# Patient Record
Sex: MALE | Race: WHITE | NOT HISPANIC OR LATINO | Employment: OTHER | URBAN - METROPOLITAN AREA
[De-identification: names, ages, dates, MRNs, and addresses within clinical notes are randomized per-mention and may not be internally consistent; named-entity substitution may affect disease eponyms.]

---

## 2018-05-01 ENCOUNTER — RECORDS - HEALTHEAST (OUTPATIENT)
Dept: LAB | Facility: CLINIC | Age: 78
End: 2018-05-01

## 2018-05-01 LAB
ALBUMIN SERPL-MCNC: 3.6 G/DL (ref 3.5–5)
ALP SERPL-CCNC: 86 U/L (ref 45–120)
ALT SERPL W P-5'-P-CCNC: 22 U/L (ref 0–45)
ANION GAP SERPL CALCULATED.3IONS-SCNC: 11 MMOL/L (ref 5–18)
AST SERPL W P-5'-P-CCNC: 25 U/L (ref 0–40)
BILIRUB SERPL-MCNC: 0.3 MG/DL (ref 0–1)
BUN SERPL-MCNC: 25 MG/DL (ref 8–28)
CALCIUM SERPL-MCNC: 9.3 MG/DL (ref 8.5–10.5)
CHLORIDE BLD-SCNC: 106 MMOL/L (ref 98–107)
CHOLEST SERPL-MCNC: 179 MG/DL
CO2 SERPL-SCNC: 21 MMOL/L (ref 22–31)
CREAT SERPL-MCNC: 1.13 MG/DL (ref 0.7–1.3)
FASTING STATUS PATIENT QL REPORTED: NO
GFR SERPL CREATININE-BSD FRML MDRD: >60 ML/MIN/1.73M2
GLUCOSE BLD-MCNC: 122 MG/DL (ref 70–125)
HDLC SERPL-MCNC: 46 MG/DL
LDLC SERPL CALC-MCNC: 109 MG/DL
POTASSIUM BLD-SCNC: 4.2 MMOL/L (ref 3.5–5)
PROT SERPL-MCNC: 7 G/DL (ref 6–8)
PSA SERPL-MCNC: 2.6 NG/ML (ref 0–6.5)
SODIUM SERPL-SCNC: 138 MMOL/L (ref 136–145)
TRIGL SERPL-MCNC: 122 MG/DL

## 2019-05-02 ENCOUNTER — RECORDS - HEALTHEAST (OUTPATIENT)
Dept: LAB | Facility: CLINIC | Age: 79
End: 2019-05-02

## 2019-05-02 LAB
ANION GAP SERPL CALCULATED.3IONS-SCNC: 10 MMOL/L (ref 5–18)
BUN SERPL-MCNC: 22 MG/DL (ref 8–28)
CALCIUM SERPL-MCNC: 9.5 MG/DL (ref 8.5–10.5)
CHLORIDE BLD-SCNC: 106 MMOL/L (ref 98–107)
CHOLEST SERPL-MCNC: 173 MG/DL
CO2 SERPL-SCNC: 22 MMOL/L (ref 22–31)
CREAT SERPL-MCNC: 1.06 MG/DL (ref 0.7–1.3)
FASTING STATUS PATIENT QL REPORTED: YES
GFR SERPL CREATININE-BSD FRML MDRD: >60 ML/MIN/1.73M2
GLUCOSE BLD-MCNC: 92 MG/DL (ref 70–125)
HDLC SERPL-MCNC: 48 MG/DL
LDLC SERPL CALC-MCNC: 114 MG/DL
POTASSIUM BLD-SCNC: 4.7 MMOL/L (ref 3.5–5)
SODIUM SERPL-SCNC: 138 MMOL/L (ref 136–145)
TRIGL SERPL-MCNC: 56 MG/DL

## 2020-06-17 ENCOUNTER — RECORDS - HEALTHEAST (OUTPATIENT)
Dept: LAB | Facility: CLINIC | Age: 80
End: 2020-06-17

## 2020-06-17 LAB
ALBUMIN SERPL-MCNC: 3.8 G/DL (ref 3.5–5)
ALP SERPL-CCNC: 87 U/L (ref 45–120)
ALT SERPL W P-5'-P-CCNC: 13 U/L (ref 0–45)
ANION GAP SERPL CALCULATED.3IONS-SCNC: 8 MMOL/L (ref 5–18)
AST SERPL W P-5'-P-CCNC: 17 U/L (ref 0–40)
BILIRUB SERPL-MCNC: 0.4 MG/DL (ref 0–1)
BUN SERPL-MCNC: 21 MG/DL (ref 8–28)
CALCIUM SERPL-MCNC: 9.3 MG/DL (ref 8.5–10.5)
CHLORIDE BLD-SCNC: 106 MMOL/L (ref 98–107)
CHOLEST SERPL-MCNC: 176 MG/DL
CO2 SERPL-SCNC: 23 MMOL/L (ref 22–31)
CREAT SERPL-MCNC: 1.2 MG/DL (ref 0.7–1.3)
FASTING STATUS PATIENT QL REPORTED: NORMAL
GFR SERPL CREATININE-BSD FRML MDRD: 59 ML/MIN/1.73M2
GLUCOSE BLD-MCNC: 113 MG/DL (ref 70–125)
HDLC SERPL-MCNC: 43 MG/DL
LDLC SERPL CALC-MCNC: 108 MG/DL
POTASSIUM BLD-SCNC: 4.1 MMOL/L (ref 3.5–5)
PROT SERPL-MCNC: 7 G/DL (ref 6–8)
SODIUM SERPL-SCNC: 137 MMOL/L (ref 136–145)
TRIGL SERPL-MCNC: 124 MG/DL

## 2021-01-13 ENCOUNTER — RECORDS - HEALTHEAST (OUTPATIENT)
Dept: LAB | Facility: CLINIC | Age: 81
End: 2021-01-13

## 2021-01-13 ENCOUNTER — TRANSFERRED RECORDS (OUTPATIENT)
Dept: HEALTH INFORMATION MANAGEMENT | Facility: CLINIC | Age: 81
End: 2021-01-13

## 2021-01-13 LAB
ANION GAP SERPL CALCULATED.3IONS-SCNC: 7 MMOL/L (ref 5–18)
BUN SERPL-MCNC: 24 MG/DL (ref 8–28)
CALCIUM SERPL-MCNC: 8.7 MG/DL (ref 8.5–10.5)
CHLORIDE BLD-SCNC: 107 MMOL/L (ref 98–107)
CO2 SERPL-SCNC: 23 MMOL/L (ref 22–31)
CREAT SERPL-MCNC: 1.09 MG/DL (ref 0.7–1.3)
GFR SERPL CREATININE-BSD FRML MDRD: >60 ML/MIN/1.73M2
GLUCOSE BLD-MCNC: 90 MG/DL (ref 70–125)
POTASSIUM BLD-SCNC: 4.5 MMOL/L (ref 3.5–5)
SODIUM SERPL-SCNC: 137 MMOL/L (ref 136–145)

## 2021-04-07 ENCOUNTER — PRE VISIT (OUTPATIENT)
Dept: NEUROLOGY | Facility: CLINIC | Age: 81
End: 2021-04-07

## 2021-04-07 DIAGNOSIS — G60.0 CMT (CHARCOT-MARIE-TOOTH DISEASE): Primary | ICD-10-CM

## 2021-04-07 NOTE — TELEPHONE ENCOUNTER
CMT NEW PATIENT    Family History of CMT?  o  Father diagnosed with CMT - had very high arches, hammertoes, foot problems, trouble walking in the later years  o Paternal cousin (from father's sister) recently dagnosed with CMT 2-3 years ago, having balance problems  o Pt's daughter was tested around age 7 - had EMG and diagnosed with CMT, has shoes inserts, no major walking/balance problems, has never been very athletic as this was difficult for her  o Pt's grandson (daughter's son) - possible signs of CMT - not tested yet, feet looked like his mother's when she was young, still athletic       When did their symptoms start? Began in childhood, had very flat feet, one foot started to invert, local orthopedist placed feet in casts on both feet for 6 months, had foot surgery when he was young, was never athletic, could not run, poor balance, was finally diagnosed about 40 years ago. Pt had seen a Dr. Christopher Iniguez (an orthopedist), R foot beginning to become numb      Who initially gave them the diagnosis? Dr. Christopher Luther's at [facility]. Clinic and hospital has now closed. EMG and muscle biopsy was done but likely no records at this time.       Has genetic testing been completed? If so, do they know what type they have? None      Hammertoes? Yes, gets calluses on feet more often now, progressively gotten worse over the years      High Arches? Not very high arches, had some foot surgery in the past as a child       Ankle weakness/foot drop? Yes, has both. Occasional trips and falls mostly when walking onto curbs or uneven ground. Had multiple falls 4-5 years ago but has been better past couple years 2-3, had fall past year and hit his head and got couple stitches above eyebrow       Hand weakness? 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 Uses walking stick if hiking, harder to stand on  uneven ground, used more for balance, sometimes hard to maintain balance if standing still        Wearing orthotics (AFO s or foot orthotics)? Yes, has inserts for his arches, last saw orthotist 3 years ago      To determine if they qualify for any research studies, would they be interested in meeting with the Research Coordinator? 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   o Losartan 50mg, once daily  o diltiazem 120mg, once daily  o Aspirin at bedtime   o No allergies       What to expect during your visit:    The CMT clinic takes place every 2nd Tuesday of the month. It is a multi-disciplinary clinic where the pt sees multiple specialists during their visit. The team consists of the neurologist, physical therapist, occupational therapist, orthotist, genetic counselor, , research coordinator and a representative from the [employer] .     If they are scheduled in the afternoon, we will need them to arrive at 12:15PM. Their departure time will vary but could be as late as 5PM.    Did they receive the NPP? If not, we can either fax it or email it to them. To be sent to pt, will call pt closer to appt date and make sure they received or else e-mail them copy    It is imperative that they have this completed and can bring this with to the appt. Dr Love requires that all new pt s fill this questionnaire  out.     We will be in touch with you closer to the appt date to update your chart and complete a pre-visit .       RECAP OF SERVICES NEEDED: PT, genetic counselor, orthotist, OT referral if ordered, SW, research coordinator, VINNY Reilly RNCC  Neurology

## 2021-04-07 NOTE — TELEPHONE ENCOUNTER
RECORDS RECEIVED FROM: Self   Date of Appt: 7/12/21   NOTES (FOR ALL VISITS) STATUS DETAILS   OFFICE NOTE from referring provider N/A    OFFICE NOTE from other specialist In process Josephra:   DISCHARGE SUMMARY from hospital N/A    DISCHARGE REPORT from the ER N/A    OPERATIVE REPORT N/A    MEDICATION LIST In process    IMAGING  (FOR ALL VISITS)     EMG N/A    EEG N/A    LUMBAR PUNCTURE N/A    JIM SCAN N/A    ULTRASOUND (CAROTID BILAT) *VASCULAR* N/A    MRI (HEAD, NECK, SPINE) N/A    CT (HEAD, NECK, SPINE) N/A       Action 4/7/21 MV 3.00pm   Action Taken Records request faxed to [email or web address]     Action 4/8/21 MV 12.59pm   Action Taken Records received from [email or web address], however pt cancelled appt. Sent records to scanning.

## 2021-05-01 ENCOUNTER — HEALTH MAINTENANCE LETTER (OUTPATIENT)
Age: 81
End: 2021-05-01

## 2021-05-05 NOTE — TELEPHONE ENCOUNTER
Pt contacted and pt's wife informed of arrival time to be 12:15pm. Updated that neurology clinic is now located on the first floor and to enter via East entrance and check-in at desk C. Pt to bring completed NPP with him to appt.       Bertha Reilly, RNCC  Neurology

## 2021-05-11 ENCOUNTER — OFFICE VISIT (OUTPATIENT)
Dept: NEUROLOGY | Facility: CLINIC | Age: 81
End: 2021-05-11
Payer: COMMERCIAL

## 2021-05-11 ENCOUNTER — DOCUMENTATION ONLY (OUTPATIENT)
Dept: ORTHOPEDICS | Facility: CLINIC | Age: 81
End: 2021-05-11

## 2021-05-11 ENCOUNTER — HOSPITAL ENCOUNTER (OUTPATIENT)
Dept: PHYSICAL THERAPY | Facility: CLINIC | Age: 81
Setting detail: THERAPIES SERIES
End: 2021-05-11
Attending: PSYCHIATRY & NEUROLOGY
Payer: COMMERCIAL

## 2021-05-11 VITALS
HEART RATE: 73 BPM | DIASTOLIC BLOOD PRESSURE: 77 MMHG | SYSTOLIC BLOOD PRESSURE: 151 MMHG | BODY MASS INDEX: 29.55 KG/M2 | WEIGHT: 195 LBS | OXYGEN SATURATION: 98 % | HEIGHT: 68 IN

## 2021-05-11 DIAGNOSIS — G60.0 CMT (CHARCOT-MARIE-TOOTH DISEASE): Primary | ICD-10-CM

## 2021-05-11 DIAGNOSIS — G60.0 CMT (CHARCOT-MARIE-TOOTH DISEASE): ICD-10-CM

## 2021-05-11 DIAGNOSIS — M25.373 ANKLE INSTABILITY: Primary | ICD-10-CM

## 2021-05-11 LAB — MISCELLANEOUS TEST: NORMAL

## 2021-05-11 PROCEDURE — 97110 THERAPEUTIC EXERCISES: CPT | Mod: GP | Performed by: PHYSICAL THERAPIST

## 2021-05-11 PROCEDURE — 99207 PR SATISFY VISIT NUMBER: CPT | Performed by: GENETIC COUNSELOR, MS

## 2021-05-11 PROCEDURE — 97161 PT EVAL LOW COMPLEX 20 MIN: CPT | Mod: GP | Performed by: PHYSICAL THERAPIST

## 2021-05-11 PROCEDURE — 96040 PR GENETIC COUNSELING, EACH 30 MIN: CPT | Performed by: GENETIC COUNSELOR, MS

## 2021-05-11 PROCEDURE — 99202 OFFICE O/P NEW SF 15 MIN: CPT | Mod: GC | Performed by: PSYCHIATRY & NEUROLOGY

## 2021-05-11 RX ORDER — ASPIRIN 325 MG
TABLET ORAL DAILY
COMMUNITY
End: 2022-05-10 | Stop reason: CLARIF

## 2021-05-11 RX ORDER — LORATADINE 10 MG/1
TABLET ORAL PRN
COMMUNITY
End: 2024-01-18

## 2021-05-11 ASSESSMENT — MIFFLIN-ST. JEOR: SCORE: 1579.01

## 2021-05-11 ASSESSMENT — PAIN SCALES - GENERAL: PAINLEVEL: SEVERE PAIN (7)

## 2021-05-11 NOTE — Clinical Note
5/11/2021         RE: Enrike Sotomayor  [address]  [place] [facility] [address]        Dear Colleague,    Thank you for referring your patient, Enrike Sotomayor, to the [facility] [place]. Please see a copy of my visit note below.    No notes on file    Again, thank you for allowing me to participate in the care of your patient.        Sincerely,        Maryanne Gardner GC

## 2021-05-11 NOTE — LETTER
5/11/2021         RE: Enrike Sotomayor  [address] [facility] [address]        Dear Colleague,    Thank you for referring your patient, Enrike Sotomayor, to the [facility] [place]. Please see a copy of my visit note below.    Neuromuscular CMT Consult Note    Chief Complaint: Imbalance    History of Present Illness:    Mr. Sotomayor is a 80 year old male with a h/o HTN who presents for evaluation of peripheral neuropathy. He first noted difficulty with imbalance and clumsiness at a child. He felt as though he was slow with running and could not keep up with his peers.  He stated he has always had flat feet and had foot surgery bilaterally at a young age but wasn't quite sure what it entailed.  He stated he's always had trouble with catching his feet on the ground with walking or with climbing stairs.  He does not use AFOs but has inserts for his shoes. He has only fallen once in the last year but attributes this to being more careful while walking.  He doesn't use a cane or walker. He does use a walking stick while hiking. He does note weakness in his hands that has been present for many years. No numbness described in the hands. He stated he does not have difficulty with fine motor tasks. He previously worked as a  for [employer] and did not have any difficulty with hand function at work. He does describe numnbess in the left toes for about the last 6-8 years ago and now the right toes in the last few months. He also notices intermittent numbness on the dorsum of the right foot when he crosses his legs.        Prior pertinent laboratory work-up:  No prior genetic testing    Prior pertinent imaging work-up:  None    Prior electrophysiologic work-up:  No prior EMG on file     Past Medical History:   HTN    Past Surgical History:  No past surgical history on file.    Family history:    History of CMT in father (high arches and imbalance), paternal cousin, daughter (high arches, not  "necessarily imbalance), and possibly his daughter's son. No prior genetic testing.      Social History:    Pt denies tobacco or illicit drug use. Occasional glass of wine or beer during the week. There is no known exposure to toxins or heavy metals.     Medical Allergies:    Allergies   Allergen Reactions     Pneumovax  [Pneumococcal Polysaccharides]      Other reaction(s): fever,swollen, red arm     Current Medications:     Current Outpatient Medications:      aspirin (ASA) 325 MG EC tablet, daily, Disp: , Rfl:      diltiazem ER (DILT-XR) 120 MG 24 hr capsule, Take 120 mg by mouth daily, Disp: , Rfl:      losartan (COZAAR) 50 MG tablet, Take 50 mg by mouth daily, Disp: , Rfl:      aspirin (ASPIRIN ADULT) 325 MG tablet, daily, Disp: , Rfl:      loratadine (CLARITIN) 10 MG tablet, as needed, Disp: , Rfl:      Review of Systems: A complete review of systems was obtained and was negative except for what was noted above.    Physical examination:    BP (!) 151/77   Pulse 73   Ht 1.727 m (5' 8\")   Wt 88.5 kg (195 lb)   SpO2 98%   BMI 29.65 kg/m    General Appearance: NAD    Skin: There are no rashes or other skin lesions.    Musculoskeletal:  Pes cavus and hammertoes noted.    Neurologic examination:    Mental status:  Patient is alert, attentive, and oriented x 3.  Language is coherent and fluent without dysarthria or aphasia.  Memory, comprehension and ability to follow commands were intact.       Cranial nerves: Pupils were round and reacted to light.  Extraocular movements were full. There was no face, jaw, palate or tongue weakness or atrophy. Hearing was grossly intact.  Shoulder shrug was normal.      Motor exam: Manual muscle testing revealed the following MRC grade muscle power:   Right Left   Neck flexion 5    Neck extension 5    Shoulder abduction 5 5   Elbow extension 5 5   Elbow flexion  5 5   Wrist extension 5 5   Finger extension 5 5   Finger flexion 5 5   FDI 4 4-   APB 5 4+   Hip flexion 5 5   Knee " extension 5 5   Knee flexion 5 5   Dorsiflexion 4 4-   Plantarflexion 5 5     Complex motor skills revealed normal coordination.  Finger-nose- finger and heel to shin were intact.       Sensory exam revealed vibration to be absent at the toes, 5-6s at the ankle and 6s at the knee, 12s at the fingers. PP reduced to ankle. Light touch reduced to mid calf.     Steppage gait.      Deep tendon reflexes:   Right Left   Triceps 0 0   Biceps 0 0   Brachioradialis 0 0   Knee jerk 0 0   Ankle jerk 0 0   Mcghee's negative b/l.     Assessment:    Mr. Sotomayor is a 80 year old male with a h/o HTN who presents for evaluation of peripheral neuropathy. 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 We will plan to have our genetic counselor meet with him today to further discuss genetic testing.  He will also visit with PT given his imbalance and distal lower limb weakness along with our orthotist to assess need for AFOs. He did note transient numbness on the dorsum of the right foot when crossing his legs and recommended he avoid this positioning as this could lead to a peroneal neuropathy. We also reviewed medications to avoid.        Plan:      1. PT  2. Genetic counselor  3. Orthotist  4. Avoid leg crossing or pressure of footwear on foot dorsum  5. List of medications to avoid provided  6. Follow up in 1 year    Patient seen and examined with attending neurologist, Dr. Love, who agrees with above.     Joe Hope MD  Neuromuscular Fellow    I personally examined the patient and concur with the resident's note.  25 minutes spent on the date of the encounter on chart review, history and examination, documentation and further activities as noted above.    Enrike Love M.D.      Again, thank you for allowing me to participate in the care of your patient.        Sincerely,        Enrike Love MD

## 2021-05-11 NOTE — PATIENT INSTRUCTIONS
Plan  1. Physical therapy and orthotics evaluations today  2. Genetic counselor visit today  3. Avoid leg crossing  4. List of medications to avoid provided  5. Follow up in 1 year  6. [employer] link to register sent via My Chart  7. 1 year follow-up scheduled for May 10th, 2021

## 2021-05-11 NOTE — PROGRESS NOTES
OUTPATIENT PHYSICAL THERAPY CLINIC NOTE  Enrike Sotomayor  YOB: 1940  [id number]    Type of visit:         Evaluation & Treatment     Date of service: 2021    Referring provider: Dr. Love     Medical diagnosis:   CMT    Date of diagnosis:     Pertinent history of current problem (include personal factors and/or comorbidities that impact the plan of care): new patient visit to CMT clinic; here to establish care; questions regarding exercise. PMHx: right patellar tendon rupture with repair (); left TKA ()    Living environment:  House    Living environment barriers:  2 stairs to enter (no railing present)  18 stairs within home (1 railing present)     Current assistance/living environment:  Lives with spouse      Current mobility equipment:  Orthotics: foot orthotics  Walking stick     Current ADL equipment:  None    Technology used: cell phone, computer, tablet    Evaluation   Interview completed.   Pain assessment:  Pain present  Location: bilateral feet/Ratin/10 at best, 6/10 at worst (left toes)                                                   0/10 at best, 6/10 at worst (right foot numbness)   Range of motion: WFL bilateral L/Es, except mild hamstring tightness & dorsiflexion to 0 degrees bilaterally with knee extended     Manual muscle testing: hip flex 5/5 bilaterally; knee ext & flex 5/5 bilaterally; ankle dorsiflexion 3-/5 bilaterally; ankle plantar flexion 4-/5 on right, 3-/5 on left   Gait: independent without device; exhibits kyphotic postural alignment, increased base of support, foot slap bilaterally, decreased push-off bilaterally   25 ft timed walk: 10 steps in 5.15 seconds without device    Fall Risk Screen:   Has the patient fallen 2 or more times in the last year? No      Has the patient fallen and had an injury in the past year? No       Timed Up and Go Score: 6.95 seconds without device    Is the patient a fall risk? Yes, department fall risk interventions  implemented     Impairments:  Muscle atrophy  Balance  Pain  Range of motion     Treatment diagnosis:  Impaired mobility    Clinical Presentation: Stable/Uncomplicated  Clinical Presentation Rationale: based upon subjective information provided, objective exam findings, medical history & clinical judgement   Clinical Decision Making (Complexity): Low complexity     Recommendations/Plan of care:  1 session evaluation & treatment.     Goals:   Target date: 5/11/2021  Patient will verbalize understanding of evaluation results and implications for functional performance.  Patient will verbalize/demonstrate understanding of home program.    Educational assessment/barriers to learning:   No barriers noted     Treatment provided this date:   Therapeutic procedures, 15 minutes    Response to treatment/recommendations:   Reviewed results of PT exam with patient and provided education regarding importance of maintaining flexibility in toes & ankles to prevent complications related to joint stiffness. Patient has been completing standing gastroc & soleus stretches; reviewed technique to assure correct positioning. Instructed in seated toe flexor stretches; demonstrated technique for patient & issued written instructions with illustrations - patient able to return demonstration of correct technique by end of visit (goal attained). Reviewed current HEP & discussed importance of feeling energized by exercises, not fatigued; encouraged patient to continually monitor for signs of intolerance and to modify as necessary. Patient verbalizes understanding of education provided (goal attained).     Goal attainment:  All goals met     Risks and benefits of evaluation/treatment have been explained.  Patient is in agreement with Plan of Care.     Timed Code Treatment Minutes: 15 min  Total Treatment Time (sum of timed and untimed services): 45 min    Signature: Fabiola Bolivar, PT   Date: 5/11/2021    Certification:  Onset date:  4/7/2021  Start of care date: 5/11/2021  Certification date from 5/11/2021 to 6/10/2021    I CERTIFY THE NEED FOR THESE SERVICES FURNISHED UNDER        THIS PLAN OF TREATMENT AND WHILE UNDER MY CARE     (Physician co-signature of this document indicates review and certification of the therapy plan).

## 2021-05-11 NOTE — PROGRESS NOTES
S.  Pt. was seen today, at Dr. Love's CMT clinic, for an evaluation for lower extremity bracing.      O.Goal.  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    A.  Pt. is an active 80 year old male.  He enjoys golfing during his free time.  He is currently wearing size 10.5 New balance shoes with accommodative custom foot orthotics, which he stated work well for him.  He has extreme pes planus and external rotation.  I have tried Turbomed afo's on the patient and he was very surprised by how well they worked for him.  We both felt that bilateral turbomed afo's would help him with stance, gait, stairs, and help with ADL's.  They will keep him much safer from falls.  The plan is to check his coverage and to let him know.  I have the AFO's in my office at this time if he decides that he would like to go forward with getting them.    P.  Pt. has been instructed to contact our facility with any future questions and/or concerns.    Jorge Alberto MCCOY/MARI

## 2021-05-11 NOTE — NURSING NOTE
"Enrike Sotomayor's goals for this visit include: consult  He requests these members of his care team be copied on today's visit information:     PCP: Louis Arora    Referring Provider:  No referring provider defined for this encounter.    BP (!) 151/77   Pulse 73   Ht 1.727 m (5' 8\")   Wt 88.5 kg (195 lb)   SpO2 98%   BMI 29.65 kg/m      Do you need any medication refills at today's visit? n  "

## 2021-05-11 NOTE — PROGRESS NOTES
Neuromuscular CMT Consult Note    Chief Complaint: Imbalance    History of Present Illness:    Mr. Sotomayor is a 80 year old male with a h/o HTN who presents for evaluation of peripheral neuropathy. He first noted difficulty with imbalance and clumsiness at a child. He felt as though he was slow with running and could not keep up with his peers.  He stated he has always had flat feet and had foot surgery bilaterally at a young age but wasn't quite sure what it entailed.  He stated he's always had trouble with catching his feet on the ground with walking or with climbing stairs.  He does not use AFOs but has inserts for his shoes. He has only fallen once in the last year but attributes this to being more careful while walking.  He doesn't use a cane or walker. He does use a walking stick while hiking. He does note weakness in his hands that has been present for many years. No numbness described in the hands. He stated he does not have difficulty with fine motor tasks. He previously worked as a  for [employer] and did not have any difficulty with hand function at work. He does describe numnbess in the left toes for about the last 6-8 years ago and now the right toes in the last few months. He also notices intermittent numbness on the dorsum of the right foot when he crosses his legs.        Prior pertinent laboratory work-up:  No prior genetic testing    Prior pertinent imaging work-up:  None    Prior electrophysiologic work-up:  No prior EMG on file     Past Medical History:   HTN    Past Surgical History:  No past surgical history on file.    Family history:    History of CMT in father (high arches and imbalance), paternal cousin, daughter (high arches, not necessarily imbalance), and possibly his daughter's son. No prior genetic testing.      Social History:    Pt denies tobacco or illicit drug use. Occasional glass of wine or beer during the week. There is no known exposure to toxins or heavy metals.     Medical  "Allergies:    Allergies   Allergen Reactions     Pneumovax  [Pneumococcal Polysaccharides]      Other reaction(s): fever,swollen, red arm     Current Medications:     Current Outpatient Medications:      aspirin (ASA) 325 MG EC tablet, daily, Disp: , Rfl:      diltiazem ER (DILT-XR) 120 MG 24 hr capsule, Take 120 mg by mouth daily, Disp: , Rfl:      losartan (COZAAR) 50 MG tablet, Take 50 mg by mouth daily, Disp: , Rfl:      aspirin (ASPIRIN ADULT) 325 MG tablet, daily, Disp: , Rfl:      loratadine (CLARITIN) 10 MG tablet, as needed, Disp: , Rfl:      Review of Systems: A complete review of systems was obtained and was negative except for what was noted above.    Physical examination:    BP (!) 151/77   Pulse 73   Ht 1.727 m (5' 8\")   Wt 88.5 kg (195 lb)   SpO2 98%   BMI 29.65 kg/m    General Appearance: NAD    Skin: There are no rashes or other skin lesions.    Musculoskeletal:  Pes cavus and hammertoes noted.    Neurologic examination:    Mental status:  Patient is alert, attentive, and oriented x 3.  Language is coherent and fluent without dysarthria or aphasia.  Memory, comprehension and ability to follow commands were intact.       Cranial nerves: Pupils were round and reacted to light.  Extraocular movements were full. There was no face, jaw, palate or tongue weakness or atrophy. Hearing was grossly intact.  Shoulder shrug was normal.      Motor exam: Manual muscle testing revealed the following MRC grade muscle power:   Right Left   Neck flexion 5    Neck extension 5    Shoulder abduction 5 5   Elbow extension 5 5   Elbow flexion  5 5   Wrist extension 5 5   Finger extension 5 5   Finger flexion 5 5   FDI 4 4-   APB 5 4+   Hip flexion 5 5   Knee extension 5 5   Knee flexion 5 5   Dorsiflexion 4 4-   Plantarflexion 5 5     Complex motor skills revealed normal coordination.  Finger-nose- finger and heel to shin were intact.       Sensory exam revealed vibration to be absent at the toes, 5-6s at the ankle " and 6s at the knee, 12s at the fingers. PP reduced to ankle. Light touch reduced to mid calf.     Steppage gait.      Deep tendon reflexes:   Right Left   Triceps 0 0   Biceps 0 0   Brachioradialis 0 0   Knee jerk 0 0   Ankle jerk 0 0   Mcghee's negative b/l.     Assessment:    Mr. Sotomayor is a 80 year old male with a h/o HTN who presents for evaluation of peripheral neuropathy. 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 We will plan to have our genetic counselor meet with him today to further discuss genetic testing.  He will also visit with PT given his imbalance and distal lower limb weakness along with our orthotist to assess need for AFOs. He did note transient numbness on the dorsum of the right foot when crossing his legs and recommended he avoid this positioning as this could lead to a peroneal neuropathy. We also reviewed medications to avoid.        Plan:      1. PT  2. Genetic counselor  3. Orthotist  4. Avoid leg crossing or pressure of footwear on foot dorsum  5. List of medications to avoid provided  6. Follow up in 1 year    Patient seen and examined with attending neurologist, Dr. Love, who agrees with above.     Joe Hope MD  Neuromuscular Fellow    I personally examined the patient and concur with the resident's note.  25 minutes spent on the date of the encounter on chart review, history and examination, documentation and further activities as noted above.    Enrike Love M.D.

## 2021-05-26 LAB — LAB SCANNED RESULT: ABNORMAL

## 2021-06-02 ENCOUNTER — TELEPHONE (OUTPATIENT)
Dept: CONSULT | Facility: CLINIC | Age: 81
End: 2021-06-02

## 2021-06-02 NOTE — TELEPHONE ENCOUNTER
LVM for patient to call me back directly to schedule results return visit with Maryanne Gardner in adult Neuro.

## 2021-06-04 NOTE — TELEPHONE ENCOUNTER
Spoke with patient and assisted in scheduling appointment.     Future Appointments   Date Time Provider Department Center   6/15/2021  1:00 PM Maryanne Gardner GC [facility]

## 2021-06-15 ENCOUNTER — VIRTUAL VISIT (OUTPATIENT)
Dept: NEUROLOGY | Facility: CLINIC | Age: 81
End: 2021-06-15
Payer: COMMERCIAL

## 2021-06-15 DIAGNOSIS — G60.0 CMT (CHARCOT-MARIE-TOOTH DISEASE): Primary | ICD-10-CM

## 2021-06-15 PROCEDURE — 99207 PR NO BILLABLE SERVICE THIS VISIT: CPT | Mod: GT | Performed by: GENETIC COUNSELOR, MS

## 2021-06-15 PROCEDURE — 99207 PR NO CHARGE LOS: CPT | Performed by: GENETIC COUNSELOR, MS

## 2021-06-15 RX ORDER — SILDENAFIL 100 MG/1
TABLET, FILM COATED ORAL
COMMUNITY
Start: 2021-03-08 | End: 2024-06-28

## 2021-06-15 NOTE — PROGRESS NOTES
Louis is a 80 year old who is being evaluated via a billable video visit.      How would you like to obtain your AVS? MyChart  If the video visit is dropped, the invitation should be resent by: Send to e-mail at: [email or web address]  Will anyone else be joining your video visit? No        Video-Visit Details    Type of service:  Video Visit        Originating Location (pt. Location): Home    Distant Location (provider location):  [facility]UROLOG[facility]IN[facility] [place]     Platform used for Video Visit: [employer]

## 2021-06-17 NOTE — PROGRESS NOTES
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 He was evaluated in the Charcot Carla Tooth multidisciplinary clinic today by multiple specialists. I met with the family at the request of Dr. Love to obtain a family history, discuss possible genetic contributions to his symptoms, and to obtain informed consent for genetic testing.     Personal History: Louis has a history of hammertoes, foot drop and hand weakness. His clinical picture is most consistent with CMT. See Dr. Love's note for additional details.     Family History: A three generation pedigree was obtained at Louis's previous appointment and scanned into the EMR. After his appointment, Louis contacted me with corrected information related to this history. These changes have been made. This family history is by patient report only and has not been verified with medical records except where noted. The following information is significant:     Louis has two children. His daughter (age 48) has some difficulty walking as a child and was fitted with plastic braces to fit inside her hi-top shoes.  She stopped wearing those braces when she started . She has two sons. One son (age 19) is healthy. One son (age 16) who has feet that turned inward when he was younger but did not require treatment and has little problem now.      His son (age 51) is quite athletic and is still playing soccer (with guys much younger than he!).  However, he did have a seizure about 4 years ago and is under a doctor's care for that, although he has not had recurrence of seizures. He has two sons. One son (age 19) is healthy. One son (age 16) who has feet that turned inward when he was younger but did not require treatment.    Louis has one sister (age 73) who has bad knees and difficulty walking and standing. Limited information is available regarding her health.    Louis's father  at age 82 due to heart  failure. He had a history of high arches, problems with his feet, walking and balance. He used a cane, walker and lift chair.He had three sisters and four brothers who have passed away. One of this brother's  due to cancer and the others  due to heart problems. One of Louis's paternal female cousins (age 84) has CMT that was diagnosed at age 83 after a fall. Her voice is slurred.    Louis's paternal grandfather  early and Louis's paternal grandmother  later in life. Limited information is available regarding Louis's paternal grandparents. Paternal ancestry is Polish.    Louis's maternal history was not obtained at this appointment. Maternal ancestry is [place] and [place].     Consanguinity was denied.     See scanned pedigree under the media tab.    Discussion: Clinical features, genetics and inheritance of Charcot Carla Tooth disease and options for genetic testing were reviewed.     Charcot-Carla-Tooth Neuropathy (CMT) is common genetic form of peripheral neuropathy affecting 1 in 2500 individuals. In general, CMT is a condition that affects the peripheral nerves. Our peripheral nerves carry messages from our brain and our spine to the rest of our body and back. When these nerves are not working properly, this can lead to symptoms in the motor and sensory systems, especially in the parts of the body that are farthest away from the brain and spine.     Each nerve has two parts, the covering around the nerve called the myelin and the central part of the nerve called the axon. The myelin controls the speed at which the message is sent. The axon controls the strength of that message. The part of the nerve that is not functioning properly tells us what type of CMT an individual has.    There are many different types of CMT. These types are labeled with numbers (1,2,4 etc) and can be determined based on the results of a nerve conduction study. CMT Type 1 is the demyelinating form of the condition.   Demyelinating  means that this type of CMT causes the covering around the nerve (myelin) to form incorrectly. When the myelin is not formed correctly the messages cannot travel the whole length of the nerve and they do not travel at the speed that they would in an individual who does not have CMT. CMT Type 2 is the axonal form of the condition.  Axonal  means that the axon of the nerve is damaged and the messages being sent down the nerves are not clear or accurate. This leads to the muscles not being able to understand the garbled message that is being sent. There are also some less common types of CMT (3, 4) that have a mixture of these axonal and demyelinating features. There are also many subtypes of CMT within each type. These subtypes are labeled with letters (A, B, C etc) and can only be determined through genetic testing.     Louis was counseled that CMT is a peripheral neuropathy that causes damage to two types of the nerves, the motor nerves (involved in muscle movement) and the sensory nerves (involved in sensation or feeling). Damage to motor nerves causes muscle weakness and difficulty with muscle movement, especially in the hands and feet. This can lead to difficulty walking, writing, putting on jewelry and many other types of movement. Damage to the sensory nerves can cause numbness, tingling and impaired balance, which can lead to fatigue.    CMT is highly variable, even within members of the same family. Individuals may not experience any symptoms, these symptoms may be mild, or they might begin as mild and gradually become more severe.    Basic genetic information was reviewed. Our genes are sequences of letters that provide instructions that help our body grow, develop and function. Sometimes a change occurs in one of our genes that causes the body to be unable to read these instructions. This results in a genetic condition.     We know that there are many different types of genetic changes that  cause CMT. The genetic changes that cause CMT could be a change in one letter in a gene or it could be a deletion or duplication of part or all of the gene. For this reason, testing for a panel of genes related to CMT is recommended. This testing looks at many genes that are associated with CMT to determine if any changes are present.    There are three possible results for this testing:    o Positive: A positive result indicates that a genetic variant has been identified that explains the cause of Enrike s symptoms. A positive result may provide information on prognosis and other symptoms related to the genetic change. It may also help guide medical management for him and may provide information to other family members regarding their risk.   o Negative: A negative result indicates that a disease causing genetic variant was not identified  o Variant of uncertain significance (VUS): A VUS is an uncertain result that indicates a genetic change was identified, but it is currently unknown if that change is associated with a genetic disorder.       Risks benefits and limitations of this testing were reviewed. A positive result in any of the genes that are tested would provide a diagnosis of CMT, explain the cause of Enrike's clinical symptoms, provide information on prognosis and appropriate medical management, as well as provide information regarding risk to other family members. For this reason, this testing is considered medically necessary and standard of care for patients like Enrike.     Next Generation Sequencing is a well established technology utilized by all molecular genetic labs throughout the country for identifying disease-causing mutations in various genes.  Next Generation Sequencing is currently the standard of care for genetic testing through the use of panels of genes.  The recommended testing for Enrike  is DIAGNOSTIC testing, and it is NOT investigational.    Risks benefits and limitations of this  testing was reviewed. Louis decided to submit for insurance authorization and complete this testing through the [facility] comprehensive neuropathy panel pending insurance approval.    Plan:   1. Louis expressed an excellent understanding of this information and decided to pursue testing today for the comprehensive neuropathy panel at [facility] pending insurance approval.  2. Return pending results of genetic testing.  3. Contact information was provided should any questions arise in the future.         Maryanne Gardner MS [facility]  Genetic Counselor  Division of Genetics and Metabolism  (p) [phone]  (f) [phone]    Total time spent in consultation with the family was approximately 45 minutes    Cc: No Letter

## 2021-07-12 ENCOUNTER — PRE VISIT (OUTPATIENT)
Dept: NEUROLOGY | Facility: CLINIC | Age: 81
End: 2021-07-12

## 2021-07-14 NOTE — PROGRESS NOTES
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 I met with Louis at the request of Dr. Love to review the results of his genetic testing and his new diagnosis of CMT1A.      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 See Dr. Love's note for additional details.      Family History: A three generation pedigree was obtained at Louis's previous appointment and scanned into the EMR. After his appointment, Louis contacted me with corrected information related to this history. These changes have been made. This family history is by patient report only and has not been verified with medical records except where noted. The following information is significant:     Louis has two children. His daughter (age 48) has some difficulty walking as a child and was fitted with plastic braces to fit inside her hi-top shoes.  She stopped wearing those braces when she started . She has two sons. One son (age 19) is healthy. One son (age 16) who has feet that turned inward when he was younger but did not require treatment and has little problem now.      His son (age 51) is quite athletic and is still playing soccer (with guys much younger than he!).  However, he did have a seizure about 4 years ago and is under a doctor's care for that, although he has not had recurrence of seizures. He has two sons. One son (age 19) is healthy. One son (age 16) who has feet that turned inward when he was younger but did not require treatment.    Louis has one sister (age 73) who has bad knees and difficulty walking and standing. Limited information is available regarding her health.    Louis's father  at age 82 due to heart failure. He had a history of high arches, problems with his feet, walking and balance. He used a cane, walker and lift chair.He had three sisters and four brothers who have passed away.  One of this brother's  due to cancer and the others  due to heart problems. One of Louis's paternal female cousins (age 84) has CMT that was diagnosed at age 83 after a fall. Her voice is slurred.    Louis's paternal grandfather  early and Louis's paternal grandmother  later in life. Limited information is available regarding Louis's paternal grandparents. Paternal ancestry is Polish.    Louis's maternal history was not obtained at this appointment. Maternal ancestry is [place]ian and Polish.     Consanguinity was denied.     See scanned pedigree under the media tab.    Discussion:     Thank you for allowing me to be a part of your healthcare at the [facility].  At your visit on 21 genetic testing related to CMT was pursued. As we discussed on the phone, your genetic test results included a disease causing (pathogenic) variant in the PMP22 gene and a disease causing (pathogenic) variant in the FIG4 gene. This letter is a brief summary of our discussion and these genetic test results. I have also included a copy of the lab report for your records.    Our genes are sequences of letters that provide instructions that help our body grow, develop and function. We all have changes or variations in our genes that make us unique. Some variations cause the body to be unable to read the instructions. These variations are called pathogenic and can result in a genetic condition. Your genetic testing looked at over 100 genes related to CMT to determine if any variants or changes were present.    As we discussed by phone, this test found two disease causing (pathogenic) variants, one in a gene called PMP22 and one in a gene called FIG4. These variants are technically called a full gene duplication of PMP22 and FIG4 [id number]. Disease causing duplications in the PMP22 gene are associated with a condition called CMT1A. Disease causing variants in the FIG4 gene are associated with CMT4J and  Christiana Varan Syndrome.    Clinical Presentation of CMT1A  Charcot Carla Tooth Disease Type 1A or CMT1A is this most common type of CMT, encompassing about 55% of genetically defined CMT and 36% of all CMT diagnoses. CMT1A is a peripheral neuropathy that causes damage to the myelin in two types of the nerves, the motor nerves (involved in muscle movement) and the sensory nerves (involved in sensation or feeling). Damage to motor nerves causes muscle weakness and difficulty with muscle movement, especially in the hands and feet. This can lead to difficulty walking, writing, putting on jewelry and many other types of movement. Weakness in the small muscles of the foot can also lead to changes in the structure of the foot such as hammer toes or high/low arches. Damage to the sensory nerves can cause numbness, tingling and impaired balance, which can lead to fatigue.    CMT1A is highly variable within members of the same family. Individuals may not experience any symptoms, these symptoms may be mild, or they might begin as mild and gradually become more severe. For some individuals, signs of neuropathy may be present in childhood, with about 10% of children experiencing delayed walking. In contrast, about 30% of people with this diagnosis do not experience symptoms until the 3rd decade of life or later. CMT1A does not impact lifespan or intelligence. The symptoms of CMT are generally very slowly progressive and less than 5% of individuals with CMT will need a wheelchair during their lifetime.     Inheritance of CMT1A  Chromosomes are made of DNA and are the packaging that contain our genes. Genes are the instructions that tell our bodies how to build proteins. These proteins function all over our body to keep us healthy. Typically, everyone has two copies of every gene. They inherit one from their mother one from their father. CMT1A is caused by a duplication in the PMP22 gene. Instead of two copies of the PMP22 gene (one  on each chromosome), individuals with CMT1A have three copies of the PMP22 gene (two on one chromosome and one on the other chromosome). This causes the body to produce too much protein which leads to the symptoms we see in CMT1A.    CMT1A is inherited in an autosomal dominant manner. This means that only one extra copy of the PMP22 gene is necessary for an individual to show symptoms of CMT1A. Children get one copy of their chromosome from their mother and one from their father. This means there is a 50% chance that someone with CMT1A will pass on the chromosome containing one copy of PMP22, resulting in a child without CMT1A and there is a 50% chance that they will pass on the chromosome containing two copies of PMP22, resulting in a child with CMT1A. Based on your genetic test results, there is a 50% chance that each of your children have CMT1A and a 50% chance that they do not have this condition.    Genetic changes can be present in multiple family members or they can be brand new and only seen in one family member. It is possible that you inherited this genetic change from a parent. It is also possible that this change is brand new in you. If you inherited this condition from a parent, your sister, cousins and other extended family members are at risk.     FIG4-related conditions  Disease causing changes in the FIG4 gene are associated with CMT4J and Yunis Varan Syndrome. Just like CMT1A, CMT4J or Charcot Carla Tooth Disease type 4J is a peripheral neuropathy that causes damage to the myelin in two types of the nerves, the motor nerves (involved in muscle movement) and the sensory nerves (involved in sensation or feeling). Damage to motor nerves causes muscle weakness and difficulty with muscle movement, especially in the hands and feet. This can lead to difficulty walking, writing, putting on jewelry and many other types of movement. Weakness in the small muscles of the foot can also lead to changes in the  structure of the foot such as hammer toes or high/low arches. Damage to the sensory nerves can cause numbness, tingling and impaired balance, which can lead to fatigue. The symptoms of CMT4J can begin in childhood or adulthood. Individuals with this type of CMT may also experience muscle weakness in the muscle closest to their body (proximal muscles) such as their thighs and shoulders. This is uncommon in other types of CMT.    Yunnis Guilherme Syndrome is a condition that impacts many different parts of the body including the bones, nerves, hair and teeth. Individuals with this condition experience signs and symptoms that begin at birth and may include underdeveloped or absent collarbones (clavicles), underdevelopment of the fingers and toes, hip dislocations and low muscle tone (hypotonia). Affected people may also experience feeding difficulties, breathing problems, brain malformations, heart defects, other skeletal abnormalities, developmental delay, and/or intellectual disability. Individuals with this condition may not survive past infancy or early childhood.    CMT4J and Yunnis Guilherme syndrome are inherited in an autosomal recessive pattern.This means that to be affected an individual must have a variant in both copies of their FIG4 gene in order to develop symptoms. Individuals with just one mutation in the FIG4 gene, like yourself, are said to be carriers.  Carriers do not have symptoms of these conditions but can have an affected child if their partner is also a carrier.  When both parents are carrier, with each pregnancy there is a 25% chance for the child to be affected, a 50% chance to be a carrier, and a 25% chance to be unaffected and not a carrier.     FIG4 association with ALS  In addition to the two conditions described above, there is some evidence that having one disease causing change in the FIG4 gene increases that risk that a person will develop ALS (Amyotrophic Lateral Sclerosis) during their  lifetime. The research linking changes in this gene with ALS are unclear at this time. For this reason, information relating to this risk is limited.    Amyotrophic lateral sclerosis (ALS) is a condition that affects the motor neurons.  Motor neurons are responsible for sending the necessary signals to the muscles, to allow for movement and speech. In ALS, these motor neurons break down and eventually lead to loss of speech and muscle control. This is a progressive disorder, meaning that the symptoms of this condition worsen over time. However, the rate of progression and muscles groups that are impacted the most is different for different individuals. For example, the first symptom that some people may experience is weakness in their throat muscles while others may experience weakness in an arm or a leg at the onset of symptoms.    Next Steps  This testing confirms your diagnosis of CMT1A. It is important that you continue to follow with a neurologist in order to obtain up to date medical management recommendations.     Genetic testing is necessary to determine if you children, sister and other family members are affected. Genetic testing is offered free of charge to any of your relatives through [facility] for 150 days after your testing was reported ([id number]). If your family members have questions or would like to pursue genetic testing they are welcome to contact me via email at [email or web address] or via phone at [phone]. If you family members choose to pursue genetic testing through a different provider, they will need to bring a copy of this letter and your genetic test results to their appointment.    Thank you again for allowing us to be a part of your care. Please do not hesitate to contact me with additional questions or concerns.    Plan:  1. reviewed the results of Louis's genetic testing  2. Contact information was provided should any questions arise in the future.     Maryanne Gardner  MS [facility]  Genetic Counselor  Division of Genetics and Metabolism  (p) [phone]  (f) [phone]     Total time spent in consultation with the family was approximately 26 minutes    Cc: No Letter

## 2021-09-08 ENCOUNTER — LAB REQUISITION (OUTPATIENT)
Dept: LAB | Facility: CLINIC | Age: 81
End: 2021-09-08
Payer: COMMERCIAL

## 2021-09-08 DIAGNOSIS — I10 ESSENTIAL (PRIMARY) HYPERTENSION: ICD-10-CM

## 2021-09-08 LAB
ANION GAP SERPL CALCULATED.3IONS-SCNC: 10 MMOL/L (ref 5–18)
BUN SERPL-MCNC: 23 MG/DL (ref 8–28)
CALCIUM SERPL-MCNC: 9.1 MG/DL (ref 8.5–10.5)
CHLORIDE BLD-SCNC: 106 MMOL/L (ref 98–107)
CO2 SERPL-SCNC: 23 MMOL/L (ref 22–31)
CREAT SERPL-MCNC: 1.06 MG/DL (ref 0.7–1.3)
GFR SERPL CREATININE-BSD FRML MDRD: 67 ML/MIN/1.73M2
GLUCOSE BLD-MCNC: 88 MG/DL (ref 70–125)
POTASSIUM BLD-SCNC: 4.6 MMOL/L (ref 3.5–5)
SODIUM SERPL-SCNC: 139 MMOL/L (ref 136–145)

## 2021-09-08 PROCEDURE — 80048 BASIC METABOLIC PNL TOTAL CA: CPT | Mod: ORL | Performed by: FAMILY MEDICINE

## 2021-10-11 ENCOUNTER — HEALTH MAINTENANCE LETTER (OUTPATIENT)
Age: 81
End: 2021-10-11

## 2022-02-23 ENCOUNTER — LAB REQUISITION (OUTPATIENT)
Dept: LAB | Facility: CLINIC | Age: 82
End: 2022-02-23
Payer: COMMERCIAL

## 2022-02-23 DIAGNOSIS — E78.5 HYPERLIPIDEMIA, UNSPECIFIED: ICD-10-CM

## 2022-02-23 DIAGNOSIS — I10 ESSENTIAL (PRIMARY) HYPERTENSION: ICD-10-CM

## 2022-02-23 PROCEDURE — 80048 BASIC METABOLIC PNL TOTAL CA: CPT | Mod: ORL | Performed by: FAMILY MEDICINE

## 2022-02-23 PROCEDURE — 80061 LIPID PANEL: CPT | Mod: ORL | Performed by: FAMILY MEDICINE

## 2022-02-24 LAB
ANION GAP SERPL CALCULATED.3IONS-SCNC: 11 MMOL/L (ref 5–18)
BUN SERPL-MCNC: 22 MG/DL (ref 8–28)
CALCIUM SERPL-MCNC: 9.6 MG/DL (ref 8.5–10.5)
CHLORIDE BLD-SCNC: 105 MMOL/L (ref 98–107)
CHOLEST SERPL-MCNC: 164 MG/DL
CO2 SERPL-SCNC: 23 MMOL/L (ref 22–31)
CREAT SERPL-MCNC: 1.16 MG/DL (ref 0.7–1.3)
FASTING STATUS PATIENT QL REPORTED: NORMAL
GFR SERPL CREATININE-BSD FRML MDRD: 64 ML/MIN/1.73M2
GLUCOSE BLD-MCNC: 103 MG/DL (ref 70–125)
HDLC SERPL-MCNC: 46 MG/DL
LDLC SERPL CALC-MCNC: 99 MG/DL
POTASSIUM BLD-SCNC: 4.2 MMOL/L (ref 3.5–5)
SODIUM SERPL-SCNC: 139 MMOL/L (ref 136–145)
TRIGL SERPL-MCNC: 96 MG/DL

## 2022-04-15 ENCOUNTER — TELEPHONE (OUTPATIENT)
Dept: NEUROLOGY | Facility: CLINIC | Age: 82
End: 2022-04-15
Payer: COMMERCIAL

## 2022-04-15 NOTE — TELEPHONE ENCOUNTER
Health Call Center    Phone Message    May a detailed message be left on voicemail: yes     Reason for Call: Other: patient called to schedule Physical Therapy appt on 5/10/22 the same day as his appt with Dr. Love but the PT referral expires on 5/4/22. Please extend referral to 5/10 if possible, and call pt back when he's able to schedule.    Action Taken: Message routed to:  [facility] neurology    Travel Screening: Not Applicable

## 2022-04-15 NOTE — TELEPHONE ENCOUNTER
RN called the pt and explained that we will reach out to him prior to his appt in the CMT clinic and get him set up with PT. He does not need to call and set up an appt.  He verbalized understanding and had no further questions.    Rhea Benitez RN   Neurology Care Coordinator

## 2022-05-03 ENCOUNTER — PRE VISIT (OUTPATIENT)
Dept: NEUROLOGY | Facility: CLINIC | Age: 82
End: 2022-05-03
Payer: COMMERCIAL

## 2022-05-03 DIAGNOSIS — G60.0 CMT (CHARCOT-MARIE-TOOTH DISEASE): Primary | ICD-10-CM

## 2022-05-03 NOTE — TELEPHONE ENCOUNTER
"CMT RETURN PATIENT    Are their any goals or new issues you would like to address at this appt? More trouble walking and balance    How has your mobility been since the last office visit? More trouble walking and worsening balance  Have you had an increase in falls or any mobility concerns? 1 fall with uneven sidewalk in the winter time, no major injury   Do you have any assistive devices (cane, walker, wheelchair) or are you interested in finding out more about how to obtain these? None, but uses a walking stick when hiking    Any concerns about hand weakness or pain? Reports that when he reaches for an object he will knock over something else in the process, patient feels his hand coordination is being affected, slightly worsening in his  strength                  Do you have trouble doing things around the house such as opening jars, zipping/buttoning, writing or other activities of daily living?     Do you know what type of CMT you have? CMT1A         Do you have any other questions for the genetic counselor? Patient was wondering if any of his family have participated in the genetic testing as well but he did not request an appt with genetic counselor to discuss this. Will send message to  to inquire about this.     Do you wear braces such as foot orthotics or AFOs? If so, how are these working for you? If not, is this something you would like to discuss?  None, declined    We will have a  available in clinic in case you have any questions. They can help connect you with community resources and discuss disability. declined    There will also be a representative from the [facility] here in clinic. n/a    To determine if you qualify for any CMT research studies, would you be interested in meeting with the research coordinator? n/a               Verify medications and allergies.     The patient will be scheduled to see: PT    \"We will contact you once the schedule has been completed to let you know " "what time you need to arrive. Disregard the automated appointment reminder call, I will call you directly to let you know what time to be here.\"      Bertha Reilly, RNCC  Neurology/Neurosurgery/PM&R    "

## 2022-05-06 NOTE — TELEPHONE ENCOUNTER
Pt informed that his arrival time will remain the same at 2:30pm.   Pt notified writer that his wife tested positive for COVID about 4-5 days ago. She has mild cold symptoms. Patient is asymptomatic and has tested negative for COVID twice. He will continue to test himself over the weekend and writer will follow-up with patient to ensure he is still asymptomatic and his test remains negative on Monday. He will also need to test himself Tuesday morning to ensure that he remains negative prior to appt.     Bertha Reilly, RNCC  Neurology/Neurosurgery/PM&R

## 2022-05-09 NOTE — TELEPHONE ENCOUNTER
Writer reached out to patient to follow-up as his wife tested positive for COVID last week. Pt stated that he is still asymptomatic and tested himself again yesterday. COVID result was negative. He will plan on testing himself again tomorrow morning and if positive, he will notify the clinic right away and reschedule his appointment. If negative, then he will come in for his visit as scheduled. Pt did reportd that his wife tested herself again yesterday and her COVID result came back negative.       Bertha Reilly, RNCC  Neurology/Neurosurgery/PM&R

## 2022-05-10 ENCOUNTER — OFFICE VISIT (OUTPATIENT)
Dept: NEUROLOGY | Facility: CLINIC | Age: 82
End: 2022-05-10
Payer: COMMERCIAL

## 2022-05-10 VITALS
SYSTOLIC BLOOD PRESSURE: 157 MMHG | HEART RATE: 69 BPM | WEIGHT: 195 LBS | BODY MASS INDEX: 29.55 KG/M2 | DIASTOLIC BLOOD PRESSURE: 77 MMHG | HEIGHT: 68 IN

## 2022-05-10 DIAGNOSIS — G60.0 CMT (CHARCOT-MARIE-TOOTH DISEASE): Primary | ICD-10-CM

## 2022-05-10 PROCEDURE — 99213 OFFICE O/P EST LOW 20 MIN: CPT | Performed by: PSYCHIATRY & NEUROLOGY

## 2022-05-10 RX ORDER — FLUTICASONE PROPIONATE 50 MCG
SPRAY, SUSPENSION (ML) NASAL
COMMUNITY

## 2022-05-10 RX ORDER — ASPIRIN 81 MG/1
81 TABLET ORAL EVERY EVENING
Status: ON HOLD | COMMUNITY
End: 2024-08-13

## 2022-05-10 ASSESSMENT — PAIN SCALES - GENERAL: PAINLEVEL: NO PAIN (0)

## 2022-05-10 NOTE — PATIENT INSTRUCTIONS
PT follow up, ideally with Shelby Bolivar. We will be in touch with you to find a day/time/place.    Return 1 year, this has been scheduled for you.    I reviewed your examination with you and discussed medication trials.

## 2022-05-10 NOTE — LETTER
"    5/10/2022         RE: Enrike Sotomayor  [address] [address]        Dear Colleague,    Thank you for referring your patient, Enrike Sotomayor, to the [facility] [place]. Please see a copy of my visit note below.    Enrike Sotomayor's goals for this visit include:   Chief Complaint   Patient presents with     RECHECK     I year follow up        He requests these members of his care team be copied on today's visit information: yes    PCP: Louis Arora    Referring Provider:  No referring provider defined for this encounter.    BP (!) 157/77 (BP Location: Right arm, Patient Position: Sitting, Cuff Size: Adult Regular)   Pulse 69   Ht 1.727 m (5' 8\")   Wt 88.5 kg (195 lb)   BMI 29.65 kg/m      Do you need any medication refills at today's visit? 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 He reports gradually progressive clumsiness in all 4 limbs, without new or worsening sensory symptoms. No headache, cognitive dysfunction, diplopia, or sustained dysphagia. Some ZULUAGA without chest pain.      Mental state: Alert, appropriate, speech, language, and thought content normal.     Cranial nerves II-XII normal.    Sensory examination:     Right Left   Light touch Normal Normal   Vibration (timed) MM6 4   Vibration (Rydell-Seiffer)     Temp     Pin DC DC   Pos     Legend:   MM = medial malleolus, TT = tibial tuberosity, K = patella, MCP = MCP joint  MF = mid-foot, DC = distal calf, MC = mid calf, PC = proximal calf      Motor examination:     Right Left   Shoulder abduction  5 5   Elbow extension 5 5   Elbow flexion 5 5   Wrist extension  5 5   Finger extension 5 5   FDI 4 3   APB 4+ 5   Hip flexion 5 5   Knee flexion 5 5   Knee extension 5 5   Dorsiflexion 4 4-   Plantar flexion 5 5   A=atrophy    Tone normal     Reflexes:   Right Left   Biceps 1 1   BRD 0 0   Triceps 1 1   Patellar 2 2   Achilles 2 2   Plantar Flexor Flexor   Clonus Absent " Absent      Coordination:  Finger-nose normal.  Heel-shin normal.  RRMs normal.    Gait:  Stooped (5-10 years), leans left. Turns well. Base normal but cannot tandem. Sways with Romberg. Retropulsive. Query moderate scoliosis.    Impression:  No clearly new neurologic or neuromuscular findings.    Recommendations:  PT follow up.      Enrike Love M.D.      23 minutes spent on the date of the encounter on chart review, history and examination, documentation and further activities as noted above.            Again, thank you for allowing me to participate in the care of your patient.        Sincerely,        Enrike Love MD

## 2022-05-10 NOTE — PROGRESS NOTES
"Enrike Sotomayor's goals for this visit include:   Chief Complaint   Patient presents with     FROYLANECK     I year follow up        He requests these members of his care team be copied on today's visit information: yes    PCP: Louis Arora    Referring Provider:  No referring provider defined for this encounter.    BP (!) 157/77 (BP Location: Right arm, Patient Position: Sitting, Cuff Size: Adult Regular)   Pulse 69   Ht 1.727 m (5' 8\")   Wt 88.5 kg (195 lb)   BMI 29.65 kg/m      Do you need any medication refills at today's visit? No  Alberto Park CMA      "

## 2022-05-10 NOTE — PROGRESS NOTES
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 He reports gradually progressive clumsiness in all 4 limbs, without new or worsening sensory symptoms. No headache, cognitive dysfunction, diplopia, or sustained dysphagia. Some ZULUAGA without chest pain.      Mental state: Alert, appropriate, speech, language, and thought content normal.     Cranial nerves II-XII normal.    Sensory examination:     Right Left   Light touch Normal Normal   Vibration (timed) MM6 4   Vibration (Rydell-Seiffer)     Temp     Pin DC DC   Pos     Legend:   MM = medial malleolus, TT = tibial tuberosity, K = patella, MCP = MCP joint  MF = mid-foot, DC = distal calf, MC = mid calf, PC = proximal calf      Motor examination:     Right Left   Shoulder abduction  5 5   Elbow extension 5 5   Elbow flexion 5 5   Wrist extension  5 5   Finger extension 5 5   FDI 4 3   APB 4+ 5   Hip flexion 5 5   Knee flexion 5 5   Knee extension 5 5   Dorsiflexion 4 4-   Plantar flexion 5 5   A=atrophy    Tone normal     Reflexes:   Right Left   Biceps 1 1   BRD 0 0   Triceps 1 1   Patellar 2 2   Achilles 2 2   Plantar Flexor Flexor   Clonus Absent Absent      Coordination:  Finger-nose normal.  Heel-shin normal.  RRMs normal.    Gait:  Stooped (5-10 years), leans left. Turns well. Base normal but cannot tandem. Sways with Romberg. Retropulsive. Query moderate scoliosis.    Impression:  No clearly new neurologic or neuromuscular findings.    Recommendations:  PT follow up.      Enrike Love M.D.      23 minutes spent on the date of the encounter on chart review, history and examination, documentation and further activities as noted above.

## 2022-05-22 ENCOUNTER — HEALTH MAINTENANCE LETTER (OUTPATIENT)
Age: 82
End: 2022-05-22

## 2022-08-09 ENCOUNTER — HOSPITAL ENCOUNTER (OUTPATIENT)
Dept: PHYSICAL THERAPY | Facility: CLINIC | Age: 82
Setting detail: THERAPIES SERIES
Discharge: HOME | End: 2022-08-09
Attending: PSYCHIATRY & NEUROLOGY
Payer: COMMERCIAL

## 2022-08-09 PROCEDURE — 97161 PT EVAL LOW COMPLEX 20 MIN: CPT | Mod: GP | Performed by: PHYSICAL THERAPIST

## 2022-08-09 NOTE — PROGRESS NOTES
[facility]    OUTPATIENT PHYSICAL THERAPY CLINIC NOTE  Enrike Sotomayor  YOB: 1940  [id number]    Type of visit:         Evaluation only    Date of service: 8/9/2022    Referring provider: Dr. Love    Medical diagnosis:   CMT    Date of diagnosis: 1972    Pertinent history of current problem (include personal factors and/or comorbidities that impact the plan of care): patient referred for PT visit in CMT clinic for assessment of strength & gait to determine progression of disease/symptoms. Patient reports fracture of 5th metatarsal 10/21 due to mis-stepping when doing the lawn (he did not fall).  PMHx: right patellar tendon rupture with repair (2007); left TKA (2012)    Living environment:  House    Living environment barriers:  2 stairs to enter (no railing present)  16 stairs within home (1 railing present)     Current assistance/living environment:  Lives with spouse      Current mobility equipment:  Orthotics: foot orthotics  Walking stick  Has access to 4WW; W/C; shower chair; commode (not using)     Current ADL equipment:  None    Technology used: cell phone, computer, tablet    Evaluation   Interview completed.   Pain assessment:  Pain denied     Range of motion: WFL bilateral L/Es, except mild hamstring tightness on left & ankle dorsiflexon to 0 degrees on left with knee ext   Manual muscle testing: hip flex, knee ext & flex 5/5 bilaterally; ankle dorsiflexion & plantar flexion 3-/5 bilaterally   Gait: independent amb without device; note fixed kyphoscoliosis, decreased armswing on right compared to left, decreased heelstrike bilaterally & decreased push-off bilaterally   25 ft timed walk: 13 steps in 6.06 seconds without device    Fall Risk Screen:   Has the patient fallen 2 or more times in the last year? No      Has the patient fallen and had an injury in the past year? No       Timed  Up and Go Score: 7.68 seconds without device    Is the patient a fall risk? Yes, department fall risk interventions implemented     Impairments:  Muscle atrophy  Balance  Range of motion     Treatment diagnosis:  Impaired mobility    Clinical Presentation: Evolving/Changing  Clinical Presentation Rationale: based upon subjective information provided, objective exam findings, medical history & clinical judgement   Clinical Decision Making (Complexity): Low complexity     Recommendations/Plan of care:  1 session evaluation only.     Goals:   Target date: 8/9/22  Patient will verbalize understanding of evaluation results and implications for functional performance.    Educational assessment/barriers to learning:   No barriers noted     Treatment provided this date:   N/A, evaluation only      Response to treatment/recommendations: N/A, PT evaluation only - no treatment provided; compared 25 ft timed walk score & TUG score from last PT visit in CMT clinic in 5/2021 (decline in both scores)    Goal attainment:  All goals met     Risks and benefits of evaluation/treatment have been explained.  Patient is in agreement with Plan of Care.     Total Treatment Time (sum of timed and untimed services): 35 min    Signature: Fabiola Bolivar, PT   Date: 8/9/2022    Certification:  Onset date: 5/9/2022  Start of care date: 8/9/2022  Certification date from 8/9/2022 to 8/9/2022    I CERTIFY THE NEED FOR THESE SERVICES FURNISHED UNDER        THIS PLAN OF TREATMENT AND WHILE UNDER MY CARE     (Physician co-signature of this document indicates review and certification of the therapy plan).

## 2022-08-24 ENCOUNTER — LAB REQUISITION (OUTPATIENT)
Dept: LAB | Facility: CLINIC | Age: 82
End: 2022-08-24
Payer: COMMERCIAL

## 2022-08-24 DIAGNOSIS — I10 ESSENTIAL (PRIMARY) HYPERTENSION: ICD-10-CM

## 2022-08-24 LAB
ALBUMIN SERPL BCG-MCNC: 3.9 G/DL (ref 3.5–5.2)
ALP SERPL-CCNC: 79 U/L (ref 40–129)
ALT SERPL W P-5'-P-CCNC: 12 U/L (ref 10–50)
ANION GAP SERPL CALCULATED.3IONS-SCNC: 8 MMOL/L (ref 7–15)
AST SERPL W P-5'-P-CCNC: 22 U/L (ref 10–50)
BILIRUB SERPL-MCNC: 0.3 MG/DL
BUN SERPL-MCNC: 24.7 MG/DL (ref 8–23)
CALCIUM SERPL-MCNC: 8.9 MG/DL (ref 8.8–10.2)
CHLORIDE SERPL-SCNC: 106 MMOL/L (ref 98–107)
CREAT SERPL-MCNC: 1.18 MG/DL (ref 0.67–1.17)
DEPRECATED HCO3 PLAS-SCNC: 25 MMOL/L (ref 22–29)
GFR SERPL CREATININE-BSD FRML MDRD: 63 ML/MIN/1.73M2
GLUCOSE SERPL-MCNC: 98 MG/DL (ref 70–99)
POTASSIUM SERPL-SCNC: 4.3 MMOL/L (ref 3.4–5.3)
PROT SERPL-MCNC: 6.4 G/DL (ref 6.4–8.3)
SODIUM SERPL-SCNC: 139 MMOL/L (ref 136–145)

## 2022-08-24 PROCEDURE — 80053 COMPREHEN METABOLIC PANEL: CPT | Mod: ORL | Performed by: FAMILY MEDICINE

## 2022-09-25 ENCOUNTER — HEALTH MAINTENANCE LETTER (OUTPATIENT)
Age: 82
End: 2022-09-25

## 2022-12-15 ENCOUNTER — LAB REQUISITION (OUTPATIENT)
Dept: LAB | Facility: CLINIC | Age: 82
End: 2022-12-15
Payer: COMMERCIAL

## 2022-12-15 DIAGNOSIS — R42 DIZZINESS AND GIDDINESS: ICD-10-CM

## 2022-12-15 LAB
ALBUMIN SERPL BCG-MCNC: 4 G/DL (ref 3.5–5.2)
ALP SERPL-CCNC: 74 U/L (ref 40–129)
ALT SERPL W P-5'-P-CCNC: 15 U/L (ref 10–50)
ANION GAP SERPL CALCULATED.3IONS-SCNC: 12 MMOL/L (ref 7–15)
AST SERPL W P-5'-P-CCNC: 19 U/L (ref 10–50)
BILIRUB SERPL-MCNC: 0.3 MG/DL
BUN SERPL-MCNC: 22.9 MG/DL (ref 8–23)
CALCIUM SERPL-MCNC: 9.2 MG/DL (ref 8.8–10.2)
CHLORIDE SERPL-SCNC: 105 MMOL/L (ref 98–107)
CREAT SERPL-MCNC: 1.2 MG/DL (ref 0.67–1.17)
DEPRECATED HCO3 PLAS-SCNC: 24 MMOL/L (ref 22–29)
GFR SERPL CREATININE-BSD FRML MDRD: 61 ML/MIN/1.73M2
GLUCOSE SERPL-MCNC: 96 MG/DL (ref 70–99)
POTASSIUM SERPL-SCNC: 4.8 MMOL/L (ref 3.4–5.3)
PROT SERPL-MCNC: 6.6 G/DL (ref 6.4–8.3)
SODIUM SERPL-SCNC: 141 MMOL/L (ref 136–145)

## 2022-12-15 PROCEDURE — 80053 COMPREHEN METABOLIC PANEL: CPT | Mod: ORL | Performed by: STUDENT IN AN ORGANIZED HEALTH CARE EDUCATION/TRAINING PROGRAM

## 2023-05-03 ENCOUNTER — PRE VISIT (OUTPATIENT)
Dept: NEUROLOGY | Facility: CLINIC | Age: 83
End: 2023-05-03
Payer: COMMERCIAL

## 2023-05-03 DIAGNOSIS — G60.0 CMT (CHARCOT-MARIE-TOOTH DISEASE): Primary | ICD-10-CM

## 2023-05-03 NOTE — CONFIDENTIAL NOTE
"CMT RETURN PATIENT    Do you have any questions/concerns or new issues you would like to address at your appt? Balance is getting worse. Feels \"funny\" when he walks.     How has your mobility been since the last office visit? Declining   Have you had an increase in falls or any mobility concerns? No falls but mobility concerns especially balance     Do you know what type of CMT you have? CMT1A        Do you have any other questions for the genetic counselor? No    Sensory Loss  - Do you have loss of feeling anywhere in your feet or legs? No  - If so, does the loss of feeling extend above your toes? N/A  - Does it extend above the ankle? N/A  - Can you identify the point where the sensation becomes normal or nearly normal? N/A  - Are these symptoms constant (present all the time), present most of the daytime, less than one-half of the daytime, or just occasionally (Daytime is defined as the time between getting up and going to bed)? N/A     Motor Symptoms- Legs  - Do you have weakness in your legs or feet? Yes  - Do you ever trip over your toes/feet or turn or sprain your ankles? Yes   - Do your feet slap down on the ground when you walk? yes  - Do you wear shoe inserts/insoles (below the ankle)? Yes   - Do you wear braces, splints or an equivalent type of orthotic that extends above your ankle? No. He has a pair but does not wear them. They are uncomfortable.  If so, how are these working for you?   - Have the above ankle orthotics described above ever been prescribed or suggested by a healthcare professional? He has some but doesn't wear them  - Have you ever had surgery on your feet or ankles? No  - If so, do you know if the surgery involved fusion of bones, a transfer of tendons, heel cord lengthening or lowering of the arch? N/A  - Do you use a cane, walking stick, or walker to help you walk most of the time outside the home? Walking stick when out hiking If not, do you feel adaptive equipments would be " "beneficial? Not at this time   - Do you use a wheelchair most of the time because of weakness? No    Motor Symptoms- Arms  - Do you have difficulty with buttoning clothes (standard shirt buttons)? No but his writing is really getting bad. May have some light hand/wrist weakness   - If yes, are the difficulties mild or severe (severe includes unable)? Very mild   - Can you cut most food including meat and pizza with normal utensils? Yes   - Do you have difficulty with activities that require extending or flexing your arms, or activities using your upper arms? No    We will have a  available in clinic. Would you be interested in discussing any of the following topics: No    - Initiating the disability process  -Transportation issues  -Financial concerns   - Other community resources    Are you registered with the MDA ([employer])? Yes     To determine if you qualify for any CMT research studies, would you be interested in meeting with the research coordinator? Yes   \"We will contact you once the schedule has been completed to let you know what time you need to arrive. Disregard the automated appointment reminder call, I will call you directly to let you know what time to be here.\"    Recap of services needed: PT and research     "

## 2023-05-07 ENCOUNTER — OFFICE VISIT (OUTPATIENT)
Dept: URGENT CARE | Facility: URGENT CARE | Age: 83
End: 2023-05-07
Payer: COMMERCIAL

## 2023-05-07 VITALS
DIASTOLIC BLOOD PRESSURE: 74 MMHG | RESPIRATION RATE: 14 BRPM | SYSTOLIC BLOOD PRESSURE: 146 MMHG | TEMPERATURE: 98.1 F | HEART RATE: 65 BPM | OXYGEN SATURATION: 96 %

## 2023-05-07 DIAGNOSIS — S61.216A LACERATION OF RIGHT LITTLE FINGER WITHOUT FOREIGN BODY WITHOUT DAMAGE TO NAIL, INITIAL ENCOUNTER: Primary | ICD-10-CM

## 2023-05-07 PROCEDURE — 12002 RPR S/N/AX/GEN/TRNK2.6-7.5CM: CPT | Performed by: FAMILY MEDICINE

## 2023-05-07 RX ORDER — CEPHALEXIN 500 MG/1
500 CAPSULE ORAL 2 TIMES DAILY
Qty: 20 CAPSULE | Refills: 0 | Status: SHIPPED | OUTPATIENT
Start: 2023-05-07 | End: 2023-05-17

## 2023-05-07 NOTE — PROGRESS NOTES
Assessment:  Laceration of right little finger without foreign body without damage to nail, initial encounter       We discussed options for wound management including advantages and disadvantages in terms of speed of healing,  Risk of infection,     He decided he wanted stitches place since the site of the wound would have stress pulling it open    PLAN:  Patient gave verbal informed consent to have laceration repaired       After care instructions:     Cephalexin 500 mg. bid x 10 days-  Will only start if signs of erythema or drainage or tenderness in the joint capsule  Tetanus- up to date  Symptomatic pain relief with acetaminophen and/ or ibuprofen  Patient was advised to monitor for signs of redness, swelling, streaking and start the antibiotic  if the wound appears to be worsening  Keep wound clean and dry  - no immersion  Sutures out in 2 weeks  Signs of infection discussed today         PROCEDURE NOTE:    Patient/ parent  signed informed consent to have laceration repaired    Anesthesia:Wound was locally injected with 3 cc's of Lidocaine 2% with epinephrine  Good anesthesia was obtained   Prepped and draped in the usual sterile fashion  Wound and surrounding skin was cleaned with antiseptic soap and sterile water   Wound was explored for any foreign bodies and evaluated for tendon, nerve, vessel or joint involvement.    Surrounding skin was painted with povidone prior to the closure  Closure was simple  Laceration was closed with 3 X 4.0 Nylon (proline) interrupted sutures with good hemostasis  Wound and local skin was again cleaned with  Sterile water and dried    Bandage was applied  Patient tolerated the procedure well  ---------------------------------------------------------------------------------------------------------------------------    SUBJECTIVE:     Chief Complaint   Patient presents with     Musculoskeletal Problem     Left hand injury, was hit in had with a baseball bleeding was recommended  to come in and get stiches     Enrike Sotomayor is a 82 year old male who presents to the clinic with a laceration on the left  Palmar base of the 5th finger sustained 1 hour(s) ago.  This is a accidental injury.    Mechanism of injury: finger was hyperextended while playing baseball-  No pain in the bones or joint.   His skin of the crease at the base of the palmar 5th finger ruptured.    Associated symptoms: Denies loss of consciousness, vomiting or confusion.  Denies numbness, weakness, or loss of function  Last tetanus booster within 10 years: yes    PMH:  Hypertension    ALLERGIES:  Pneumovax  [pneumococcal polysaccharide vaccine]    MEDs  aspirin 81 MG EC tablet, Take 81 mg by mouth daily  diltiazem ER (DILT-XR) 120 MG 24 hr capsule, Take 120 mg by mouth daily  fluticasone (FLONASE) 50 MCG/ACT nasal spray, 1 spray in each nostril  losartan (COZAAR) 50 MG tablet, Take 50 mg by mouth daily  sildenafil (VIAGRA) 100 MG tablet, 1 tablet as needed  loratadine (CLARITIN) 10 MG tablet, as needed (Patient not taking: Reported on 5/7/2023)    No current facility-administered medications on file prior to visit.      Social History     Tobacco Use     Smoking status: Former     Types: Cigarettes     Smokeless tobacco: Never   Vaping Use     Vaping status: Not on file   Substance Use Topics     Alcohol use: Not on file          ROS:  CONSTITUTIONAL:NEGATIVE for fever, chills,   EYES: NEGATIVE for vision changes or irritation  ENT/MOUTH: NEGATIVE for ear, mouth and throat problems  RESP:NEGATIVE for significant cough or SOB    EXAM:      VITALS: BP (!) 146/74   Pulse 65   Temp 98.1  F (36.7  C)   Resp 14   SpO2 96%   Site of wound:  right   Base of the 5th finger in the crease palmar side MCP joint  Size of laceration: 3 centimeters  Depth of laceration 4 millimeters  Characteristics of the laceration: bleeding- mild, clean and straight, transverse  Tendon function intact: yes  Sensation to light touch intact:  yes  Pulses intact: yes      EYES:   EOMI,   conjunctiva clear  HENT:   External ears with no swelling or lesions   Nose and lips without  Swelling, ulcers, erythema or lesions  NECK:   normal pain free ROM  RESP:   no labored respirations, no tachypnea  EXTREMITIES:   Full ROM without expression of pain or limitation x 4 extremities  NEURO:   Normal strength and tone, ambulation without difficulty,   normal speech and mentation

## 2023-05-09 ENCOUNTER — HOSPITAL ENCOUNTER (OUTPATIENT)
Dept: PHYSICAL THERAPY | Facility: CLINIC | Age: 83
Setting detail: THERAPIES SERIES
Discharge: HOME | End: 2023-05-09
Attending: PSYCHIATRY & NEUROLOGY
Payer: COMMERCIAL

## 2023-05-09 ENCOUNTER — OFFICE VISIT (OUTPATIENT)
Dept: NEUROLOGY | Facility: CLINIC | Age: 83
End: 2023-05-09
Payer: COMMERCIAL

## 2023-05-09 ENCOUNTER — ANCILLARY PROCEDURE (OUTPATIENT)
Dept: GENERAL RADIOLOGY | Facility: CLINIC | Age: 83
End: 2023-05-09
Attending: PSYCHIATRY & NEUROLOGY
Payer: COMMERCIAL

## 2023-05-09 VITALS
HEART RATE: 60 BPM | HEIGHT: 68 IN | BODY MASS INDEX: 29.55 KG/M2 | SYSTOLIC BLOOD PRESSURE: 157 MMHG | DIASTOLIC BLOOD PRESSURE: 73 MMHG | WEIGHT: 195 LBS | OXYGEN SATURATION: 97 %

## 2023-05-09 DIAGNOSIS — M62.9 DISORDER OF MUSCLE, UNSPECIFIED: ICD-10-CM

## 2023-05-09 DIAGNOSIS — G60.0 CMT (CHARCOT-MARIE-TOOTH DISEASE): Primary | ICD-10-CM

## 2023-05-09 LAB
BASOPHILS # BLD AUTO: 0.1 10E3/UL (ref 0–0.2)
BASOPHILS NFR BLD AUTO: 1 %
CK SERPL-CCNC: 180 U/L (ref 30–300)
EOSINOPHIL # BLD AUTO: 0.3 10E3/UL (ref 0–0.7)
EOSINOPHIL NFR BLD AUTO: 5 %
ERYTHROCYTE [DISTWIDTH] IN BLOOD BY AUTOMATED COUNT: 12 % (ref 10–15)
ERYTHROCYTE [SEDIMENTATION RATE] IN BLOOD BY WESTERGREN METHOD: 19 MM/HR (ref 0–20)
HCT VFR BLD AUTO: 40.7 % (ref 40–53)
HGB BLD-MCNC: 13.5 G/DL (ref 13.3–17.7)
IMM GRANULOCYTES # BLD: 0 10E3/UL
IMM GRANULOCYTES NFR BLD: 1 %
LYMPHOCYTES # BLD AUTO: 1.7 10E3/UL (ref 0.8–5.3)
LYMPHOCYTES NFR BLD AUTO: 24 %
MCH RBC QN AUTO: 30.3 PG (ref 26.5–33)
MCHC RBC AUTO-ENTMCNC: 33.2 G/DL (ref 31.5–36.5)
MCV RBC AUTO: 92 FL (ref 78–100)
MONOCYTES # BLD AUTO: 0.6 10E3/UL (ref 0–1.3)
MONOCYTES NFR BLD AUTO: 9 %
NEUTROPHILS # BLD AUTO: 4.2 10E3/UL (ref 1.6–8.3)
NEUTROPHILS NFR BLD AUTO: 60 %
NRBC # BLD AUTO: 0 10E3/UL
NRBC BLD AUTO-RTO: 0 /100
PLATELET # BLD AUTO: 231 10E3/UL (ref 150–450)
RBC # BLD AUTO: 4.45 10E6/UL (ref 4.4–5.9)
TSH SERPL DL<=0.005 MIU/L-ACNC: 2.59 MU/L (ref 0.4–4)
WBC # BLD AUTO: 6.9 10E3/UL (ref 4–11)

## 2023-05-09 PROCEDURE — 36415 COLL VENOUS BLD VENIPUNCTURE: CPT | Performed by: PSYCHIATRY & NEUROLOGY

## 2023-05-09 PROCEDURE — 84443 ASSAY THYROID STIM HORMONE: CPT | Performed by: PSYCHIATRY & NEUROLOGY

## 2023-05-09 PROCEDURE — 85025 COMPLETE CBC W/AUTO DIFF WBC: CPT | Performed by: PSYCHIATRY & NEUROLOGY

## 2023-05-09 PROCEDURE — 82550 ASSAY OF CK (CPK): CPT | Performed by: PSYCHIATRY & NEUROLOGY

## 2023-05-09 PROCEDURE — 85652 RBC SED RATE AUTOMATED: CPT | Performed by: PSYCHIATRY & NEUROLOGY

## 2023-05-09 PROCEDURE — 99214 OFFICE O/P EST MOD 30 MIN: CPT | Performed by: PSYCHIATRY & NEUROLOGY

## 2023-05-09 PROCEDURE — 97110 THERAPEUTIC EXERCISES: CPT | Mod: GP | Performed by: PHYSICAL THERAPIST

## 2023-05-09 PROCEDURE — 72080 X-RAY EXAM THORACOLMB 2/> VW: CPT | Performed by: RADIOLOGY

## 2023-05-09 PROCEDURE — 97161 PT EVAL LOW COMPLEX 20 MIN: CPT | Mod: GP | Performed by: PHYSICAL THERAPIST

## 2023-05-09 NOTE — NURSING NOTE
"Enrike Sotomayor's goals for this visit include:   Chief Complaint   Patient presents with     RECHECK     1 year follow up CMT       He requests these members of his care team be copied on today's visit information: yes    PCP: Louis Arora    Referring Provider:  No referring provider defined for this encounter.    BP (!) 157/73   Pulse 60   Ht 1.727 m (5' 8\")   Wt 88.5 kg (195 lb)   SpO2 97%   BMI 29.65 kg/m      Do you need any medication refills at today's visit? No      "

## 2023-05-09 NOTE — PROGRESS NOTES
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 No substantial interval changes, but slowly a bit weaker in the legs, particularly later in the day. Uses a stick on uneven surfaces. Legs get tired when walking long distances. No gait limitation. Does not have pigmenturia. Also stooped posture, developing over years.    Examination    Trace appendicular cogwheeling, normal axial tone.  EOMI  No tremor  Gait is stooped; no Trendelenberg or steppage, turns well.  Able to stand from a seated position without using arms.       0 1 2 3 4   Sensory symptoms None Below or at ankle bones Symptoms up to the distal half of the calf Symptoms up to the proximal half of the calf, including knee Symptoms above knee (above the top of the patella)   Motor symptoms - legs None  Trips, catches toes, slaps feet, shoe inserts Ankle support or stabilization needed most of the time for ambulation Walking aids (cane, walker) needed most of the time Wheelchair most of the time   Motor symptoms - arms None Mild difficulty with buttons Severe difficulty or unable to do buttons Unable to cut most foods Proximal weakness (affect movements involving the elbow and above)   Pin sensibility Normal Decreased below or at ankle bones  MF/MF Decreased up to the distal half of the calf Decreased up to the proximal half of the calf, including knee Decreased above knee (above the  top of the patella)   Vibration  Normal Reduced at great toe  MM5/MM5 Reduced at ankle Reduced at knee (tibial tuberosity) Absent at knee and ankle   Strength - legs Normal 4+, 4, or 4- on foot dorsi- or plantarflexion  DF 4/4 </= 3 on foot dorsi- or plantarflexion </= 3 on foot dorsi- and plantarflexion Proximal weakness   Strength - arms Normal 4+, 4, or 4- on intrinsic hand muscles  FDI 4/3  APB 4/4+ </= 3 on intrinsic hand muscles < 5 on wrist extensors Weak above elbow   Ulnar CMAP (Median)        Radial SNAP          Symptoms discussed with PT and medical spine  consultant      Impression    1. CMT stable.  2. Gait abnormality: CK, TSH, x-rays for DISH, spine PT    RTC 1 year, sooner if worsening.    Enrike Love M.D.

## 2023-05-09 NOTE — PATIENT INSTRUCTIONS
CMT examination is unchanged.  Referral to a spine PT.  Labs to screen for common conditions that can cause heaviness in the legs or difficulty with walking.  Return 1 year, sooner if needed.  Xrays at your convenience

## 2023-05-09 NOTE — LETTER
5/9/2023         RE: Enrike Sotomayor  [address]  [place] [address]        Dear Colleague,    Thank you for referring your patient, Enrike Sotomayor, to the [facility] [place]. Please see a copy of my visit note below.    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 No substantial interval changes, but slowly a bit weaker in the legs, particularly later in the day. Uses a stick on uneven surfaces. Legs get tired when walking long distances. No gait limitation. Does not have pigmenturia. Also stooped posture, developing over years.    Examination    Trace appendicular cogwheeling, normal axial tone.  EOMI  No tremor  Gait is stooped; no Trendelenberg or steppage, turns well.  Able to stand from a seated position without using arms.       0 1 2 3 4   Sensory symptoms None Below or at ankle bones Symptoms up to the distal half of the calf Symptoms up to the proximal half of the calf, including knee Symptoms above knee (above the top of the patella)   Motor symptoms - legs None  Trips, catches toes, slaps feet, shoe inserts Ankle support or stabilization needed most of the time for ambulation Walking aids (cane, walker) needed most of the time Wheelchair most of the time   Motor symptoms - arms None Mild difficulty with buttons Severe difficulty or unable to do buttons Unable to cut most foods Proximal weakness (affect movements involving the elbow and above)   Pin sensibility Normal Decreased below or at ankle bones  MF/MF Decreased up to the distal half of the calf Decreased up to the proximal half of the calf, including knee Decreased above knee (above the  top of the patella)   Vibration  Normal Reduced at great toe  MM5/MM5 Reduced at ankle Reduced at knee (tibial tuberosity) Absent at knee and ankle   Strength - legs Normal 4+, 4, or 4- on foot dorsi- or plantarflexion  DF 4/4 </= 3 on foot dorsi- or plantarflexion </= 3 on foot dorsi- and plantarflexion Proximal weakness    Strength - arms Normal 4+, 4, or 4- on intrinsic hand muscles  FDI 4/3  APB 4/4+ </= 3 on intrinsic hand muscles < 5 on wrist extensors Weak above elbow   Ulnar CMAP (Median)        Radial SNAP          Symptoms discussed with PT and medical spine consultant      Impression    1. CMT stable.  2. Gait abnormality: CK, TSH, x-rays for DISH, spine PT    RTC 1 year, sooner if worsening.    Enrike Love M.D.    Again, thank you for allowing me to participate in the care of your patient.        Sincerely,        Enrike Love MD

## 2023-05-09 NOTE — PROGRESS NOTES
[facility]    OUTPATIENT PHYSICAL THERAPY CLINIC NOTE  Enrike Sotomayor  YOB: 1940  [id number]    Type of visit:         Evaluation & Treatment     Date of service: 5/9/2023    Referring provider: Dr. Love    Medical diagnosis:   CMT    Date of diagnosis: 1972    Pertinent history of current problem (include personal factors and/or comorbidities that impact the plan of care): patient reporting increased difficulty with static standing balance, a decrease in walking distance & greater difficulty on inclines/declines. PMHx: right patellar tendon rupture with repair (2007); left TKA (2012)    Living environment:  House    Living environment barriers:  2 stairs to enter (no railing present)  16 stairs within home (1 railing present)     Current assistance/living environment:  Lives with spouse      Current mobility equipment:  Orthotics: bilateral foot orthoses  Walking stick     Current ADL equipment:  None    Technology used: cell phone, computer    Evaluation   Interview completed.   Pain assessment:  Pain denied     Range of motion: hips & knees WFL bilaterally; ankle dorsiflexion 0 degrees bilaterally with knee ext     Manual muscle testing: hip flex, knee ext & knee flex 5/5 bilaterally; ankle dorsiflexion & plantar flexion 3-/5 bilaterally   Gait: independent without device, note kyphoscoliosis, decreased armswing on right compared to left, decreased heelstrike bilaterally & decreased push-off bilaterally   25 ft timed walk: 14 steps in 6.86 seconds without device    Fall Risk Screen:   Has the patient fallen 2 or more times in the last year? No      Has the patient fallen and had an injury in the past year? No       Timed Up and Go Score: 7.71 seconds without device    Is the patient a fall risk? Yes, department fall risk interventions implemented     Impairments:  Muscle  atrophy  Balance  Range of motion     Treatment diagnosis:  Impaired mobility    Clinical Presentation: Stable/Uncomplicated  Clinical Presentation Rationale: based upon subjective information provided, objective exam findings, medical history & clinical judgement   Clinical Decision Making (Complexity): Low complexity     Recommendations/Plan of care:  1 session evaluation & treatment.     Goals:   Target date: 5/9/2023  Patient will verbalize understanding of evaluation results and implications for functional performance.  Patient will verbalize/demonstrate understanding of home program.    Educational assessment/barriers to learning:   No barriers noted     Treatment provided this date:   Therapeutic procedures, 15 minutes    Response to treatment/recommendations:   Reviewed results of PT exam with patient & completed comparison of scores with last clinic visit (8/9/22); strength assessment stable, 25 ft timed walk & TUG scores unchanged. Reviewed difficulties with static standing related to ankle dorsiflexion & plantar flexion weakness with decreased ankle flexibility. Encouraged patient to complete ankle stretches daily. Instructed in standing ankle sway exercise to be completed in corner with chair in front for safety - discussed importance of monitoring his fatigue level, as well as any other changes. Instructed in standing postural stretch/positioning against wall - very difficult for patient to maintain & patient suggested supine posture stretch on floor. Patient denies any concerns with floor to stand transition, thus in agreement with this exercise. Also instructed in step-ups with single U/E support to work on power for stairs & inclines. By end of session, patient able to return demonstration of these exercises to PT using written instructions with illustrations.     Goal attainment:  All goals met     Risks and benefits of evaluation/treatment have been explained.  Patient is in agreement with Plan of  Care.     Timed Code Treatment Minutes: 15 min  Total Treatment Time (sum of timed and untimed services): 45 min    Signature: Fabiola Bolivar, PT   Date: 5/9/2023    Certification:  Onset date: 5/3/2023  Start of care date: 5/9/2023  Certification date from 5/9/2023 to 5/31/2023    I CERTIFY THE NEED FOR THESE SERVICES FURNISHED UNDER        THIS PLAN OF TREATMENT AND WHILE UNDER MY CARE     (Physician co-signature of this document indicates review and certification of the therapy plan).

## 2023-05-12 ENCOUNTER — TELEPHONE (OUTPATIENT)
Dept: NEUROLOGY | Facility: CLINIC | Age: 83
End: 2023-05-12
Payer: COMMERCIAL

## 2023-05-12 DIAGNOSIS — M48.10 DISH (DISSEMINATED IDIOPATHIC SKELETAL HYPEROSTOSIS): ICD-10-CM

## 2023-05-12 DIAGNOSIS — M40.00 KYPHOSIS (ACQUIRED) (POSTURAL): Primary | ICD-10-CM

## 2023-05-12 NOTE — TELEPHONE ENCOUNTER
Pt informed of provider's message below and will wait for phone call to schedule med spine referral.       Enrike Love MD  [facility] [place]  Please notify Mr Светлана that his labs are normal. His x-ray demonstrates bone spurs and a spinal deformity that are likely contributing to his gait difficulty. In addition to PT, I am referring him for a medical spine consultation. Thanks.         Bertha Reilly, RNCC  Neurology/Neurosurgery

## 2023-05-13 ENCOUNTER — HEALTH MAINTENANCE LETTER (OUTPATIENT)
Age: 83
End: 2023-05-13

## 2023-05-24 ENCOUNTER — LAB REQUISITION (OUTPATIENT)
Dept: LAB | Facility: CLINIC | Age: 83
End: 2023-05-24
Payer: COMMERCIAL

## 2023-05-24 DIAGNOSIS — E78.5 HYPERLIPIDEMIA, UNSPECIFIED: ICD-10-CM

## 2023-05-24 DIAGNOSIS — I10 ESSENTIAL (PRIMARY) HYPERTENSION: ICD-10-CM

## 2023-05-24 PROCEDURE — 80061 LIPID PANEL: CPT | Mod: ORL | Performed by: FAMILY MEDICINE

## 2023-05-24 PROCEDURE — 80048 BASIC METABOLIC PNL TOTAL CA: CPT | Mod: ORL | Performed by: FAMILY MEDICINE

## 2023-05-25 LAB
ANION GAP SERPL CALCULATED.3IONS-SCNC: 12 MMOL/L (ref 7–15)
BUN SERPL-MCNC: 27.1 MG/DL (ref 8–23)
CALCIUM SERPL-MCNC: 9.4 MG/DL (ref 8.8–10.2)
CHLORIDE SERPL-SCNC: 105 MMOL/L (ref 98–107)
CHOLEST SERPL-MCNC: 154 MG/DL
CREAT SERPL-MCNC: 1.29 MG/DL (ref 0.67–1.17)
DEPRECATED HCO3 PLAS-SCNC: 22 MMOL/L (ref 22–29)
GFR SERPL CREATININE-BSD FRML MDRD: 56 ML/MIN/1.73M2
GLUCOSE SERPL-MCNC: 98 MG/DL (ref 70–99)
HDLC SERPL-MCNC: 49 MG/DL
LDLC SERPL CALC-MCNC: 83 MG/DL
NONHDLC SERPL-MCNC: 105 MG/DL
POTASSIUM SERPL-SCNC: 4.4 MMOL/L (ref 3.4–5.3)
SODIUM SERPL-SCNC: 139 MMOL/L (ref 136–145)
TRIGL SERPL-MCNC: 108 MG/DL

## 2023-05-31 ENCOUNTER — THERAPY VISIT (OUTPATIENT)
Dept: PHYSICAL THERAPY | Facility: REHABILITATION | Age: 83
End: 2023-05-31
Attending: PSYCHIATRY & NEUROLOGY
Payer: COMMERCIAL

## 2023-05-31 DIAGNOSIS — G60.0 CMT (CHARCOT-MARIE-TOOTH DISEASE): ICD-10-CM

## 2023-05-31 PROCEDURE — 97161 PT EVAL LOW COMPLEX 20 MIN: CPT | Mod: GP

## 2023-05-31 PROCEDURE — 97110 THERAPEUTIC EXERCISES: CPT | Mod: GP

## 2023-05-31 PROCEDURE — 97140 MANUAL THERAPY 1/> REGIONS: CPT | Mod: GP

## 2023-07-19 ENCOUNTER — TRANSFERRED RECORDS (OUTPATIENT)
Dept: HEALTH INFORMATION MANAGEMENT | Facility: CLINIC | Age: 83
End: 2023-07-19

## 2023-08-02 ENCOUNTER — THERAPY VISIT (OUTPATIENT)
Dept: PHYSICAL THERAPY | Facility: REHABILITATION | Age: 83
End: 2023-08-02
Payer: COMMERCIAL

## 2023-08-02 DIAGNOSIS — G60.0 CMT (CHARCOT-MARIE-TOOTH DISEASE): Primary | ICD-10-CM

## 2023-08-02 PROCEDURE — 97110 THERAPEUTIC EXERCISES: CPT | Mod: GP

## 2023-08-02 PROCEDURE — 97112 NEUROMUSCULAR REEDUCATION: CPT | Mod: GP

## 2023-08-04 ENCOUNTER — THERAPY VISIT (OUTPATIENT)
Dept: PHYSICAL THERAPY | Facility: REHABILITATION | Age: 83
End: 2023-08-04
Payer: COMMERCIAL

## 2023-08-04 DIAGNOSIS — G60.0 CMT (CHARCOT-MARIE-TOOTH DISEASE): Primary | ICD-10-CM

## 2023-08-04 PROCEDURE — 97112 NEUROMUSCULAR REEDUCATION: CPT | Mod: GP

## 2023-08-04 PROCEDURE — 97110 THERAPEUTIC EXERCISES: CPT | Mod: GP

## 2023-08-09 ENCOUNTER — THERAPY VISIT (OUTPATIENT)
Dept: PHYSICAL THERAPY | Facility: REHABILITATION | Age: 83
End: 2023-08-09
Payer: COMMERCIAL

## 2023-08-09 DIAGNOSIS — G60.0 CMT (CHARCOT-MARIE-TOOTH DISEASE): Primary | ICD-10-CM

## 2023-08-09 PROCEDURE — 97110 THERAPEUTIC EXERCISES: CPT | Mod: GP

## 2023-08-09 PROCEDURE — 97112 NEUROMUSCULAR REEDUCATION: CPT | Mod: GP

## 2023-08-16 ENCOUNTER — THERAPY VISIT (OUTPATIENT)
Dept: PHYSICAL THERAPY | Facility: REHABILITATION | Age: 83
End: 2023-08-16
Payer: COMMERCIAL

## 2023-08-16 DIAGNOSIS — G60.0 CMT (CHARCOT-MARIE-TOOTH DISEASE): Primary | ICD-10-CM

## 2023-08-16 PROCEDURE — 97110 THERAPEUTIC EXERCISES: CPT | Mod: GP

## 2023-08-16 PROCEDURE — 97112 NEUROMUSCULAR REEDUCATION: CPT | Mod: GP

## 2023-08-23 ENCOUNTER — THERAPY VISIT (OUTPATIENT)
Dept: PHYSICAL THERAPY | Facility: REHABILITATION | Age: 83
End: 2023-08-23
Payer: COMMERCIAL

## 2023-08-23 DIAGNOSIS — G60.0 CMT (CHARCOT-MARIE-TOOTH DISEASE): Primary | ICD-10-CM

## 2023-08-23 PROCEDURE — 97112 NEUROMUSCULAR REEDUCATION: CPT | Mod: GP

## 2023-08-23 PROCEDURE — 97110 THERAPEUTIC EXERCISES: CPT | Mod: GP

## 2023-08-23 NOTE — PROGRESS NOTES
DISCHARGE  Reason for Discharge: Patient has met all goals.      Discharge Plan: Patient to continue home program.    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   Target Date 06/28/23   Date Met 08/23/23   PT Goal 2   Goal Identifier 2   Goal Description Patient will tolerate grabbing boxes off the top pantry shelf with no increase in symptoms in order to demonstrate improved function.   Target Date 07/26/23   Date Met 08/23/23   PT Goal 3   Goal Identifier 3   Goal Description Paitent will improve FGA from 18/30 to 25/30 or better in order to demonstrate improved impairments.   Target Date 08/23/23   Date Met 08/23/23   Subjective Report   Subjective Report Patient reports he is feeling pretty good today. He feels he is more stable than normal.   Objective Measures   Objective Measures Objective Measure 1   Objective Measure 1   Objective Measure Functional Gait Assessment   Details 27/30   Treatment Interventions (PT)   Interventions Therapeutic Procedure/Exercise;Self Care/Home Management;Manual Therapy;Neuromuscular Re-education   Therapeutic Procedure/Exercise   Therapeutic Procedures: strength, endurance, ROM, flexibillity minutes (49219) 25   Ther Proc 1 -  "Details Education on activity guidelines and strength and resistance training post PT; Banded low row 2x12 G (verbal and tactile cues for scapular retraction); Banded shoulder extension 2x12 G (verbal and tactile cues for scapular depression);  Sit to stand 2x10 @ 10#; Deficit calf raise 2x12; Exercise ball serratus roll on wall 2x10   Skilled Intervention Progressive UE and LE strengthening   Patient Response/Progress Patient demonstrated understanding; patient worked to appropriate level of fatigue   Neuromuscular Re-education   Neuromuscular re-ed of mvmt, balance, coord, kinesthetic sense, posture, proprioception minutes (28246) 15   Neuro Re-ed 1 - Details Foam step up and over 2x8 each; Backward walk w/ quick transition forward 20\" 2x3;   Skilled Intervention Dynamic and reactive balance training; Pallof routine to improve static standing with perturbation   Patient Response/Progress Patient performance improved with cueing to decrease speed   Education   Learner/Method Patient   Plan   Home program PTRx [id number]   Plan for next session Progressive UE/LE strengthening and dynamic balance training   Comments   Comments Patient reports he has made noticeable balance improvements. He is performing more tasks at home without being concerned for balance. Patient worked to appropriate level of fatigue. Patient was educated on activity guidelines and resistance trianing to improve function post PT. At this time, patient is appropriate for discharge due to meeting all goals.   Total Session Time   Timed Code Treatment Minutes 40   Total Treatment Time (sum of timed and untimed services) 40           Phi Alcaraz, PT on 8/23/2023 at 11:14 AM    "

## 2023-11-03 ENCOUNTER — LAB REQUISITION (OUTPATIENT)
Dept: LAB | Facility: CLINIC | Age: 83
End: 2023-11-03
Payer: COMMERCIAL

## 2023-11-03 DIAGNOSIS — N18.31 CHRONIC KIDNEY DISEASE, STAGE 3A (H): ICD-10-CM

## 2023-11-03 LAB
ANION GAP SERPL CALCULATED.3IONS-SCNC: 15 MMOL/L (ref 7–15)
BUN SERPL-MCNC: 24.5 MG/DL (ref 8–23)
CALCIUM SERPL-MCNC: 9.2 MG/DL (ref 8.8–10.2)
CHLORIDE SERPL-SCNC: 103 MMOL/L (ref 98–107)
CREAT SERPL-MCNC: 1.15 MG/DL (ref 0.67–1.17)
DEPRECATED HCO3 PLAS-SCNC: 20 MMOL/L (ref 22–29)
EGFRCR SERPLBLD CKD-EPI 2021: 64 ML/MIN/1.73M2
GLUCOSE SERPL-MCNC: 130 MG/DL (ref 70–99)
POTASSIUM SERPL-SCNC: 4.4 MMOL/L (ref 3.4–5.3)
SODIUM SERPL-SCNC: 138 MMOL/L (ref 135–145)

## 2023-11-03 PROCEDURE — 80048 BASIC METABOLIC PNL TOTAL CA: CPT | Mod: ORL | Performed by: PHYSICIAN ASSISTANT

## 2024-01-15 DIAGNOSIS — H35.363 DRUSEN (DEGENERATIVE) OF MACULA, BILATERAL: ICD-10-CM

## 2024-01-15 DIAGNOSIS — H26.9 CATARACT: Primary | ICD-10-CM

## 2024-01-15 DIAGNOSIS — H35.369: ICD-10-CM

## 2024-01-17 ENCOUNTER — OFFICE VISIT (OUTPATIENT)
Dept: OPHTHALMOLOGY | Facility: CLINIC | Age: 84
End: 2024-01-17
Attending: OPHTHALMOLOGY
Payer: COMMERCIAL

## 2024-01-17 DIAGNOSIS — H52.203 MYOPIA OF BOTH EYES WITH ASTIGMATISM AND PRESBYOPIA: Primary | ICD-10-CM

## 2024-01-17 DIAGNOSIS — Z96.1 PSEUDOPHAKIA, BOTH EYES: ICD-10-CM

## 2024-01-17 DIAGNOSIS — H35.369: ICD-10-CM

## 2024-01-17 DIAGNOSIS — H35.363 DRUSEN (DEGENERATIVE) OF MACULA, BILATERAL: ICD-10-CM

## 2024-01-17 DIAGNOSIS — H52.13 MYOPIA OF BOTH EYES WITH ASTIGMATISM AND PRESBYOPIA: Primary | ICD-10-CM

## 2024-01-17 DIAGNOSIS — H52.4 MYOPIA OF BOTH EYES WITH ASTIGMATISM AND PRESBYOPIA: Primary | ICD-10-CM

## 2024-01-17 PROCEDURE — G0463 HOSPITAL OUTPT CLINIC VISIT: HCPCS | Performed by: OPHTHALMOLOGY

## 2024-01-17 PROCEDURE — 92134 CPTRZ OPH DX IMG PST SGM RTA: CPT | Performed by: OPHTHALMOLOGY

## 2024-01-17 PROCEDURE — 92004 COMPRE OPH EXAM NEW PT 1/>: CPT | Performed by: OPHTHALMOLOGY

## 2024-01-17 ASSESSMENT — CONF VISUAL FIELD
OS_INFERIOR_NASAL_RESTRICTION: 0
OD_SUPERIOR_NASAL_RESTRICTION: 0
OS_NORMAL: 1
OS_SUPERIOR_NASAL_RESTRICTION: 0
OD_INFERIOR_TEMPORAL_RESTRICTION: 0
OD_SUPERIOR_TEMPORAL_RESTRICTION: 0
OD_NORMAL: 1
OS_SUPERIOR_TEMPORAL_RESTRICTION: 0
METHOD: COUNTING FINGERS
OS_INFERIOR_TEMPORAL_RESTRICTION: 0
OD_INFERIOR_NASAL_RESTRICTION: 0

## 2024-01-17 ASSESSMENT — REFRACTION_MANIFEST
OD_AXIS: 010
OD_CYLINDER: +1.75
METHOD_AUTOREFRACTION: 1
OD_ADD: +2.50
OS_SPHERE: -0.75
OD_SPHERE: -2.00
OD_CYLINDER: +0.75
OD_AXIS: 007
OS_ADD: +2.50
OS_AXIS: 125
OS_CYLINDER: +0.50
OD_SPHERE: -1.50

## 2024-01-17 ASSESSMENT — SLIT LAMP EXAM - LIDS
COMMENTS: NORMAL
COMMENTS: NORMAL

## 2024-01-17 ASSESSMENT — CUP TO DISC RATIO
OD_RATIO: 0.8
OS_RATIO: 0.8

## 2024-01-17 ASSESSMENT — TONOMETRY
OD_IOP_MMHG: 14
OS_IOP_MMHG: 14
IOP_METHOD: APPLANATION

## 2024-01-17 ASSESSMENT — VISUAL ACUITY
OS_SC: 20/20
OD_SC+: -1
OD_SC: 20/50
METHOD: SNELLEN - LINEAR

## 2024-01-17 NOTE — PROGRESS NOTES
HPI       Pseudophakia Follow Up    In right eye.  Associated symptoms include blurred vision.  Context:  near vision.             Comments    Here for lens exchange evaluation right eye. Had cataracts over 1 year ago and vision in the right can only see up to 18ft. Some monocular diplopia right eye. Left eye doing excellent. He wishes to see distance with both eyes. No flashes or floaters. No pain.    Anmol Maxwell COT 9:31 AM January 17, 2024             Last edited by Anmol Maxwell on 1/17/2024  9:36 AM.         Review of systems for the eyes was negative other than the pertinent positives/negatives listed in the HPI.      Assessment & Plan    HPI:  Enrike Sotomayor is a 83 year old male with history of HTN, ED, allergies presents with dissatisfaction following cataract surgery. He is left eye dominant and can see well at a distance in the left eye, but the right eye only sees moderately well at distance and up to 18 inches. He cannot read without spectacles. He is wondering his options for improvement.       POHx: Cataract extraction/intraocular lens, mild dry armd  PMHx:  HTN, ED, allergies  Current Medications: aspirin 81 MG EC tablet, Take 81 mg by mouth daily  diltiazem ER (DILT-XR) 120 MG 24 hr capsule, Take 120 mg by mouth daily  fluticasone (FLONASE) 50 MCG/ACT nasal spray, 1 spray in each nostril  losartan (COZAAR) 50 MG tablet, Take 50 mg by mouth daily  sildenafil (VIAGRA) 100 MG tablet, 1 tablet as needed    No current facility-administered medications on file prior to visit.    FHx:  PSHx: Cataract extraction/intraocular lens Vivity both eyes 2/2023 [facility]      Current Eye Medications:      Assessment & Plan:  (H52.13,  H52.203,  H52.4) Myopia of both eyes with astigmatism and presbyopia  (primary encounter diagnosis)  (Z96.1) Pseudophakia, both eyes  Likely chose Vivity lens due to drusen left eye > right eye  Dissatisfaction with near vision  Will first dispense spectacles and gauge satisfaction  May  consider refractive surgery vs lens exchange  Discussed risks and benefits of each  Patient states that he has not attempted spectacle correction yet    Patient has myopia and a copy of today's glasses prescription was given.  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        Ganesh Del Rio MD     Attending Physician Attestation:  Complete documentation of historical and exam elements from today's encounter can be found in the full encounter summary report (not reduplicated in this progress note).  I personally obtained the chief complaint(s) and history of present illness.  I confirmed and edited as necessary the review of systems, past medical/surgical history, family history, social history, and examination findings as documented by others; and I examined the patient myself.  I personally reviewed the relevant tests, images, and reports as documented above.  I formulated and edited as necessary the assessment and plan and discussed the findings and management plan with the patient and family. - Ganesh Del Rio MD

## 2024-01-17 NOTE — NURSING NOTE
Chief Complaints and History of Present Illnesses   Patient presents with    Pseudophakia Follow Up     Chief Complaint(s) and History of Present Illness(es)       Pseudophakia Follow Up              Laterality: right eye    Associated symptoms: blurred vision    Context: near vision              Comments    Here for lens exchange evaluation right eye. Had cataracts over 1 year ago and vision in the right can only see up to 18ft. Some monocular diplopia right eye. Left eye doing excellent. He wishes to see distance with both eyes. No flashes or floaters. No pain.    Anmol Maxwell COT 9:31 AM January 17, 2024

## 2024-02-08 ENCOUNTER — MYC MEDICAL ADVICE (OUTPATIENT)
Dept: OPHTHALMOLOGY | Facility: CLINIC | Age: 84
End: 2024-02-08
Payer: COMMERCIAL

## 2024-02-08 ENCOUNTER — TELEPHONE (OUTPATIENT)
Dept: OPHTHALMOLOGY | Facility: CLINIC | Age: 84
End: 2024-02-08
Payer: COMMERCIAL

## 2024-02-08 NOTE — TELEPHONE ENCOUNTER
"Enrike Sotomayor \"Louis\" [phone]     Called and no answer and voicemail not set up    Called mobile number times two and spoke to pt    Pt with new swelling of eyelid this morning and drooping appearance    Pt had some scratchiness yesterday.    Pathable message sent to pt and pt will try to send image back in response.    Will contact pt again after image received.    Kalyan Schuster RN 9:01 AM 02/08/24    "

## 2024-02-08 NOTE — TELEPHONE ENCOUNTER
[facility] Call Center    Phone Message    May a detailed message be left on voicemail: yes     Reason for Call: Symptoms or Concerns     If patient has red-flag symptoms, warm transfer to triage line    Current symptom or concern: Pt woke up this morning and his Lt eye is swollen, itchy and a little pink.    Symptoms have been present for: Couple hour(s)    Has patient previously been seen for this? No    By : Dr. Del Rio    Date: 1/17/2024    Are there any new or worsening symptoms? Yes: New eyelid swelling    Action Taken: Message routed to:  [facility]    Travel Screening: Not Applicable

## 2024-02-08 NOTE — TELEPHONE ENCOUNTER
Image received and spoke to pt     Swelling localized on lid only    No redness on white part of eye  No vision changes    Recommended warm compresses 4/day for 10 minutes    Reviewed to contact clinic or may go to urgent care for any new swelling around eye/tissue of eye.    Reviewed if not improving by next week to reach out for scheduling evaluation and sooner for any worsening eye symptoms/vision changes    Pt verbally demonstrated understanding and seemed comfortable with plan/information.    Kalyan Schuster RN 10:57 AM 02/08/24

## 2024-02-12 ENCOUNTER — TELEPHONE (OUTPATIENT)
Dept: OPHTHALMOLOGY | Facility: CLINIC | Age: 84
End: 2024-02-12
Payer: COMMERCIAL

## 2024-02-12 NOTE — TELEPHONE ENCOUNTER
Patient asking to be seen for MARISABEL cyst.  Worsening and swelling coming down.  Gave patient options to see his PCP physician today, or [facility] today or appointment tomorrow.  Patient asking for appointment tomorrow.  Appointment scheduled.

## 2024-02-12 NOTE — TELEPHONE ENCOUNTER
[facility] Call Center    Phone Message    May a detailed message be left on voicemail: yes     Reason for Call: Symptoms or Concerns     If patient has red-flag symptoms, warm transfer to triage line    Current symptom or concern: Pt is still having the Lt eye symptoms as last week. He has used the warm compresses as Kalyan had recommended but has not seen improvement.    Symptoms have been present for:  4 day(s)    Has patient previously been seen for this? No    By : Dr. Del Rio    Date: 1/17/24    Are there any new or worsening symptoms? No    Action Taken: Message routed to:  [facility]    Travel Screening: Not Applicable

## 2024-02-12 NOTE — TELEPHONE ENCOUNTER
FUTURE VISIT INFORMATION      FUTURE VISIT INFORMATION:  Date: 2/13/24  Time: 12:40pm  Location: [facility]  REFERRAL INFORMATION:  Referring provider:  Ganesh Del Rio MD   Referring providers clinic:  [facility]  Reason for visit/diagnosis  cyst upper lid, worsening     RECORDS REQUESTED FROM:       Clinic name Comments Records Status Imaging Status   [facility] Ov/referral 1/17/24 epic

## 2024-02-13 ENCOUNTER — OFFICE VISIT (OUTPATIENT)
Dept: OPHTHALMOLOGY | Facility: CLINIC | Age: 84
End: 2024-02-13
Payer: COMMERCIAL

## 2024-02-13 ENCOUNTER — PRE VISIT (OUTPATIENT)
Dept: OPHTHALMOLOGY | Facility: CLINIC | Age: 84
End: 2024-02-13

## 2024-02-13 DIAGNOSIS — H00.014 HORDEOLUM EXTERNUM OF LEFT UPPER EYELID: Primary | ICD-10-CM

## 2024-02-13 DIAGNOSIS — Z96.1 PSEUDOPHAKIA, BOTH EYES: ICD-10-CM

## 2024-02-13 PROCEDURE — 99213 OFFICE O/P EST LOW 20 MIN: CPT | Performed by: OPHTHALMOLOGY

## 2024-02-13 RX ORDER — NEOMYCIN SULFATE, POLYMYXIN B SULFATE, AND DEXAMETHASONE 3.5; 10000; 1 MG/G; [USP'U]/G; MG/G
0.5 OINTMENT OPHTHALMIC 4 TIMES DAILY
Qty: 3.5 G | Refills: 1 | Status: SHIPPED | OUTPATIENT
Start: 2024-02-13 | End: 2024-02-23

## 2024-02-13 ASSESSMENT — VISUAL ACUITY
OS_CC+: -2
OD_CC+: -2
OD_CC: 20/25
METHOD: SNELLEN - LINEAR
OS_CC: 20/20
CORRECTION_TYPE: GLASSES

## 2024-02-13 ASSESSMENT — TONOMETRY
OD_IOP_MMHG: 10
IOP_METHOD: ICARE
OS_IOP_MMHG: 11

## 2024-02-13 ASSESSMENT — CONF VISUAL FIELD
OS_INFERIOR_NASAL_RESTRICTION: 0
OD_INFERIOR_TEMPORAL_RESTRICTION: 0
OD_SUPERIOR_NASAL_RESTRICTION: 0
OS_SUPERIOR_TEMPORAL_RESTRICTION: 0
OS_SUPERIOR_NASAL_RESTRICTION: 0
OD_NORMAL: 1
OS_INFERIOR_TEMPORAL_RESTRICTION: 0
OD_SUPERIOR_TEMPORAL_RESTRICTION: 0
OS_NORMAL: 1
OD_INFERIOR_NASAL_RESTRICTION: 0

## 2024-02-13 ASSESSMENT — SLIT LAMP EXAM - LIDS: COMMENTS: NORMAL

## 2024-02-13 ASSESSMENT — CUP TO DISC RATIO
OS_RATIO: 0.8
OD_RATIO: 0.8

## 2024-02-13 NOTE — NURSING NOTE
"Chief Complaints and History of Present Illnesses   Patient presents with    Eyelid Cyst Evaluation     Chief Complaint(s) and History of Present Illness(es)       Eyelid Cyst Evaluation              Laterality: left upper lid    Onset: 6 days ago    Associated symptoms: lid swelling, lid pain (tender to the touch, less than before per pt), lid redness and discharge (some mattering upon waking per pt).  Negative for mattering    Treatments tried: warm compresses    Response to treatment: mild improvement    Pain scale: 0/10              Comments    Pt notes he has been doing WC 4-5x daily x 6 day, AT 3-4x daily, NI, pt notes he woke up on Thursday with a red lid, on Friday it was \"the worst and it felt scratchy\", it is no longer scratchy.  He has a h/o I and D on the MARISABEL.     Rosetta Stake COT February 13, 2024 12:48 PM                       "

## 2024-02-13 NOTE — PROGRESS NOTES
HPI  Enrike Sotomayor is an 83 year old  here for a new painful lesion of the left upper eyelid that started 6 days ago.  Some mattering but no significant drainage. Less eye irritation now.  Has been doing warm compresses. Vision stable, new reading glasses working great.     PMH:  Past Medical History:   Diagnosis Date     Nonsenile cataract Mar 2023      POH: cataract extraction intraocular lens both eyes-vivity , left upper eyelid chalazion excision, posterior vitreous detachment both eyes, mild dry armd  Oc Meds: none  FH: Denies any glaucoma, age related macular degeneration, or other known eye diseases         Assessment & Plan        (H00.014) Hordeolum externum of left upper eyelid  (primary encounter diagnosis)  Comment: new , still somewhat acutely inflammed  Plan: neomycin-polymyxin-dexAMETHasone (MAXITROL)         3.5-33353-9.1 ophthalmic ointment four times a day os        Continue warm compresses         Gentle lid washing , massage area         Discussed with patient signs of cellulitis and to call if this occurs        Possible excision if not resolved    (Z96.1) Pseudophakia of both eyes - Both Eyes  Comment: now happy with reading glasses   Plan: observe     -----------------------------------------------------------------------------------       Patient disposition:   Return in about 31 days (around 3/15/2024) for poss MARISABEL chalazion excision w Dr. Coronado or Quang. . Patient to call sooner as needed for any worsening symptoms.    Complete documentation of historical and exam elements from today's encounter can be found in the full encounter summary report (not reduplicated in this progress note). I personally obtained the chief complaint(s) and history of present illness.  I have confirmed and edited as necessary the CC, HPI, PMH/PSH, social history, FMH, ROS, and exam/neuro findings as obtained by the technician or others. I have examined this patient myself and I personally viewed the  image(s) and studies listed above and the documentation reflects my findings and interpretation.  I formulated and edited as necessary the assessment and plan and discussed the findings and management plan with the patient and family.     Millicent Carver MD

## 2024-02-21 ENCOUNTER — OFFICE VISIT (OUTPATIENT)
Dept: OPHTHALMOLOGY | Facility: CLINIC | Age: 84
End: 2024-02-21
Attending: OPHTHALMOLOGY
Payer: COMMERCIAL

## 2024-02-21 DIAGNOSIS — H00.14 CHALAZION OF LEFT UPPER EYELID: Primary | ICD-10-CM

## 2024-02-21 DIAGNOSIS — H52.4 MYOPIA OF BOTH EYES WITH ASTIGMATISM AND PRESBYOPIA: ICD-10-CM

## 2024-02-21 DIAGNOSIS — H52.203 MYOPIA OF BOTH EYES WITH ASTIGMATISM AND PRESBYOPIA: ICD-10-CM

## 2024-02-21 DIAGNOSIS — H52.13 MYOPIA OF BOTH EYES WITH ASTIGMATISM AND PRESBYOPIA: ICD-10-CM

## 2024-02-21 PROCEDURE — 67800 REMOVE EYELID LESION: CPT | Performed by: OPHTHALMOLOGY

## 2024-02-21 PROCEDURE — G0463 HOSPITAL OUTPT CLINIC VISIT: HCPCS | Mod: 25 | Performed by: OPHTHALMOLOGY

## 2024-02-21 RX ORDER — DOXYCYCLINE 100 MG/1
100 CAPSULE ORAL 2 TIMES DAILY
Qty: 60 CAPSULE | Refills: 1 | Status: SHIPPED | OUTPATIENT
Start: 2024-02-21 | End: 2024-06-19

## 2024-02-21 ASSESSMENT — REFRACTION_WEARINGRX
OS_AXIS: 125
OD_ADD: +2.50
OD_SPHERE: -1.50
OS_CYLINDER: +0.50
OS_ADD: +2.50
OD_AXIS: 007
OS_SPHERE: -0.75
OD_CYLINDER: +0.75

## 2024-02-21 ASSESSMENT — TONOMETRY
OD_IOP_MMHG: 10
IOP_METHOD: TONOPEN
OS_IOP_MMHG: 10

## 2024-02-21 ASSESSMENT — VISUAL ACUITY
OD_CC: 20/20
OS_CC: 20/25
METHOD: SNELLEN - LINEAR
CORRECTION_TYPE: GLASSES
OD_CC+: -1

## 2024-02-21 ASSESSMENT — SLIT LAMP EXAM - LIDS
COMMENTS: CHALAZION: UPPER LID
COMMENTS: NORMAL

## 2024-02-21 NOTE — PROGRESS NOTES
HPI       Pseudophakia Follow Up    In both eyes.  Duration of 4 weeks.             Comments    Pt. States that he is doing great! Extremely happy with new glasses. No pain BE. No flashes or new floaters BE. Has been treating a stye on RUL for the last week. Has seen slight improvement with WC's and ointment.     Madai Maradiaga COT 9:20 AM February 21, 2024             Last edited by Madai Maradiaga on 2/21/2024  9:21 AM.         Review of systems for the eyes was negative other than the pertinent positives/negatives listed in the HPI.      Assessment & Plan    HPI:  Enrike Sotomayor is a 83 year old male with history of HTN, ED, allergies presents for followup dissatisfaction following cataract surgery. He was ecstatic with receiving the glasses. Left eye a little worse since chalazion. Saw Dr. Carver and started warm compress and maxitrol      POHx: Cataract extraction/intraocular lens, mild dry armd  PMHx:  HTN, ED, allergies  Current Medications: aspirin 81 MG EC tablet, Take 81 mg by mouth daily  diltiazem ER (DILT-XR) 120 MG 24 hr capsule, Take 120 mg by mouth daily  fluticasone (FLONASE) 50 MCG/ACT nasal spray, 1 spray in each nostril  losartan (COZAAR) 50 MG tablet, Take 50 mg by mouth daily  neomycin-polymyxin-dexAMETHasone (MAXITROL) 3.5-97488-2.1 ophthalmic ointment, Place 0.1429 Applications (0.5 g) Into the left eye 4 times daily for 10 days  sildenafil (VIAGRA) 100 MG tablet, 1 tablet as needed    No current facility-administered medications on file prior to visit.    FHx: denies family history of ocular conditions   PSHx: Cataract extraction/intraocular lens Vivity both eyes 2/2023 [facility]      Current Eye Medications:  Maxitrol four times a day left eye     Assessment & Plan:  (H52.13,  H52.203,  H52.4) Myopia of both eyes with astigmatism and presbyopia  (primary encounter diagnosis)  (Z96.1) Pseudophakia, both eyes  Likely chose Vivity lens due to drusen left eye > right eye  Doing much better  with MRx and happy    (H00.14) Chalazion of left upper eyelid  (primary encounter diagnosis)  Chalazion diagnosis was discussed in detail with patient. Discussed treatment options with patient. Surgical risks and benefits of I&D were discussed, explained and understood by patient. Patient elects to proceed  Hot compresses, mechanical lid cleansing with OcuSoft lid scrubs or baby shampoo, oral omega 3's and topical ointment were prescribed. Normal lid anatomy was discussed including the oil producing glands and its role in chalazion and blepharitis. Strategies were reviewed for helping a chalazion resolve when one is present. This includes hot compresses twice a day using an uncooked bag of rice in a clean tube sock followed by ophthalmic ointment. Prevention of new lesions may be tried using daily mechanical lid scrubs.   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        Ganesh Del Rio MD     Attending Physician Attestation:  Complete documentation of historical and exam elements from today's encounter can be found in the full encounter summary report (not reduplicated in this progress note).  I personally obtained the chief complaint(s) and history of present illness.  I confirmed and edited as necessary the review of systems, past medical/surgical history, family history, social history, and examination findings as documented by others; and I examined the patient myself.  I personally reviewed the relevant tests, images, and reports as documented above.  I formulated and edited as necessary the assessment and plan and discussed the findings and management plan with the patient and family. - Ganesh Del Rio MD

## 2024-02-21 NOTE — NURSING NOTE
Chief Complaints and History of Present Illnesses   Patient presents with    Pseudophakia Follow Up     Chief Complaint(s) and History of Present Illness(es)       Pseudophakia Follow Up              Laterality: both eyes    Duration: 4 weeks              Comments    Pt. States that he is doing great! Extremely happy with new glasses. No pain BE. No flashes or new floaters BE. Has been treating a stye on RUL for the last week. Has seen slight improvement with WC's and ointment.     Madai Maradiaga COT 9:20 AM February 21, 2024

## 2024-03-07 ENCOUNTER — LAB REQUISITION (OUTPATIENT)
Dept: LAB | Facility: CLINIC | Age: 84
End: 2024-03-07
Payer: COMMERCIAL

## 2024-03-07 ENCOUNTER — MEDICAL CORRESPONDENCE (OUTPATIENT)
Dept: HEALTH INFORMATION MANAGEMENT | Facility: CLINIC | Age: 84
End: 2024-03-07

## 2024-03-07 ENCOUNTER — TRANSFERRED RECORDS (OUTPATIENT)
Dept: HEALTH INFORMATION MANAGEMENT | Facility: CLINIC | Age: 84
End: 2024-03-07

## 2024-03-07 DIAGNOSIS — I12.9 HYPERTENSIVE CHRONIC KIDNEY DISEASE WITH STAGE 1 THROUGH STAGE 4 CHRONIC KIDNEY DISEASE, OR UNSPECIFIED CHRONIC KIDNEY DISEASE: ICD-10-CM

## 2024-03-07 LAB — HBA1C MFR BLD: 5.2 % (ref 4.2–6.1)

## 2024-03-07 PROCEDURE — 80048 BASIC METABOLIC PNL TOTAL CA: CPT | Mod: ORL | Performed by: STUDENT IN AN ORGANIZED HEALTH CARE EDUCATION/TRAINING PROGRAM

## 2024-03-08 LAB
ANION GAP SERPL CALCULATED.3IONS-SCNC: 8 MMOL/L (ref 7–15)
BUN SERPL-MCNC: 28.4 MG/DL (ref 8–23)
CALCIUM SERPL-MCNC: 9.2 MG/DL (ref 8.8–10.2)
CHLORIDE SERPL-SCNC: 104 MMOL/L (ref 98–107)
CREAT SERPL-MCNC: 1.21 MG/DL (ref 0.67–1.17)
DEPRECATED HCO3 PLAS-SCNC: 22 MMOL/L (ref 22–29)
EGFRCR SERPLBLD CKD-EPI 2021: 60 ML/MIN/1.73M2
GLUCOSE SERPL-MCNC: 98 MG/DL (ref 70–99)
POTASSIUM SERPL-SCNC: 4.4 MMOL/L (ref 3.4–5.3)
SODIUM SERPL-SCNC: 134 MMOL/L (ref 135–145)

## 2024-03-20 ENCOUNTER — TRANSFERRED RECORDS (OUTPATIENT)
Dept: HEALTH INFORMATION MANAGEMENT | Facility: CLINIC | Age: 84
End: 2024-03-20
Payer: COMMERCIAL

## 2024-03-27 ENCOUNTER — HOSPITAL ENCOUNTER (OUTPATIENT)
Dept: CARDIOLOGY | Facility: CLINIC | Age: 84
Discharge: HOME | End: 2024-03-27
Attending: STUDENT IN AN ORGANIZED HEALTH CARE EDUCATION/TRAINING PROGRAM | Admitting: STUDENT IN AN ORGANIZED HEALTH CARE EDUCATION/TRAINING PROGRAM
Payer: COMMERCIAL

## 2024-03-27 ENCOUNTER — TRANSFERRED RECORDS (OUTPATIENT)
Dept: HEALTH INFORMATION MANAGEMENT | Facility: CLINIC | Age: 84
End: 2024-03-27

## 2024-03-27 DIAGNOSIS — R06.09 DYSPNEA ON EXERTION: ICD-10-CM

## 2024-03-27 PROCEDURE — 93321 DOPPLER ECHO F-UP/LMTD STD: CPT | Mod: 26 | Performed by: GENERAL ACUTE CARE HOSPITAL

## 2024-03-27 PROCEDURE — 93325 DOPPLER ECHO COLOR FLOW MAPG: CPT | Mod: 26 | Performed by: GENERAL ACUTE CARE HOSPITAL

## 2024-03-27 PROCEDURE — 255N000002 HC RX 255 OP 636: Performed by: STUDENT IN AN ORGANIZED HEALTH CARE EDUCATION/TRAINING PROGRAM

## 2024-03-27 PROCEDURE — 93016 CV STRESS TEST SUPVJ ONLY: CPT | Performed by: INTERNAL MEDICINE

## 2024-03-27 PROCEDURE — 93352 ADMIN ECG CONTRAST AGENT: CPT | Performed by: GENERAL ACUTE CARE HOSPITAL

## 2024-03-27 PROCEDURE — 93018 CV STRESS TEST I&R ONLY: CPT | Performed by: GENERAL ACUTE CARE HOSPITAL

## 2024-03-27 PROCEDURE — 93350 STRESS TTE ONLY: CPT | Mod: 26 | Performed by: GENERAL ACUTE CARE HOSPITAL

## 2024-03-27 PROCEDURE — 999N000208 ECHO STRESS ECHOCARDIOGRAM

## 2024-03-27 RX ADMIN — PERFLUTREN 3 ML: 6.52 INJECTION, SUSPENSION INTRAVENOUS at 08:48

## 2024-03-29 ENCOUNTER — TRANSFERRED RECORDS (OUTPATIENT)
Dept: HEALTH INFORMATION MANAGEMENT | Facility: CLINIC | Age: 84
End: 2024-03-29
Payer: COMMERCIAL

## 2024-04-10 ENCOUNTER — OFFICE VISIT (OUTPATIENT)
Dept: OPHTHALMOLOGY | Facility: CLINIC | Age: 84
End: 2024-04-10
Attending: OPHTHALMOLOGY
Payer: COMMERCIAL

## 2024-04-10 DIAGNOSIS — H00.14 CHALAZION OF LEFT UPPER EYELID: Primary | ICD-10-CM

## 2024-04-10 PROCEDURE — 99212 OFFICE O/P EST SF 10 MIN: CPT | Performed by: OPHTHALMOLOGY

## 2024-04-10 PROCEDURE — G0463 HOSPITAL OUTPT CLINIC VISIT: HCPCS | Performed by: OPHTHALMOLOGY

## 2024-04-10 ASSESSMENT — REFRACTION_WEARINGRX
OD_AXIS: 007
OD_SPHERE: -1.50
OS_SPHERE: -0.75
OS_CYLINDER: +0.50
OD_CYLINDER: +0.75
OS_AXIS: 125
OS_ADD: +2.50
OD_ADD: +2.50

## 2024-04-10 ASSESSMENT — TONOMETRY
IOP_METHOD: ICARE
OD_IOP_MMHG: 09
OS_IOP_MMHG: 10

## 2024-04-10 ASSESSMENT — VISUAL ACUITY
OS_CC+: -2
OD_CC: 20/25
OS_CC: 20/20
CORRECTION_TYPE: GLASSES
METHOD: SNELLEN - LINEAR
OD_CC+: +2

## 2024-04-10 ASSESSMENT — SLIT LAMP EXAM - LIDS: COMMENTS: 2+ DERMATOCHALASIS, 2+ MEIBOMIAN GLAND DYSFUNCTION

## 2024-04-10 NOTE — NURSING NOTE
Chief Complaints and History of Present Illnesses   Patient presents with    Chalazion Follow Up     Chief Complaint(s) and History of Present Illness(es)       Chalazion Follow Up              Laterality: left upper lid              Comments    Pt. States that he is doing well. Seems as though chalazion has almost resolved. No pain or discomfort. Swelling has gone down. No change in VA BE.   Madai YOUNG 8:48 AM April 10, 2024

## 2024-04-10 NOTE — PROGRESS NOTES
HPI       Chalazion Follow Up    In left upper lid.             Comments    Pt. States that he is doing well. Seems as though chalazion has almost resolved. No pain or discomfort. Swelling has gone down. No change in VA BE.   Madai Maradiaga COT 8:48 AM April 10, 2024             Last edited by Madai Maradiaga on 4/10/2024  8:48 AM.         Review of systems for the eyes was negative other than the pertinent positives/negatives listed in the HPI.      Assessment & Plan    HPI:  Enrike Sotomayor is a 83 year old male with history of HTN, ED, allergies presents for followup dissatisfaction following cataract surgery. He was ecstatic with receiving the glasses. Left eye a little worse since chalazion. Saw Dr. Carver and started warm compress and maxitrol      POHx: Cataract extraction/intraocular lens, mild dry armd  PMHx:  HTN, ED, allergies  Current Medications:   Current Outpatient Medications   Medication Sig Dispense Refill    aspirin 81 MG EC tablet Take 81 mg by mouth daily      diltiazem ER (DILT-XR) 120 MG 24 hr capsule Take 120 mg by mouth daily      doxycycline hyclate (VIBRAMYCIN) 100 MG capsule Take 1 capsule (100 mg) by mouth 2 times daily 60 capsule 1    fluticasone (FLONASE) 50 MCG/ACT nasal spray 1 spray in each nostril      losartan (COZAAR) 50 MG tablet Take 50 mg by mouth daily      sildenafil (VIAGRA) 100 MG tablet 1 tablet as needed       No current facility-administered medications for this visit.     FHx: denies family history of ocular conditions   PSHx: Cataract extraction/intraocular lens Vivity both eyes 2/2023 [facility]      Current Eye Medications:  Maxitrol four times a day left eye     Assessment & Plan:  (H52.13,  H52.203,  H52.4) Myopia of both eyes with astigmatism and presbyopia  (primary encounter diagnosis)  (Z96.1) Pseudophakia, both eyes  Likely chose Vivity lens due to drusen left eye > right eye  Doing much better with MRx and happy    (H00.14) Chalazion of left upper eyelid   (primary encounter diagnosis)  Much improved but not completely resolved  Advised complete doxycycline course  Restart warm compress      (H35.369) Drusen (degenerative) of retina  (H35.363) Drusen (degenerative) of macula, bilateral  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        Ganesh Del Rio MD     Attending Physician Attestation:  Complete documentation of historical and exam elements from today's encounter can be found in the full encounter summary report (not reduplicated in this progress note).  I personally obtained the chief complaint(s) and history of present illness.  I confirmed and edited as necessary the review of systems, past medical/surgical history, family history, social history, and examination findings as documented by others; and I examined the patient myself.  I personally reviewed the relevant tests, images, and reports as documented above.  I formulated and edited as necessary the assessment and plan and discussed the findings and management plan with the patient and family. - Ganesh Del Rio MD

## 2024-04-15 ENCOUNTER — MEDICAL CORRESPONDENCE (OUTPATIENT)
Dept: HEALTH INFORMATION MANAGEMENT | Facility: CLINIC | Age: 84
End: 2024-04-15
Payer: COMMERCIAL

## 2024-05-08 ENCOUNTER — OFFICE VISIT (OUTPATIENT)
Dept: CARDIOLOGY | Facility: CLINIC | Age: 84
End: 2024-05-08
Payer: COMMERCIAL

## 2024-05-08 ENCOUNTER — HOSPITAL ENCOUNTER (OUTPATIENT)
Dept: RADIOLOGY | Facility: CLINIC | Age: 84
Discharge: HOME | End: 2024-05-08
Attending: INTERNAL MEDICINE | Admitting: INTERNAL MEDICINE
Payer: COMMERCIAL

## 2024-05-08 VITALS
SYSTOLIC BLOOD PRESSURE: 138 MMHG | DIASTOLIC BLOOD PRESSURE: 70 MMHG | OXYGEN SATURATION: 98 % | HEART RATE: 65 BPM | WEIGHT: 201.9 LBS | BODY MASS INDEX: 30.7 KG/M2 | RESPIRATION RATE: 16 BRPM

## 2024-05-08 DIAGNOSIS — R06.09 DYSPNEA ON EXERTION: ICD-10-CM

## 2024-05-08 DIAGNOSIS — R07.2 PRECORDIAL PAIN: ICD-10-CM

## 2024-05-08 DIAGNOSIS — R09.89 BRUIT OF LEFT CAROTID ARTERY: ICD-10-CM

## 2024-05-08 DIAGNOSIS — R09.89 CHEST CRACKLES: ICD-10-CM

## 2024-05-08 DIAGNOSIS — R06.09 DYSPNEA ON EXERTION: Primary | ICD-10-CM

## 2024-05-08 PROCEDURE — 99204 OFFICE O/P NEW MOD 45 MIN: CPT | Performed by: INTERNAL MEDICINE

## 2024-05-08 PROCEDURE — 71046 X-RAY EXAM CHEST 2 VIEWS: CPT

## 2024-05-08 NOTE — LETTER
5/8/2024    Louis Arora MD  [address] [place] [address]    RE: Enrike Sotomayor       Dear Colleague,     I had the pleasure of seeing Enrike Sotomayor in the [facility].      [facility]  [phone]          Assessment/Recommendations   Patient with longstanding exercise intolerance because of dyspnea on exertion but this has been worsening over the past 1 year.  Now he gets short of breath going up any kind of incline.  He does not have resting dyspnea and no evidence of pulmonary vascular congestion but does have some dry type crackles at his bases which do not clear with coughing.  He also has known vascular disease, status post carotid endarterectomy on the right and also has a soft bruit on the left.  His stress echocardiogram was nondiagnostic with very little increase in his heart rate and no change in his left ventricular systolic function after exercise.  Further evaluation would be warranted and I would suggest a CT coronary angiogram and patient is agreeable.    Given his shortness of breath and dry type crackles at the left base, I would also get a chest x-ray.      Given the systolic murmur and limited views of the right heart chambers, may also need an echocardiogram.    He has a left carotid bruit and a history of a right carotid endarterectomy.  Would recommend a carotid ultrasound for follow-up.    LDL cholesterol is excellent despite no statin therapy but we may need to reconsider this if he has plaque buildup on CT angiogram or carotid ultrasound.  He does take an antiplatelet agent.    Blood pressure is well-controlled.    We will get back to him with the results of above studies and any further recommendations.           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  Patient states that he was  short of breath with exertion dating back to his high school days.  He just could not keep up with running long distances and would become winded.  All of his adult life he had the similar issue and climbing up to the second floor of their house would cause him to be short of breath but it would pass quickly.  Over the last year he has noticed worsening dyspnea on exertion.  Any kind of elevation causes him to be short of breath.  He did not get short of breath walking into the clinic.  He gets more short of breath climbing stairs.  He denies orthopnea, paroxysmal nocturnal dyspnea, and gets minimal peripheral edema intermittently.  He denies a history of heart murmur, rheumatic fever, cerebrovascular accident or TIA.  He has no palpitations.    He is not diabetic, smoked for about 10 years in his college years and shortly thereafter.  His father had congestive heart failure in his 70s.  Patient's lipids are excellent and he has been treated for hypertensive but is not diabetic.    Patient grew up in [place].  He has a PhD in chemistry from [employer].  He worked over 30 years at  as a .  He is  has a son and a daughter.  He is now retired.        Study Date: 03/27/2024 08:16 AM  Age: 83 yrs  Gender: Male  Patient Location: [facility]  Reason For Study: Dyspnea on exertion  Ordering Physician: NIRMAAL BYERS  Referring Physician: NIRMALA BYERS  Performed By: ASHOK     BSA: 2.0 m2  Height: 68 in  Weight: 195 lb  HR: 82  BP: 161/79 mmHg  ______________________________________________________________________________  Procedure  Stress Echo Complete. Definity (NDC #55505-136) given intravenously.  Technically difficult study.Extremely difficult acoustic windows despite the  use of contrast for endcardial border definition.  ______________________________________________________________________________  Interpretation Summary  Stress findings:  1. Non-diagnostic exercise stress  echocardiogram due to a combination of poor  image quality and the inability to achieve target heart rate. Visually  estimated post exercise left ventricular ejection fraction appears unchanged  at 55-60%.  2. Non-diagnostic exercise stress electrocardiogram due to the inability to  achieve target heart rate. No ST segment changes observed with the achieved  workload.  3. Functional capacity is impaired. The patient exercised for 3:00 minutes on  the Justino protocol, achieving 4.6 METs and 80% the age-predicted maximum heart  rate. The patient reported dyspnea with exercise but did not experience chest  paiin.     Physical Examination Review of Systems   There were no vitals taken for this visit.  There is no height or weight on file to calculate BMI.  Wt Readings from Last 3 Encounters:   05/09/23 88.5 kg (195 lb)   05/10/22 88.5 kg (195 lb)   05/11/21 88.5 kg (195 lb)     General Appearance:   Alert, cooperative and in no acute distress.   ENT/Mouth: Pink/moist oral mucosa   EYES:  no scleral icterus, normal conjunctivae   Neck: JVP normal. No Hepatojugular reflux. Thyroid not visualized.   Chest/Lungs:   Lungs have dry type crackles at the left base which do not clear with coughing, equal chest wall expansion.   Cardiovascular:   S1, S2 with 2/6 systolic murmur , no clicks or rubs. Brachial, radial and posterior tibial pulses are intact and symetric. No carotid bruits noted   Abdomen:  Nontender.    Extremities: No cyanosis, clubbing or edema   Skin: no xanthelasma, warm.    Neurologic: normal arm movement bilateral, no tremors     Psychiatric: Appropriate affect.                                                  Medical History  Surgical History Family History Social History   Past Medical History:   Diagnosis Date    Nonsenile cataract Mar 2023    Past Surgical History:   Procedure Laterality Date    CATARACT IOL, RT/LT  Mar 2023    Family History   Problem Relation Age of Onset    Cancer Mother     Hypertension  Father     Social History     Socioeconomic History    Marital status:      Spouse name: Not on file    Number of children: Not on file    Years of education: Not on file    Highest education level: Not on file   Occupational History    Not on file   Tobacco Use    Smoking status: Former     Current packs/day: 1.00     Average packs/day: 1 pack/day for 5.0 years (5.0 ttl pk-yrs)     Types: Cigarettes    Smokeless tobacco: Never   Substance and Sexual Activity    Alcohol use: Yes    Drug use: Never    Sexual activity: Not Currently     Partners: Female     Birth control/protection: Male Surgical   Other Topics Concern    Not on file   Social History Narrative    Not on file     Social Determinants of Health     Financial Resource Strain: Not on File (2021)    Received from [facility]     Financial Resource Strain     Financial Resource Strain: 0   Food Insecurity: Not on File (2021)    Received from [facility]     Food Insecurity     Food: 0   Transportation Needs: Not on File (2021)    Received from [facility]     Transportation Needs     Transportation: 0   Physical Activity: Not on File (2021)    Received from [facility]     Physical Activity     Physical Activity: 0   Stress: Not on File (2021)    Received from [facility]     Stress     Stress: 0   Social Connections: Not on File (2021)    Received from [facility]     Social Connections     Social Connections and Isolation: 0   Interpersonal Safety: Not on file   Housing Stability: Not on File (2021)    Received from [facility]     Housing Stability     Housin          Medications  Allergies   Current Outpatient Medications   Medication Sig Dispense Refill    aspirin 81 MG EC tablet Take 81 mg by mouth daily      diltiazem ER (DILT-XR) 120 MG 24 hr capsule Take 120 mg by mouth daily      doxycycline hyclate (VIBRAMYCIN) 100 MG capsule Take 1 capsule (100 mg) by mouth 2 times daily (Patient taking  differently: Take 100 mg by mouth 2 times daily Pt still have 2 more pills left to take.) 60 capsule 1    fluticasone (FLONASE) 50 MCG/ACT nasal spray 1 spray in each nostril      losartan (COZAAR) 50 MG tablet Take 50 mg by mouth daily      sildenafil (VIAGRA) 100 MG tablet 1 tablet as needed      Allergies   Allergen Reactions    Pneumovax  [Pneumococcal Polysaccharide Vaccine]      Other reaction(s): fever,swollen, red arm         Lab Results    Chemistry/lipid CBC Cardiac Enzymes/BNP/TSH/INR   Lab Results   Component Value Date    CHOL 154 05/24/2023    HDL 49 05/24/2023    TRIG 108 05/24/2023    BUN 28.4 (H) 03/07/2024     (L) 03/07/2024    CO2 22 03/07/2024    Lab Results   Component Value Date    WBC 6.9 05/09/2023    HGB 13.5 05/09/2023    HCT 40.7 05/09/2023    MCV 92 05/09/2023     05/09/2023    Lab Results   Component Value Date    TSH 2.59 05/09/2023                Thank you for allowing me to participate in the care of your patient.      Sincerely,     Justin Corona MD     [facility]  cc:   Debbie Lazo NP  [address], [address],   [place] [address]

## 2024-05-08 NOTE — PROGRESS NOTES
[facility]  [phone]          Assessment/Recommendations   Patient with longstanding exercise intolerance because of dyspnea on exertion but this has been worsening over the past 1 year.  Now he gets short of breath going up any kind of incline.  He does not have resting dyspnea and no evidence of pulmonary vascular congestion but does have some dry type crackles at his bases which do not clear with coughing.  He also has known vascular disease, status post carotid endarterectomy on the right and also has a soft bruit on the left.  His stress echocardiogram was nondiagnostic with very little increase in his heart rate and no change in his left ventricular systolic function after exercise.  Further evaluation would be warranted and I would suggest a CT coronary angiogram and patient is agreeable.    Given his shortness of breath and dry type crackles at the left base, I would also get a chest x-ray.      Given the systolic murmur and limited views of the right heart chambers, may also need an echocardiogram.    He has a left carotid bruit and a history of a right carotid endarterectomy.  Would recommend a carotid ultrasound for follow-up.    LDL cholesterol is excellent despite no statin therapy but we may need to reconsider this if he has plaque buildup on CT angiogram or carotid ultrasound.  He does take an antiplatelet agent.    Blood pressure is well-controlled.    We will get back to him with the results of above studies and any further recommendations.           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  Patient states that he was short of breath with exertion dating back to his high school days.  He just could not keep up with running long distances and would become winded.  All of his adult life he had the similar issue and climbing  up to the second floor of their house would cause him to be short of breath but it would pass quickly.  Over the last year he has noticed worsening dyspnea on exertion.  Any kind of elevation causes him to be short of breath.  He did not get short of breath walking into the clinic.  He gets more short of breath climbing stairs.  He denies orthopnea, paroxysmal nocturnal dyspnea, and gets minimal peripheral edema intermittently.  He denies a history of heart murmur, rheumatic fever, cerebrovascular accident or TIA.  He has no palpitations.    He is not diabetic, smoked for about 10 years in his college years and shortly thereafter.  His father had congestive heart failure in his 70s.  Patient's lipids are excellent and he has been treated for hypertensive but is not diabetic.    Patient grew up in [place].  He has a PhD in chemistry from [employer].  He worked over 30 years at  as a .  He is  has a son and a daughter.  He is now retired.        Study Date: 03/27/2024 08:16 AM  Age: 83 yrs  Gender: Male  Patient Location: [facility]  Reason For Study: Dyspnea on exertion  Ordering Physician: NIRMALA BYERS  Referring Physician: NIRMALA BYERS  Performed By: ASHOK     BSA: 2.0 m2  Height: 68 in  Weight: 195 lb  HR: 82  BP: 161/79 mmHg  ______________________________________________________________________________  Procedure  Stress Echo Complete. Definity (NDC #24011-369) given intravenously.  Technically difficult study.Extremely difficult acoustic windows despite the  use of contrast for endcardial border definition.  ______________________________________________________________________________  Interpretation Summary  Stress findings:  1. Non-diagnostic exercise stress echocardiogram due to a combination of poor  image quality and the inability to achieve target heart rate. Visually  estimated post exercise left ventricular ejection fraction appears unchanged  at 55-60%.  2.  Non-diagnostic exercise stress electrocardiogram due to the inability to  achieve target heart rate. No ST segment changes observed with the achieved  workload.  3. Functional capacity is impaired. The patient exercised for 3:00 minutes on  the Justino protocol, achieving 4.6 METs and 80% the age-predicted maximum heart  rate. The patient reported dyspnea with exercise but did not experience chest  paiin.     Physical Examination Review of Systems   There were no vitals taken for this visit.  There is no height or weight on file to calculate BMI.  Wt Readings from Last 3 Encounters:   05/09/23 88.5 kg (195 lb)   05/10/22 88.5 kg (195 lb)   05/11/21 88.5 kg (195 lb)     General Appearance:   Alert, cooperative and in no acute distress.   ENT/Mouth: Pink/moist oral mucosa   EYES:  no scleral icterus, normal conjunctivae   Neck: JVP normal. No Hepatojugular reflux. Thyroid not visualized.   Chest/Lungs:   Lungs have dry type crackles at the left base which do not clear with coughing, equal chest wall expansion.   Cardiovascular:   S1, S2 with 2/6 systolic murmur , no clicks or rubs. Brachial, radial and posterior tibial pulses are intact and symetric. No carotid bruits noted   Abdomen:  Nontender.    Extremities: No cyanosis, clubbing or edema   Skin: no xanthelasma, warm.    Neurologic: normal arm movement bilateral, no tremors     Psychiatric: Appropriate affect.                                                  Medical History  Surgical History Family History Social History   Past Medical History:   Diagnosis Date    Nonsenile cataract Mar 2023    Past Surgical History:   Procedure Laterality Date    CATARACT IOL, RT/LT  Mar 2023    Family History   Problem Relation Age of Onset    Cancer Mother     Hypertension Father     Social History     Socioeconomic History    Marital status:      Spouse name: Not on file    Number of children: Not on file    Years of education: Not on file    Highest education level: Not  on file   Occupational History    Not on file   Tobacco Use    Smoking status: Former     Current packs/day: 1.00     Average packs/day: 1 pack/day for 5.0 years (5.0 ttl pk-yrs)     Types: Cigarettes    Smokeless tobacco: Never   Substance and Sexual Activity    Alcohol use: Yes    Drug use: Never    Sexual activity: Not Currently     Partners: Female     Birth control/protection: Male Surgical   Other Topics Concern    Not on file   Social History Narrative    Not on file     Social Determinants of Health     Financial Resource Strain: Not on File (2021)    Received from [facility]     Financial Resource Strain     Financial Resource Strain: 0   Food Insecurity: Not on File (2021)    Received from [facility]     Food Insecurity     Food: 0   Transportation Needs: Not on File (2021)    Received from [facility]     Transportation Needs     Transportation: 0   Physical Activity: Not on File (2021)    Received from [facility]     Physical Activity     Physical Activity: 0   Stress: Not on File (2021)    Received from [facility]     Stress     Stress: 0   Social Connections: Not on File (2021)    Received from [facility]     Social Connections     Social Connections and Isolation: 0   Interpersonal Safety: Not on file   Housing Stability: Not on File (2021)    Received from [facility]     Housing Stability     Housin          Medications  Allergies   Current Outpatient Medications   Medication Sig Dispense Refill    aspirin 81 MG EC tablet Take 81 mg by mouth daily      diltiazem ER (DILT-XR) 120 MG 24 hr capsule Take 120 mg by mouth daily      doxycycline hyclate (VIBRAMYCIN) 100 MG capsule Take 1 capsule (100 mg) by mouth 2 times daily (Patient taking differently: Take 100 mg by mouth 2 times daily Pt still have 2 more pills left to take.) 60 capsule 1    fluticasone (FLONASE) 50 MCG/ACT nasal spray 1 spray in each nostril      losartan (COZAAR) 50 MG tablet  Take 50 mg by mouth daily      sildenafil (VIAGRA) 100 MG tablet 1 tablet as needed      Allergies   Allergen Reactions    Pneumovax  [Pneumococcal Polysaccharide Vaccine]      Other reaction(s): fever,swollen, red arm         Lab Results    Chemistry/lipid CBC Cardiac Enzymes/BNP/TSH/INR   Lab Results   Component Value Date    CHOL 154 05/24/2023    HDL 49 05/24/2023    TRIG 108 05/24/2023    BUN 28.4 (H) 03/07/2024     (L) 03/07/2024    CO2 22 03/07/2024    Lab Results   Component Value Date    WBC 6.9 05/09/2023    HGB 13.5 05/09/2023    HCT 40.7 05/09/2023    MCV 92 05/09/2023     05/09/2023    Lab Results   Component Value Date    TSH 2.59 05/09/2023

## 2024-06-12 ENCOUNTER — HOSPITAL ENCOUNTER (OUTPATIENT)
Dept: CT IMAGING | Facility: CLINIC | Age: 84
Discharge: HOME | End: 2024-06-12
Attending: INTERNAL MEDICINE
Payer: COMMERCIAL

## 2024-06-12 ENCOUNTER — HOSPITAL ENCOUNTER (OUTPATIENT)
Dept: ULTRASOUND IMAGING | Facility: CLINIC | Age: 84
Discharge: HOME | End: 2024-06-12
Attending: INTERNAL MEDICINE
Payer: COMMERCIAL

## 2024-06-12 VITALS — SYSTOLIC BLOOD PRESSURE: 124 MMHG | DIASTOLIC BLOOD PRESSURE: 56 MMHG | HEART RATE: 61 BPM

## 2024-06-12 DIAGNOSIS — R93.1 ABNORMAL FINDINGS ON DIAGNOSTIC IMAGING OF HEART AND CORONARY CIRCULATION: ICD-10-CM

## 2024-06-12 DIAGNOSIS — R09.89 BRUIT OF LEFT CAROTID ARTERY: ICD-10-CM

## 2024-06-12 DIAGNOSIS — R07.2 PRECORDIAL PAIN: ICD-10-CM

## 2024-06-12 DIAGNOSIS — R06.09 DYSPNEA ON EXERTION: ICD-10-CM

## 2024-06-12 DIAGNOSIS — R09.89 CHEST CRACKLES: ICD-10-CM

## 2024-06-12 LAB
BSA FOR ECHO PROCEDURE: 0 M2
CREAT BLD-MCNC: 1.2 MG/DL (ref 0.7–1.3)
EGFRCR SERPLBLD CKD-EPI 2021: >60 ML/MIN/1.73M2

## 2024-06-12 PROCEDURE — 75574 CT ANGIO HRT W/3D IMAGE: CPT | Mod: 26 | Performed by: INTERNAL MEDICINE

## 2024-06-12 PROCEDURE — 250N000011 HC RX IP 250 OP 636: Mod: JZ | Performed by: INTERNAL MEDICINE

## 2024-06-12 PROCEDURE — 250N000013 HC RX MED GY IP 250 OP 250 PS 637: Performed by: INTERNAL MEDICINE

## 2024-06-12 PROCEDURE — 93880 EXTRACRANIAL BILAT STUDY: CPT

## 2024-06-12 PROCEDURE — 75580 N-INVAS EST C FFR SW ALY CTA: CPT

## 2024-06-12 PROCEDURE — 82565 ASSAY OF CREATININE: CPT

## 2024-06-12 PROCEDURE — 75574 CT ANGIO HRT W/3D IMAGE: CPT

## 2024-06-12 RX ORDER — LIDOCAINE 40 MG/G
CREAM TOPICAL
Status: DISCONTINUED | OUTPATIENT
Start: 2024-06-12 | End: 2024-06-13 | Stop reason: HOSPADM

## 2024-06-12 RX ORDER — METOPROLOL TARTRATE 1 MG/ML
5 INJECTION, SOLUTION INTRAVENOUS
Status: DISCONTINUED | OUTPATIENT
Start: 2024-06-12 | End: 2024-06-13 | Stop reason: HOSPADM

## 2024-06-12 RX ORDER — DILTIAZEM HYDROCHLORIDE 5 MG/ML
10 INJECTION INTRAVENOUS
Status: DISCONTINUED | OUTPATIENT
Start: 2024-06-12 | End: 2024-06-13 | Stop reason: HOSPADM

## 2024-06-12 RX ORDER — DILTIAZEM HYDROCHLORIDE 5 MG/ML
5 INJECTION INTRAVENOUS
Status: DISCONTINUED | OUTPATIENT
Start: 2024-06-12 | End: 2024-06-13 | Stop reason: HOSPADM

## 2024-06-12 RX ORDER — NITROGLYCERIN 0.4 MG/1
0.4 TABLET SUBLINGUAL ONCE
Status: COMPLETED | OUTPATIENT
Start: 2024-06-12 | End: 2024-06-12

## 2024-06-12 RX ORDER — IOPAMIDOL 755 MG/ML
100 INJECTION, SOLUTION INTRAVASCULAR ONCE
Status: COMPLETED | OUTPATIENT
Start: 2024-06-12 | End: 2024-06-12

## 2024-06-12 RX ADMIN — NITROGLYCERIN 0.4 MG: 0.4 TABLET, ORALLY DISINTEGRATING SUBLINGUAL at 08:07

## 2024-06-12 RX ADMIN — IOPAMIDOL 100 ML: 755 INJECTION, SOLUTION INTRAVENOUS at 08:08

## 2024-06-13 LAB — BSA FOR ECHO PROCEDURE: 2 M2

## 2024-06-13 PROCEDURE — 75580 N-INVAS EST C FFR SW ALY CTA: CPT | Mod: 26 | Performed by: INTERNAL MEDICINE

## 2024-06-17 DIAGNOSIS — R09.89 BRUIT OF LEFT CAROTID ARTERY: Primary | ICD-10-CM

## 2024-06-17 DIAGNOSIS — R93.1 ABNORMAL COMPUTED TOMOGRAPHY ANGIOGRAPHY OF HEART: ICD-10-CM

## 2024-06-17 DIAGNOSIS — R93.89 ABNORMAL ULTRASOUND OF CAROTID ARTERY: ICD-10-CM

## 2024-06-17 DIAGNOSIS — R07.2 PRECORDIAL PAIN: ICD-10-CM

## 2024-06-17 DIAGNOSIS — R93.1 ABNORMAL FINDINGS ON DIAGNOSTIC IMAGING OF HEART AND CORONARY CIRCULATION: ICD-10-CM

## 2024-06-17 DIAGNOSIS — R06.09 DYSPNEA ON EXERTION: Primary | ICD-10-CM

## 2024-06-18 ENCOUNTER — DOCUMENTATION ONLY (OUTPATIENT)
Dept: CARDIOLOGY | Facility: CLINIC | Age: 84
End: 2024-06-18
Payer: COMMERCIAL

## 2024-06-18 ENCOUNTER — PREP FOR PROCEDURE (OUTPATIENT)
Dept: CARDIOLOGY | Facility: CLINIC | Age: 84
End: 2024-06-18
Payer: COMMERCIAL

## 2024-06-18 DIAGNOSIS — R93.1 ABNORMAL COMPUTED TOMOGRAPHY ANGIOGRAPHY OF HEART: ICD-10-CM

## 2024-06-18 DIAGNOSIS — R93.1 ABNORMAL FINDINGS ON DIAGNOSTIC IMAGING OF HEART AND CORONARY CIRCULATION: ICD-10-CM

## 2024-06-18 DIAGNOSIS — R07.2 PRECORDIAL PAIN: Primary | ICD-10-CM

## 2024-06-18 DIAGNOSIS — R06.09 DYSPNEA ON EXERTION: ICD-10-CM

## 2024-06-18 LAB
ABO/RH(D): NORMAL
ANTIBODY SCREEN: NEGATIVE
SPECIMEN EXPIRATION DATE: NORMAL

## 2024-06-18 RX ORDER — ASPIRIN 325 MG
325 TABLET ORAL ONCE
Status: CANCELLED | OUTPATIENT
Start: 2024-06-24 | End: 2024-06-18

## 2024-06-18 RX ORDER — ASPIRIN 81 MG/1
243 TABLET, CHEWABLE ORAL ONCE
Status: CANCELLED | OUTPATIENT
Start: 2024-06-24

## 2024-06-18 RX ORDER — FENTANYL CITRATE 50 UG/ML
25 INJECTION, SOLUTION INTRAMUSCULAR; INTRAVENOUS
Status: CANCELLED | OUTPATIENT
Start: 2024-06-24

## 2024-06-18 RX ORDER — LIDOCAINE 40 MG/G
CREAM TOPICAL
Status: CANCELLED | OUTPATIENT
Start: 2024-06-18

## 2024-06-18 RX ORDER — SODIUM CHLORIDE 9 MG/ML
INJECTION, SOLUTION INTRAVENOUS CONTINUOUS
Status: CANCELLED | OUTPATIENT
Start: 2024-06-24

## 2024-06-19 ENCOUNTER — OFFICE VISIT (OUTPATIENT)
Dept: CARDIOLOGY | Facility: CLINIC | Age: 84
End: 2024-06-19
Payer: COMMERCIAL

## 2024-06-19 ENCOUNTER — APPOINTMENT (OUTPATIENT)
Dept: CARDIOLOGY | Facility: CLINIC | Age: 84
End: 2024-06-19
Payer: COMMERCIAL

## 2024-06-19 VITALS
RESPIRATION RATE: 16 BRPM | HEART RATE: 64 BPM | BODY MASS INDEX: 30.41 KG/M2 | WEIGHT: 200 LBS | SYSTOLIC BLOOD PRESSURE: 130 MMHG | DIASTOLIC BLOOD PRESSURE: 64 MMHG

## 2024-06-19 DIAGNOSIS — R93.1 ABNORMAL COMPUTED TOMOGRAPHY ANGIOGRAPHY OF HEART: ICD-10-CM

## 2024-06-19 DIAGNOSIS — R06.09 DYSPNEA ON EXERTION: ICD-10-CM

## 2024-06-19 DIAGNOSIS — R93.1 ABNORMAL FINDINGS ON DIAGNOSTIC IMAGING OF HEART AND CORONARY CIRCULATION: ICD-10-CM

## 2024-06-19 DIAGNOSIS — R07.2 PRECORDIAL PAIN: ICD-10-CM

## 2024-06-19 LAB
ANION GAP SERPL CALCULATED.3IONS-SCNC: 9 MMOL/L (ref 7–15)
BLOOD BANK CHART COMMENT: NORMAL
BUN SERPL-MCNC: 24.6 MG/DL (ref 8–23)
CALCIUM SERPL-MCNC: 9.1 MG/DL (ref 8.8–10.2)
CHLORIDE SERPL-SCNC: 105 MMOL/L (ref 98–107)
CHOLEST SERPL-MCNC: 156 MG/DL
CREAT SERPL-MCNC: 1.08 MG/DL (ref 0.67–1.17)
DEPRECATED HCO3 PLAS-SCNC: 24 MMOL/L (ref 22–29)
EGFRCR SERPLBLD CKD-EPI 2021: 69 ML/MIN/1.73M2
ERYTHROCYTE [DISTWIDTH] IN BLOOD BY AUTOMATED COUNT: 12.3 % (ref 10–15)
FASTING STATUS PATIENT QL REPORTED: YES
FASTING STATUS PATIENT QL REPORTED: YES
GLUCOSE SERPL-MCNC: 95 MG/DL (ref 70–99)
HCT VFR BLD AUTO: 39.3 % (ref 40–53)
HDLC SERPL-MCNC: 49 MG/DL
HGB BLD-MCNC: 13.3 G/DL (ref 13.3–17.7)
LDLC SERPL CALC-MCNC: 92 MG/DL
MCH RBC QN AUTO: 31 PG (ref 26.5–33)
MCHC RBC AUTO-ENTMCNC: 33.8 G/DL (ref 31.5–36.5)
MCV RBC AUTO: 92 FL (ref 78–100)
NONHDLC SERPL-MCNC: 107 MG/DL
PLATELET # BLD AUTO: 231 10E3/UL (ref 150–450)
POTASSIUM SERPL-SCNC: 5.1 MMOL/L (ref 3.4–5.3)
RBC # BLD AUTO: 4.29 10E6/UL (ref 4.4–5.9)
SODIUM SERPL-SCNC: 138 MMOL/L (ref 135–145)
SPECIMEN EXPIRATION DATE: NORMAL
TRIGL SERPL-MCNC: 77 MG/DL
WBC # BLD AUTO: 6.1 10E3/UL (ref 4–11)

## 2024-06-19 PROCEDURE — 86850 RBC ANTIBODY SCREEN: CPT | Performed by: INTERNAL MEDICINE

## 2024-06-19 PROCEDURE — 86900 BLOOD TYPING SEROLOGIC ABO: CPT | Performed by: INTERNAL MEDICINE

## 2024-06-19 PROCEDURE — 99215 OFFICE O/P EST HI 40 MIN: CPT | Performed by: PHYSICIAN ASSISTANT

## 2024-06-19 PROCEDURE — 36415 COLL VENOUS BLD VENIPUNCTURE: CPT | Performed by: INTERNAL MEDICINE

## 2024-06-19 PROCEDURE — 85027 COMPLETE CBC AUTOMATED: CPT | Performed by: INTERNAL MEDICINE

## 2024-06-19 PROCEDURE — 86901 BLOOD TYPING SEROLOGIC RH(D): CPT | Performed by: INTERNAL MEDICINE

## 2024-06-19 PROCEDURE — 80048 BASIC METABOLIC PNL TOTAL CA: CPT | Performed by: INTERNAL MEDICINE

## 2024-06-19 PROCEDURE — 80061 LIPID PANEL: CPT | Performed by: INTERNAL MEDICINE

## 2024-06-19 RX ORDER — LOSARTAN POTASSIUM 100 MG/1
1 TABLET ORAL EVERY MORNING
Status: ON HOLD | COMMUNITY
Start: 2024-05-17 | End: 2024-08-13

## 2024-06-19 NOTE — PATIENT INSTRUCTIONS
Enrike Sotomayor,    It was a pleasure to see you today in the clinic regarding your H&P for coronary angiogram.     My recommendations after this visit include:     - no medication changes today   - please go to [facility] at the instructed time on Monday    If you have questions or concerns, please call using the number below:      After Hours/Scheduling  [phone]      Whitney Martinez PA-C  Structural Heart Program  [facility]

## 2024-06-19 NOTE — H&P (VIEW-ONLY)
Assessment/Recommendations   Assessment/Plan:  1.  Coronary artery disease: He recently has had progressive dyspnea on exertion.  He underwent CTA coronary angiogram which showed showed proximal to mid circumflex stenosis 50 to 69% FFRct  0.61.  Proximal LAD stenosis with FFRct 0.65.  He is scheduled to undergo coronary angiogram with possible percutaneous coronary intervention on June 24.    Discussed about the indication for coronary angiogram/possible PCI and the risks associated with angiogram including <0.1% of MI and CVA or death or any of the following to the degree that it could threaten her life: allergic reaction, arrhythmia, renal failure, hemorrhage, thrombosis and infection.  Risk associated with therapeutic intervention includes 2% or less risk of MI, less than 1% risk of CVA, emergency heart surgery, death, less than 4% risk of vascular injury requiring surgical repair or blood transfusion with 20-30% risk of restenosis with a bare-metal stent and less than 10% risk of stenosis with a drug-eluting stent.     Patient  met with KAVITHA Gonsalves for pre-angio teach.     2.  Hypertension: His blood pressure is well controlled- currently taking losartan and diltiazem.    3.  Carotid artery stenosis: Recent carotid ultrasound showed 50-69% stenosis of left internal carotid artery less than 50% stenosis of right internal carotid artery.  He has been referred to vascular.    4. Chronic Kidney Disease Stage : BMP pending    5.  Obstructive sleep apnea -uses CPAP    Plan:  - Labs pending  - Continue ASA  - Avoid strenuous exercise or lifting heavy objects until further direction  - Please seek medical attention if persistent worsening chest pain/tightness/pressure, shortness of breath, lightheaded or dizziness  -Continue current hypertension regimen  - Pre angio teach by KAVITHA Gonsalves      History of Present Illness/Subjective    Enrike Sotomayor is a 83 year old years old  with a significant PMH of hypertension,  carotid artery stenosis,  obstructive sleep apnea, former smoker is here at [facility] for pre procedure history and physical examination for coronary angiogram..     He has recently had progressive dyspnea on exertion.  He was evaluated by his primary care provider in March and he underwent echo stress test which was nondiagnostic due to poor image quality and inability to achieve target heart rate.  He was recently seen in clinic with Dr. Corona for further evaluation he underwent CTA which showed high likelihood of lesion specific ischemia of the LAD and LCx.  He was recommended to undergo coronary angiogram with possible PCI, which is scheduled for June 24.     He denies chest discomfort, shortness of breath, orthopnea, PND, palpitations, chest pain, abdominal fullness/bloating, lower extremity edema.  He further denies fever, chills, N/V, diarrhea, vomiting, constipation, hematochezia, hematemesis, hemoptysis.       CTA Coronary on 6/12/2024-reviewed  Interpretation Summary    Prox Cx to Mid Cx lesion has moderate luminal stenosis in the range of 50-69%.    Hazy images in proximal RCA and proximal LAD, cannot exclude hemodynamically significant obstruction.  FFR pending.    Interpretation Summary    Date of FFRct Interpretation:  6/13/2024   Interpretation:     1. The 50-69 % stenosis in the circumflex coronary artery proximal segment has a high likelihood of lesion specific ischemia with an FFRct value of 0.61.   2. The  stenosis in the LAD coronary artery proximal segment has a high likelihood of lesion specific ischemia with an FFRct value of 0.65.   3.  The stenosis in the proximal RCA is of indeterminate significance, due to poor image quality.    Stress test done on 3/27/2024- Reviewed  Interpretation Summary  Stress findings:  1. Non-diagnostic exercise stress echocardiogram due to a combination of poor  image quality and the inability to achieve target heart rate.  Visually  estimated post exercise left ventricular ejection fraction appears unchanged  at 55-60%.  2. Non-diagnostic exercise stress electrocardiogram due to the inability to  achieve target heart rate. No ST segment changes observed with the achieved  workload.  3. Functional capacity is impaired. The patient exercised for 3:00 minutes on  the Justino protocol, achieving 4.6 METs and 80% the age-predicted maximum heart  rate. The patient reported dyspnea with exercise but did not experience chest  paiin.     Rest findings:  1. Left ventricular chamber size are wall thickness are normal. Overall  systolic function is normal but there is suggestion of distal septal and  apical hypokineis. The visually estimated left ventricular ejection fraction  is 55-60%.  2. Right ventricle is not well seen. Limited views suggest normal chamber size  and systolic function.  3. No hemodynamically significant left-sided valvular abnormalities. The  right-sided heart valves are not visualized.     No prior study available for comparison. Recommend stress cardiac MRI for  further assessment or pharmacologic nuclear stress testing if MRI cannot be  performed.         Physical Examination Review of Systems   /64 (BP Location: Right arm, Patient Position: Sitting, Cuff Size: Adult Large)   Pulse 64   Resp 16   Wt 90.7 kg (200 lb)   BMI 30.41 kg/m    Body mass index is 30.41 kg/m .  Wt Readings from Last 3 Encounters:   06/19/24 90.7 kg (200 lb)   05/08/24 91.6 kg (201 lb 14.4 oz)   05/09/23 88.5 kg (195 lb)     General Appearance:   no distress, normal body habitus   ENT/Mouth: membranes moist, no oral lesions or bleeding gums.      EYES:  no scleral icterus, normal conjunctivae   Neck: no carotid bruits or thyromegaly   Chest/Lungs:   lungs are clear to auscultation, no rales or wheezing, equal chest wall expansion    Cardiovascular:   Regular. Normal first and second heart sounds with +murmur, no rubs or gallops; the carotid,  radial and posterior tibial pulses are intact, no edema bilaterally    Abdomen:  no organomegaly, masses, bruits, or tenderness; bowel sounds are present   Extremities   no cyanosis or clubbing.     Skin: no xanthelasma, warm.    Neurologic: normal  bilateral, no tremors     Psychiatric: alert and oriented x3, calm               Enc Vitals  BP: 130/64  Pulse: 64  Resp: 16  Weight: 90.7 kg (200 lb)                                        Negative unless noted in HPI     Medical History  Surgical History Family History Social History   Past Medical History:   Diagnosis Date    Nonsenile cataract Mar 2023    Past Surgical History:   Procedure Laterality Date    CATARACT IOL, RT/LT  Mar 2023    Family History   Problem Relation Age of Onset    Cancer Mother     Hypertension Father     Social History     Socioeconomic History    Marital status:      Spouse name: Not on file    Number of children: Not on file    Years of education: Not on file    Highest education level: Not on file   Occupational History    Not on file   Tobacco Use    Smoking status: Former     Current packs/day: 1.00     Average packs/day: 1 pack/day for 5.0 years (5.0 ttl pk-yrs)     Types: Cigarettes    Smokeless tobacco: Never   Substance and Sexual Activity    Alcohol use: Yes    Drug use: Never    Sexual activity: Not Currently     Partners: Female     Birth control/protection: Male Surgical   Other Topics Concern    Not on file   Social History Narrative    Not on file     Social Determinants of Health     Financial Resource Strain: Not on File (2/16/2021)    Received from [facility]     Financial Resource Strain     Financial Resource Strain: 0   Food Insecurity: Not on File (2/16/2021)    Received from [facility]     Food Insecurity     Food: 0   Transportation Needs: Not on File (2/16/2021)    Received from [facility]     Transportation Needs     Transportation: 0   Physical Activity: Not on File (2/16/2021)    Received from  [facility]     Physical Activity     Physical Activity: 0   Stress: Not on File (2021)    Received from [facility]     Stress     Stress: 0   Social Connections: Not on File (2021)    Received from [facility]     Social Connections     Social Connections and Isolation: 0   Interpersonal Safety: Not on file   Housing Stability: Not on File (2021)    Received from [facility]     Housing Stability     Housin          Medications  Allergies   Current Outpatient Medications   Medication Sig Dispense Refill    aspirin 81 MG EC tablet Take 81 mg by mouth daily      diltiazem ER (DILT-XR) 120 MG 24 hr capsule Take 120 mg by mouth daily      fluticasone (FLONASE) 50 MCG/ACT nasal spray 1 spray in each nostril      losartan (COZAAR) 100 MG tablet       sildenafil (VIAGRA) 100 MG tablet 1 tablet as needed      Allergies   Allergen Reactions    Pneumovax  [Pneumococcal Polysaccharide Vaccine]      Other reaction(s): fever,swollen, red arm         Lab Results    Chemistry/lipid CBC Cardiac Enzymes/BNP/TSH/INR   Lab Results   Component Value Date    CHOL 154 2023    HDL 49 2023    TRIG 108 2023    BUN 28.4 (H) 2024     (L) 2024    CO2 22 2024    Lab Results   Component Value Date    WBC 6.9 2023    HGB 13.5 2023    HCT 40.7 2023    MCV 92 2023     2023    Lab Results   Component Value Date    TSH 2.59 2023        40 minutes spent on the date of encounter doing chart review, review of outside records, review of test results, interpretation with above tests, patient visit, and documentation.        This note has been dictated using voice recognition software. Any grammatical, typographical, or context distortions are unintentional and inherent to the software    Whitney Martinez PA-C

## 2024-06-19 NOTE — PROGRESS NOTES
Assessment/Recommendations   Assessment/Plan:  1.  Coronary artery disease: He recently has had progressive dyspnea on exertion.  He underwent CTA coronary angiogram which showed showed proximal to mid circumflex stenosis 50 to 69% FFRct  0.61.  Proximal LAD stenosis with FFRct 0.65.  He is scheduled to undergo coronary angiogram with possible percutaneous coronary intervention on June 24.    Discussed about the indication for coronary angiogram/possible PCI and the risks associated with angiogram including <0.1% of MI and CVA or death or any of the following to the degree that it could threaten her life: allergic reaction, arrhythmia, renal failure, hemorrhage, thrombosis and infection.  Risk associated with therapeutic intervention includes 2% or less risk of MI, less than 1% risk of CVA, emergency heart surgery, death, less than 4% risk of vascular injury requiring surgical repair or blood transfusion with 20-30% risk of restenosis with a bare-metal stent and less than 10% risk of stenosis with a drug-eluting stent.     Patient  met with KAVITHA Gonsalves for pre-angio teach.     2.  Hypertension: His blood pressure is well controlled- currently taking losartan and diltiazem.    3.  Carotid artery stenosis: Recent carotid ultrasound showed 50-69% stenosis of left internal carotid artery less than 50% stenosis of right internal carotid artery.  He has been referred to vascular.    4. Chronic Kidney Disease Stage : BMP pending    5.  Obstructive sleep apnea -uses CPAP    Plan:  - Labs pending  - Continue ASA  - Avoid strenuous exercise or lifting heavy objects until further direction  - Please seek medical attention if persistent worsening chest pain/tightness/pressure, shortness of breath, lightheaded or dizziness  -Continue current hypertension regimen  - Pre angio teach by KAVITHA Gonsalves      History of Present Illness/Subjective    Enrike Sotomayor is a 83 year old years old  with a significant PMH of hypertension,  carotid artery stenosis,  obstructive sleep apnea, former smoker is here at [facility] for pre procedure history and physical examination for coronary angiogram..     He has recently had progressive dyspnea on exertion.  He was evaluated by his primary care provider in March and he underwent echo stress test which was nondiagnostic due to poor image quality and inability to achieve target heart rate.  He was recently seen in clinic with Dr. Corona for further evaluation he underwent CTA which showed high likelihood of lesion specific ischemia of the LAD and LCx.  He was recommended to undergo coronary angiogram with possible PCI, which is scheduled for June 24.     He denies chest discomfort, shortness of breath, orthopnea, PND, palpitations, chest pain, abdominal fullness/bloating, lower extremity edema.  He further denies fever, chills, N/V, diarrhea, vomiting, constipation, hematochezia, hematemesis, hemoptysis.       CTA Coronary on 6/12/2024-reviewed  Interpretation Summary    Prox Cx to Mid Cx lesion has moderate luminal stenosis in the range of 50-69%.    Hazy images in proximal RCA and proximal LAD, cannot exclude hemodynamically significant obstruction.  FFR pending.    Interpretation Summary    Date of FFRct Interpretation:  6/13/2024   Interpretation:     1. The 50-69 % stenosis in the circumflex coronary artery proximal segment has a high likelihood of lesion specific ischemia with an FFRct value of 0.61.   2. The  stenosis in the LAD coronary artery proximal segment has a high likelihood of lesion specific ischemia with an FFRct value of 0.65.   3.  The stenosis in the proximal RCA is of indeterminate significance, due to poor image quality.    Stress test done on 3/27/2024- Reviewed  Interpretation Summary  Stress findings:  1. Non-diagnostic exercise stress echocardiogram due to a combination of poor  image quality and the inability to achieve target heart rate.  Visually  estimated post exercise left ventricular ejection fraction appears unchanged  at 55-60%.  2. Non-diagnostic exercise stress electrocardiogram due to the inability to  achieve target heart rate. No ST segment changes observed with the achieved  workload.  3. Functional capacity is impaired. The patient exercised for 3:00 minutes on  the Justino protocol, achieving 4.6 METs and 80% the age-predicted maximum heart  rate. The patient reported dyspnea with exercise but did not experience chest  paiin.     Rest findings:  1. Left ventricular chamber size are wall thickness are normal. Overall  systolic function is normal but there is suggestion of distal septal and  apical hypokineis. The visually estimated left ventricular ejection fraction  is 55-60%.  2. Right ventricle is not well seen. Limited views suggest normal chamber size  and systolic function.  3. No hemodynamically significant left-sided valvular abnormalities. The  right-sided heart valves are not visualized.     No prior study available for comparison. Recommend stress cardiac MRI for  further assessment or pharmacologic nuclear stress testing if MRI cannot be  performed.         Physical Examination Review of Systems   /64 (BP Location: Right arm, Patient Position: Sitting, Cuff Size: Adult Large)   Pulse 64   Resp 16   Wt 90.7 kg (200 lb)   BMI 30.41 kg/m    Body mass index is 30.41 kg/m .  Wt Readings from Last 3 Encounters:   06/19/24 90.7 kg (200 lb)   05/08/24 91.6 kg (201 lb 14.4 oz)   05/09/23 88.5 kg (195 lb)     General Appearance:   no distress, normal body habitus   ENT/Mouth: membranes moist, no oral lesions or bleeding gums.      EYES:  no scleral icterus, normal conjunctivae   Neck: no carotid bruits or thyromegaly   Chest/Lungs:   lungs are clear to auscultation, no rales or wheezing, equal chest wall expansion    Cardiovascular:   Regular. Normal first and second heart sounds with +murmur, no rubs or gallops; the carotid,  radial and posterior tibial pulses are intact, no edema bilaterally    Abdomen:  no organomegaly, masses, bruits, or tenderness; bowel sounds are present   Extremities   no cyanosis or clubbing.     Skin: no xanthelasma, warm.    Neurologic: normal  bilateral, no tremors     Psychiatric: alert and oriented x3, calm               Enc Vitals  BP: 130/64  Pulse: 64  Resp: 16  Weight: 90.7 kg (200 lb)                                        Negative unless noted in HPI     Medical History  Surgical History Family History Social History   Past Medical History:   Diagnosis Date    Nonsenile cataract Mar 2023    Past Surgical History:   Procedure Laterality Date    CATARACT IOL, RT/LT  Mar 2023    Family History   Problem Relation Age of Onset    Cancer Mother     Hypertension Father     Social History     Socioeconomic History    Marital status:      Spouse name: Not on file    Number of children: Not on file    Years of education: Not on file    Highest education level: Not on file   Occupational History    Not on file   Tobacco Use    Smoking status: Former     Current packs/day: 1.00     Average packs/day: 1 pack/day for 5.0 years (5.0 ttl pk-yrs)     Types: Cigarettes    Smokeless tobacco: Never   Substance and Sexual Activity    Alcohol use: Yes    Drug use: Never    Sexual activity: Not Currently     Partners: Female     Birth control/protection: Male Surgical   Other Topics Concern    Not on file   Social History Narrative    Not on file     Social Determinants of Health     Financial Resource Strain: Not on File (2/16/2021)    Received from [facility]     Financial Resource Strain     Financial Resource Strain: 0   Food Insecurity: Not on File (2/16/2021)    Received from [facility]     Food Insecurity     Food: 0   Transportation Needs: Not on File (2/16/2021)    Received from [facility]     Transportation Needs     Transportation: 0   Physical Activity: Not on File (2/16/2021)    Received from  [facility]     Physical Activity     Physical Activity: 0   Stress: Not on File (2021)    Received from [facility]     Stress     Stress: 0   Social Connections: Not on File (2021)    Received from [facility]     Social Connections     Social Connections and Isolation: 0   Interpersonal Safety: Not on file   Housing Stability: Not on File (2021)    Received from [facility]     Housing Stability     Housin          Medications  Allergies   Current Outpatient Medications   Medication Sig Dispense Refill    aspirin 81 MG EC tablet Take 81 mg by mouth daily      diltiazem ER (DILT-XR) 120 MG 24 hr capsule Take 120 mg by mouth daily      fluticasone (FLONASE) 50 MCG/ACT nasal spray 1 spray in each nostril      losartan (COZAAR) 100 MG tablet       sildenafil (VIAGRA) 100 MG tablet 1 tablet as needed      Allergies   Allergen Reactions    Pneumovax  [Pneumococcal Polysaccharide Vaccine]      Other reaction(s): fever,swollen, red arm         Lab Results    Chemistry/lipid CBC Cardiac Enzymes/BNP/TSH/INR   Lab Results   Component Value Date    CHOL 154 2023    HDL 49 2023    TRIG 108 2023    BUN 28.4 (H) 2024     (L) 2024    CO2 22 2024    Lab Results   Component Value Date    WBC 6.9 2023    HGB 13.5 2023    HCT 40.7 2023    MCV 92 2023     2023    Lab Results   Component Value Date    TSH 2.59 2023        40 minutes spent on the date of encounter doing chart review, review of outside records, review of test results, interpretation with above tests, patient visit, and documentation.        This note has been dictated using voice recognition software. Any grammatical, typographical, or context distortions are unintentional and inherent to the software    Whitney Martinez PA-C

## 2024-06-19 NOTE — LETTER
6/19/2024    Louis Arora MD  [address] [place] [address]    RE: RustyBAILEE Sotomayor       Dear Colleague,     I had the pleasure of seeing Enrike MOROCHO Светлана in the [facility].          Assessment/Recommendations   Assessment/Plan:  1.  Coronary artery disease: He recently has had progressive dyspnea on exertion.  He underwent CTA coronary angiogram which showed showed proximal to mid circumflex stenosis 50 to 69% FFRct  0.61.  Proximal LAD stenosis with FFRct 0.65.  He is scheduled to undergo coronary angiogram with possible percutaneous coronary intervention on June 24.    Discussed about the indication for coronary angiogram/possible PCI and the risks associated with angiogram including <0.1% of MI and CVA or death or any of the following to the degree that it could threaten her life: allergic reaction, arrhythmia, renal failure, hemorrhage, thrombosis and infection.  Risk associated with therapeutic intervention includes 2% or less risk of MI, less than 1% risk of CVA, emergency heart surgery, death, less than 4% risk of vascular injury requiring surgical repair or blood transfusion with 20-30% risk of restenosis with a bare-metal stent and less than 10% risk of stenosis with a drug-eluting stent.     Patient  met with KAVITHA Gonsalves for pre-angio teach.     2.  Hypertension: His blood pressure is well controlled- currently taking losartan and diltiazem.    3.  Carotid artery stenosis: Recent carotid ultrasound showed 50-69% stenosis of left internal carotid artery less than 50% stenosis of right internal carotid artery.  He has been referred to vascular.    4. Chronic Kidney Disease Stage : BMP pending    5.  Obstructive sleep apnea -uses CPAP    Plan:  - Labs pending  - Continue ASA  - Avoid strenuous exercise or lifting heavy objects until further direction  - Please seek medical attention if persistent worsening chest pain/tightness/pressure, shortness of breath, lightheaded or  dizziness  -Continue current hypertension regimen  - Pre angio teach by KAVITHA Gonsalves      History of Present Illness/Subjective    Enrike Sotomayor is a 83 year old years old  with a significant PMH of hypertension, carotid artery stenosis,  obstructive sleep apnea, former smoker is here at [facility] for pre procedure history and physical examination for coronary angiogram..     He has recently had progressive dyspnea on exertion.  He was evaluated by his primary care provider in March and he underwent echo stress test which was nondiagnostic due to poor image quality and inability to achieve target heart rate.  He was recently seen in clinic with Dr. Corona for further evaluation he underwent CTA which showed high likelihood of lesion specific ischemia of the LAD and LCx.  He was recommended to undergo coronary angiogram with possible PCI, which is scheduled for June 24.     He denies chest discomfort, shortness of breath, orthopnea, PND, palpitations, chest pain, abdominal fullness/bloating, lower extremity edema.  He further denies fever, chills, N/V, diarrhea, vomiting, constipation, hematochezia, hematemesis, hemoptysis.       CTA Coronary on 6/12/2024-reviewed  Interpretation Summary    Prox Cx to Mid Cx lesion has moderate luminal stenosis in the range of 50-69%.    Hazy images in proximal RCA and proximal LAD, cannot exclude hemodynamically significant obstruction.  FFR pending.    Interpretation Summary    Date of FFRct Interpretation:  6/13/2024   Interpretation:     1. The 50-69 % stenosis in the circumflex coronary artery proximal segment has a high likelihood of lesion specific ischemia with an FFRct value of 0.61.   2. The  stenosis in the LAD coronary artery proximal segment has a high likelihood of lesion specific ischemia with an FFRct value of 0.65.   3.  The stenosis in the proximal RCA is of indeterminate significance, due to poor image quality.    Stress test done on  3/27/2024- Reviewed  Interpretation Summary  Stress findings:  1. Non-diagnostic exercise stress echocardiogram due to a combination of poor  image quality and the inability to achieve target heart rate. Visually  estimated post exercise left ventricular ejection fraction appears unchanged  at 55-60%.  2. Non-diagnostic exercise stress electrocardiogram due to the inability to  achieve target heart rate. No ST segment changes observed with the achieved  workload.  3. Functional capacity is impaired. The patient exercised for 3:00 minutes on  the Justino protocol, achieving 4.6 METs and 80% the age-predicted maximum heart  rate. The patient reported dyspnea with exercise but did not experience chest  paiin.     Rest findings:  1. Left ventricular chamber size are wall thickness are normal. Overall  systolic function is normal but there is suggestion of distal septal and  apical hypokineis. The visually estimated left ventricular ejection fraction  is 55-60%.  2. Right ventricle is not well seen. Limited views suggest normal chamber size  and systolic function.  3. No hemodynamically significant left-sided valvular abnormalities. The  right-sided heart valves are not visualized.     No prior study available for comparison. Recommend stress cardiac MRI for  further assessment or pharmacologic nuclear stress testing if MRI cannot be  performed.         Physical Examination Review of Systems   /64 (BP Location: Right arm, Patient Position: Sitting, Cuff Size: Adult Large)   Pulse 64   Resp 16   Wt 90.7 kg (200 lb)   BMI 30.41 kg/m    Body mass index is 30.41 kg/m .  Wt Readings from Last 3 Encounters:   06/19/24 90.7 kg (200 lb)   05/08/24 91.6 kg (201 lb 14.4 oz)   05/09/23 88.5 kg (195 lb)     General Appearance:   no distress, normal body habitus   ENT/Mouth: membranes moist, no oral lesions or bleeding gums.      EYES:  no scleral icterus, normal conjunctivae   Neck: no carotid bruits or thyromegaly    Chest/Lungs:   lungs are clear to auscultation, no rales or wheezing, equal chest wall expansion    Cardiovascular:   Regular. Normal first and second heart sounds with +murmur, no rubs or gallops; the carotid, radial and posterior tibial pulses are intact, no edema bilaterally    Abdomen:  no organomegaly, masses, bruits, or tenderness; bowel sounds are present   Extremities   no cyanosis or clubbing.     Skin: no xanthelasma, warm.    Neurologic: normal  bilateral, no tremors     Psychiatric: alert and oriented x3, calm               Enc Vitals  BP: 130/64  Pulse: 64  Resp: 16  Weight: 90.7 kg (200 lb)                                        Negative unless noted in HPI     Medical History  Surgical History Family History Social History   Past Medical History:   Diagnosis Date    Nonsenile cataract Mar 2023    Past Surgical History:   Procedure Laterality Date    CATARACT IOL, RT/LT  Mar 2023    Family History   Problem Relation Age of Onset    Cancer Mother     Hypertension Father     Social History     Socioeconomic History    Marital status:      Spouse name: Not on file    Number of children: Not on file    Years of education: Not on file    Highest education level: Not on file   Occupational History    Not on file   Tobacco Use    Smoking status: Former     Current packs/day: 1.00     Average packs/day: 1 pack/day for 5.0 years (5.0 ttl pk-yrs)     Types: Cigarettes    Smokeless tobacco: Never   Substance and Sexual Activity    Alcohol use: Yes    Drug use: Never    Sexual activity: Not Currently     Partners: Female     Birth control/protection: Male Surgical   Other Topics Concern    Not on file   Social History Narrative    Not on file     Social Determinants of Health     Financial Resource Strain: Not on File (2/16/2021)    Received from [facility]     Financial Resource Strain     Financial Resource Strain: 0   Food Insecurity: Not on File (2/16/2021)    Received from [facility]      Food Insecurity     Food: 0   Transportation Needs: Not on File (2021)    Received from [facility]     Transportation Needs     Transportation: 0   Physical Activity: Not on File (2021)    Received from [facility]     Physical Activity     Physical Activity: 0   Stress: Not on File (2021)    Received from [facility]     Stress     Stress: 0   Social Connections: Not on File (2021)    Received from [facility]     Social Connections     Social Connections and Isolation: 0   Interpersonal Safety: Not on file   Housing Stability: Not on File (2021)    Received from [facility]     Housing Stability     Housin          Medications  Allergies   Current Outpatient Medications   Medication Sig Dispense Refill    aspirin 81 MG EC tablet Take 81 mg by mouth daily      diltiazem ER (DILT-XR) 120 MG 24 hr capsule Take 120 mg by mouth daily      fluticasone (FLONASE) 50 MCG/ACT nasal spray 1 spray in each nostril      losartan (COZAAR) 100 MG tablet       sildenafil (VIAGRA) 100 MG tablet 1 tablet as needed      Allergies   Allergen Reactions    Pneumovax  [Pneumococcal Polysaccharide Vaccine]      Other reaction(s): fever,swollen, red arm         Lab Results    Chemistry/lipid CBC Cardiac Enzymes/BNP/TSH/INR   Lab Results   Component Value Date    CHOL 154 2023    HDL 49 2023    TRIG 108 2023    BUN 28.4 (H) 2024     (L) 2024    CO2 22 2024    Lab Results   Component Value Date    WBC 6.9 2023    HGB 13.5 2023    HCT 40.7 2023    MCV 92 2023     2023    Lab Results   Component Value Date    TSH 2.59 2023        40 minutes spent on the date of encounter doing chart review, review of outside records, review of test results, interpretation with above tests, patient visit, and documentation.        This note has been dictated using voice recognition software. Any grammatical, typographical, or context  distortions are unintentional and inherent to the software    Whitney Martinez PA-C      Thank you for allowing me to participate in the care of your patient.      Sincerely,     Whitney Martinez PA-C     [facility]  cc:   Louis Arora MD  [address] [place] [address]

## 2024-06-20 ENCOUNTER — TELEPHONE (OUTPATIENT)
Dept: VASCULAR SURGERY | Facility: CLINIC | Age: 84
End: 2024-06-20
Payer: COMMERCIAL

## 2024-06-20 NOTE — TELEPHONE ENCOUNTER
Vascular Referral Intake    Referred by: Whitney CROSS for carotid stenosis    Specialty: Vascular Medicine    Specific Provider if Necessary:  MD Zander Tyler or TAY Bang    Visit Type: Vascular Medicine    Time Frame: Next Available    Testing/Imaging Needed Before Consult: NA    Appt Note: (to be pasted into appt comments, also add where additional information is located, ie, outside images pushed to PACS, in Epic, sent to HIMS, etc)  50-69% stenosis of left internal carotid artery

## 2024-06-24 ENCOUNTER — HOSPITAL ENCOUNTER (OUTPATIENT)
Facility: HOSPITAL | Age: 84
Discharge: HOME | End: 2024-06-24
Attending: INTERNAL MEDICINE | Admitting: INTERNAL MEDICINE
Payer: COMMERCIAL

## 2024-06-24 VITALS
OXYGEN SATURATION: 97 % | WEIGHT: 200 LBS | DIASTOLIC BLOOD PRESSURE: 71 MMHG | RESPIRATION RATE: 18 BRPM | HEART RATE: 60 BPM | HEIGHT: 68 IN | TEMPERATURE: 97.8 F | SYSTOLIC BLOOD PRESSURE: 160 MMHG | BODY MASS INDEX: 30.31 KG/M2

## 2024-06-24 DIAGNOSIS — R06.09 DYSPNEA ON EXERTION: ICD-10-CM

## 2024-06-24 DIAGNOSIS — R93.1 ABNORMAL FINDINGS ON DIAGNOSTIC IMAGING OF HEART AND CORONARY CIRCULATION: ICD-10-CM

## 2024-06-24 DIAGNOSIS — R07.2 PRECORDIAL PAIN: ICD-10-CM

## 2024-06-24 DIAGNOSIS — R09.89 BRUIT OF LEFT CAROTID ARTERY: Primary | ICD-10-CM

## 2024-06-24 DIAGNOSIS — R93.1 ABNORMAL COMPUTED TOMOGRAPHY ANGIOGRAPHY OF HEART: ICD-10-CM

## 2024-06-24 LAB
ANION GAP SERPL CALCULATED.3IONS-SCNC: 9 MMOL/L (ref 7–15)
ATRIAL RATE - MUSE: 63 BPM
BUN SERPL-MCNC: 22 MG/DL (ref 8–23)
CALCIUM SERPL-MCNC: 8.9 MG/DL (ref 8.8–10.2)
CHLORIDE SERPL-SCNC: 106 MMOL/L (ref 98–107)
CHOLEST SERPL-MCNC: 160 MG/DL
CREAT SERPL-MCNC: 1.12 MG/DL (ref 0.67–1.17)
DEPRECATED HCO3 PLAS-SCNC: 24 MMOL/L (ref 22–29)
DIASTOLIC BLOOD PRESSURE - MUSE: NORMAL MMHG
EGFRCR SERPLBLD CKD-EPI 2021: 66 ML/MIN/1.73M2
ERYTHROCYTE [DISTWIDTH] IN BLOOD BY AUTOMATED COUNT: 12.1 % (ref 10–15)
FASTING STATUS PATIENT QL REPORTED: YES
FASTING STATUS PATIENT QL REPORTED: YES
GLUCOSE SERPL-MCNC: 101 MG/DL (ref 70–99)
HCT VFR BLD AUTO: 40.9 % (ref 40–53)
HDLC SERPL-MCNC: 49 MG/DL
HGB BLD-MCNC: 13.8 G/DL (ref 13.3–17.7)
INTERPRETATION ECG - MUSE: NORMAL
LDLC SERPL CALC-MCNC: 99 MG/DL
MCH RBC QN AUTO: 30.7 PG (ref 26.5–33)
MCHC RBC AUTO-ENTMCNC: 33.7 G/DL (ref 31.5–36.5)
MCV RBC AUTO: 91 FL (ref 78–100)
NONHDLC SERPL-MCNC: 111 MG/DL
P AXIS - MUSE: 27 DEGREES
PLATELET # BLD AUTO: 236 10E3/UL (ref 150–450)
POTASSIUM SERPL-SCNC: 4.5 MMOL/L (ref 3.4–5.3)
PR INTERVAL - MUSE: 192 MS
QRS DURATION - MUSE: 88 MS
QT - MUSE: 396 MS
QTC - MUSE: 405 MS
R AXIS - MUSE: -19 DEGREES
RBC # BLD AUTO: 4.5 10E6/UL (ref 4.4–5.9)
SODIUM SERPL-SCNC: 139 MMOL/L (ref 135–145)
SYSTOLIC BLOOD PRESSURE - MUSE: NORMAL MMHG
T AXIS - MUSE: 13 DEGREES
TRIGL SERPL-MCNC: 61 MG/DL
VENTRICULAR RATE- MUSE: 63 BPM
WBC # BLD AUTO: 6.2 10E3/UL (ref 4–11)

## 2024-06-24 PROCEDURE — 93458 L HRT ARTERY/VENTRICLE ANGIO: CPT | Performed by: INTERNAL MEDICINE

## 2024-06-24 PROCEDURE — 82565 ASSAY OF CREATININE: CPT | Performed by: INTERNAL MEDICINE

## 2024-06-24 PROCEDURE — 250N000009 HC RX 250: Performed by: INTERNAL MEDICINE

## 2024-06-24 PROCEDURE — 36415 COLL VENOUS BLD VENIPUNCTURE: CPT | Performed by: INTERNAL MEDICINE

## 2024-06-24 PROCEDURE — 999N000054 HC STATISTIC EKG NON-CHARGEABLE

## 2024-06-24 PROCEDURE — 99152 MOD SED SAME PHYS/QHP 5/>YRS: CPT | Performed by: INTERNAL MEDICINE

## 2024-06-24 PROCEDURE — 272N000001 HC OR GENERAL SUPPLY STERILE: Performed by: INTERNAL MEDICINE

## 2024-06-24 PROCEDURE — 250N000011 HC RX IP 250 OP 636: Mod: JZ | Performed by: INTERNAL MEDICINE

## 2024-06-24 PROCEDURE — 83718 ASSAY OF LIPOPROTEIN: CPT | Performed by: INTERNAL MEDICINE

## 2024-06-24 PROCEDURE — C1887 CATHETER, GUIDING: HCPCS | Performed by: INTERNAL MEDICINE

## 2024-06-24 PROCEDURE — 93010 ELECTROCARDIOGRAM REPORT: CPT | Performed by: INTERNAL MEDICINE

## 2024-06-24 PROCEDURE — 258N000003 HC RX IP 258 OP 636: Mod: JZ | Performed by: INTERNAL MEDICINE

## 2024-06-24 PROCEDURE — 82374 ASSAY BLOOD CARBON DIOXIDE: CPT | Performed by: INTERNAL MEDICINE

## 2024-06-24 PROCEDURE — 93458 L HRT ARTERY/VENTRICLE ANGIO: CPT | Mod: 26 | Performed by: INTERNAL MEDICINE

## 2024-06-24 PROCEDURE — 85027 COMPLETE CBC AUTOMATED: CPT | Performed by: INTERNAL MEDICINE

## 2024-06-24 PROCEDURE — C1769 GUIDE WIRE: HCPCS | Performed by: INTERNAL MEDICINE

## 2024-06-24 PROCEDURE — 93005 ELECTROCARDIOGRAM TRACING: CPT

## 2024-06-24 PROCEDURE — 255N000002 HC RX 255 OP 636: Performed by: INTERNAL MEDICINE

## 2024-06-24 PROCEDURE — C1894 INTRO/SHEATH, NON-LASER: HCPCS | Performed by: INTERNAL MEDICINE

## 2024-06-24 RX ORDER — FENTANYL CITRATE 50 UG/ML
INJECTION, SOLUTION INTRAMUSCULAR; INTRAVENOUS
Status: DISCONTINUED | OUTPATIENT
Start: 2024-06-24 | End: 2024-06-24 | Stop reason: HOSPADM

## 2024-06-24 RX ORDER — LIDOCAINE 40 MG/G
CREAM TOPICAL
Status: DISCONTINUED | OUTPATIENT
Start: 2024-06-24 | End: 2024-06-24 | Stop reason: HOSPADM

## 2024-06-24 RX ORDER — FENTANYL CITRATE 50 UG/ML
25 INJECTION, SOLUTION INTRAMUSCULAR; INTRAVENOUS
Status: DISCONTINUED | OUTPATIENT
Start: 2024-06-24 | End: 2024-06-24 | Stop reason: HOSPADM

## 2024-06-24 RX ORDER — ASPIRIN 325 MG
325 TABLET ORAL ONCE
Status: COMPLETED | OUTPATIENT
Start: 2024-06-24 | End: 2024-06-24

## 2024-06-24 RX ORDER — NALOXONE HYDROCHLORIDE 0.4 MG/ML
0.2 INJECTION, SOLUTION INTRAMUSCULAR; INTRAVENOUS; SUBCUTANEOUS
Status: DISCONTINUED | OUTPATIENT
Start: 2024-06-24 | End: 2024-06-24 | Stop reason: HOSPADM

## 2024-06-24 RX ORDER — ACETAMINOPHEN 325 MG/1
650 TABLET ORAL EVERY 4 HOURS PRN
Status: DISCONTINUED | OUTPATIENT
Start: 2024-06-24 | End: 2024-06-24 | Stop reason: HOSPADM

## 2024-06-24 RX ORDER — ASPIRIN 81 MG/1
243 TABLET, CHEWABLE ORAL ONCE
Status: COMPLETED | OUTPATIENT
Start: 2024-06-24 | End: 2024-06-24

## 2024-06-24 RX ORDER — OXYCODONE HYDROCHLORIDE 5 MG/1
10 TABLET ORAL EVERY 4 HOURS PRN
Status: DISCONTINUED | OUTPATIENT
Start: 2024-06-24 | End: 2024-06-24 | Stop reason: HOSPADM

## 2024-06-24 RX ORDER — OXYCODONE HYDROCHLORIDE 5 MG/1
5 TABLET ORAL EVERY 4 HOURS PRN
Status: DISCONTINUED | OUTPATIENT
Start: 2024-06-24 | End: 2024-06-24 | Stop reason: HOSPADM

## 2024-06-24 RX ORDER — ATROPINE SULFATE 0.1 MG/ML
0.5 INJECTION INTRAVENOUS
Status: DISCONTINUED | OUTPATIENT
Start: 2024-06-24 | End: 2024-06-24 | Stop reason: HOSPADM

## 2024-06-24 RX ORDER — NALOXONE HYDROCHLORIDE 0.4 MG/ML
0.4 INJECTION, SOLUTION INTRAMUSCULAR; INTRAVENOUS; SUBCUTANEOUS
Status: DISCONTINUED | OUTPATIENT
Start: 2024-06-24 | End: 2024-06-24 | Stop reason: HOSPADM

## 2024-06-24 RX ORDER — ATORVASTATIN CALCIUM 40 MG/1
40 TABLET, FILM COATED ORAL DAILY
Qty: 90 TABLET | Refills: 3 | Status: SHIPPED | OUTPATIENT
Start: 2024-06-24 | End: 2024-07-25

## 2024-06-24 RX ORDER — FLUMAZENIL 0.1 MG/ML
0.2 INJECTION, SOLUTION INTRAVENOUS
Status: DISCONTINUED | OUTPATIENT
Start: 2024-06-24 | End: 2024-06-24 | Stop reason: HOSPADM

## 2024-06-24 RX ORDER — SODIUM CHLORIDE 9 MG/ML
INJECTION, SOLUTION INTRAVENOUS CONTINUOUS
Status: DISCONTINUED | OUTPATIENT
Start: 2024-06-24 | End: 2024-06-24 | Stop reason: HOSPADM

## 2024-06-24 RX ORDER — IODIXANOL 320 MG/ML
INJECTION, SOLUTION INTRAVASCULAR
Status: DISCONTINUED | OUTPATIENT
Start: 2024-06-24 | End: 2024-06-24 | Stop reason: HOSPADM

## 2024-06-24 RX ADMIN — SODIUM CHLORIDE: 9 INJECTION, SOLUTION INTRAVENOUS at 08:52

## 2024-06-24 ASSESSMENT — ACTIVITIES OF DAILY LIVING (ADL)
ADLS_ACUITY_SCORE: 35

## 2024-06-24 ASSESSMENT — EJECTION FRACTION: EF_VALUE: .32

## 2024-06-24 NOTE — PRE-PROCEDURE
GENERAL PRE-PROCEDURE:   Procedure:  Coronary angiogram with possible PCI  Date/Time:  6/24/2024 9:10 AM    Written consent obtained?: Yes    Risks and benefits: Risks, benefits and alternatives were discussed    Consent given by:  Patient  Patient states understanding of procedure being performed: Yes    Patient's understanding of procedure matches consent: Yes    Procedure consent matches procedure scheduled: Yes    Expected level of sedation:  Moderate  Appropriately NPO:  Yes  ASA Class:  3 (dyspnea on exertion, HTN, carotid artery disease; s/p Rt CEA, Class I obesity; BMI 30.41kg/m2)  Mallampati  :  Grade 2- soft palate, base of uvula, tonsillar pillars, and portion of posterior pharyngeal wall visible  Lungs:  Lungs clear with good breath sounds bilaterally  Heart:  Normal heart sounds and rate  History & Physical reviewed:  History and physical reviewed and updates made (see comment)  H&P Comments:  Clinically Significant Risk Factors Present on Admission    Cardiovascular : dyspnea on exertion, HTN, carotid artery disease; s/p Rt CEA, Class I obesity; BMI 30.41kg/m2    Fluid & Electrolyte Disorders : Not present on admission    Gastroenterology : Not present on admission    Hematology/Oncology : Not present on admission    Nephrology : Not present on admission    Neurology : Not present on admission    Pulmonology : Not present on admission    Systemic : Class I obesity; BMI 30.41kg/m2    Statement of review:  I have reviewed the lab findings, diagnostic data, medications, and the plan for sedation

## 2024-06-24 NOTE — PROGRESS NOTES
Pt remained on bedrest until 1210pm.  TR band removed at 1205pm, pt with good reverse barbeau test.  Pt up to bathroom successfully.  R Wrist site remains, soft and notender, with no bruising noted during TR band removal. Reviewed discharge instructions with pt and spouse, and informed pt of followup with CV Surgery.  Pt discharged with spouse.

## 2024-06-24 NOTE — DISCHARGE INSTRUCTIONS

## 2024-06-24 NOTE — INTERVAL H&P NOTE
"I have reviewed the surgical (or preoperative) H&P that is linked to this encounter, and examined the patient. There are no significant changes    Clinical Conditions Present on Arrival:  Clinically Significant Risk Factors Present on Admission                 # Drug Induced Platelet Defect: home medication list includes an antiplatelet medication      # Obesity: Estimated body mass index is 30.41 kg/m  as calculated from the following:    Height as of this encounter: 1.727 m (5' 8\").    Weight as of this encounter: 90.7 kg (200 lb).       "

## 2024-06-26 ENCOUNTER — PRE VISIT (OUTPATIENT)
Dept: NEUROLOGY | Facility: CLINIC | Age: 84
End: 2024-06-26
Payer: COMMERCIAL

## 2024-06-26 DIAGNOSIS — G60.0 CMT (CHARCOT-MARIE-TOOTH DISEASE): Primary | ICD-10-CM

## 2024-06-26 NOTE — CONFIDENTIAL NOTE
CMT RETURN PATIENT  Do you have any questions/concerns or new issues you would like to address at your appt? He just had an angiogram. Wants to know how this will effect his CMT. Sees to be having more trouble walking. More difficulty with fine hand motor movements.   How has your mobility been since the last office visit? Balance is getting worse.   -Have you had an increase in falls or any mobility/balance concerns? He has had one fall, no injury. 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 Not really  Sensory Loss  - Do you have loss of feeling or numbness anywhere in your feet or legs? No numbness but he does have pain in his toes because of the severe hammertoes   - If so, does the loss of feeling extend above your toes? N/A  - Does it extend above the ankle? N/A  - Can you identify the point where the sensation becomes normal or nearly normal? N/A  - Are these symptoms constant (present all the time), present most of the daytime, less than one-half of the daytime, or just occasionally (Daytime is defined as the time between getting up and going to bed)? N/A   Motor Symptoms- Legs  - Do you have weakness in your legs or feet? Yes  - Do you ever trip over your toes/feet or turn or sprain your ankles? He occasionally trips on things.   - Do your feet slap down on the ground when you walk? Left ankle is worse than the left  - Do you wear shoe inserts/insoles (below the ankle)? Yes  - Do you wear braces, splints or an equivalent type of orthotic that extends above your ankle? No but he does have a pair at home. He does not feel it is too the point where he needs them. If so, how are these working for you? Not wearing them. I offered for him to see Jorge Alberto but he declined  - Have the above ankle orthotics described above ever been prescribed or suggested by a healthcare professional? He bought a pair over the counter  - Have  "you ever had surgery on your feet or ankles? No  - If so, do you know if the surgery involved fusion of bones, a transfer of tendons, heel cord lengthening or lowering of the arch? N/A  - Do you use a cane, walking stick, or walker to help you walk most of the time outside the home? He has a walking stick that he uses when traveling but not for every day use  If not, do you feel adaptive equipment would be beneficial? He does not feel he needs to use these every day.   - Do you use a wheelchair most of the time because of weakness? No  Motor Symptoms- Arms  - Do you have difficulty with buttoning clothes (standard shirt buttons)? Yes which is a more recent change  - If yes, are the difficulties mild or severe (severe includes unable)? Mild  - Can you cut most food including meat and pizza with normal utensils? Yes  - Do you have difficulty with activities that require extending or flexing your arms, or activities using your upper arms? He has trouble with doing activities over his head         -Do you have any difficulty with gripping or pinching object? Yes         -Do you have any hand splints that you currently wear or have these ever been used in the past (if yes, please ask that they bring these to the appt)? No    We will have a  available in clinic. Would you be interested in discussing any of the following topics: Not at this time   - Initiating the disability process  -Transportation issues  -Financial concerns   - Other community resources  Are you registered with the [employer] ([employer])? Yes   To determine if you qualify for any CMT research studies, would you be interested in meeting with the research coordinator? YEs, he is registered but would like some updates  \"We will contact you once the schedule has been completed to let you know what time you need to arrive. Disregard the automated appointment reminder call, I will call you directly to let you know what time to be " "here.\"    Recap of services needed: PT, OT and research   "

## 2024-06-27 ENCOUNTER — TELEPHONE (OUTPATIENT)
Dept: OPHTHALMOLOGY | Facility: CLINIC | Age: 84
End: 2024-06-27
Payer: COMMERCIAL

## 2024-06-27 NOTE — TELEPHONE ENCOUNTER
[facility] Call Center    Phone Message    May a detailed message be left on voicemail: yes     Reason for Call: Symptoms or Concerns     If patient has red-flag symptoms, warm transfer to triage line    Current symptom or concern: Patient states there appears to be a sparkly light in eye that grows into a crescent shape that grows larger.     Symptoms have been present for:  2 day(s)    Has patient previously been seen for this? No    By : NA     Date: NA     Are there any new or worsening symptoms? Yes: Patient would like to discuss with care team. Please call to advise.     Action Taken: Other: eye    Travel Screening: Not Applicable

## 2024-06-27 NOTE — TELEPHONE ENCOUNTER
Spoke to pt at 1305    Likely bilateral cresent light moving in vision/visual disturbance lasting about 10 minutes before normalizing.    Pt states has happened in past and seen by eye provider and provided assurance.    Pt states in past 1.5 week happened about 4 times now.    Reviewed s/s of ocular migraine and offered appt next week Wednesday with Dr. Vidal vs tomorrow with Dr. Adame and pt would prefer seeing Dr. Adame tomorrow.    Scheduled 0950 tomorrow with Dr. Adame and pt aware of date/time/location at [facility].    Kalyan Schuster RN 1:11 PM 06/27/24

## 2024-06-27 NOTE — TELEPHONE ENCOUNTER
[facility] Call Center    Phone Message    May a detailed message be left on voicemail: yes     Reason for Call: Symptoms or Concerns     If patient has red-flag symptoms, warm transfer to triage line    Current symptom or concern: Flashers     Symptoms have been present for:  2 day(s)    Has patient previously been seen for this? No    Are there any new or worsening symptoms? Yes: Patient has not experienced this before     Action Taken: Message routed to:  [facility] Eye     Travel Screening: Not Applicable     Date of Service:

## 2024-06-28 ENCOUNTER — TELEPHONE (OUTPATIENT)
Dept: CARDIOLOGY | Facility: CLINIC | Age: 84
End: 2024-06-28

## 2024-06-28 ENCOUNTER — OFFICE VISIT (OUTPATIENT)
Dept: OPHTHALMOLOGY | Facility: CLINIC | Age: 84
End: 2024-06-28
Attending: OPTOMETRIST
Payer: COMMERCIAL

## 2024-06-28 DIAGNOSIS — H52.203 MYOPIA OF BOTH EYES WITH ASTIGMATISM AND PRESBYOPIA: Primary | ICD-10-CM

## 2024-06-28 DIAGNOSIS — G43.109 OCULAR MIGRAINE: ICD-10-CM

## 2024-06-28 DIAGNOSIS — Z96.1 PSEUDOPHAKIA, BOTH EYES: ICD-10-CM

## 2024-06-28 DIAGNOSIS — H52.13 MYOPIA OF BOTH EYES WITH ASTIGMATISM AND PRESBYOPIA: Primary | ICD-10-CM

## 2024-06-28 DIAGNOSIS — H52.4 MYOPIA OF BOTH EYES WITH ASTIGMATISM AND PRESBYOPIA: Primary | ICD-10-CM

## 2024-06-28 DIAGNOSIS — H35.363 DEGENERATIVE DRUSEN OF BOTH EYES: ICD-10-CM

## 2024-06-28 PROCEDURE — 92004 COMPRE OPH EXAM NEW PT 1/>: CPT | Performed by: OPTOMETRIST

## 2024-06-28 PROCEDURE — G0463 HOSPITAL OUTPT CLINIC VISIT: HCPCS | Performed by: OPTOMETRIST

## 2024-06-28 ASSESSMENT — VISUAL ACUITY
OS_CC: 20/20
OD_CC: 20/25
OD_CC+: -1
CORRECTION_TYPE: GLASSES
METHOD: SNELLEN - LINEAR
OS_CC+: -2

## 2024-06-28 ASSESSMENT — CONF VISUAL FIELD
OD_NORMAL: 1
OD_SUPERIOR_NASAL_RESTRICTION: 0
OS_INFERIOR_NASAL_RESTRICTION: 0
OS_SUPERIOR_TEMPORAL_RESTRICTION: 0
METHOD: COUNTING FINGERS
OD_INFERIOR_NASAL_RESTRICTION: 0
OS_NORMAL: 1
OS_SUPERIOR_NASAL_RESTRICTION: 0
OD_SUPERIOR_TEMPORAL_RESTRICTION: 0
OS_INFERIOR_TEMPORAL_RESTRICTION: 0
OD_INFERIOR_TEMPORAL_RESTRICTION: 0

## 2024-06-28 ASSESSMENT — SLIT LAMP EXAM - LIDS: COMMENTS: 2+ DERMATOCHALASIS, 2+ MEIBOMIAN GLAND DYSFUNCTION

## 2024-06-28 ASSESSMENT — TONOMETRY
OS_IOP_MMHG: 10
IOP_METHOD: ICARE
OD_IOP_MMHG: 10

## 2024-06-28 ASSESSMENT — CUP TO DISC RATIO
OD_RATIO: 0.2
OS_RATIO: 0.2

## 2024-06-28 ASSESSMENT — EXTERNAL EXAM - RIGHT EYE: OD_EXAM: NORMAL

## 2024-06-28 ASSESSMENT — EXTERNAL EXAM - LEFT EYE: OS_EXAM: NORMAL

## 2024-06-28 NOTE — TELEPHONE ENCOUNTER
[facility] Call Center    Phone Message    May a detailed message be left on voicemail: yes     Reason for Call: Form or Letter   Type or form/letter needing completion: Letter of Medical Necessity for the CT Fractional Flow test as insurance has denied coverage. Insurance said patient should have a had a CTA first. Patient did have that on the same day 6/12.  Med[employer] is the insurance   Provider: Dr Corona  Date form needed:   Once completed: Please contact patient to discuss, patient also given number to billing so he may check with them as well.    Action Taken: Other: Cardiology    Travel Screening: Not Applicable    Thank you!  [facility]       Date of Service:                                                                       Render Note In Bullet Format When Appropriate: No Show Applicator Variable?: Yes Detail Level: Detailed Number Of Freeze-Thaw Cycles: 2 freeze-thaw cycles Duration Of Freeze Thaw-Cycle (Seconds): 4 Post-Care Instructions: I reviewed with the patient in detail post-care instructions. Patient is to wear sunprotection, and avoid picking at any of the treated lesions. Pt may apply Vaseline to crusted or scabbing areas. Application Tool (Optional): Liquid Nitrogen Sprayer Aperture Size (Optional): C Consent: The patient's consent was obtained including but not limited to risks of crusting, scabbing, blistering, scarring, darker or lighter pigmentary change, recurrence, incomplete removal and infection.

## 2024-06-28 NOTE — NURSING NOTE
Chief Complaints and History of Present Illnesses   Patient presents with    Visual Disturbance     Chief Complaint(s) and History of Present Illness(es)       Visual Disturbance               Comments    Patient states not sure if this is something that is being seen or more of a neurological issue, but has had symptoms of crescent-like shape of light starting in one area and growing outwards. When covering each eye, the shape of light is still there. No new floaters or loss of vision. No headache. This has been longstanding infrequent, but recently has increased to four episodes over the past 1.5 weeks. Usually lasts 10 minutes or less. Patient has increased stressed due to some recent health issues. Patient does not take any AT or gels. No dryness, redness, tearing.     Debbie Truong on 6/28/2024 at 9:35 AM

## 2024-06-28 NOTE — PROGRESS NOTES
Assessment & Plan       Enrike Sotomayor is a 83 year old male with the following diagnoses:   1. Myopia of both eyes with astigmatism and presbyopia    2. Pseudophakia, both eyes - Both Eyes    3. 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          Complete documentation of historical and exam elements from today's encounter can be found in the full encounter summary report (not reduplicated in this progress note). I personally obtained the chief complaint(s) and history of present illness.  I confirmed and edited as necessary the review of systems, past medical/surgical history, family history, social history, and examination findings as documented by others; and I examined the patient myself. I personally reviewed the relevant tests, images, and reports as documented above. I formulated and edited as necessary the assessment and plan and discussed the findings and management plan with the patient and family.  Dr. Juni Adame

## 2024-06-28 NOTE — PATIENT INSTRUCTIONS
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

## 2024-07-02 NOTE — TELEPHONE ENCOUNTER
"Notified Pt, he will provide a copy of the letter-rosanna    Spoke with [facility], they were not able to assist. Will ask Pt to provide a copy of the letter received. -rosanna        From: Monse Ruiz  Sent: 7/1/2024  11:10 AM CDT  To: Brina Whitmore  Subject: RE: referral for CTCA and FFR                    The CTA ANGIOGRAM CORONARY ARTERY was approved and the FFR was denied.     Since this is now considered post service review it is out of scope from Central Prior Authorization.     I asked my lead and she says I should refer you to [facility] at [phone].   She states [facility] would be able to send the referral to the denials team to follow up on and appeal    Hope this helps.     Monse INGRAM - Central Prior Authorization  ----- Message -----  From: Brina Whitmore  Sent: 6/28/2024   4:13 PM CDT  To: Monse Ruiz  Subject: referral for CTCA and FFR                        Good Afternoon Monse,    I received a phone call from this patient requesting a letter of medical necessity for the FFR, but looking at the referral unsure where we are in the process of authorization. I do see this note   \"**WILL NEED TO SUBMIT FFR AFTER SCAN- CARELON HAS MEDICAL QUESTIONS**\"    Wondering if there is an update or if there is additional information needed.     Thank you for your assistance,    Brina"

## 2024-07-03 ENCOUNTER — OFFICE VISIT (OUTPATIENT)
Dept: CARDIOLOGY | Facility: CLINIC | Age: 84
End: 2024-07-03
Payer: COMMERCIAL

## 2024-07-03 VITALS
BODY MASS INDEX: 29.95 KG/M2 | HEART RATE: 66 BPM | RESPIRATION RATE: 20 BRPM | SYSTOLIC BLOOD PRESSURE: 126 MMHG | OXYGEN SATURATION: 98 % | DIASTOLIC BLOOD PRESSURE: 52 MMHG | WEIGHT: 197 LBS

## 2024-07-03 DIAGNOSIS — R93.1 ABNORMAL COMPUTED TOMOGRAPHY ANGIOGRAPHY OF HEART: ICD-10-CM

## 2024-07-03 DIAGNOSIS — R06.09 DYSPNEA ON EXERTION: ICD-10-CM

## 2024-07-03 DIAGNOSIS — R07.2 PRECORDIAL PAIN: ICD-10-CM

## 2024-07-03 PROCEDURE — 99203 OFFICE O/P NEW LOW 30 MIN: CPT | Performed by: THORACIC SURGERY (CARDIOTHORACIC VASCULAR SURGERY)

## 2024-07-03 RX ORDER — DILTIAZEM HYDROCHLORIDE 240 MG/1
1 CAPSULE, EXTENDED RELEASE ORAL DAILY
Status: ON HOLD | COMMUNITY
Start: 2024-07-01 | End: 2024-08-13

## 2024-07-03 NOTE — LETTER
7/3/2024    Louis Arora MD  [address] [place] [address]    RE: Enrike Heathrisa       Dear Colleague,     I had the pleasure of seeing Enrike Sotomayor in the [facility].  ASKED BY REFERRING PHYSICIAN: Arcenio Corona and Pk to evaluate this patient for coronary artery bypass grafting.    CHIEF COMPLAINT: Shortness of breath with exertion.    HPI: Enrike is a 83 year old gentleman with dyspnea on exertion.  He was found to have severe triple vessel coronary artery disease with a totally occluded LAD.  He is S/P a right carotid endartectomy.  He has a bruit on the left.  On echo he has distal septal and apical akinesis.      PAST MEDICAL HISTORY:  Past Medical History:   Diagnosis Date    Nonsenile cataract Mar 2023       PAST SURGICAL HISTORY:  Past Surgical History:   Procedure Laterality Date    CATARACT IOL, RT/LT  Mar 2023    CV CORONARY ANGIOGRAM N/A 6/24/2024    Procedure: Coronary Angiogram;  Surgeon: Oscar Whittington MD;  Location: [facility] CATH LAB CV    CV LEFT HEART CATH N/A 6/24/2024    Procedure: Left Heart Catheterization;  Surgeon: Oscar Whittington MD;  Location: [facility] CATH LAB CV       FAMILY HISTORY:   Family History   Problem Relation Age of Onset    Cancer Mother     Hypertension Father     Glaucoma No family hx of     Macular Degeneration No family hx of        SOCIAL HISTORY:  Social History     Socioeconomic History    Marital status:      Spouse name: Not on file    Number of children: Not on file    Years of education: Not on file    Highest education level: Not on file   Occupational History    Not on file   Tobacco Use    Smoking status: Former     Current packs/day: 1.00     Average packs/day: 1 pack/day for 5.0 years (5.0 ttl pk-yrs)     Types: Cigarettes    Smokeless tobacco: Never   Substance and Sexual Activity    Alcohol use: Yes    Drug use: Never    Sexual activity: Not Currently     Partners: Female     Birth control/protection:  Male Surgical   Other Topics Concern    Not on file   Social History Narrative    Not on file     Social Determinants of Health     Financial Resource Strain: Not on File (2021)    Received from [facility]    Financial Resource Strain     Financial Resource Strain: 0   Food Insecurity: Not on File (2021)    Received from [facility]    Food Insecurity     Food: 0   Transportation Needs: Not on File (2021)    Received from [facility] [employer]    Transportation Needs     Transportation: 0   Physical Activity: Not on File (2021)    Received from [facility] [employer]    Physical Activity     Physical Activity: 0   Stress: Not on File (2021)    Received from [facility] [employer]    Stress     Stress: 0   Social Connections: Not on File (2021)    Received from [facility] [employer]    Social Connections     Social Connections and Isolation: 0   Interpersonal Safety: Not on file   Housing Stability: Not on File (2021)    Received from [employer] [employer]    Housing Stability     Housin        ALLERGIES:   Allergies   Allergen Reactions    Pneumovax  [Pneumococcal Polysaccharide Vaccine]      Other reaction(s): fever,swollen, red arm       CURRENT MEDICATIONS:   Prescription Medications as of 7/3/2024         Rx Number Disp Refills Start End Last Dispensed Date Next Fill Date Owning Pharmacy    aspirin 81 MG EC tablet  -- --  --       Sig: Take 81 mg by mouth daily    Class: Historical    Route: Oral    atorvastatin (LIPITOR) 40 MG tablet  90 tablet 3 2024 --   [address]    Sig: Take 1 tablet (40 mg) by mouth daily    Class: E-Prescribe    Route: Oral    diltiazem ER (DILT-XR) 240 MG 24 hr ER beaded capsule  -- -- 2024 --       Sig: Take 1 capsule by mouth daily    Class: Historical    Route: Oral    fluticasone (FLONASE) 50 MCG/ACT nasal spray  -- --  --   [address]    Sig: [place] 1 spray  into both nostrils every other day    Class: Historical    Route: Both Nostrils    losartan (COZAAR) 100 MG tablet  -- -- 5/17/2024 --       Sig: Take 1 tablet by mouth every morning    Class: Historical    Route: Oral            REVIEW OF SYSTEMS:   Gen: denies frequent headaches, double/blurry vision, insomnia, fatigue, unexplained weight loss/gain. No previous anesthesia reactions.  CV: ZULUAGA is primary symptom, denies chest pain, palpitations, peripheral edema.    Pulm: denies shortness of breath, asthma or wheezing  GI/: denies liver or kidney problems, blood in stool or BRBPR, difficulty urinating  Endo: denies thyroid problems or Diabetes  Heme/Onc: denies bleeding or clotting disorders, no family problems with bleeding/clotting diorders  MS: Walks stooped over. Both knees have been have been operated upon, no weakness, tremors or gait instability  Neuro: denies depression, memory problems, no dysesthesias, no previous strokes, no migraines, no dysphagia  Skin: No petechiae, purpura or rash.    PHYSICAL EXAMINATION:   /52 (BP Location: Left arm, Patient Position: Sitting, Cuff Size: Adult Regular)   Pulse 66   Resp 20   Wt 89.4 kg (197 lb)   SpO2 98%   BMI 29.95 kg/m    General: alert and oriented x 3, pleasant, no acute distress  CV: S1 S2, no murmurs, rubs or gallops, regular rate and rhythm, no peripheral edema, bilateral carotid bruits, pulses in upper and lower extremities palpable  Pulm: bilateral breath sounds, clear to auscultation, easy work of breathing  GI: (+) bowel sounds, soft non-tender and non-distended  : voiding without problems  MS: moves all extremities x 4,  5+/5+ equal strength bilaterally  Neuro: pupils equal round and reactive to light, cranial nerves, II-XII grossly intact, no gross neurologic deficits noted    LABS:  BMP RESULTS:  Lab Results   Component Value Date     06/24/2024    POTASSIUM 4.5 06/24/2024    POTASSIUM 4.2 02/23/2022    CHLORIDE 106 06/24/2024     CHLORIDE 105 02/23/2022    CO2 24 06/24/2024    CO2 23 02/23/2022    ANIONGAP 9 06/24/2024    ANIONGAP 11 02/23/2022     (H) 06/24/2024     02/23/2022    BUN 22.0 06/24/2024    BUN 22 02/23/2022    CR 1.12 06/24/2024    GFRESTIMATED 66 06/24/2024    GFRESTIMATED >60 06/12/2024    GFRESTIMATED >60 01/13/2021    GFRESTBLACK >60 01/13/2021    LEONA 8.9 06/24/2024        CBC RESULTS:  Lab Results   Component Value Date    WBC 6.2 06/24/2024    RBC 4.50 06/24/2024    HGB 13.8 06/24/2024    HCT 40.9 06/24/2024    MCV 91 06/24/2024    MCH 30.7 06/24/2024    MCHC 33.7 06/24/2024    RDW 12.1 06/24/2024     06/24/2024       PROCEDURES/IMAGING:  Chest X-Ray: Hyperkyphosis at the thoracolumbar junction.  Angio: Triple vessel coronary artery disease.  Echo: LVEF is 55-60%  CT: Done at [facility]  MRI: Not done  Carotid US: right mild plaque at the bifurcation, left moderate plaque at the bifurcation     ASSESSMENT/PLAN:   Enrike Álavrezis a 83 year old male who presents with dyspnea on exertion as his anginal equivalent.  He has severe triple vessel coronary artery disease and would benefit from a triple vessel coronary artery bypass grafting procedure.  He and his wife and daughter understand that the risks for this procedure include: bleeding, infection, stroke, sternal dehiscence, myocardial infarction, arrhythmias, the need for a pacemaker, prolonged ventilation, pneumonia, liver/renal failure, aortic dissection, and an operative mortality of 2 to 4 percent.  He accepts these risks and is agreeable with further work-up and scheduling his procedure in August. .      1. Bilateral lower extremity vein mapping  2. Normal echocardiogram  3. Obtain CT scan of the chest from [facility].  4. Schedule CABG.       Approximately 30 minutes were spent with the patient in clinic at this visit.    CC  Patient Care Team:  Louis Arora MD as PCP - General (Family Medicine)  Enrike Love MD as Assigned Neuroscience  Provider  Ganesh Del Rio MD as Physician (Ophthalmology)  Ganesh Del Rio MD as Assigned Surgical Provider  Whitney Martinez PA-C as Assigned Heart and Vascular Provider  OSCAR WHITTINGTON      Thank you for allowing me to participate in the care of your patient.      Sincerely,     Ольга Aparicio MD     [facility]  cc:   Oscar Whittington MD  [address]  [place] [address]

## 2024-07-03 NOTE — PROGRESS NOTES
ASKED BY REFERRING PHYSICIAN: Arcenio Corona and Pk to evaluate this patient for coronary artery bypass grafting.    CHIEF COMPLAINT: Shortness of breath with exertion.    HPI: Enrike is a 83 year old gentleman with dyspnea on exertion.  He was found to have severe triple vessel coronary artery disease with a totally occluded LAD.  He is S/P a right carotid endartectomy.  He has a bruit on the left.  On echo he has distal septal and apical akinesis.      PAST MEDICAL HISTORY:  Past Medical History:   Diagnosis Date    Nonsenile cataract Mar 2023       PAST SURGICAL HISTORY:  Past Surgical History:   Procedure Laterality Date    CATARACT IOL, RT/LT  Mar 2023    CV CORONARY ANGIOGRAM N/A 6/24/2024    Procedure: Coronary Angiogram;  Surgeon: Oscar Whittington MD;  Location: [facility] CATH LAB CV    CV LEFT HEART CATH N/A 6/24/2024    Procedure: Left Heart Catheterization;  Surgeon: Oscar Whittington MD;  Location: [facility] CATH LAB CV       FAMILY HISTORY:   Family History   Problem Relation Age of Onset    Cancer Mother     Hypertension Father     Glaucoma No family hx of     Macular Degeneration No family hx of        SOCIAL HISTORY:  Social History     Socioeconomic History    Marital status:      Spouse name: Not on file    Number of children: Not on file    Years of education: Not on file    Highest education level: Not on file   Occupational History    Not on file   Tobacco Use    Smoking status: Former     Current packs/day: 1.00     Average packs/day: 1 pack/day for 5.0 years (5.0 ttl pk-yrs)     Types: Cigarettes    Smokeless tobacco: Never   Substance and Sexual Activity    Alcohol use: Yes    Drug use: Never    Sexual activity: Not Currently     Partners: Female     Birth control/protection: Male Surgical   Other Topics Concern    Not on file   Social History Narrative    Not on file     Social Determinants of Health     Financial Resource Strain: Not on File (2/16/2021)    Received from [facility]    Financial Resource Strain     Financial Resource Strain: 0   Food Insecurity: Not on File (2021)    Received from [facility]    Food Insecurity     Food: 0   Transportation Needs: Not on File (2021)    Received from [facility] [employer]    Transportation Needs     Transportation: 0   Physical Activity: Not on File (2021)    Received from [facility] [employer]    Physical Activity     Physical Activity: 0   Stress: Not on File (2021)    Received from [facility] [employer]    Stress     Stress: 0   Social Connections: Not on File (2021)    Received from [facility] [employer]    Social Connections     Social Connections and Isolation: 0   Interpersonal Safety: Not on file   Housing Stability: Not on File (2021)    Received from [employer] [employer]    Housing Stability     Housin        ALLERGIES:   Allergies   Allergen Reactions    Pneumovax  [Pneumococcal Polysaccharide Vaccine]      Other reaction(s): fever,swollen, red arm       CURRENT MEDICATIONS:   Prescription Medications as of 7/3/2024         Rx Number Disp Refills Start End Last Dispensed Date Next Fill Date Owning Pharmacy    aspirin 81 MG EC tablet  -- --  --       Sig: Take 81 mg by mouth daily    Class: Historical    Route: Oral    atorvastatin (LIPITOR) 40 MG tablet  90 tablet 3 2024 --   [address]    Sig: Take 1 tablet (40 mg) by mouth daily    Class: E-Prescribe    Route: Oral    diltiazem ER (DILT-XR) 240 MG 24 hr ER beaded capsule  -- -- 2024 --       Sig: Take 1 capsule by mouth daily    Class: Historical    Route: Oral    fluticasone (FLONASE) 50 MCG/ACT nasal spray  -- --  --   [address]    Sig: [place] 1 spray into both nostrils every other day    Class: Historical    Route: Both Nostrils    losartan (COZAAR) 100 MG tablet  -- -- 2024 --       Sig: Take 1 tablet by mouth every morning    Class: Historical     Route: Oral            REVIEW OF SYSTEMS:   Gen: denies frequent headaches, double/blurry vision, insomnia, fatigue, unexplained weight loss/gain. No previous anesthesia reactions.  CV: ZULUAGA is primary symptom, denies chest pain, palpitations, peripheral edema.    Pulm: denies shortness of breath, asthma or wheezing  GI/: denies liver or kidney problems, blood in stool or BRBPR, difficulty urinating  Endo: denies thyroid problems or Diabetes  Heme/Onc: denies bleeding or clotting disorders, no family problems with bleeding/clotting diorders  MS: Walks stooped over. Both knees have been have been operated upon, no weakness, tremors or gait instability  Neuro: denies depression, memory problems, no dysesthesias, no previous strokes, no migraines, no dysphagia  Skin: No petechiae, purpura or rash.    PHYSICAL EXAMINATION:   /52 (BP Location: Left arm, Patient Position: Sitting, Cuff Size: Adult Regular)   Pulse 66   Resp 20   Wt 89.4 kg (197 lb)   SpO2 98%   BMI 29.95 kg/m    General: alert and oriented x 3, pleasant, no acute distress  CV: S1 S2, no murmurs, rubs or gallops, regular rate and rhythm, no peripheral edema, bilateral carotid bruits, pulses in upper and lower extremities palpable  Pulm: bilateral breath sounds, clear to auscultation, easy work of breathing  GI: (+) bowel sounds, soft non-tender and non-distended  : voiding without problems  MS: moves all extremities x 4,  5+/5+ equal strength bilaterally  Neuro: pupils equal round and reactive to light, cranial nerves, II-XII grossly intact, no gross neurologic deficits noted    LABS:  BMP RESULTS:  Lab Results   Component Value Date     06/24/2024    POTASSIUM 4.5 06/24/2024    POTASSIUM 4.2 02/23/2022    CHLORIDE 106 06/24/2024    CHLORIDE 105 02/23/2022    CO2 24 06/24/2024    CO2 23 02/23/2022    ANIONGAP 9 06/24/2024    ANIONGAP 11 02/23/2022     (H) 06/24/2024     02/23/2022    BUN 22.0 06/24/2024    BUN 22  02/23/2022    CR 1.12 06/24/2024    GFRESTIMATED 66 06/24/2024    GFRESTIMATED >60 06/12/2024    GFRESTIMATED >60 01/13/2021    GFRESTBLACK >60 01/13/2021    LEONA 8.9 06/24/2024        CBC RESULTS:  Lab Results   Component Value Date    WBC 6.2 06/24/2024    RBC 4.50 06/24/2024    HGB 13.8 06/24/2024    HCT 40.9 06/24/2024    MCV 91 06/24/2024    MCH 30.7 06/24/2024    MCHC 33.7 06/24/2024    RDW 12.1 06/24/2024     06/24/2024       PROCEDURES/IMAGING:  Chest X-Ray: Hyperkyphosis at the thoracolumbar junction.  Angio: Triple vessel coronary artery disease.  Echo: LVEF is 55-60%  CT: Done at [facility]  MRI: Not done  Carotid US: right mild plaque at the bifurcation, left moderate plaque at the bifurcation     ASSESSMENT/PLAN:   Enrike Álvarezis a 83 year old male who presents with dyspnea on exertion as his anginal equivalent.  He has severe triple vessel coronary artery disease and would benefit from a triple vessel coronary artery bypass grafting procedure.  He and his wife and daughter understand that the risks for this procedure include: bleeding, infection, stroke, sternal dehiscence, myocardial infarction, arrhythmias, the need for a pacemaker, prolonged ventilation, pneumonia, liver/renal failure, aortic dissection, and an operative mortality of 2 to 4 percent.  He accepts these risks and is agreeable with further work-up and scheduling his procedure in August. .      1. Bilateral lower extremity vein mapping  2. Normal echocardiogram  3. Obtain CT scan of the chest from [facility].  4. Schedule CABG.       Approximately 30 minutes were spent with the patient in clinic at this visit.    CC  Patient Care Team:  Louis Arora MD as PCP - General (Family Medicine)  Enrike Love MD as Assigned Neuroscience Provider  Ganesh Del Rio MD as Physician (Ophthalmology)  Ganesh Del Rio MD as Assigned Surgical Provider  Whitney Martinez PA-C as Assigned Heart and Vascular Provider  MAR  [facility]

## 2024-07-05 NOTE — TELEPHONE ENCOUNTER
Writer spoke with patient and confirmed his arrival time for CMT clinic on 7/9 at 12:45.      RAHUL AroraN RN Care Coordinator  Neurology/Neurosurgery/PM&R/Pain Management

## 2024-07-08 ENCOUNTER — PREP FOR PROCEDURE (OUTPATIENT)
Dept: ADMINISTRATIVE | Facility: CLINIC | Age: 84
End: 2024-07-08
Payer: COMMERCIAL

## 2024-07-08 ENCOUNTER — TELEPHONE (OUTPATIENT)
Dept: ADMINISTRATIVE | Facility: CLINIC | Age: 84
End: 2024-07-08
Payer: COMMERCIAL

## 2024-07-08 DIAGNOSIS — R07.2 PRECORDIAL PAIN: ICD-10-CM

## 2024-07-08 DIAGNOSIS — I25.10 CAD (CORONARY ARTERY DISEASE): Primary | ICD-10-CM

## 2024-07-08 DIAGNOSIS — I25.10 CORONARY ARTERY DISEASE INVOLVING NATIVE CORONARY ARTERY OF NATIVE HEART, UNSPECIFIED WHETHER ANGINA PRESENT: Primary | ICD-10-CM

## 2024-07-08 DIAGNOSIS — R93.1 ABNORMAL COMPUTED TOMOGRAPHY ANGIOGRAPHY OF HEART: ICD-10-CM

## 2024-07-08 DIAGNOSIS — R06.09 DYSPNEA ON EXERTION: ICD-10-CM

## 2024-07-08 NOTE — TELEPHONE ENCOUNTER
Per task, pt needs to schedule surgery with Dr. Aparicio. Talked with pt and scheduled surgery for 8/7. Will call if anything changes

## 2024-07-09 ENCOUNTER — THERAPY VISIT (OUTPATIENT)
Dept: OCCUPATIONAL THERAPY | Facility: CLINIC | Age: 84
End: 2024-07-09
Attending: PSYCHIATRY & NEUROLOGY
Payer: COMMERCIAL

## 2024-07-09 ENCOUNTER — OFFICE VISIT (OUTPATIENT)
Dept: NEUROLOGY | Facility: CLINIC | Age: 84
End: 2024-07-09
Payer: COMMERCIAL

## 2024-07-09 VITALS
DIASTOLIC BLOOD PRESSURE: 73 MMHG | BODY MASS INDEX: 29.86 KG/M2 | SYSTOLIC BLOOD PRESSURE: 152 MMHG | HEIGHT: 68 IN | WEIGHT: 197 LBS | HEART RATE: 57 BPM

## 2024-07-09 DIAGNOSIS — G60.0 CMT (CHARCOT-MARIE-TOOTH DISEASE): Primary | ICD-10-CM

## 2024-07-09 PROCEDURE — 99213 OFFICE O/P EST LOW 20 MIN: CPT | Performed by: PSYCHIATRY & NEUROLOGY

## 2024-07-09 PROCEDURE — 97165 OT EVAL LOW COMPLEX 30 MIN: CPT | Mod: GO | Performed by: OCCUPATIONAL THERAPIST

## 2024-07-09 PROCEDURE — 97110 THERAPEUTIC EXERCISES: CPT | Mod: GO | Performed by: OCCUPATIONAL THERAPIST

## 2024-07-09 NOTE — PROGRESS NOTES
Enrike Sotomayor is a 83 year old individual with a diagnosis of CMT.    Interval history from pre-visit call. Also cannot stand without support because of imbalance.      0 1 2 3 4   Sensory symptoms None Below or at ankle bones Symptoms up to the distal half of the calf Symptoms up to the proximal half of the calf, including knee Symptoms above knee (above the top of the patella)   Motor symptoms - legs None  Trips, catches toes, slaps feet, shoe inserts Ankle support or stabilization needed most of the time for ambulation Walking aids (cane, walker) needed most of the time Wheelchair most of the time   Motor symptoms - arms None Mild difficulty with buttons Severe difficulty or unable to do buttons Unable to cut most foods Proximal weakness (affect movements involving the elbow and above)   Pin sensibility Normal Decreased below or at ankle bones Decreased up to the distal half of the calf Decreased up to the proximal half of the calf, including knee Decreased above knee (above the  top of the patella)   Vibration  Normal Reduced at great toe Reduced at ankle Reduced at knee (tibial tuberosity) Absent at knee and ankle   Strength - legs Normal 4+, 4, or 4- on foot dorsi- or plantarflexion </= 3 on foot dorsi- or plantarflexion </= 3 on foot dorsi- and plantarflexion Proximal weakness   Strength - arms Normal 4+, 4, or 4- on intrinsic hand muscles </= 3 on intrinsic hand muscles < 5 on wrist extensors Weak above elbow   Ulnar CMAP (Median)        Radial SNAP           CMTES: 9      Examination    Foot and MSK exam: unremarkable      Mental state: Alert, appropriate, speech, language, and thought content normal.       Sensory examination:     Right Left   Vibration (Rydell-Seiffer) MM2  TT5 MM0  TT4   Temp     Pin DC DC   Pos     Legend:   MM = medial malleolus, TT = tibial tuberosity, K = patella, MCP = MCP joint  MF = mid-foot, DC = distal calf, MC = mid calf, PC = proximal calf      Motor examination:     Right  Left   Shoulder abduction  5 5   Elbow extension 5 5   Elbow flexion 5 5   Wrist extension  5 5   Finger extension 5 5   FDI 4 3   APB 4 4+   Hip flexion 5 5   Knee flexion 5 5   Knee extension 5 5   Dorsiflexion 4- 4   Plantar flexion 5 5   A=atrophy      Gait:  Independent.      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  Return in 1 year. We may pursue more PT assessment at that time.    Enrike Love M.D.

## 2024-07-09 NOTE — PROGRESS NOTES
OCCUPATIONAL THERAPY EVALUATION  Type of Visit: Evaluation       Fall Risk Screen:  Fall screen completed by: OT  Have you fallen 2 or more times in the past year?: No  Have you fallen and had an injury in the past year?: No  Is patient a fall risk?: No    Subjective      Presenting condition or subjective complaint:    Date of onset: 07/09/24      Living Environment  Social support: With a significant other or spouse   Type of home: House; 2-story   Stairs to enter the home: Yes 2 Is there a railing: No  Ramp: No   Stairs inside the home: Yes 16 Is there a railing: Yes  Help at home: None  Equipment owned:     Employment: No    Hobbies/Interests: golf, woodworking, senior classes, cabin, fishing    Pain assessment: Pain denied     Objective     SENSATION:  occasional numbness when sleeping. Otherwise seems normal.  POSTURE:  no wasting or atrophy noted.   RANGE OF MOTION: UE AROM WNL  STRENGTH:   R 60 L 60  Lat Pinch R 9 L 8  3pt pinch R 7 L7      FUNCTIONAL MOBILITY  Assistive Device(s): None  Ambulation: indep    BED MOBILITY: Independent    TRANSFERS: Independent    BATHING: Independent  Equipment: recommend chair or grab bars    UPPER BODY DRESSING: Independent  Equipment: recommend button hook and zipper pull    LOWER BODY DRESSING: indep    TOILETING: Independent    GROOMING: Independent, hands get fatigued when arms are raised (may be from heart issue), able to hold toothbrush    EATING/SELF FEEDING: Independent     ACTIVITY TOLERANCE: fatigues easily but is having cardiac bypass surgery.     INSTRUMENTAL ACTIVITIES OF DAILY LIVING (IADL):   Meal Planning/Prep: wife does most of it, but he helps  Home/Financial Management: able to do vacuuming, wife does most of other cleaning, a cleaning lady comes every 2 weeks.   Communication/Computer Use: able to use computer and cell phone  Community Mobility: drives  Care of Others:- NA      Assessment & Plan   CLINICAL IMPRESSIONS  Medical Diagnosis: CMT     Treatment Diagnosis: Hand weakness    Impression/Assessment: Pt is a 83 year old male presenting to Occupational Therapy due to hand weakness.  The following significant findings have been identified: Impaired strength.  These identified deficits interfere with their ability to perform self care tasks, recreational activities, and household chores as compared to previous level of function.     Recommended: button hook for dressing, shower chair and grab bars for safety    Clinical Decision Making (Complexity):  Assessment of Occupational Performance: 1-3 Performance Deficits  Occupational Performance Limitations: dressing, home establishment and management, and leisure activities  Clinical Decision Making (Complexity): Low complexity    PLAN OF CARE  Treatment Interventions: NA    Long Term Goals: NA        Frequency of Treatment: eval only  Duration of Treatment: eval only     Recommended Referrals to Other Professionals: NA  Education Assessment: Learner/Method: Patient;Listening;Reading;Demonstration;Pictures/Video;No Barriers to Learning     Risks and benefits of evaluation/treatment have been explained.   Patient/Family/caregiver agrees with Plan of Care.     Evaluation Time:    MANNY Pacheco Low Complexity Minutes (82059): 30    Signing Clinician: LUCAS Mckoy [facility]                                                                                   OUTPATIENT OCCUPATIONAL THERAPY      PLAN OF TREATMENT FOR OUTPATIENT REHABILITATION   Patient's Last Name, First Name, Enrike Metz YOB: 1940   Provider's Name   [facility]   Medical Record No.  [id number]     Onset Date: 07/09/24 Start of Care Date: 07/09/24     Medical Diagnosis:  CMT      OT Treatment Diagnosis:  Hand weakness Plan of Treatment  Frequency/Duration:eval only/eval only    Certification date from 07/09/24   To 07/09/24        See note for plan of  treatment details and functional goals     Tha Rojas, OTR                           Referring Provider:  Enrike Love    Initial Assessment  See Epic Evaluation- 07/09/24

## 2024-07-09 NOTE — LETTER
7/9/2024      Enrike Sotomayor  [address] [address]      Dear Colleague,    Thank you for referring your patient, Enrike Sotomayor, to the [facility] [place]. Please see a copy of my visit note below.    Enrike Sotomayor is a 83 year old individual with a diagnosis of CMT.    Interval history from pre-visit call. Also cannot stand without support because of imbalance.      0 1 2 3 4   Sensory symptoms None Below or at ankle bones Symptoms up to the distal half of the calf Symptoms up to the proximal half of the calf, including knee Symptoms above knee (above the top of the patella)   Motor symptoms - legs None  Trips, catches toes, slaps feet, shoe inserts Ankle support or stabilization needed most of the time for ambulation Walking aids (cane, walker) needed most of the time Wheelchair most of the time   Motor symptoms - arms None Mild difficulty with buttons Severe difficulty or unable to do buttons Unable to cut most foods Proximal weakness (affect movements involving the elbow and above)   Pin sensibility Normal Decreased below or at ankle bones Decreased up to the distal half of the calf Decreased up to the proximal half of the calf, including knee Decreased above knee (above the  top of the patella)   Vibration  Normal Reduced at great toe Reduced at ankle Reduced at knee (tibial tuberosity) Absent at knee and ankle   Strength - legs Normal 4+, 4, or 4- on foot dorsi- or plantarflexion </= 3 on foot dorsi- or plantarflexion </= 3 on foot dorsi- and plantarflexion Proximal weakness   Strength - arms Normal 4+, 4, or 4- on intrinsic hand muscles </= 3 on intrinsic hand muscles < 5 on wrist extensors Weak above elbow   Ulnar CMAP (Median)        Radial SNAP           CMTES: 9      Examination    Foot and MSK exam: unremarkable      Mental state: Alert, appropriate, speech, language, and thought content normal.       Sensory examination:     Right Left   Vibration  ([facility]) MM2  TT5 MM0  TT4   Temp     Pin DC DC   Pos     Legend:   MM = medial malleolus, TT = tibial tuberosity, K = patella, MCP = MCP joint  MF = mid-foot, DC = distal calf, MC = mid calf, PC = proximal calf      Motor examination:     Right Left   Shoulder abduction  5 5   Elbow extension 5 5   Elbow flexion 5 5   Wrist extension  5 5   Finger extension 5 5   FDI 4 3   APB 4 4+   Hip flexion 5 5   Knee flexion 5 5   Knee extension 5 5   Dorsiflexion 4- 4   Plantar flexion 5 5   A=atrophy      Gait:  Independent.      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  Return in 1 year. We may pursue more PT assessment at that time.    Enrike Love M.D.             Again, thank you for allowing me to participate in the care of your patient.        Sincerely,        Enrike Love MD

## 2024-07-09 NOTE — NURSING NOTE
"Enrike Sotomayor's goals for this visit include:   Chief Complaint   Patient presents with    RECHECK     CMT follow up // little worse-- leg strength balance        He requests these members of his care team be copied on today's visit information: yes    PCP: Louis Arora    Referring Provider:  Enrike Loev MD  [address] [place] [address] [id number]  [place] [address]    BP (!) 152/73 (BP Location: Right arm, Patient Position: Sitting, Cuff Size: Adult Regular)   Pulse 57   Ht 1.727 m (5' 8\")   Wt 89.4 kg (197 lb)   BMI 29.95 kg/m      Do you need any medication refills at today's visit? No  TORITO Nash., CMA ([facility])     "

## 2024-07-09 NOTE — PATIENT INSTRUCTIONS
I will be sure Dr Aparicio receives a note from me.  Return in 1 year. We may pursue more PT assessment at that time.

## 2024-07-10 ENCOUNTER — TELEPHONE (OUTPATIENT)
Dept: CARDIOLOGY | Facility: CLINIC | Age: 84
End: 2024-07-10
Payer: COMMERCIAL

## 2024-07-10 DIAGNOSIS — G60.0 CMT (CHARCOT-MARIE-TOOTH DISEASE): Primary | ICD-10-CM

## 2024-07-10 NOTE — TELEPHONE ENCOUNTER
Explained to pt Dr. Love reached out to Dr. Aparicio and CVTS team with concerns about walking and rehab s/p CAB. Had a lengthy discussion with pt and spouse about his ability to stand and walk without assistance of his arms. Pt does not use a walker or cane. He feels Dr. Love overcalled his CMT challenges.    Pt agreed to meet with Dr. Whittington to discuss stenting options. Message sent to cardiology team to schedule pt.    Surgery date of 8/7 held for pt in case OHS is his decision.    ----- Message from Oscar Whittington sent at 7/10/2024 10:30 AM CDT -----  Not impossible but difficult, would probably just have to be LAD to start, likely multiple procedures, including a .  Hope this helps.    Thx!  Oscar  ----- Message -----  From: Debbie Knapp RN  Sent: 7/10/2024   9:25 AM CDT  To: MD Royce Johns Dr.,    Pt's neurologist reached out to our team with concerns about his CMT preventing pt to stand or ambulate independently, and how that would affect his recovery after surgery. I plan to speak more with Dr. Aparicio after her case today about this, but I thought I'd be proactive and inquire to see if you thought he is a candidate for stenting or PCI at all?    Thank you,  Mimi

## 2024-07-11 ENCOUNTER — TELEPHONE (OUTPATIENT)
Dept: CARDIOLOGY | Facility: CLINIC | Age: 84
End: 2024-07-11
Payer: COMMERCIAL

## 2024-07-11 DIAGNOSIS — I25.10 CORONARY ARTERY DISEASE INVOLVING NATIVE CORONARY ARTERY OF NATIVE HEART, UNSPECIFIED WHETHER ANGINA PRESENT: Primary | ICD-10-CM

## 2024-07-11 DIAGNOSIS — G60.0 CMT (CHARCOT-MARIE-TOOTH DISEASE): ICD-10-CM

## 2024-07-11 DIAGNOSIS — R06.02 SHORTNESS OF BREATH: ICD-10-CM

## 2024-07-11 NOTE — TELEPHONE ENCOUNTER
Spoke with Pt and have him all schedule for PAT and imaging for 8/1. Pt understands date, time and location

## 2024-07-15 DIAGNOSIS — I25.10 CORONARY ARTERY DISEASE INVOLVING NATIVE CORONARY ARTERY OF NATIVE HEART, UNSPECIFIED WHETHER ANGINA PRESENT: Primary | ICD-10-CM

## 2024-07-15 DIAGNOSIS — G60.0 CMT (CHARCOT-MARIE-TOOTH DISEASE): ICD-10-CM

## 2024-07-15 DIAGNOSIS — R93.1 ABNORMAL COMPUTED TOMOGRAPHY ANGIOGRAPHY OF HEART: ICD-10-CM

## 2024-07-15 DIAGNOSIS — R06.09 DYSPNEA ON EXERTION: ICD-10-CM

## 2024-07-15 DIAGNOSIS — I99.8 OTHER DISORDER OF CIRCULATORY SYSTEM: ICD-10-CM

## 2024-07-15 DIAGNOSIS — R79.89 OTHER SPECIFIED ABNORMAL FINDINGS OF BLOOD CHEMISTRY: ICD-10-CM

## 2024-07-15 RX ORDER — LIDOCAINE 40 MG/G
CREAM TOPICAL
Status: CANCELLED | OUTPATIENT
Start: 2024-07-15

## 2024-07-15 RX ORDER — CHLORHEXIDINE GLUCONATE ORAL RINSE 1.2 MG/ML
10 SOLUTION DENTAL ONCE
Status: CANCELLED | OUTPATIENT
Start: 2024-08-07 | End: 2024-07-15

## 2024-07-15 RX ORDER — PHENYLEPHRINE HCL IN 0.9% NACL 50MG/250ML
.1-6 PLASTIC BAG, INJECTION (ML) INTRAVENOUS CONTINUOUS
Status: CANCELLED | OUTPATIENT
Start: 2024-08-07

## 2024-07-15 RX ORDER — SODIUM CHLORIDE, SODIUM GLUCONATE, SODIUM ACETATE, POTASSIUM CHLORIDE AND MAGNESIUM CHLORIDE 526; 502; 368; 37; 30 MG/100ML; MG/100ML; MG/100ML; MG/100ML; MG/100ML
1000 INJECTION, SOLUTION INTRAVENOUS
Status: CANCELLED | OUTPATIENT
Start: 2024-08-07 | End: 2024-08-07

## 2024-07-15 RX ORDER — CEFAZOLIN SODIUM/WATER 2 G/20 ML
2 SYRINGE (ML) INTRAVENOUS
Status: CANCELLED | OUTPATIENT
Start: 2024-08-07

## 2024-07-15 RX ORDER — DEXMEDETOMIDINE HYDROCHLORIDE 4 UG/ML
.2-1.2 INJECTION, SOLUTION INTRAVENOUS CONTINUOUS
Status: CANCELLED | OUTPATIENT
Start: 2024-08-07

## 2024-07-15 RX ORDER — CEFAZOLIN SODIUM/WATER 2 G/20 ML
2 SYRINGE (ML) INTRAVENOUS SEE ADMIN INSTRUCTIONS
Status: CANCELLED | OUTPATIENT
Start: 2024-08-07

## 2024-07-15 RX ORDER — ASPIRIN 81 MG/1
81 TABLET, CHEWABLE ORAL
Status: CANCELLED | OUTPATIENT
Start: 2024-08-07

## 2024-07-15 RX ORDER — ASPIRIN 81 MG/1
162 TABLET, CHEWABLE ORAL
Status: CANCELLED | OUTPATIENT
Start: 2024-08-07

## 2024-07-20 ENCOUNTER — HEALTH MAINTENANCE LETTER (OUTPATIENT)
Age: 84
End: 2024-07-20

## 2024-07-25 ENCOUNTER — OFFICE VISIT (OUTPATIENT)
Dept: CARDIOLOGY | Facility: CLINIC | Age: 84
End: 2024-07-25
Payer: COMMERCIAL

## 2024-07-25 VITALS
HEIGHT: 68 IN | RESPIRATION RATE: 16 BRPM | HEART RATE: 62 BPM | DIASTOLIC BLOOD PRESSURE: 51 MMHG | OXYGEN SATURATION: 99 % | SYSTOLIC BLOOD PRESSURE: 151 MMHG | WEIGHT: 195 LBS | BODY MASS INDEX: 29.55 KG/M2

## 2024-07-25 DIAGNOSIS — G60.0 CMT (CHARCOT-MARIE-TOOTH DISEASE): ICD-10-CM

## 2024-07-25 DIAGNOSIS — R93.1 ABNORMAL COMPUTED TOMOGRAPHY ANGIOGRAPHY OF HEART: ICD-10-CM

## 2024-07-25 DIAGNOSIS — I25.10 CORONARY ARTERY DISEASE INVOLVING NATIVE CORONARY ARTERY OF NATIVE HEART, UNSPECIFIED WHETHER ANGINA PRESENT: ICD-10-CM

## 2024-07-25 DIAGNOSIS — R07.2 PRECORDIAL PAIN: ICD-10-CM

## 2024-07-25 DIAGNOSIS — R06.09 DYSPNEA ON EXERTION: ICD-10-CM

## 2024-07-25 PROCEDURE — 99215 OFFICE O/P EST HI 40 MIN: CPT | Performed by: INTERNAL MEDICINE

## 2024-07-25 RX ORDER — ATORVASTATIN CALCIUM 80 MG/1
80 TABLET, FILM COATED ORAL DAILY
Qty: 90 TABLET | Refills: 3 | Status: SHIPPED | OUTPATIENT
Start: 2024-07-25 | End: 2024-08-01

## 2024-07-25 NOTE — PROGRESS NOTES
HEART CARE VALVE CLINIC ENCOUNTER CONSULTATON NOTE      [facility]  [phone]      Assessment/Recommendations   Assessment/Plan: Enrike Sotomayor is a 83 year old old male with HTN, carotid artery disease; s/p Rt CEA, Class I obesity; BMI 30.41kg/m2, dyspnea on exertion, abnormal CTA cors how is here to discuss management of his CAD.     - I reviewed his angio, which showed multi-vessel obstructive CAD; high-normal L-sided filling pressures  - we went over the findings w/ the patient and his wife in detail and they understand, that given multi-vessel disease w/ a subtotally occluded mLAD v. a short , CABG would still be the top guideline-driven recommendation and as long as they and the surgical team are comfortable w/ his candidacy, I think surgery would be a good option  - if he ends up not being a candidate or refuses surgery, I think PCI would be an option, likely as part of two separate procedures at least (Lcx and RCA first, LAD separate procedure if it proves to be a )  - continue ASA 81mg daily indefinitely, increase atorva to 80  - continue aggressive risk factor modification    A total of >60 mins was spent reviewing prior records, including documentation, lab studies, cardiac testing/imaging, interview with patient, physical exam, and documentation     History of Present Illness/Subjective    HPI: Enrike Sotomayor is a 83 year old male w/ HTN, carotid artery disease; s/p Rt CEA, Class I obesity; BMI 30.41kg/m2, dyspnea on exertion, abnormal CTA cors how is here to discuss management of his CAD    He underwent angiography about a month ago, which showed multi-vessel disease and we referred him to  who offered CABG. He is now re-presenting to discuss percutaneous options.      Recent Echocardiogram Results:  Stress findings:  1. Non-diagnostic exercise stress echocardiogram due to a combination of poor  image quality and the inability to achieve target heart rate.  "Visually  estimated post exercise left ventricular ejection fraction appears unchanged  at 55-60%.  2. Non-diagnostic exercise stress electrocardiogram due to the inability to  achieve target heart rate. No ST segment changes observed with the achieved  workload.  3. Functional capacity is impaired. The patient exercised for 3:00 minutes on  the Justino protocol, achieving 4.6 METs and 80% the age-predicted maximum heart  rate. The patient reported dyspnea with exercise but did not experience chest  paiin.     Rest findings:  1. Left ventricular chamber size are wall thickness are normal. Overall  systolic function is normal but there is suggestion of distal septal and  apical hypokineis. The visually estimated left ventricular ejection fraction  is 55-60%.  2. Right ventricle is not well seen. Limited views suggest normal chamber size  and systolic function.  3. No hemodynamically significant left-sided valvular abnormalities. The  right-sided heart valves are not visualized.     No prior study available for comparison. Recommend stress cardiac MRI for  further assessment or pharmacologic nuclear stress testing if MRI cannot be  performed.    Recent Coronary Angiogram Results:  Enrike Sotomayor is a 83 year old old male with HTN, carotid artery disease; s/p Rt CEA, Class I obesity; BMI 30.41kg/m2 who is here for w/up of dyspnea on exertion, abnormal CTA cors.     - multi-vessel obstructive CAD; high-normal L-sided filling pressures  - will stop and consult CTS for CABG evaluation  - continue ASA 81mg daily indefinitely, add atorva 40, uptitrate as tolerated  - continue aggressive risk factor modification       Physical Examination  Review of Systems   Vitals: BP (!) 151/51 (BP Location: Right arm, Patient Position: Sitting, Cuff Size: Adult Regular)   Pulse 62   Resp 16   Ht 1.727 m (5' 8\")   Wt 88.5 kg (195 lb)   SpO2 99%   BMI 29.65 kg/m    BMI= Body mass index is 29.65 kg/m .  Wt Readings from Last 3 " Encounters:   07/25/24 88.5 kg (195 lb)   07/09/24 89.4 kg (197 lb)   07/03/24 89.4 kg (197 lb)       General Appearance:   no distress, normal body habitus   ENT/Mouth: membranes moist, no oral lesions or bleeding gums.      EYES:  no scleral icterus, normal conjunctivae   Neck: no carotid bruits or thyromegaly   Chest/Lungs:   lungs are clear to auscultation, no rales or wheezing, no sternal scar, equal chest wall expansion    Cardiovascular:   Regular. Normal first and second heart sounds with no systolic murmur, no rubs, or gallops; the carotid, radial and posterior tibial pulses are intact, Jugular venous pressure 6, no edema bilaterally    Abdomen:  no organomegaly, masses, bruits, or tenderness; bowel sounds are present   Extremities: no cyanosis or clubbing   Skin: no xanthelasma, warm.    Neurologic: normal  bilateral, no tremors     Psychiatric: alert and oriented x3, calm        Please refer above for cardiac ROS details.        Medical History  Surgical History Family History Social History   Past Medical History:   Diagnosis Date    Aortic valve sclerosis     Asthma     CAD (coronary artery disease)     Chronic kidney disease     Heart murmur     HLD (hyperlipidemia)     HTN (hypertension)     Lyme disease     Nonsenile cataract Mar 2023    SHAINA on CPAP     TIA (transient ischemic attack)     Visual field cut     Right     Past Surgical History:   Procedure Laterality Date    carotid endartectomy Right     CATARACT IOL, RT/LT  Mar 2023    CV CORONARY ANGIOGRAM N/A 06/24/2024    Procedure: Coronary Angiogram;  Surgeon: Oscar Whittington MD;  Location: [facility] CATH LAB CV    CV LEFT HEART CATH N/A 06/24/2024    Procedure: Left Heart Catheterization;  Surgeon: Oscar Whittington MD;  Location: [facility] CATH LAB CV    KNEE SURGERY Left     REPLACEMENT TOTAL KNEE Right     TONSILLECTOMY & ADENOIDECTOMY       Family History   Problem Relation Age of Onset    Cancer Mother     Hypertension Father      Glaucoma No family hx of     Macular Degeneration No family hx of         Social History     Socioeconomic History    Marital status:      Spouse name: Not on file    Number of children: Not on file    Years of education: Not on file    Highest education level: Not on file   Occupational History    Not on file   Tobacco Use    Smoking status: Former     Current packs/day: 1.00     Average packs/day: 1 pack/day for 5.0 years (5.0 ttl pk-yrs)     Types: Cigarettes    Smokeless tobacco: Never   Substance and Sexual Activity    Alcohol use: Yes    Drug use: Never    Sexual activity: Not Currently     Partners: Female     Birth control/protection: Male Surgical   Other Topics Concern    Not on file   Social History Narrative    Not on file     Social Determinants of Health     Financial Resource Strain: Not on File (2021)    Received from [facility]    Financial Resource Strain     Financial Resource Strain: 0   Food Insecurity: Not on File (2021)    Received from [facility]    Food Insecurity     Food: 0   Transportation Needs: Not on File (2021)    Received from [facility]    Transportation Needs     Transportation: 0   Physical Activity: Not on File (2021)    Received from [facility]    Physical Activity     Physical Activity: 0   Stress: Not on File (2021)    Received from [facility]    Stress     Stress: 0   Social Connections: Not on File (2021)    Received from [facility]    Social Connections     Social Connections and Isolation: 0   Interpersonal Safety: Not on file   Housing Stability: Not on File (2021)    Received from [facility] [employer]    Housing Stability     Housin           Medications  Allergies   Current Outpatient Medications   Medication Sig Dispense Refill    aspirin 81 MG EC tablet Take 81 mg by mouth daily      atorvastatin (LIPITOR) 40 MG tablet Take 1 tablet (40 mg) by mouth daily 90 tablet 3    diltiazem ER (DILT-XR) 240 MG 24 hr ER beaded  "capsule Take 1 capsule by mouth daily      fluticasone (FLONASE) 50 MCG/ACT nasal spray Spray 1 spray into both nostrils every other day      losartan (COZAAR) 100 MG tablet Take 1 tablet by mouth every morning         Allergies   Allergen Reactions    Pneumovax  [Pneumococcal Polysaccharide Vaccine]      Other reaction(s): fever,swollen, red arm          Lab Results    Chemistry/lipid CBC Cardiac Enzymes/BNP/TSH/INR   Recent Labs   Lab Test 06/24/24  0841   CHOL 160   HDL 49   LDL 99   TRIG 61     Recent Labs   Lab Test 06/24/24  0841 06/19/24  1019 05/24/23  1607   LDL 99 92 83     Recent Labs   Lab Test 06/24/24  0841      POTASSIUM 4.5   CHLORIDE 106   CO2 24   *   BUN 22.0   CR 1.12   GFRESTIMATED 66   LEONA 8.9     Recent Labs   Lab Test 06/24/24  0841 06/19/24  1019 06/12/24  0806   CR 1.12 1.08 1.2     No results for input(s): \"A1C\" in the last 50481 hours.       Recent Labs   Lab Test 06/24/24  0841   WBC 6.2   HGB 13.8   HCT 40.9   MCV 91        Recent Labs   Lab Test 06/24/24  0841 06/19/24  1019 05/09/23  1651   HGB 13.8 13.3 13.5    No results for input(s): \"TROPONINI\" in the last 22017 hours.  No results for input(s): \"BNP\", \"NTBNPI\", \"NTBNP\" in the last 56363 hours.  Recent Labs   Lab Test 05/09/23  1651   TSH 2.59     No results for input(s): \"INR\" in the last 92685 hours.     Oscar Whittington MD                                      "

## 2024-07-25 NOTE — LETTER
7/25/2024    Debbie Lazo MD  [address] [place] [address]    RE: Enrike Sotomayor       Dear Colleague,     I had the pleasure of seeing Enrike Sotomayor in the [facility].    HEART CARE VALVE CLINIC ENCOUNTER CONSULTATON NOTE      [facility]  [phone]      Assessment/Recommendations   Assessment/Plan: Enrike Sotomayor is a 83 year old old male with HTN, carotid artery disease; s/p Rt CEA, Class I obesity; BMI 30.41kg/m2, dyspnea on exertion, abnormal CTA cors how is here to discuss management of his CAD.     - I reviewed his angio, which showed multi-vessel obstructive CAD; high-normal L-sided filling pressures  - we went over the findings w/ the patient and his wife in detail and they understand, that given multi-vessel disease w/ a subtotally occluded mLAD v. a short , CABG would still be the top guideline-driven recommendation and as long as they and the surgical team are comfortable w/ his candidacy, I think surgery would be a good option  - if he ends up not being a candidate or refuses surgery, I think PCI would be an option, likely as part of two separate procedures at least (Lcx and RCA first, LAD separate procedure if it proves to be a )  - continue ASA 81mg daily indefinitely, increase atorva to 80  - continue aggressive risk factor modification    A total of >60 mins was spent reviewing prior records, including documentation, lab studies, cardiac testing/imaging, interview with patient, physical exam, and documentation     History of Present Illness/Subjective    HPI: Enrike Sotomayor is a 83 year old male w/ HTN, carotid artery disease; s/p Rt CEA, Class I obesity; BMI 30.41kg/m2, dyspnea on exertion, abnormal CTA cors how is here to discuss management of his CAD    He underwent angiography about a month ago, which showed multi-vessel disease and we referred him to  who offered CABG. He is now re-presenting to discuss percutaneous  options.      Recent Echocardiogram Results:  Stress findings:  1. Non-diagnostic exercise stress echocardiogram due to a combination of poor  image quality and the inability to achieve target heart rate. Visually  estimated post exercise left ventricular ejection fraction appears unchanged  at 55-60%.  2. Non-diagnostic exercise stress electrocardiogram due to the inability to  achieve target heart rate. No ST segment changes observed with the achieved  workload.  3. Functional capacity is impaired. The patient exercised for 3:00 minutes on  the Justino protocol, achieving 4.6 METs and 80% the age-predicted maximum heart  rate. The patient reported dyspnea with exercise but did not experience chest  paiin.     Rest findings:  1. Left ventricular chamber size are wall thickness are normal. Overall  systolic function is normal but there is suggestion of distal septal and  apical hypokineis. The visually estimated left ventricular ejection fraction  is 55-60%.  2. Right ventricle is not well seen. Limited views suggest normal chamber size  and systolic function.  3. No hemodynamically significant left-sided valvular abnormalities. The  right-sided heart valves are not visualized.     No prior study available for comparison. Recommend stress cardiac MRI for  further assessment or pharmacologic nuclear stress testing if MRI cannot be  performed.    Recent Coronary Angiogram Results:  Enrike Sotomayor is a 83 year old old male with HTN, carotid artery disease; s/p Rt CEA, Class I obesity; BMI 30.41kg/m2 who is here for w/up of dyspnea on exertion, abnormal CTA cors.     - multi-vessel obstructive CAD; high-normal L-sided filling pressures  - will stop and consult CTS for CABG evaluation  - continue ASA 81mg daily indefinitely, add atorva 40, uptitrate as tolerated  - continue aggressive risk factor modification       Physical Examination  Review of Systems   Vitals: BP (!) 151/51 (BP Location: Right arm, Patient Position:  "Sitting, Cuff Size: Adult Regular)   Pulse 62   Resp 16   Ht 1.727 m (5' 8\")   Wt 88.5 kg (195 lb)   SpO2 99%   BMI 29.65 kg/m    BMI= Body mass index is 29.65 kg/m .  Wt Readings from Last 3 Encounters:   07/25/24 88.5 kg (195 lb)   07/09/24 89.4 kg (197 lb)   07/03/24 89.4 kg (197 lb)       General Appearance:   no distress, normal body habitus   ENT/Mouth: membranes moist, no oral lesions or bleeding gums.      EYES:  no scleral icterus, normal conjunctivae   Neck: no carotid bruits or thyromegaly   Chest/Lungs:   lungs are clear to auscultation, no rales or wheezing, no sternal scar, equal chest wall expansion    Cardiovascular:   Regular. Normal first and second heart sounds with no systolic murmur, no rubs, or gallops; the carotid, radial and posterior tibial pulses are intact, Jugular venous pressure 6, no edema bilaterally    Abdomen:  no organomegaly, masses, bruits, or tenderness; bowel sounds are present   Extremities: no cyanosis or clubbing   Skin: no xanthelasma, warm.    Neurologic: normal  bilateral, no tremors     Psychiatric: alert and oriented x3, calm        Please refer above for cardiac ROS details.        Medical History  Surgical History Family History Social History   Past Medical History:   Diagnosis Date    Aortic valve sclerosis     Asthma     CAD (coronary artery disease)     Chronic kidney disease     Heart murmur     HLD (hyperlipidemia)     HTN (hypertension)     Lyme disease     Nonsenile cataract Mar 2023    SHAINA on CPAP     TIA (transient ischemic attack)     Visual field cut     Right     Past Surgical History:   Procedure Laterality Date    carotid endartectomy Right     CATARACT IOL, RT/LT  Mar 2023    CV CORONARY ANGIOGRAM N/A 06/24/2024    Procedure: Coronary Angiogram;  Surgeon: Oscar Whittington MD;  Location: [facility] CATH LAB CV    CV LEFT HEART CATH N/A 06/24/2024    Procedure: Left Heart Catheterization;  Surgeon: Oscar Whittington MD;  Location: [facility]" LAB CV    KNEE SURGERY Left     REPLACEMENT TOTAL KNEE Right     TONSILLECTOMY & ADENOIDECTOMY       Family History   Problem Relation Age of Onset    Cancer Mother     Hypertension Father     Glaucoma No family hx of     Macular Degeneration No family hx of         Social History     Socioeconomic History    Marital status:      Spouse name: Not on file    Number of children: Not on file    Years of education: Not on file    Highest education level: Not on file   Occupational History    Not on file   Tobacco Use    Smoking status: Former     Current packs/day: 1.00     Average packs/day: 1 pack/day for 5.0 years (5.0 ttl pk-yrs)     Types: Cigarettes    Smokeless tobacco: Never   Substance and Sexual Activity    Alcohol use: Yes    Drug use: Never    Sexual activity: Not Currently     Partners: Female     Birth control/protection: Male Surgical   Other Topics Concern    Not on file   Social History Narrative    Not on file     Social Determinants of Health     Financial Resource Strain: Not on File (2021)    Received from [facility]    Financial Resource Strain     Financial Resource Strain: 0   Food Insecurity: Not on File (2021)    Received from [facility]    Food Insecurity     Food: 0   Transportation Needs: Not on File (2021)    Received from [facility]    Transportation Needs     Transportation: 0   Physical Activity: Not on File (2021)    Received from [facility]    Physical Activity     Physical Activity: 0   Stress: Not on File (2021)    Received from [facility]    Stress     Stress: 0   Social Connections: Not on File (2021)    Received from [facility]    Social Connections     Social Connections and Isolation: 0   Interpersonal Safety: Not on file   Housing Stability: Not on File (2021)    Received from [facility] [employer]    Housing Stability     Housin           Medications  Allergies   Current Outpatient Medications   Medication Sig Dispense Refill     "aspirin 81 MG EC tablet Take 81 mg by mouth daily      atorvastatin (LIPITOR) 40 MG tablet Take 1 tablet (40 mg) by mouth daily 90 tablet 3    diltiazem ER (DILT-XR) 240 MG 24 hr ER beaded capsule Take 1 capsule by mouth daily      fluticasone (FLONASE) 50 MCG/ACT nasal spray Spray 1 spray into both nostrils every other day      losartan (COZAAR) 100 MG tablet Take 1 tablet by mouth every morning         Allergies   Allergen Reactions    Pneumovax  [Pneumococcal Polysaccharide Vaccine]      Other reaction(s): fever,swollen, red arm          Lab Results    Chemistry/lipid CBC Cardiac Enzymes/BNP/TSH/INR   Recent Labs   Lab Test 06/24/24  0841   CHOL 160   HDL 49   LDL 99   TRIG 61     Recent Labs   Lab Test 06/24/24  0841 06/19/24  1019 05/24/23  1607   LDL 99 92 83     Recent Labs   Lab Test 06/24/24  0841      POTASSIUM 4.5   CHLORIDE 106   CO2 24   *   BUN 22.0   CR 1.12   GFRESTIMATED 66   LEONA 8.9     Recent Labs   Lab Test 06/24/24  0841 06/19/24  1019 06/12/24  0806   CR 1.12 1.08 1.2     No results for input(s): \"A1C\" in the last 95104 hours.       Recent Labs   Lab Test 06/24/24  0841   WBC 6.2   HGB 13.8   HCT 40.9   MCV 91        Recent Labs   Lab Test 06/24/24  0841 06/19/24  1019 05/09/23  1651   HGB 13.8 13.3 13.5    No results for input(s): \"TROPONINI\" in the last 89285 hours.  No results for input(s): \"BNP\", \"NTBNPI\", \"NTBNP\" in the last 28950 hours.  Recent Labs   Lab Test 05/09/23  1651   TSH 2.59     No results for input(s): \"INR\" in the last 21900 hours.     Oscar Whittington MD                    Thank you for allowing me to participate in the care of your patient.      Sincerely,     Oscar Whittington MD     [facility]  cc:   Debbie Lazo MD  [address] [place] [address]      "

## 2024-07-26 ENCOUNTER — TRANSFERRED RECORDS (OUTPATIENT)
Dept: HEALTH INFORMATION MANAGEMENT | Facility: CLINIC | Age: 84
End: 2024-07-26
Payer: COMMERCIAL

## 2024-07-26 ENCOUNTER — TELEPHONE (OUTPATIENT)
Dept: CARDIOLOGY | Facility: CLINIC | Age: 84
End: 2024-07-26
Payer: COMMERCIAL

## 2024-07-26 NOTE — TELEPHONE ENCOUNTER
Called and spoke to patient's spouse who stated pt wants to proceed with CAB surgery on 8/7 with Dr. Aparicio. Pt is scheduled to see vascular on 7/31, and spouse states she will call if vascular surgery recommends intervention on left carotid artery stenosis prior to CAB.    Pt is having his preop history and physical done today at [facility]. Report available in care everywhere. Schedule reviewed with spouse, and all questions were addressed.

## 2024-07-29 DIAGNOSIS — J90 PLEURAL EFFUSION: Primary | ICD-10-CM

## 2024-07-30 NOTE — PROGRESS NOTES
[facility]        Patient is here for a consult to discuss Carotid stenosis. Patient has no symptoms.  Consult Dr. Corona 50-69% stenosis of left internal carotid artery   Pt is currently taking Aspirin and Statin.    BP (!) 154/74   Pulse 59   Resp 16   SpO2 98%     The provider has been notified that the patient has concerns of carotid stenosis.     Questions patient would like addressed today are: N/A.    Refills are needed: N/A    Has homecare services and agency name:  No

## 2024-07-30 NOTE — PATIENT INSTRUCTIONS
Osiel Calvert,    Thank you for entrusting your care with us today. After your visit today with MD Zander Tyler this is the plan that was discussed at your appointment.    Continue with medication management - Aspirin and Atorvastatin    Follow up in 1 year with Dr. Tyler with repeat carotid ultrasounds prior. We will contact you closer to that time to schedule        I am including additional information on these things and our contact information if you have any questions or concerns.   Please do not hesitate to reach out to us if you felt we did not answer your questions or you are unsure of the treatment plan after your visit today. Our number is [phone].Thank you for trusting us with your care.         Again thank you for your time.

## 2024-07-31 ENCOUNTER — OFFICE VISIT (OUTPATIENT)
Dept: VASCULAR SURGERY | Facility: CLINIC | Age: 84
End: 2024-07-31
Attending: INTERNAL MEDICINE
Payer: COMMERCIAL

## 2024-07-31 VITALS
HEART RATE: 59 BPM | RESPIRATION RATE: 16 BRPM | OXYGEN SATURATION: 98 % | DIASTOLIC BLOOD PRESSURE: 74 MMHG | SYSTOLIC BLOOD PRESSURE: 154 MMHG

## 2024-07-31 DIAGNOSIS — R09.89 BRUIT OF LEFT CAROTID ARTERY: ICD-10-CM

## 2024-07-31 DIAGNOSIS — I65.22 LEFT CAROTID STENOSIS: Primary | ICD-10-CM

## 2024-07-31 DIAGNOSIS — R93.89 ABNORMAL ULTRASOUND OF CAROTID ARTERY: ICD-10-CM

## 2024-07-31 PROCEDURE — 99204 OFFICE O/P NEW MOD 45 MIN: CPT | Performed by: HOSPITALIST

## 2024-07-31 PROCEDURE — G0463 HOSPITAL OUTPT CLINIC VISIT: HCPCS | Performed by: HOSPITALIST

## 2024-07-31 RX ORDER — SILDENAFIL 100 MG/1
100 TABLET, FILM COATED ORAL DAILY PRN
COMMUNITY
End: 2024-09-04

## 2024-07-31 ASSESSMENT — PAIN SCALES - GENERAL: PAINLEVEL: NO PAIN (0)

## 2024-08-01 ENCOUNTER — HOSPITAL ENCOUNTER (OUTPATIENT)
Dept: ULTRASOUND IMAGING | Facility: HOSPITAL | Age: 84
Discharge: HOME | End: 2024-08-01
Attending: THORACIC SURGERY (CARDIOTHORACIC VASCULAR SURGERY)
Payer: COMMERCIAL

## 2024-08-01 ENCOUNTER — HOSPITAL ENCOUNTER (OUTPATIENT)
Dept: CARDIOLOGY | Facility: HOSPITAL | Age: 84
Discharge: HOME | End: 2024-08-01
Attending: THORACIC SURGERY (CARDIOTHORACIC VASCULAR SURGERY)
Payer: COMMERCIAL

## 2024-08-01 ENCOUNTER — ANESTHESIA EVENT (OUTPATIENT)
Dept: SURGERY | Facility: HOSPITAL | Age: 84
DRG: 235 | End: 2024-08-01
Payer: COMMERCIAL

## 2024-08-01 ENCOUNTER — DOCUMENTATION ONLY (OUTPATIENT)
Dept: OTHER | Facility: CLINIC | Age: 84
End: 2024-08-01

## 2024-08-01 ENCOUNTER — HOSPITAL ENCOUNTER (OUTPATIENT)
Dept: SURGERY | Facility: HOSPITAL | Age: 84
Discharge: HOME | End: 2024-08-01
Attending: THORACIC SURGERY (CARDIOTHORACIC VASCULAR SURGERY)
Payer: COMMERCIAL

## 2024-08-01 VITALS
SYSTOLIC BLOOD PRESSURE: 136 MMHG | WEIGHT: 198.06 LBS | BODY MASS INDEX: 30.02 KG/M2 | RESPIRATION RATE: 18 BRPM | TEMPERATURE: 97.8 F | HEART RATE: 63 BPM | HEIGHT: 68 IN | DIASTOLIC BLOOD PRESSURE: 64 MMHG | OXYGEN SATURATION: 99 %

## 2024-08-01 DIAGNOSIS — I25.10 CORONARY ARTERY DISEASE INVOLVING NATIVE CORONARY ARTERY OF NATIVE HEART, UNSPECIFIED WHETHER ANGINA PRESENT: ICD-10-CM

## 2024-08-01 DIAGNOSIS — G60.0 CMT (CHARCOT-MARIE-TOOTH DISEASE): ICD-10-CM

## 2024-08-01 DIAGNOSIS — R79.89 OTHER SPECIFIED ABNORMAL FINDINGS OF BLOOD CHEMISTRY: ICD-10-CM

## 2024-08-01 DIAGNOSIS — R06.02 SHORTNESS OF BREATH: ICD-10-CM

## 2024-08-01 DIAGNOSIS — I99.8 OTHER DISORDER OF CIRCULATORY SYSTEM: ICD-10-CM

## 2024-08-01 LAB
ABO/RH(D): NORMAL
ALBUMIN SERPL BCG-MCNC: 3.8 G/DL (ref 3.5–5.2)
ALBUMIN UR-MCNC: NEGATIVE MG/DL
ALP SERPL-CCNC: 104 U/L (ref 40–150)
ALT SERPL W P-5'-P-CCNC: 16 U/L (ref 0–70)
ANION GAP SERPL CALCULATED.3IONS-SCNC: 11 MMOL/L (ref 7–15)
ANTIBODY SCREEN: NEGATIVE
APPEARANCE UR: CLEAR
APTT PPP: 34 SECONDS (ref 22–38)
AST SERPL W P-5'-P-CCNC: 21 U/L (ref 0–45)
BILIRUB SERPL-MCNC: 0.5 MG/DL
BILIRUB UR QL STRIP: NEGATIVE
BUN SERPL-MCNC: 25.2 MG/DL (ref 8–23)
CALCIUM SERPL-MCNC: 8.8 MG/DL (ref 8.8–10.4)
CHLORIDE SERPL-SCNC: 104 MMOL/L (ref 98–107)
COLOR UR AUTO: ABNORMAL
CREAT SERPL-MCNC: 1.12 MG/DL (ref 0.67–1.17)
EGFRCR SERPLBLD CKD-EPI 2021: 66 ML/MIN/1.73M2
ERYTHROCYTE [DISTWIDTH] IN BLOOD BY AUTOMATED COUNT: 11.9 % (ref 10–15)
GLUCOSE SERPL-MCNC: 88 MG/DL (ref 70–99)
GLUCOSE UR STRIP-MCNC: NEGATIVE MG/DL
HBA1C MFR BLD: 5.4 %
HCO3 SERPL-SCNC: 24 MMOL/L (ref 22–29)
HCT VFR BLD AUTO: 37.9 % (ref 40–53)
HGB BLD-MCNC: 12.7 G/DL (ref 13.3–17.7)
HGB UR QL STRIP: ABNORMAL
INR PPP: 1.24 (ref 0.85–1.15)
KETONES UR STRIP-MCNC: NEGATIVE MG/DL
LEUKOCYTE ESTERASE UR QL STRIP: NEGATIVE
MAGNESIUM SERPL-MCNC: 2 MG/DL (ref 1.7–2.3)
MCH RBC QN AUTO: 30.8 PG (ref 26.5–33)
MCHC RBC AUTO-ENTMCNC: 33.5 G/DL (ref 31.5–36.5)
MCV RBC AUTO: 92 FL (ref 78–100)
NITRATE UR QL: NEGATIVE
PH UR STRIP: 6 [PH] (ref 5–7)
PLATELET # BLD AUTO: 239 10E3/UL (ref 150–450)
POTASSIUM SERPL-SCNC: 4.1 MMOL/L (ref 3.4–5.3)
PREALB SERPL-MCNC: 18.4 MG/DL (ref 20–40)
PROT SERPL-MCNC: 7.2 G/DL (ref 6.4–8.3)
RBC # BLD AUTO: 4.13 10E6/UL (ref 4.4–5.9)
RBC URINE: 2 /HPF
SODIUM SERPL-SCNC: 139 MMOL/L (ref 135–145)
SP GR UR STRIP: 1.01 (ref 1–1.03)
SPECIMEN EXPIRATION DATE: NORMAL
UROBILINOGEN UR STRIP-MCNC: <2 MG/DL
WBC # BLD AUTO: 7 10E3/UL (ref 4–11)
WBC URINE: <1 /HPF

## 2024-08-01 PROCEDURE — 999N000208 ECHOCARDIOGRAM COMPLETE

## 2024-08-01 PROCEDURE — 85730 THROMBOPLASTIN TIME PARTIAL: CPT | Performed by: THORACIC SURGERY (CARDIOTHORACIC VASCULAR SURGERY)

## 2024-08-01 PROCEDURE — 255N000002 HC RX 255 OP 636: Performed by: THORACIC SURGERY (CARDIOTHORACIC VASCULAR SURGERY)

## 2024-08-01 PROCEDURE — 83036 HEMOGLOBIN GLYCOSYLATED A1C: CPT | Performed by: THORACIC SURGERY (CARDIOTHORACIC VASCULAR SURGERY)

## 2024-08-01 PROCEDURE — 36415 COLL VENOUS BLD VENIPUNCTURE: CPT | Performed by: THORACIC SURGERY (CARDIOTHORACIC VASCULAR SURGERY)

## 2024-08-01 PROCEDURE — 84134 ASSAY OF PREALBUMIN: CPT | Performed by: THORACIC SURGERY (CARDIOTHORACIC VASCULAR SURGERY)

## 2024-08-01 PROCEDURE — 93970 EXTREMITY STUDY: CPT

## 2024-08-01 PROCEDURE — 85027 COMPLETE CBC AUTOMATED: CPT | Performed by: THORACIC SURGERY (CARDIOTHORACIC VASCULAR SURGERY)

## 2024-08-01 PROCEDURE — 83735 ASSAY OF MAGNESIUM: CPT | Performed by: THORACIC SURGERY (CARDIOTHORACIC VASCULAR SURGERY)

## 2024-08-01 PROCEDURE — 81003 URINALYSIS AUTO W/O SCOPE: CPT | Performed by: THORACIC SURGERY (CARDIOTHORACIC VASCULAR SURGERY)

## 2024-08-01 PROCEDURE — 86900 BLOOD TYPING SEROLOGIC ABO: CPT | Performed by: THORACIC SURGERY (CARDIOTHORACIC VASCULAR SURGERY)

## 2024-08-01 PROCEDURE — 93306 TTE W/DOPPLER COMPLETE: CPT | Mod: 26 | Performed by: INTERNAL MEDICINE

## 2024-08-01 PROCEDURE — 85610 PROTHROMBIN TIME: CPT | Performed by: THORACIC SURGERY (CARDIOTHORACIC VASCULAR SURGERY)

## 2024-08-01 PROCEDURE — 82374 ASSAY BLOOD CARBON DIOXIDE: CPT | Performed by: THORACIC SURGERY (CARDIOTHORACIC VASCULAR SURGERY)

## 2024-08-01 RX ORDER — ACETAMINOPHEN 325 MG/1
975 TABLET ORAL ONCE
Status: CANCELLED | OUTPATIENT
Start: 2024-08-07 | End: 2024-08-01

## 2024-08-01 RX ORDER — METHADONE HYDROCHLORIDE 10 MG/ML
10 INJECTION, SOLUTION INTRAMUSCULAR; INTRAVENOUS; SUBCUTANEOUS ONCE
Status: CANCELLED | OUTPATIENT
Start: 2024-08-07 | End: 2024-08-01

## 2024-08-01 RX ORDER — LIDOCAINE 40 MG/G
CREAM TOPICAL
Status: DISCONTINUED | OUTPATIENT
Start: 2024-08-01 | End: 2024-08-01

## 2024-08-01 RX ORDER — SODIUM CHLORIDE, SODIUM LACTATE, POTASSIUM CHLORIDE, CALCIUM CHLORIDE 600; 310; 30; 20 MG/100ML; MG/100ML; MG/100ML; MG/100ML
INJECTION, SOLUTION INTRAVENOUS CONTINUOUS
Status: DISCONTINUED | OUTPATIENT
Start: 2024-08-01 | End: 2024-08-01

## 2024-08-01 RX ORDER — ATORVASTATIN CALCIUM 80 MG/1
80 TABLET, FILM COATED ORAL DAILY
Status: ON HOLD | COMMUNITY
End: 2024-08-19

## 2024-08-01 RX ORDER — METHADONE HYDROCHLORIDE 10 MG/ML
10 INJECTION, SOLUTION INTRAMUSCULAR; INTRAVENOUS; SUBCUTANEOUS ONCE
Status: DISCONTINUED | OUTPATIENT
Start: 2024-08-01 | End: 2024-08-01

## 2024-08-01 RX ORDER — ACETAMINOPHEN 325 MG/1
975 TABLET ORAL ONCE
Status: DISCONTINUED | OUTPATIENT
Start: 2024-08-01 | End: 2024-08-01

## 2024-08-01 RX ORDER — SODIUM CHLORIDE, SODIUM LACTATE, POTASSIUM CHLORIDE, CALCIUM CHLORIDE 600; 310; 30; 20 MG/100ML; MG/100ML; MG/100ML; MG/100ML
INJECTION, SOLUTION INTRAVENOUS CONTINUOUS
Status: CANCELLED | OUTPATIENT
Start: 2024-08-07

## 2024-08-01 RX ORDER — MAGNESIUM SULFATE 4 G/50ML
4 INJECTION INTRAVENOUS ONCE
Status: CANCELLED | OUTPATIENT
Start: 2024-08-07 | End: 2024-08-01

## 2024-08-01 RX ORDER — MAGNESIUM SULFATE 4 G/50ML
4 INJECTION INTRAVENOUS ONCE
Status: DISCONTINUED | OUTPATIENT
Start: 2024-08-01 | End: 2024-08-02 | Stop reason: HOSPADM

## 2024-08-01 RX ORDER — LIDOCAINE 40 MG/G
CREAM TOPICAL
Status: CANCELLED | OUTPATIENT
Start: 2024-08-01

## 2024-08-01 RX ADMIN — PERFLUTREN 2 ML: 6.52 INJECTION, SUSPENSION INTRAVENOUS at 11:06

## 2024-08-01 ASSESSMENT — LIFESTYLE VARIABLES: TOBACCO_USE: 1

## 2024-08-01 NOTE — ANESTHESIA PREPROCEDURE EVALUATION
Anesthesia Pre-Procedure Evaluation    Patient: Enrike Sotomayor   MRN: [id number] : 1940        Procedure : Procedure(s):  CORONARY ARTERY BYPASS GRAFT , INTERNAL MAMMARY ARTERY HARVEST, ENDOSCOPIC VESSEL PROCUREMENT,  ANESTHESIA TRANSESOPHAGEAL ECHOCARDIOGRAM  Pre-Admission Testing       Past Medical History:   Diagnosis Date     Aortic valve sclerosis      Arteriosclerosis of right carotid artery      Bilateral cataracts      CAD (coronary artery disease)      Charcot's joint of foot      Chronic kidney disease      Chronic rhinitis      Chronic vasomotor rhinitis      Constipation by delayed colonic transit      Decreased hearing of both ears      Grade I diastolic dysfunction      Heart murmur      HLD (hyperlipidemia)      HTN (hypertension)      Lyme disease      Nonsenile cataract Mar 2023     Obesity      SHAINA on CPAP      Prostatic hypertrophy      Psychophysiological insomnia      Retinal arterial branch occlusion      Squamous cell carcinoma of skin of face      TIA (transient ischemic attack)      Visual field cut     Right      Past Surgical History:   Procedure Laterality Date     carotid endartectomy Right      CATARACT IOL, RT/LT  Mar 2023     CV CORONARY ANGIOGRAM N/A 2024    Procedure: Coronary Angiogram;  Surgeon: Oscar Whittington MD;  Location: [facility] CATH LAB CV     CV LEFT HEART CATH N/A 2024    Procedure: Left Heart Catheterization;  Surgeon: Oscar Whittington MD;  Location: [facility] CATH LAB CV     KNEE SURGERY Left      REPLACEMENT TOTAL KNEE Right      TONSILLECTOMY & ADENOIDECTOMY        Allergies   Allergen Reactions     Pneumovax  [Pneumococcal Polysaccharide Vaccine]      Other reaction(s): fever,swollen, red arm      Social History     Tobacco Use     Smoking status: Former     Current packs/day: 1.00     Average packs/day: 1 pack/day for 5.0 years (5.0 ttl pk-yrs)     Types: Cigarettes     Smokeless tobacco: Never   Substance Use Topics     Alcohol use: Yes      Alcohol/week: 6.0 standard drinks of alcohol     Types: 6 Standard drinks or equivalent per week      Wt Readings from Last 1 Encounters:   08/01/24 89.8 kg (198 lb 1 oz)        Anesthesia Evaluation   Pt has had prior anesthetic.     History of anesthetic complications  - .  No personal or family h/o MH.  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    ROS/MED HX  ENT/Pulmonary:     (+) sleep apnea, uses CPAP,              tobacco use, Past use,     asthma                  Neurologic: Comment: Lyme disease     Charcot-Carla-Tooth disease.  Chronic weakness LE greater than UE.  Never able to run well because of weakness and chronic ZULUAGA.  Now with worsened ZULUAGA from CAD.    (+)              TIA,                  Cardiovascular: Comment: 8/1/24 Echo  Interpretation Summary     1. The left ventricle is normal in size. Left ventricular systolic performance  is mildly reduced. The ejection fraction is estimated to be 45-50%.  2. There is moderate mid to distal anterior hypokinesis along with a small  region of apical hypokinesis.  3. There is very mild aortic stenosis.  4. Normal right ventricular size and systolic performance.    6/24/24 Coronary cath  Findings:  LM:no obstruction  LAD:mid-vessel subtotal occlusion v.  w/ L-L collaterals  Lcx:OM1 w/ long area of 75% narrowing  RCA: dominant, proximal 90% , mid-vessel 99% narrowing w/ some antegrade and some L-L distal filling     LVEDP: 18  AV gradient: 12    Narrative & Impression  EXAM: US CAROTID BILATERAL  LOCATION: [facility]  DATE: 6/12/2024                                                                   IMPRESSION:  1.  RIGHT: Mild plaque formation, velocities consistent with less than 50% stenosis in the right internal carotid artery. Delayed systolic upstrokes/monophasic flow in the right subclavian artery, suggesting more proximal or brachiocephalic artery   disease.  2.  LEFT: Moderate plaque formation,  "velocities consistent with 50-69% stenosis in the left internal carotid artery.           (+) Dyslipidemia hypertension- Peripheral Vascular Disease (s/p right CEA 2012.  Moderate left carotid stenosis.)-- Carotid Stenosis.  CAD angina-  - -      CHF etiology: diastolic    ZULUAGA.                           METS/Exercise Tolerance:     Hematologic:  - neg hematologic  ROS     Musculoskeletal: Comment: Charcot-Carla-Tooth disease  Chronic weakness, LE greater than UE      GI/Hepatic:  - neg GI/hepatic ROS     Renal/Genitourinary:     (+) renal disease, type: CRI,      BPH,      Endo:     (+)               Obesity (BMI 30),       Psychiatric/Substance Use:       Infectious Disease:       Malignancy:       Other:            Physical Exam    Airway        Mallampati: III   TM distance: > 3 FB   Neck ROM: full   Mouth opening: > 3 cm    Respiratory Devices and Support         Dental       (+) Multiple crowns, permanant bridges    B=Bridge, C=Chipped, L=Loose, M=Missing    Cardiovascular          Rhythm and rate: regular and normal     Pulmonary   pulmonary exam normal            OUTSIDE LABS:  CBC:   Lab Results   Component Value Date    WBC 7.0 08/01/2024    WBC 6.2 06/24/2024    HGB 12.7 (L) 08/01/2024    HGB 13.8 06/24/2024    HCT 37.9 (L) 08/01/2024    HCT 40.9 06/24/2024     08/01/2024     06/24/2024     BMP:   Lab Results   Component Value Date     08/01/2024     06/24/2024    POTASSIUM 4.1 08/01/2024    POTASSIUM 4.5 06/24/2024    CHLORIDE 104 08/01/2024    CHLORIDE 106 06/24/2024    CO2 24 08/01/2024    CO2 24 06/24/2024    BUN 25.2 (H) 08/01/2024    BUN 22.0 06/24/2024    CR 1.12 08/01/2024    CR 1.12 06/24/2024    GLC 88 08/01/2024     (H) 06/24/2024     COAGS:   Lab Results   Component Value Date    PTT 34 08/01/2024    INR 1.24 (H) 08/01/2024     POC: No results found for: \"BGM\", \"HCG\", \"HCGS\"  HEPATIC:   Lab Results   Component Value Date    ALBUMIN 3.8 08/01/2024    PROTTOTAL " 7.2 08/01/2024    ALT 16 08/01/2024    AST 21 08/01/2024    ALKPHOS 104 08/01/2024    BILITOTAL 0.5 08/01/2024     OTHER:   Lab Results   Component Value Date    A1C 5.4 08/01/2024    LEONA 8.8 08/01/2024    MAG 2.0 08/01/2024    TSH 2.59 05/09/2023    SED 19 05/09/2023       Anesthesia Plan    ASA Status:  3    NPO Status:  NPO Appropriate    Anesthesia Type: General.     - Airway: ETT   Induction: Intravenous.   Maintenance: TIVA (MH precautions, TIVA).   Techniques and Equipment:     - Airway: Video-Laryngoscope     - Lines/Monitors: 2nd IV, Arterial Line, CVP, ALEJO            ALEJO Absolute Contra-indication: NONE            ALEJO Relative Contra-indication: NONE     - Blood: Blood in Room, PRBC, Cell Saver, T&C     Consents    Anesthesia Plan(s) and associated risks, benefits, and realistic alternatives discussed. Questions answered and patient/representative(s) expressed understanding.     - Discussed: Risks, Benefits and Alternatives for BOTH SEDATION and the PROCEDURE were discussed     - Discussed with:  Patient      - Extended Intubation/Ventilatory Support Discussed: Yes.      - Patient is DNR/DNI Status: No     Use of blood products discussed: Yes.     - Discussed with: Patient.     - Consented: consented to blood products     Postoperative Care    Pain management: Multi-modal analgesia.   PONV prophylaxis: Ondansetron (or other 5HT-3), Dexamethasone or Solumedrol     Comments:    Other Comments: Charcot-Carla-Tooth disease,  possible increased risk for malignant hyperthermia.  Plan for MH precautions.  Avoid inhalational anesthetics and succinylcholine.  VaporClean charcoal filters on anesthesia machine per protocol.    Increased sensitivity to nondepolarizing muscle relaxants    TIVA with propofol and precedex    Methadone  Consider holding magnesium due to weakness    Ketamine      Reverse with Sugammadex           Christine Coates MD    I have reviewed the pertinent notes and labs in the chart from the past  "30 days and (re)examined the patient.  Any updates or changes from those notes are reflected in this note.            # Coagulation Defect: INR = 1.24 (Ref range: 0.85 - 1.15) and/or PTT = 34 Seconds (Ref range: 22 - 38 Seconds), will monitor for bleeding  # Drug Induced Platelet Defect: home medication list includes an antiplatelet medication  # Obesity: Estimated body mass index is 30.12 kg/m  as calculated from the following:    Height as of this encounter: 1.727 m (5' 8\").    Weight as of this encounter: 89.8 kg (198 lb 1 oz).      "

## 2024-08-01 NOTE — PHARMACY-CONSULT NOTE
Pharmacist Admission Medication History    Admission medication history is complete. The information provided in this note is only as accurate as the sources available at the time of the update.    Information Source(s): Patient via in-person    Pertinent Information: None    Changes made to PTA medication list:  Added: None  Deleted: None  Changed: Aspirin - Changed from daily to evening. Fluticasone - Changed from every other day to every other night before bed.    Allergies reviewed with patient and updates made in EHR: yes    Medication History Completed By: Kalyan Martinez [facility] 8/1/2024 8:48 AM    PTA Med List   Medication Sig Last Dose    aspirin 81 MG EC tablet Take 81 mg by mouth every evening 7/31/2024 at PM    atorvastatin (LIPITOR) 80 MG tablet Take 80 mg by mouth daily 7/31/2024 at HS    diltiazem ER (DILT-XR) 240 MG 24 hr ER beaded capsule Take 1 capsule by mouth daily 8/1/2024 at AM    fluticasone (FLONASE) 50 MCG/ACT nasal spray Spray 1 spray into each nostril every other night before bed. Uses on even days. 7/30/2024 at HS    losartan (COZAAR) 100 MG tablet Take 1 tablet by mouth every morning 8/1/2024 at AM    sildenafil (VIAGRA) 100 MG tablet Take 100 mg by mouth daily as needed  at PRN

## 2024-08-05 ENCOUNTER — TRANSFERRED RECORDS (OUTPATIENT)
Dept: HEALTH INFORMATION MANAGEMENT | Facility: CLINIC | Age: 84
End: 2024-08-05
Payer: COMMERCIAL

## 2024-08-07 ENCOUNTER — ANESTHESIA (OUTPATIENT)
Dept: SURGERY | Facility: HOSPITAL | Age: 84
DRG: 235 | End: 2024-08-07
Payer: COMMERCIAL

## 2024-08-07 ENCOUNTER — APPOINTMENT (OUTPATIENT)
Dept: RADIOLOGY | Facility: HOSPITAL | Age: 84
DRG: 235 | End: 2024-08-07
Attending: PHYSICIAN ASSISTANT
Payer: COMMERCIAL

## 2024-08-07 ENCOUNTER — HOSPITAL ENCOUNTER (INPATIENT)
Facility: HOSPITAL | Age: 84
LOS: 6 days | Discharge: NURSING, REHAB OR LONG-TERM CARE | DRG: 235 | End: 2024-08-13
Attending: THORACIC SURGERY (CARDIOTHORACIC VASCULAR SURGERY) | Admitting: THORACIC SURGERY (CARDIOTHORACIC VASCULAR SURGERY)
Payer: COMMERCIAL

## 2024-08-07 DIAGNOSIS — I25.10 CORONARY ARTERY DISEASE INVOLVING NATIVE CORONARY ARTERY OF NATIVE HEART, UNSPECIFIED WHETHER ANGINA PRESENT: ICD-10-CM

## 2024-08-07 DIAGNOSIS — Z95.1 S/P CABG (CORONARY ARTERY BYPASS GRAFT): Primary | ICD-10-CM

## 2024-08-07 LAB
ALBUMIN SERPL BCG-MCNC: 3.3 G/DL (ref 3.5–5.2)
ALP SERPL-CCNC: 70 U/L (ref 40–150)
ALT SERPL W P-5'-P-CCNC: 12 U/L (ref 0–70)
ANION GAP SERPL CALCULATED.3IONS-SCNC: 10 MMOL/L (ref 7–15)
APTT PPP: 55 SECONDS (ref 22–38)
APTT PPP: 58 SECONDS (ref 22–38)
AST SERPL W P-5'-P-CCNC: 27 U/L (ref 0–45)
BASE EXCESS BLDA CALC-SCNC: -0.3 MMOL/L (ref -3–3)
BASE EXCESS BLDA CALC-SCNC: -1.4 MMOL/L (ref -3–3)
BASE EXCESS BLDA CALC-SCNC: -1.5 MMOL/L (ref -3–3)
BASE EXCESS BLDA CALC-SCNC: -2 MMOL/L (ref -3–3)
BASE EXCESS BLDA CALC-SCNC: -3.8 MMOL/L (ref -3–3)
BASE EXCESS BLDA CALC-SCNC: -4.4 MMOL/L (ref -3–3)
BASE EXCESS BLDV CALC-SCNC: -0.2 MMOL/L (ref -3–3)
BILIRUB SERPL-MCNC: 0.3 MG/DL
BLD PROD TYP BPU: NORMAL
BLD PROD TYP BPU: NORMAL
BLOOD COMPONENT TYPE: NORMAL
BLOOD COMPONENT TYPE: NORMAL
BUN SERPL-MCNC: 18.5 MG/DL (ref 8–23)
CA-I BLD-MCNC: 4.3 MG/DL (ref 4.4–5.2)
CA-I BLD-MCNC: 4.4 MG/DL (ref 4.4–5.2)
CA-I BLD-MCNC: 4.5 MG/DL (ref 4.4–5.2)
CA-I BLD-MCNC: 4.8 MG/DL (ref 4.4–5.2)
CA-I BLD-MCNC: 5.2 MG/DL (ref 4.4–5.2)
CALCIUM SERPL-MCNC: 8 MG/DL (ref 8.8–10.4)
CHLORIDE SERPL-SCNC: 108 MMOL/L (ref 98–107)
CODING SYSTEM: NORMAL
CODING SYSTEM: NORMAL
COHGB MFR BLD: 100 % (ref 95–96)
COHGB MFR BLD: 97.7 % (ref 95–96)
CPB POCT: NO
CREAT SERPL-MCNC: 0.94 MG/DL (ref 0.67–1.17)
CROSSMATCH: NORMAL
CROSSMATCH: NORMAL
EGFRCR SERPLBLD CKD-EPI 2021: 81 ML/MIN/1.73M2
ERYTHROCYTE [DISTWIDTH] IN BLOOD BY AUTOMATED COUNT: 12 % (ref 10–15)
ERYTHROCYTE [DISTWIDTH] IN BLOOD BY AUTOMATED COUNT: 12 % (ref 10–15)
FIBRINOGEN PPP-MCNC: 304 MG/DL (ref 170–510)
GLUCOSE BLD-MCNC: 111 MG/DL (ref 70–99)
GLUCOSE BLD-MCNC: 147 MG/DL (ref 70–99)
GLUCOSE BLD-MCNC: 148 MG/DL (ref 70–99)
GLUCOSE BLD-MCNC: 177 MG/DL (ref 70–99)
GLUCOSE BLDC GLUCOMTR-MCNC: 106 MG/DL (ref 70–99)
GLUCOSE BLDC GLUCOMTR-MCNC: 113 MG/DL (ref 70–99)
GLUCOSE BLDC GLUCOMTR-MCNC: 121 MG/DL (ref 70–99)
GLUCOSE BLDC GLUCOMTR-MCNC: 123 MG/DL (ref 70–99)
GLUCOSE BLDC GLUCOMTR-MCNC: 125 MG/DL (ref 70–99)
GLUCOSE BLDC GLUCOMTR-MCNC: 127 MG/DL (ref 70–99)
GLUCOSE BLDC GLUCOMTR-MCNC: 136 MG/DL (ref 70–99)
GLUCOSE BLDC GLUCOMTR-MCNC: 139 MG/DL (ref 70–99)
GLUCOSE BLDC GLUCOMTR-MCNC: 149 MG/DL (ref 70–99)
GLUCOSE BLDC GLUCOMTR-MCNC: 171 MG/DL (ref 70–99)
GLUCOSE SERPL-MCNC: 167 MG/DL (ref 70–99)
HCO3 BLD-SCNC: 21 MMOL/L (ref 21–28)
HCO3 BLD-SCNC: 21 MMOL/L (ref 21–28)
HCO3 BLDA-SCNC: 23 MMOL/L (ref 21–28)
HCO3 BLDA-SCNC: 23 MMOL/L (ref 21–28)
HCO3 BLDA-SCNC: 24 MMOL/L (ref 21–28)
HCO3 BLDA-SCNC: 24 MMOL/L (ref 21–28)
HCO3 BLDV-SCNC: 24 MMOL/L (ref 21–28)
HCO3 SERPL-SCNC: 22 MMOL/L (ref 22–29)
HCT VFR BLD AUTO: 31.2 % (ref 40–53)
HCT VFR BLD AUTO: 31.3 % (ref 40–53)
HCT VFR BLD CALC: 31 % (ref 40–54)
HGB BLD-MCNC: 10.2 G/DL (ref 13.3–17.7)
HGB BLD-MCNC: 10.4 G/DL (ref 13.3–17.7)
HGB BLD-MCNC: 10.4 G/DL (ref 13.3–17.7)
HGB BLD-MCNC: 10.5 G/DL (ref 13.3–17.7)
HGB BLD-MCNC: 10.7 G/DL (ref 13.3–17.7)
HGB BLD-MCNC: 12.2 G/DL (ref 13.3–17.7)
HGB BLD-MCNC: 9.8 G/DL (ref 13.3–17.7)
INR PPP: 1.58 (ref 0.85–1.15)
INR PPP: 1.79 (ref 0.85–1.15)
ISSUE DATE AND TIME: NORMAL
ISSUE DATE AND TIME: NORMAL
LACTATE BLD-SCNC: 0.5 MMOL/L (ref 0.7–2)
LACTATE BLD-SCNC: 0.5 MMOL/L (ref 0.7–2)
LACTATE BLD-SCNC: 0.6 MMOL/L (ref 0.7–2)
LACTATE BLD-SCNC: 1.2 MMOL/L (ref 0.7–2)
LACTATE SERPL-SCNC: 1.3 MMOL/L (ref 0.7–2)
MAGNESIUM SERPL-MCNC: 3.1 MG/DL (ref 1.7–2.3)
MCH RBC QN AUTO: 30.5 PG (ref 26.5–33)
MCH RBC QN AUTO: 30.5 PG (ref 26.5–33)
MCHC RBC AUTO-ENTMCNC: 33.2 G/DL (ref 31.5–36.5)
MCHC RBC AUTO-ENTMCNC: 33.3 G/DL (ref 31.5–36.5)
MCV RBC AUTO: 92 FL (ref 78–100)
MCV RBC AUTO: 92 FL (ref 78–100)
O2/TOTAL GAS SETTING VFR VENT: 36 %
O2/TOTAL GAS SETTING VFR VENT: 40 %
OXYHGB MFR BLDA: 99 % (ref 92–100)
OXYHGB MFR BLDV: 87 % (ref 70–75)
OXYHGB MFR BLDV: 87 % (ref 70–75)
PCO2 BLD: 33 MM HG (ref 35–45)
PCO2 BLD: 36 MM HG (ref 35–45)
PCO2 BLDA: 38 MM HG (ref 35–45)
PCO2 BLDA: 40 MM HG (ref 35–45)
PCO2 BLDA: 41 MM HG (ref 35–45)
PCO2 BLDA: 43 MM HG (ref 35–45)
PCO2 BLDV: 56 MM HG (ref 40–50)
PEEP: 5 CM H2O
PH BLD: 7.37 [PH] (ref 7.35–7.45)
PH BLD: 7.41 [PH] (ref 7.35–7.45)
PH BLDA: 7.35 [PH] (ref 7.35–7.45)
PH BLDA: 7.37 [PH] (ref 7.35–7.45)
PH BLDA: 7.38 [PH] (ref 7.35–7.45)
PH BLDA: 7.4 [PH] (ref 7.35–7.45)
PH BLDV: 7.28 [PH] (ref 7.32–7.43)
PHOSPHATE SERPL-MCNC: 3.5 MG/DL (ref 2.5–4.5)
PLATELET # BLD AUTO: 194 10E3/UL (ref 150–450)
PLATELET # BLD AUTO: 208 10E3/UL (ref 150–450)
PO2 BLD: 147 MM HG (ref 80–105)
PO2 BLD: 90 MM HG (ref 80–105)
PO2 BLDA: 125 MM HG (ref 80–105)
PO2 BLDA: 282 MM HG (ref 80–105)
PO2 BLDA: 396 MM HG (ref 80–105)
PO2 BLDA: 431 MM HG (ref 80–105)
PO2 BLDV: 66 MM HG (ref 25–47)
POTASSIUM BLD-SCNC: 3.6 MMOL/L (ref 3.4–5.3)
POTASSIUM BLD-SCNC: 4.2 MMOL/L (ref 3.4–5.3)
POTASSIUM BLD-SCNC: 4.5 MMOL/L (ref 3.4–5.3)
POTASSIUM BLD-SCNC: 5.1 MMOL/L (ref 3.4–5.3)
POTASSIUM BLD-SCNC: 5.1 MMOL/L (ref 3.4–5.3)
POTASSIUM SERPL-SCNC: 4.4 MMOL/L (ref 3.4–5.3)
PROT SERPL-MCNC: 5.4 G/DL (ref 6.4–8.3)
RBC # BLD AUTO: 3.41 10E6/UL (ref 4.4–5.9)
RBC # BLD AUTO: 3.41 10E6/UL (ref 4.4–5.9)
SAO2 % BLDA: 100 % (ref 95–96)
SAO2 % BLDA: 97 % (ref 92–100)
SAO2 % BLDA: 99 % (ref 92–100)
SAO2 % BLDA: 99 % (ref 92–100)
SAO2 % BLDV: 89 % (ref 70–75)
SODIUM BLD-SCNC: 136 MMOL/L (ref 135–145)
SODIUM BLD-SCNC: 136 MMOL/L (ref 135–145)
SODIUM BLD-SCNC: 138 MMOL/L (ref 135–145)
SODIUM BLD-SCNC: 140 MMOL/L (ref 135–145)
SODIUM BLD-SCNC: 140 MMOL/L (ref 135–145)
SODIUM SERPL-SCNC: 140 MMOL/L (ref 135–145)
UNIT ABO/RH: NORMAL
UNIT ABO/RH: NORMAL
UNIT NUMBER: NORMAL
UNIT NUMBER: NORMAL
UNIT STATUS: NORMAL
UNIT STATUS: NORMAL
UNIT TYPE ISBT: 5100
UNIT TYPE ISBT: 5100
WBC # BLD AUTO: 17.4 10E3/UL (ref 4–11)
WBC # BLD AUTO: 17.7 10E3/UL (ref 4–11)

## 2024-08-07 PROCEDURE — 85027 COMPLETE CBC AUTOMATED: CPT | Performed by: THORACIC SURGERY (CARDIOTHORACIC VASCULAR SURGERY)

## 2024-08-07 PROCEDURE — 83605 ASSAY OF LACTIC ACID: CPT | Performed by: PHYSICIAN ASSISTANT

## 2024-08-07 PROCEDURE — 250N000013 HC RX MED GY IP 250 OP 250 PS 637: Performed by: PHYSICIAN ASSISTANT

## 2024-08-07 PROCEDURE — 85384 FIBRINOGEN ACTIVITY: CPT | Performed by: THORACIC SURGERY (CARDIOTHORACIC VASCULAR SURGERY)

## 2024-08-07 PROCEDURE — 99291 CRITICAL CARE FIRST HOUR: CPT | Performed by: NURSE PRACTITIONER

## 2024-08-07 PROCEDURE — 360N000079 HC SURGERY LEVEL 6, PER MIN: Performed by: THORACIC SURGERY (CARDIOTHORACIC VASCULAR SURGERY)

## 2024-08-07 PROCEDURE — 02100Z9 BYPASS CORONARY ARTERY, ONE ARTERY FROM LEFT INTERNAL MAMMARY, OPEN APPROACH: ICD-10-PCS | Performed by: THORACIC SURGERY (CARDIOTHORACIC VASCULAR SURGERY)

## 2024-08-07 PROCEDURE — 258N000003 HC RX IP 258 OP 636: Performed by: ANESTHESIOLOGY

## 2024-08-07 PROCEDURE — 94002 VENT MGMT INPAT INIT DAY: CPT

## 2024-08-07 PROCEDURE — 33533 CABG ARTERIAL SINGLE: CPT | Performed by: THORACIC SURGERY (CARDIOTHORACIC VASCULAR SURGERY)

## 2024-08-07 PROCEDURE — 272N000001 HC OR GENERAL SUPPLY STERILE: Performed by: THORACIC SURGERY (CARDIOTHORACIC VASCULAR SURGERY)

## 2024-08-07 PROCEDURE — 82330 ASSAY OF CALCIUM: CPT

## 2024-08-07 PROCEDURE — 250N000013 HC RX MED GY IP 250 OP 250 PS 637: Performed by: THORACIC SURGERY (CARDIOTHORACIC VASCULAR SURGERY)

## 2024-08-07 PROCEDURE — 250N000011 HC RX IP 250 OP 636: Performed by: THORACIC SURGERY (CARDIOTHORACIC VASCULAR SURGERY)

## 2024-08-07 PROCEDURE — 250N000011 HC RX IP 250 OP 636: Performed by: ANESTHESIOLOGY

## 2024-08-07 PROCEDURE — 33533 CABG ARTERIAL SINGLE: CPT | Performed by: NURSE ANESTHETIST, CERTIFIED REGISTERED

## 2024-08-07 PROCEDURE — C1760 CLOSURE DEV, VASC: HCPCS | Performed by: THORACIC SURGERY (CARDIOTHORACIC VASCULAR SURGERY)

## 2024-08-07 PROCEDURE — 86923 COMPATIBILITY TEST ELECTRIC: CPT | Performed by: THORACIC SURGERY (CARDIOTHORACIC VASCULAR SURGERY)

## 2024-08-07 PROCEDURE — 82810 BLOOD GASES O2 SAT ONLY: CPT

## 2024-08-07 PROCEDURE — 999N000157 HC STATISTIC RCP TIME EA 10 MIN

## 2024-08-07 PROCEDURE — 33533 CABG ARTERIAL SINGLE: CPT | Performed by: ANESTHESIOLOGY

## 2024-08-07 PROCEDURE — 999N000253 HC STATISTIC WEANING TRIALS

## 2024-08-07 PROCEDURE — C1898 LEAD, PMKR, OTHER THAN TRANS: HCPCS | Performed by: THORACIC SURGERY (CARDIOTHORACIC VASCULAR SURGERY)

## 2024-08-07 PROCEDURE — 250N000009 HC RX 250: Performed by: THORACIC SURGERY (CARDIOTHORACIC VASCULAR SURGERY)

## 2024-08-07 PROCEDURE — 200N000001 HC R&B ICU

## 2024-08-07 PROCEDURE — 410N000003 HC PER-PERFUSION 1ST 30 MIN: Performed by: THORACIC SURGERY (CARDIOTHORACIC VASCULAR SURGERY)

## 2024-08-07 PROCEDURE — 258N000003 HC RX IP 258 OP 636: Performed by: THORACIC SURGERY (CARDIOTHORACIC VASCULAR SURGERY)

## 2024-08-07 PROCEDURE — 999N000065 XR CHEST PORT 1 VIEW

## 2024-08-07 PROCEDURE — 83735 ASSAY OF MAGNESIUM: CPT | Performed by: PHYSICIAN ASSISTANT

## 2024-08-07 PROCEDURE — 85610 PROTHROMBIN TIME: CPT | Performed by: THORACIC SURGERY (CARDIOTHORACIC VASCULAR SURGERY)

## 2024-08-07 PROCEDURE — 33508 ENDOSCOPIC VEIN HARVEST: CPT | Mod: 59 | Performed by: THORACIC SURGERY (CARDIOTHORACIC VASCULAR SURGERY)

## 2024-08-07 PROCEDURE — 250N000025 HC SEVOFLURANE, PER MIN: Performed by: THORACIC SURGERY (CARDIOTHORACIC VASCULAR SURGERY)

## 2024-08-07 PROCEDURE — 85610 PROTHROMBIN TIME: CPT | Performed by: PHYSICIAN ASSISTANT

## 2024-08-07 PROCEDURE — 250N000013 HC RX MED GY IP 250 OP 250 PS 637: Performed by: ANESTHESIOLOGY

## 2024-08-07 PROCEDURE — 250N000013 HC RX MED GY IP 250 OP 250 PS 637: Performed by: NURSE PRACTITIONER

## 2024-08-07 PROCEDURE — 84100 ASSAY OF PHOSPHORUS: CPT | Performed by: PHYSICIAN ASSISTANT

## 2024-08-07 PROCEDURE — 4A133B3 MONITORING OF ARTERIAL PRESSURE, PULMONARY, PERCUTANEOUS APPROACH: ICD-10-PCS | Performed by: THORACIC SURGERY (CARDIOTHORACIC VASCULAR SURGERY)

## 2024-08-07 PROCEDURE — 250N000011 HC RX IP 250 OP 636: Performed by: PHYSICIAN ASSISTANT

## 2024-08-07 PROCEDURE — 250N000012 HC RX MED GY IP 250 OP 636 PS 637: Performed by: THORACIC SURGERY (CARDIOTHORACIC VASCULAR SURGERY)

## 2024-08-07 PROCEDURE — 021109W BYPASS CORONARY ARTERY, TWO ARTERIES FROM AORTA WITH AUTOLOGOUS VENOUS TISSUE, OPEN APPROACH: ICD-10-PCS | Performed by: THORACIC SURGERY (CARDIOTHORACIC VASCULAR SURGERY)

## 2024-08-07 PROCEDURE — 82803 BLOOD GASES ANY COMBINATION: CPT

## 2024-08-07 PROCEDURE — 99100 ANES PT EXTEME AGE<1 YR&>70: CPT | Performed by: NURSE ANESTHETIST, CERTIFIED REGISTERED

## 2024-08-07 PROCEDURE — 85730 THROMBOPLASTIN TIME PARTIAL: CPT | Performed by: PHYSICIAN ASSISTANT

## 2024-08-07 PROCEDURE — 06BQ4ZZ EXCISION OF LEFT SAPHENOUS VEIN, PERCUTANEOUS ENDOSCOPIC APPROACH: ICD-10-PCS | Performed by: THORACIC SURGERY (CARDIOTHORACIC VASCULAR SURGERY)

## 2024-08-07 PROCEDURE — 999N000141 HC STATISTIC PRE-PROCEDURE NURSING ASSESSMENT: Performed by: THORACIC SURGERY (CARDIOTHORACIC VASCULAR SURGERY)

## 2024-08-07 PROCEDURE — 999N000156 HC STATISTIC RCP CONSULT EA 30 MIN

## 2024-08-07 PROCEDURE — 370N000017 HC ANESTHESIA TECHNICAL FEE, PER MIN: Performed by: THORACIC SURGERY (CARDIOTHORACIC VASCULAR SURGERY)

## 2024-08-07 PROCEDURE — 272N000004 HC RX 272: Performed by: THORACIC SURGERY (CARDIOTHORACIC VASCULAR SURGERY)

## 2024-08-07 PROCEDURE — 250N000009 HC RX 250: Performed by: ANESTHESIOLOGY

## 2024-08-07 PROCEDURE — 85730 THROMBOPLASTIN TIME PARTIAL: CPT | Performed by: THORACIC SURGERY (CARDIOTHORACIC VASCULAR SURGERY)

## 2024-08-07 PROCEDURE — 4A1239Z MONITORING OF CARDIAC OUTPUT, PERCUTANEOUS APPROACH: ICD-10-PCS | Performed by: THORACIC SURGERY (CARDIOTHORACIC VASCULAR SURGERY)

## 2024-08-07 PROCEDURE — 85041 AUTOMATED RBC COUNT: CPT | Performed by: PHYSICIAN ASSISTANT

## 2024-08-07 PROCEDURE — 82330 ASSAY OF CALCIUM: CPT | Performed by: PHYSICIAN ASSISTANT

## 2024-08-07 PROCEDURE — 5A1221Z PERFORMANCE OF CARDIAC OUTPUT, CONTINUOUS: ICD-10-PCS | Performed by: THORACIC SURGERY (CARDIOTHORACIC VASCULAR SURGERY)

## 2024-08-07 PROCEDURE — P9016 RBC LEUKOCYTES REDUCED: HCPCS | Performed by: THORACIC SURGERY (CARDIOTHORACIC VASCULAR SURGERY)

## 2024-08-07 PROCEDURE — 33518 CABG ARTERY-VEIN TWO: CPT | Performed by: THORACIC SURGERY (CARDIOTHORACIC VASCULAR SURGERY)

## 2024-08-07 PROCEDURE — 82805 BLOOD GASES W/O2 SATURATION: CPT | Performed by: PHYSICIAN ASSISTANT

## 2024-08-07 PROCEDURE — 02HQ32Z INSERTION OF MONITORING DEVICE INTO RIGHT PULMONARY ARTERY, PERCUTANEOUS APPROACH: ICD-10-PCS | Performed by: THORACIC SURGERY (CARDIOTHORACIC VASCULAR SURGERY)

## 2024-08-07 PROCEDURE — P9045 ALBUMIN (HUMAN), 5%, 250 ML: HCPCS | Performed by: THORACIC SURGERY (CARDIOTHORACIC VASCULAR SURGERY)

## 2024-08-07 PROCEDURE — 84450 TRANSFERASE (AST) (SGOT): CPT | Performed by: PHYSICIAN ASSISTANT

## 2024-08-07 PROCEDURE — 410N000004: Performed by: THORACIC SURGERY (CARDIOTHORACIC VASCULAR SURGERY)

## 2024-08-07 PROCEDURE — P9045 ALBUMIN (HUMAN), 5%, 250 ML: HCPCS | Performed by: ANESTHESIOLOGY

## 2024-08-07 PROCEDURE — 84155 ASSAY OF PROTEIN SERUM: CPT | Performed by: PHYSICIAN ASSISTANT

## 2024-08-07 RX ORDER — PHENYLEPHRINE HCL IN 0.9% NACL 50MG/250ML
.1-6 PLASTIC BAG, INJECTION (ML) INTRAVENOUS CONTINUOUS
Status: DISCONTINUED | OUTPATIENT
Start: 2024-08-07 | End: 2024-08-07

## 2024-08-07 RX ORDER — OXYCODONE HYDROCHLORIDE 5 MG/1
5 TABLET ORAL EVERY 4 HOURS PRN
Status: DISCONTINUED | OUTPATIENT
Start: 2024-08-07 | End: 2024-08-13 | Stop reason: HOSPADM

## 2024-08-07 RX ORDER — SODIUM CHLORIDE, SODIUM GLUCONATE, SODIUM ACETATE, POTASSIUM CHLORIDE AND MAGNESIUM CHLORIDE 526; 502; 368; 37; 30 MG/100ML; MG/100ML; MG/100ML; MG/100ML; MG/100ML
1000 INJECTION, SOLUTION INTRAVENOUS
Status: DISCONTINUED | OUTPATIENT
Start: 2024-08-07 | End: 2024-08-07

## 2024-08-07 RX ORDER — ASPIRIN 300 MG/1
300 SUPPOSITORY RECTAL DAILY
Status: DISCONTINUED | OUTPATIENT
Start: 2024-08-08 | End: 2024-08-10

## 2024-08-07 RX ORDER — ACETAMINOPHEN 325 MG/1
975 TABLET ORAL ONCE
Status: COMPLETED | OUTPATIENT
Start: 2024-08-07 | End: 2024-08-07

## 2024-08-07 RX ORDER — CEFAZOLIN SODIUM/WATER 2 G/20 ML
2 SYRINGE (ML) INTRAVENOUS SEE ADMIN INSTRUCTIONS
Status: DISCONTINUED | OUTPATIENT
Start: 2024-08-07 | End: 2024-08-07

## 2024-08-07 RX ORDER — HYDRALAZINE HYDROCHLORIDE 20 MG/ML
10 INJECTION INTRAMUSCULAR; INTRAVENOUS EVERY 30 MIN PRN
Status: DISCONTINUED | OUTPATIENT
Start: 2024-08-07 | End: 2024-08-13 | Stop reason: HOSPADM

## 2024-08-07 RX ORDER — ASPIRIN 81 MG/1
81 TABLET, CHEWABLE ORAL
Status: COMPLETED | OUTPATIENT
Start: 2024-08-07 | End: 2024-08-07

## 2024-08-07 RX ORDER — AMOXICILLIN 250 MG
1 CAPSULE ORAL 2 TIMES DAILY
Status: DISCONTINUED | OUTPATIENT
Start: 2024-08-07 | End: 2024-08-13 | Stop reason: HOSPADM

## 2024-08-07 RX ORDER — CEFAZOLIN SODIUM 2 G/100ML
2 INJECTION, SOLUTION INTRAVENOUS EVERY 8 HOURS
Status: COMPLETED | OUTPATIENT
Start: 2024-08-07 | End: 2024-08-08

## 2024-08-07 RX ORDER — SODIUM CHLORIDE, SODIUM LACTATE, POTASSIUM CHLORIDE, CALCIUM CHLORIDE 600; 310; 30; 20 MG/100ML; MG/100ML; MG/100ML; MG/100ML
INJECTION, SOLUTION INTRAVENOUS CONTINUOUS
Status: DISCONTINUED | OUTPATIENT
Start: 2024-08-07 | End: 2024-08-07

## 2024-08-07 RX ORDER — PROCHLORPERAZINE MALEATE 5 MG
5 TABLET ORAL EVERY 6 HOURS PRN
Status: DISCONTINUED | OUTPATIENT
Start: 2024-08-07 | End: 2024-08-13 | Stop reason: HOSPADM

## 2024-08-07 RX ORDER — NALOXONE HYDROCHLORIDE 0.4 MG/ML
0.4 INJECTION, SOLUTION INTRAMUSCULAR; INTRAVENOUS; SUBCUTANEOUS
Status: DISCONTINUED | OUTPATIENT
Start: 2024-08-07 | End: 2024-08-13 | Stop reason: HOSPADM

## 2024-08-07 RX ORDER — KETAMINE HYDROCHLORIDE 10 MG/ML
INJECTION INTRAMUSCULAR; INTRAVENOUS PRN
Status: DISCONTINUED | OUTPATIENT
Start: 2024-08-07 | End: 2024-08-07

## 2024-08-07 RX ORDER — CALCIUM GLUCONATE 20 MG/ML
1 INJECTION, SOLUTION INTRAVENOUS
Status: DISCONTINUED | OUTPATIENT
Start: 2024-08-07 | End: 2024-08-13 | Stop reason: HOSPADM

## 2024-08-07 RX ORDER — ACETAMINOPHEN 325 MG/1
650 TABLET ORAL EVERY 4 HOURS PRN
Status: DISCONTINUED | OUTPATIENT
Start: 2024-08-10 | End: 2024-08-13 | Stop reason: HOSPADM

## 2024-08-07 RX ORDER — LIDOCAINE 4 G/G
1-2 PATCH TOPICAL EVERY 24 HOURS
Status: DISCONTINUED | OUTPATIENT
Start: 2024-08-07 | End: 2024-08-13 | Stop reason: HOSPADM

## 2024-08-07 RX ORDER — ATROPINE SULFATE 0.4 MG/ML
AMPUL (ML) INJECTION PRN
Status: DISCONTINUED | OUTPATIENT
Start: 2024-08-07 | End: 2024-08-07

## 2024-08-07 RX ORDER — NITROGLYCERIN 10 MG/100ML
INJECTION INTRAVENOUS PRN
Status: DISCONTINUED | OUTPATIENT
Start: 2024-08-07 | End: 2024-08-07

## 2024-08-07 RX ORDER — HYDROMORPHONE HCL IN WATER/PF 6 MG/30 ML
0.2 PATIENT CONTROLLED ANALGESIA SYRINGE INTRAVENOUS
Status: DISCONTINUED | OUTPATIENT
Start: 2024-08-07 | End: 2024-08-09

## 2024-08-07 RX ORDER — ATORVASTATIN CALCIUM 40 MG/1
80 TABLET, FILM COATED ORAL DAILY
Status: DISCONTINUED | OUTPATIENT
Start: 2024-08-07 | End: 2024-08-13 | Stop reason: HOSPADM

## 2024-08-07 RX ORDER — PROPOFOL 10 MG/ML
INJECTION, EMULSION INTRAVENOUS PRN
Status: DISCONTINUED | OUTPATIENT
Start: 2024-08-07 | End: 2024-08-07

## 2024-08-07 RX ORDER — PROTAMINE SULFATE 10 MG/ML
INJECTION, SOLUTION INTRAVENOUS PRN
Status: DISCONTINUED | OUTPATIENT
Start: 2024-08-07 | End: 2024-08-07

## 2024-08-07 RX ORDER — LIDOCAINE HYDROCHLORIDE 20 MG/ML
INJECTION, SOLUTION INFILTRATION; PERINEURAL PRN
Status: DISCONTINUED | OUTPATIENT
Start: 2024-08-07 | End: 2024-08-07

## 2024-08-07 RX ORDER — HYDROMORPHONE HCL IN WATER/PF 6 MG/30 ML
0.4 PATIENT CONTROLLED ANALGESIA SYRINGE INTRAVENOUS
Status: DISCONTINUED | OUTPATIENT
Start: 2024-08-07 | End: 2024-08-09

## 2024-08-07 RX ORDER — NALOXONE HYDROCHLORIDE 0.4 MG/ML
0.2 INJECTION, SOLUTION INTRAMUSCULAR; INTRAVENOUS; SUBCUTANEOUS
Status: DISCONTINUED | OUTPATIENT
Start: 2024-08-07 | End: 2024-08-13 | Stop reason: HOSPADM

## 2024-08-07 RX ORDER — BISACODYL 10 MG
10 SUPPOSITORY, RECTAL RECTAL DAILY PRN
Status: DISCONTINUED | OUTPATIENT
Start: 2024-08-07 | End: 2024-08-13 | Stop reason: HOSPADM

## 2024-08-07 RX ORDER — SODIUM CHLORIDE 9 MG/ML
INJECTION, SOLUTION INTRAVENOUS CONTINUOUS PRN
Status: DISCONTINUED | OUTPATIENT
Start: 2024-08-07 | End: 2024-08-07

## 2024-08-07 RX ORDER — ASPIRIN 81 MG/1
162 TABLET, CHEWABLE ORAL
Status: COMPLETED | OUTPATIENT
Start: 2024-08-07 | End: 2024-08-07

## 2024-08-07 RX ORDER — DEXMEDETOMIDINE HYDROCHLORIDE 4 UG/ML
.1-1.2 INJECTION, SOLUTION INTRAVENOUS CONTINUOUS
Status: DISCONTINUED | OUTPATIENT
Start: 2024-08-07 | End: 2024-08-09

## 2024-08-07 RX ORDER — NALOXONE HYDROCHLORIDE 0.4 MG/ML
0.2 INJECTION, SOLUTION INTRAMUSCULAR; INTRAVENOUS; SUBCUTANEOUS
Status: DISCONTINUED | OUTPATIENT
Start: 2024-08-07 | End: 2024-08-07

## 2024-08-07 RX ORDER — LIDOCAINE 40 MG/G
CREAM TOPICAL
Status: DISCONTINUED | OUTPATIENT
Start: 2024-08-07 | End: 2024-08-07

## 2024-08-07 RX ORDER — OXYCODONE HYDROCHLORIDE 5 MG/1
10 TABLET ORAL EVERY 4 HOURS PRN
Status: DISCONTINUED | OUTPATIENT
Start: 2024-08-07 | End: 2024-08-13 | Stop reason: HOSPADM

## 2024-08-07 RX ORDER — VANCOMYCIN HYDROCHLORIDE 1 G/20ML
INJECTION, POWDER, LYOPHILIZED, FOR SOLUTION INTRAVENOUS PRN
Status: DISCONTINUED | OUTPATIENT
Start: 2024-08-07 | End: 2024-08-07 | Stop reason: HOSPADM

## 2024-08-07 RX ORDER — ACETAMINOPHEN 325 MG/1
975 TABLET ORAL EVERY 8 HOURS
Status: COMPLETED | OUTPATIENT
Start: 2024-08-07 | End: 2024-08-10

## 2024-08-07 RX ORDER — MAGNESIUM SULFATE 4 G/50ML
4 INJECTION INTRAVENOUS ONCE
Status: DISCONTINUED | OUTPATIENT
Start: 2024-08-07 | End: 2024-08-07

## 2024-08-07 RX ORDER — HEPARIN SODIUM 1000 [USP'U]/ML
INJECTION, SOLUTION INTRAVENOUS; SUBCUTANEOUS PRN
Status: DISCONTINUED | OUTPATIENT
Start: 2024-08-07 | End: 2024-08-07

## 2024-08-07 RX ORDER — ASPIRIN 300 MG/1
300 SUPPOSITORY RECTAL ONCE
Status: COMPLETED | OUTPATIENT
Start: 2024-08-07 | End: 2024-08-07

## 2024-08-07 RX ORDER — METHADONE HYDROCHLORIDE 10 MG/ML
10 INJECTION, SOLUTION INTRAMUSCULAR; INTRAVENOUS; SUBCUTANEOUS ONCE
Status: DISCONTINUED | OUTPATIENT
Start: 2024-08-07 | End: 2024-08-07

## 2024-08-07 RX ORDER — METHADONE HYDROCHLORIDE 10 MG/ML
INJECTION, SOLUTION INTRAMUSCULAR; INTRAVENOUS; SUBCUTANEOUS
Status: DISCONTINUED
Start: 2024-08-07 | End: 2024-08-07 | Stop reason: HOSPADM

## 2024-08-07 RX ORDER — MAGNESIUM SULFATE 4 G/50ML
4 INJECTION INTRAVENOUS ONCE
Status: COMPLETED | OUTPATIENT
Start: 2024-08-07 | End: 2024-08-07

## 2024-08-07 RX ORDER — CEFAZOLIN SODIUM/WATER 2 G/20 ML
2 SYRINGE (ML) INTRAVENOUS
Status: COMPLETED | OUTPATIENT
Start: 2024-08-07 | End: 2024-08-07

## 2024-08-07 RX ORDER — MAGNESIUM HYDROXIDE 1200 MG/15ML
LIQUID ORAL PRN
Status: DISCONTINUED | OUTPATIENT
Start: 2024-08-07 | End: 2024-08-07 | Stop reason: HOSPADM

## 2024-08-07 RX ORDER — ASPIRIN 81 MG/1
162 TABLET, CHEWABLE ORAL ONCE
Status: COMPLETED | OUTPATIENT
Start: 2024-08-07 | End: 2024-08-07

## 2024-08-07 RX ORDER — ONDANSETRON 4 MG/1
4 TABLET, ORALLY DISINTEGRATING ORAL EVERY 6 HOURS PRN
Status: DISCONTINUED | OUTPATIENT
Start: 2024-08-07 | End: 2024-08-13 | Stop reason: HOSPADM

## 2024-08-07 RX ORDER — FENTANYL CITRATE 50 UG/ML
INJECTION, SOLUTION INTRAMUSCULAR; INTRAVENOUS PRN
Status: DISCONTINUED | OUTPATIENT
Start: 2024-08-07 | End: 2024-08-07

## 2024-08-07 RX ORDER — POLYETHYLENE GLYCOL 3350 17 G/17G
17 POWDER, FOR SOLUTION ORAL DAILY
Status: DISCONTINUED | OUTPATIENT
Start: 2024-08-08 | End: 2024-08-13 | Stop reason: HOSPADM

## 2024-08-07 RX ORDER — CHLORHEXIDINE GLUCONATE ORAL RINSE 1.2 MG/ML
10 SOLUTION DENTAL ONCE
Status: COMPLETED | OUTPATIENT
Start: 2024-08-07 | End: 2024-08-07

## 2024-08-07 RX ORDER — NICOTINE POLACRILEX 4 MG
15-30 LOZENGE BUCCAL
Status: DISCONTINUED | OUTPATIENT
Start: 2024-08-07 | End: 2024-08-08

## 2024-08-07 RX ORDER — DEXMEDETOMIDINE HYDROCHLORIDE 4 UG/ML
.2-1.2 INJECTION, SOLUTION INTRAVENOUS CONTINUOUS
Status: DISCONTINUED | OUTPATIENT
Start: 2024-08-07 | End: 2024-08-07

## 2024-08-07 RX ORDER — NALOXONE HYDROCHLORIDE 0.4 MG/ML
0.4 INJECTION, SOLUTION INTRAMUSCULAR; INTRAVENOUS; SUBCUTANEOUS
Status: DISCONTINUED | OUTPATIENT
Start: 2024-08-07 | End: 2024-08-07

## 2024-08-07 RX ORDER — PANTOPRAZOLE SODIUM 40 MG/1
40 TABLET, DELAYED RELEASE ORAL DAILY
Status: DISCONTINUED | OUTPATIENT
Start: 2024-08-07 | End: 2024-08-13 | Stop reason: HOSPADM

## 2024-08-07 RX ORDER — HEPARIN SODIUM 5000 [USP'U]/.5ML
5000 INJECTION, SOLUTION INTRAVENOUS; SUBCUTANEOUS EVERY 8 HOURS
Status: DISCONTINUED | OUTPATIENT
Start: 2024-08-08 | End: 2024-08-10 | Stop reason: ALTCHOICE

## 2024-08-07 RX ORDER — ONDANSETRON 2 MG/ML
4 INJECTION INTRAMUSCULAR; INTRAVENOUS EVERY 6 HOURS PRN
Status: DISCONTINUED | OUTPATIENT
Start: 2024-08-07 | End: 2024-08-13 | Stop reason: HOSPADM

## 2024-08-07 RX ORDER — METHADONE HYDROCHLORIDE 10 MG/ML
20 INJECTION, SOLUTION INTRAMUSCULAR; INTRAVENOUS; SUBCUTANEOUS ONCE
Status: DISCONTINUED | OUTPATIENT
Start: 2024-08-07 | End: 2024-08-07

## 2024-08-07 RX ORDER — CALCIUM GLUCONATE 20 MG/ML
2 INJECTION, SOLUTION INTRAVENOUS
Status: DISCONTINUED | OUTPATIENT
Start: 2024-08-07 | End: 2024-08-13 | Stop reason: HOSPADM

## 2024-08-07 RX ORDER — CHLORHEXIDINE GLUCONATE ORAL RINSE 1.2 MG/ML
15 SOLUTION DENTAL EVERY 12 HOURS
Status: DISCONTINUED | OUTPATIENT
Start: 2024-08-07 | End: 2024-08-08

## 2024-08-07 RX ORDER — ACETAMINOPHEN 325 MG/1
975 TABLET ORAL ONCE
Status: DISCONTINUED | OUTPATIENT
Start: 2024-08-07 | End: 2024-08-07

## 2024-08-07 RX ORDER — ASPIRIN 81 MG/1
162 TABLET, CHEWABLE ORAL DAILY
Status: DISCONTINUED | OUTPATIENT
Start: 2024-08-08 | End: 2024-08-10

## 2024-08-07 RX ORDER — ONDANSETRON 2 MG/ML
INJECTION INTRAMUSCULAR; INTRAVENOUS PRN
Status: DISCONTINUED | OUTPATIENT
Start: 2024-08-07 | End: 2024-08-07

## 2024-08-07 RX ORDER — DEXTROSE MONOHYDRATE 25 G/50ML
25-50 INJECTION, SOLUTION INTRAVENOUS
Status: DISCONTINUED | OUTPATIENT
Start: 2024-08-07 | End: 2024-08-08

## 2024-08-07 RX ORDER — CEFAZOLIN SODIUM/WATER 2 G/20 ML
2 SYRINGE (ML) INTRAVENOUS EVERY 8 HOURS
Status: DISCONTINUED | OUTPATIENT
Start: 2024-08-07 | End: 2024-08-07

## 2024-08-07 RX ORDER — PHENYLEPHRINE HCL IN 0.9% NACL 50MG/250ML
.1-4 PLASTIC BAG, INJECTION (ML) INTRAVENOUS CONTINUOUS PRN
Status: DISCONTINUED | OUTPATIENT
Start: 2024-08-07 | End: 2024-08-09

## 2024-08-07 RX ADMIN — PHENYLEPHRINE HYDROCHLORIDE 100 MCG: 10 INJECTION INTRAVENOUS at 08:47

## 2024-08-07 RX ADMIN — ROCURONIUM BROMIDE 20 MG: 50 INJECTION, SOLUTION INTRAVENOUS at 07:37

## 2024-08-07 RX ADMIN — PROTAMINE SULFATE 230 MG: 10 INJECTION, SOLUTION INTRAVENOUS at 11:11

## 2024-08-07 RX ADMIN — DEXMEDETOMIDINE HYDROCHLORIDE 0.5 MCG/KG/HR: 400 INJECTION INTRAVENOUS at 07:56

## 2024-08-07 RX ADMIN — ALBUMIN HUMAN 12.5 G: 0.05 INJECTION, SOLUTION INTRAVENOUS at 13:40

## 2024-08-07 RX ADMIN — ACETAMINOPHEN 975 MG: 325 TABLET ORAL at 13:56

## 2024-08-07 RX ADMIN — MAGNESIUM SULFATE HEPTAHYDRATE 4 G: 80 INJECTION, SOLUTION INTRAVENOUS at 06:49

## 2024-08-07 RX ADMIN — KETAMINE HYDROCHLORIDE 30 MG: 10 INJECTION INTRAMUSCULAR; INTRAVENOUS at 07:32

## 2024-08-07 RX ADMIN — ROCURONIUM BROMIDE 50 MG: 50 INJECTION, SOLUTION INTRAVENOUS at 07:35

## 2024-08-07 RX ADMIN — PHENYLEPHRINE HYDROCHLORIDE 100 MCG: 10 INJECTION INTRAVENOUS at 11:51

## 2024-08-07 RX ADMIN — SODIUM CHLORIDE, POTASSIUM CHLORIDE, SODIUM LACTATE AND CALCIUM CHLORIDE: 600; 310; 30; 20 INJECTION, SOLUTION INTRAVENOUS at 09:15

## 2024-08-07 RX ADMIN — PROPOFOL 50 MG: 10 INJECTION, EMULSION INTRAVENOUS at 07:35

## 2024-08-07 RX ADMIN — PHENYLEPHRINE HYDROCHLORIDE 100 MCG: 10 INJECTION INTRAVENOUS at 09:20

## 2024-08-07 RX ADMIN — SUGAMMADEX 200 MG: 100 INJECTION, SOLUTION INTRAVENOUS at 12:25

## 2024-08-07 RX ADMIN — NITROGLYCERIN 20 MCG: 10 INJECTION INTRAVENOUS at 11:53

## 2024-08-07 RX ADMIN — SODIUM CHLORIDE 7.5 G: 900 INJECTION, SOLUTION INTRAVENOUS at 07:56

## 2024-08-07 RX ADMIN — PHENYLEPHRINE HYDROCHLORIDE 0.3 MCG/KG/MIN: 10 INJECTION INTRAVENOUS at 09:20

## 2024-08-07 RX ADMIN — KETAMINE HYDROCHLORIDE 10 MG: 10 INJECTION INTRAMUSCULAR; INTRAVENOUS at 07:20

## 2024-08-07 RX ADMIN — Medication 0.1 MCG/KG/MIN: at 12:12

## 2024-08-07 RX ADMIN — CHLORHEXIDINE GLUCONATE 15 ML: 1.2 RINSE ORAL at 19:41

## 2024-08-07 RX ADMIN — ACETAMINOPHEN 975 MG: 325 TABLET ORAL at 19:40

## 2024-08-07 RX ADMIN — ALBUMIN HUMAN 12.5 G: 0.05 INJECTION, SOLUTION INTRAVENOUS at 23:11

## 2024-08-07 RX ADMIN — NITROGLYCERIN 20 MCG: 10 INJECTION INTRAVENOUS at 11:12

## 2024-08-07 RX ADMIN — LIDOCAINE HYDROCHLORIDE 50 MG: 20 INJECTION, SOLUTION INFILTRATION; PERINEURAL at 07:32

## 2024-08-07 RX ADMIN — PHENYLEPHRINE HYDROCHLORIDE 100 MCG: 10 INJECTION INTRAVENOUS at 08:19

## 2024-08-07 RX ADMIN — CHLORHEXIDINE GLUCONATE 10 ML: 1.2 RINSE ORAL at 06:38

## 2024-08-07 RX ADMIN — PHENYLEPHRINE HYDROCHLORIDE 100 MCG: 10 INJECTION INTRAVENOUS at 09:37

## 2024-08-07 RX ADMIN — PHENYLEPHRINE HYDROCHLORIDE 100 MCG: 10 INJECTION INTRAVENOUS at 09:14

## 2024-08-07 RX ADMIN — ASPIRIN 81 MG CHEWABLE TABLET 162 MG: 81 TABLET CHEWABLE at 13:56

## 2024-08-07 RX ADMIN — FENTANYL CITRATE 50 MCG: 50 INJECTION INTRAMUSCULAR; INTRAVENOUS at 07:16

## 2024-08-07 RX ADMIN — INSULIN HUMAN 5 UNITS/HR: 1 INJECTION, SOLUTION INTRAVENOUS at 10:41

## 2024-08-07 RX ADMIN — ROCURONIUM BROMIDE 50 MG: 50 INJECTION, SOLUTION INTRAVENOUS at 10:55

## 2024-08-07 RX ADMIN — ALBUMIN HUMAN 12.5 G: 0.05 INJECTION, SOLUTION INTRAVENOUS at 13:12

## 2024-08-07 RX ADMIN — SENNOSIDES AND DOCUSATE SODIUM 1 TABLET: 8.6; 5 TABLET ORAL at 19:40

## 2024-08-07 RX ADMIN — CEFAZOLIN SODIUM 2 G: 2 INJECTION, SOLUTION INTRAVENOUS at 19:48

## 2024-08-07 RX ADMIN — SODIUM CHLORIDE: 9 INJECTION, SOLUTION INTRAVENOUS at 07:56

## 2024-08-07 RX ADMIN — HYDROMORPHONE HYDROCHLORIDE 0.5 MG: 1 INJECTION, SOLUTION INTRAMUSCULAR; INTRAVENOUS; SUBCUTANEOUS at 11:26

## 2024-08-07 RX ADMIN — ALBUMIN HUMAN: 0.05 INJECTION, SOLUTION INTRAVENOUS at 11:17

## 2024-08-07 RX ADMIN — ACETAMINOPHEN 975 MG: 325 TABLET ORAL at 06:36

## 2024-08-07 RX ADMIN — Medication 40 MG: at 16:32

## 2024-08-07 RX ADMIN — PHENYLEPHRINE HYDROCHLORIDE 100 MCG: 10 INJECTION INTRAVENOUS at 08:20

## 2024-08-07 RX ADMIN — ATORVASTATIN CALCIUM 80 MG: 40 TABLET, FILM COATED ORAL at 20:14

## 2024-08-07 RX ADMIN — NITROGLYCERIN 20 MCG: 10 INJECTION INTRAVENOUS at 11:24

## 2024-08-07 RX ADMIN — Medication 2 G: at 12:00

## 2024-08-07 RX ADMIN — AMINOCAPROIC ACID 1 G/HR: 250 INJECTION, SOLUTION INTRAVENOUS at 08:56

## 2024-08-07 RX ADMIN — LIDOCAINE 1 PATCH: 246 PATCH TOPICAL at 13:57

## 2024-08-07 RX ADMIN — PROPOFOL 100 MG: 10 INJECTION, EMULSION INTRAVENOUS at 07:32

## 2024-08-07 RX ADMIN — ATROPINE SULFATE 0.4 MG: 0.4 INJECTION INTRAMUSCULAR; INTRAVENOUS; SUBCUTANEOUS at 08:14

## 2024-08-07 RX ADMIN — SODIUM CHLORIDE, POTASSIUM CHLORIDE, SODIUM LACTATE AND CALCIUM CHLORIDE: 600; 310; 30; 20 INJECTION, SOLUTION INTRAVENOUS at 06:49

## 2024-08-07 RX ADMIN — Medication 2 G: at 08:00

## 2024-08-07 RX ADMIN — HEPARIN SODIUM 30000 UNITS: 1000 INJECTION INTRAVENOUS; SUBCUTANEOUS at 09:13

## 2024-08-07 RX ADMIN — KETAMINE HYDROCHLORIDE 10 MG: 10 INJECTION INTRAMUSCULAR; INTRAVENOUS at 07:18

## 2024-08-07 RX ADMIN — METOPROLOL TARTRATE 12.5 MG: 25 TABLET, FILM COATED ORAL at 06:36

## 2024-08-07 RX ADMIN — EPINEPHRINE 0.03 MCG/KG/MIN: 1 INJECTION INTRAMUSCULAR; INTRAVENOUS; SUBCUTANEOUS at 11:05

## 2024-08-07 RX ADMIN — ONDANSETRON 4 MG: 2 INJECTION INTRAMUSCULAR; INTRAVENOUS at 11:52

## 2024-08-07 RX ADMIN — NITROGLYCERIN 20 MCG: 10 INJECTION INTRAVENOUS at 11:14

## 2024-08-07 RX ADMIN — HYDROMORPHONE HYDROCHLORIDE 0.2 MG: 0.2 INJECTION, SOLUTION INTRAMUSCULAR; INTRAVENOUS; SUBCUTANEOUS at 15:11

## 2024-08-07 RX ADMIN — SODIUM CHLORIDE: 9 INJECTION, SOLUTION INTRAVENOUS at 07:57

## 2024-08-07 RX ADMIN — SODIUM CHLORIDE, POTASSIUM CHLORIDE, SODIUM LACTATE AND CALCIUM CHLORIDE: 600; 310; 30; 20 INJECTION, SOLUTION INTRAVENOUS at 07:12

## 2024-08-07 RX ADMIN — Medication 20 MG: at 08:05

## 2024-08-07 RX ADMIN — ALBUMIN HUMAN 12.5 G: 0.05 INJECTION, SOLUTION INTRAVENOUS at 13:58

## 2024-08-07 RX ADMIN — NITROGLYCERIN 20 MCG: 10 INJECTION INTRAVENOUS at 11:27

## 2024-08-07 RX ADMIN — DESMOPRESSIN ACETATE 26.84 MCG: 4 INJECTION, SOLUTION INTRAVENOUS; SUBCUTANEOUS at 11:26

## 2024-08-07 RX ADMIN — NITROGLYCERIN 20 MCG: 10 INJECTION INTRAVENOUS at 09:31

## 2024-08-07 RX ADMIN — NITROGLYCERIN 20 MCG: 10 INJECTION INTRAVENOUS at 09:33

## 2024-08-07 RX ADMIN — FENTANYL CITRATE 50 MCG: 50 INJECTION INTRAMUSCULAR; INTRAVENOUS at 07:19

## 2024-08-07 RX ADMIN — ROCURONIUM BROMIDE 30 MG: 50 INJECTION, SOLUTION INTRAVENOUS at 08:30

## 2024-08-07 RX ADMIN — HYDROMORPHONE HYDROCHLORIDE 0.5 MG: 1 INJECTION, SOLUTION INTRAMUSCULAR; INTRAVENOUS; SUBCUTANEOUS at 11:41

## 2024-08-07 ASSESSMENT — ACTIVITIES OF DAILY LIVING (ADL)
ADLS_ACUITY_SCORE: 25
ADLS_ACUITY_SCORE: 25
ADLS_ACUITY_SCORE: 31
ADLS_ACUITY_SCORE: 25
ADLS_ACUITY_SCORE: 31
ADLS_ACUITY_SCORE: 25
ADLS_ACUITY_SCORE: 31
ADLS_ACUITY_SCORE: 25
ADLS_ACUITY_SCORE: 31
ADLS_ACUITY_SCORE: 35
ADLS_ACUITY_SCORE: 25
ADLS_ACUITY_SCORE: 25

## 2024-08-07 NOTE — PHARMACY-ADMISSION MEDICATION HISTORY
Pharmacist Admission Medication History    Admission medication history is complete. The information provided in this note is only as accurate as the sources available at the time of the update.    Information Source(s): Patient via in-person    Pertinent Information: Patient took 2 aspirin tablets this morning PTA 8/7/24. No other home medications were taken today.     Changes made to PTA medication list:  Added: None  Deleted: None  Changed: None    Allergies reviewed with patient and updates made in EHR: yes    Medication History Completed By: AIRAM BONILLA RPH 8/7/2024 6:46 AM    PTA Med List   Medication Sig Last Dose    aspirin 81 MG EC tablet Take 81 mg by mouth every evening 8/7/2024 at 0400    atorvastatin (LIPITOR) 80 MG tablet Take 80 mg by mouth daily 8/6/2024 at HS    diltiazem ER (DILT-XR) 240 MG 24 hr ER beaded capsule Take 1 capsule by mouth daily 8/6/2024 at AM    fluticasone (FLONASE) 50 MCG/ACT nasal spray Spray 1 spray into each nostril every other night before bed. Uses on even days. 8/6/2024 at HS    losartan (COZAAR) 100 MG tablet Take 1 tablet by mouth every morning 8/6/2024 at AM    sildenafil (VIAGRA) 100 MG tablet Take 100 mg by mouth daily as needed Unknown at PRN

## 2024-08-07 NOTE — ANESTHESIA CARE TRANSFER NOTE
Patient: Enrike Sotomayor    Procedure: Procedure(s):  CORONARY ARTERY BYPASS GRAFT TIMES THREE, LEFT INTERNAL MAMMARY ARTERY HARVEST, LEFT LEG ENDOSCOPIC VESSEL PROCUREMENT  ANESTHESIA TRANSESOPHAGEAL ECHOCARDIOGRAM, EPI-AORTIC ULTRASOUND       Diagnosis: Dyspnea on exertion [R06.09]  Precordial pain [R07.2]  Abnormal computed tomography angiography of heart [R93.1]  Diagnosis Additional Information: No value filed.    Anesthesia Type:   General     Note:    Oropharynx: endotracheal tube in place and ventilatory support  Level of Consciousness: iatrogenic sedation    Level of Supplemental Oxygen (L/min / FiO2): 10  Independent Airway: airway patency not satisfactory and stable  Dentition: dentition unchanged  Vital Signs Stable: post-procedure vital signs reviewed and stable  Report to RN Given: handoff report given  Patient transferred to: ICU  Comments: Patient transferred to ICU with RN, Surgeons, line tech, Dr. Garibay and CRNA.  Upon arrival to ICU patient placed on ventilator by RT.  Report given to RN.  Vitals stable. Questions answered.  Muscle reversal given at bedside with 4/4 TOF after reversal.   ICU Handoff: Call for PAUSE to initiate/utilize ICU HANDOFF, Identified Patient, Identified Responsible Provider, Reviewed the Pertinent Medical History, Discussed Surgical Course, Reviewed Intra-OP Anesthesia Management and Issues during Anesthesia, Set Expectations for Post Procedure Period and Allowed Opportunity for Questions and Acknowledgement of Understanding  Vitals:  Vitals Value Taken Time   BP 98/63 08/07/24 1225   Temp     Pulse 89 08/07/24 1237   Resp 18 08/07/24 1225   SpO2 100 % 08/07/24 1237   Vitals shown include unfiled device data.    Electronically Signed By: STEPHON Schreiber CRNA  August 7, 2024  12:39 PM

## 2024-08-07 NOTE — BRIEF OP NOTE
[facility]    Brief Operative Note    Pre-operative diagnosis: Dyspnea on exertion [R06.09]  Precordial pain [R07.2]  Abnormal computed tomography angiography of heart [R93.1]  Triple vessel coronary artery disease  Post-operative diagnosis:  Same     Procedure: CORONARY ARTERY BYPASS GRAFT TIMES THREE, LEFT INTERNAL MAMMARY ARTERY HARVEST, LEFT LEG ENDOSCOPIC VESSEL PROCUREMENT, N/A - Chest  ANESTHESIA TRANSESOPHAGEAL ECHOCARDIOGRAM, EPI-AORTIC ULTRASOUND, N/A - Heart    Surgeon: Surgeons and Role:     * Ольга Aparicio MD - Primary     * Dylan Faith MD - Assisting     * Alyssa Morales PA-C - Assisting  Anesthesia: General   Estimated Blood Loss: 260 mL  Incisions:  Median sternotomy, skip incisions left lower extremity  Drains: Two 36 Fr mediastinal and one 24 Fr Sabas right and left pleural spaces  Specimens: None  Findings: Good targets and conduit.  Complications:  None.  Implants: None

## 2024-08-07 NOTE — PRE-PROCEDURE
Patient was seen and examined by me.  There has been no interval change in history or physical examination.  He understands that the risks for a coronary artery bypass grafting procedure include: bleeding, infection, stroke, sternal dehiscence, myocardial infarction, arrhythmias, the need for a pacemaker, prolonged ventilation, pneumonia, liver/renal failure, aortic dissection, and an operative mortality of 2 to 4 percent.  He accepts these risks and is ready to proceed this morning.  Informed consent has been obtained.

## 2024-08-07 NOTE — ANESTHESIA PROCEDURE NOTES
Arterial Line Procedure Note    Pre-Procedure   Staff -        Anesthesiologist:  Greer Garibay MD       Performed By: anesthesiologist       Location: pre-op       Pre-Anesthestic Checklist: patient identified, IV checked, risks and benefits discussed, informed consent, monitors and equipment checked, pre-op evaluation and at physician/surgeon's request  Timeout:       Correct Patient: Yes        Correct Procedure: Yes        Correct Site: Yes        Correct Position: Yes   Line Placement:   This line was placed Pre Induction starting at 8/7/2024 7:20 AM and ending at 8/7/2024 7:30 AM  Procedure   Procedure: arterial line       Laterality: right       Insertion Site: brachial.  Sterile Prep        Standard elements of sterile barrier followed       Skin prep: Chloraprep  Insertion/Injection        Technique: ultrasound guided and Seldinger Technique        1. Ultrasound was used to evaluate the access site.       2. Artery evaluated via ultrasound for patency/adequacy.       3. Using real-time ultrasound the needle/catheter was observed entering the artery/vein.       4. Permanent image was captured and entered into the patient's record.       Catheter Type/Size: 20 G, 12 cm  Narrative         Secured by: suture       Tegaderm and Biopatch dressing used.       Complications: None apparent,        Arterial waveform: Yes

## 2024-08-07 NOTE — PROGRESS NOTES
CT EXTUBATION FAST TRACK:    [facility] - ICU     FastTrack Candidate: Yes. Goal 1814     Patient Status: Still intubated at 1850. Pt tried wean starting at 1515. Each lasting 5 to 15 minutes. A total of 3 to 4 wean attempts up until 1800. APNEA each time he fell asleep. Next attempt approx 1900.

## 2024-08-07 NOTE — ANESTHESIA PROCEDURE NOTES
Perioperative ALEJO Procedure Note    Staff -        Anesthesiologist:  Greer Garibay MD       Performed By: anesthesiologist  Preanesthesia Checklist:  Patient identified, IV assessed, risks and benefits discussed, monitors and equipment assessed, procedure being performed at surgeon's request and anesthesia consent obtained.    ALEJO Probe Insertion    Probe Status PRE Insertion: NO obvious damage  Probe type:  Adult 3D  Bite block used:   Soft  Insertion Technique: Easy, no oropharyngeal manipulation  Insertion complications: None obvious  Billing Report:A ALEJO report is NOT being generated.  Probe Status POST Removal: NO obvious damage

## 2024-08-07 NOTE — PROGRESS NOTES
VASCULAR MEDICINE CONSULT NOTE          LOCATION:  [facility]         Date of Service: 7/31/2024      Primary Care Provider: Paul Anderson  Referring provider;  Justin Corona      Reason for the visit/chief complaint:   Carotid artery stenosis      HPI:  Enrike Sotomayor is a pleasant 83 year old male who presents to our Vascular Medicine clinic for the above mentioned reason.    Mr. Sotomayor is a retired  with past medical history significant for hypertension, dyslipidemia, carotid artery disease status post previous right endarterectomy elsewhere, CAD anticipating CABG next week and former smoker with approximately 20-pack-year quit 30 years ago.    He was referred to us from Dr. Corona in cardiology given prior history of right carotid endarterectomy and left carotid bruit heard on exam with ultrasound carotid ordered prior to the visit.  Patient denies stroke, TIA or amaurosis fugax.  Unsure of the date of his previous right carotid endarterectomy.  This seemed that it was done after amaurosis fugax episode?    Ultrasound carotid showed patent endarterectomy on the right with normal velocities.  On the left, there was moderate plaque at the bifurcation with a peak systolic velocity in the right ICA of 203 cm/s and ICA/CCA ratio of 2.0 consistent with 50 to 69% stenosis.      Cardiovascular risk factors:  Hypertension: On losartan 100 mg.  Smoking: Reports that he smoked for approximately 20 years, 1 PPD, quit 30 days ago.  Dyslipidemia: On atorvastatin that was recently increased from 40 mg to 80 mg by cardiology.  Was not on statin prior to that.  No history of diabetes.  History of CAD as above anticipating CABG next week.  Denies intermittent claudication.      REVIEW OF SYSTEMS:    A 12 point ROS was reviewed and is negative except what is mentioned in HPI.       Past medical history, surgical history, medications, family history, social history and allergies  were reviewed. Pertinent points mentioned under HPI.        OBJECTIVE:    Vital signs:  BP (!) 154/74   Pulse 59   Resp 16   SpO2 98%   Wt Readings from Last 1 Encounters:   08/07/24 198 lb (89.8 kg)     There is no height or weight on file to calculate BMI.    Physical exam:  General appearance: Pleasant male in no apparent distress.    HEENT: NC/AT.    Neck: Carotids +2/2 bilaterally with soft left carotid bruit no jugular venous distension.   Heart: RRR. Normal S1, S2.   Chest: Clear to auscultation bilaterally.  Abdomen: Soft, nontender, nondistended. No pulsatile mass.  No bruits.   Extremities: No swelling.  Palpable DP and PT 2/2 bilaterally.  Skin: Normal color and temperature.  Neurological: Alert, awake and oriented       DIAGNOSTIC STUDIES:   Labs and diagnostics reviewed including outside records. Pertinent points are mentioned under HPI and assessment and plan sections.        ASSESSMENT AND PLAN:    Asymptomatic moderate left ICA stenosis  History of right carotid endarterectomy after amaurosis fugax?  Unknown date  CAD plan for CABG next week  Remote history of smoking with approximately 20-pack-year quit 30 years ago  Hypertension    Mr. Sotomayor has evidence of moderate left ICA stenosis that is asymptomatic.  We spent time discussing carotid artery stenosis and the risk of stroke based on different categories of stenosis.  We discussed the importance of medical management at this stage and that would include antiplatelet therapy and statins.  We discussed despite his good cholesterol prior to starting statins recently, statins have also placed tropic effect and we would like to target an LDL less than 70.  Will plan on surveillance in 1 year.      Recommendations:  Ultrasound carotid in 1 year with follow-up in our clinic.  Continue aspirin 81 mg.  Continue high intensity statin.  Goal LDL less than 70.  Most recent LDL 99.  Atorvastatin was increased from 40 mg to 80 mg by cardiology.  Continue  losartan 100 mg.  Needs follow-up on blood pressure with goal less than 130/80.  Was slightly higher in the clinic today 154 systolic.  Counseled about stroke, TIA and amaurosis fugax symptoms that would warrant immediate evaluation, 911 or ER.    It was a pleasure meeting Mr. Sotomayor in our clinic today.    Zander Tyler MD  Vascular Medicine  July 31, 2024

## 2024-08-07 NOTE — PROGRESS NOTES
Patient arrived from OR at 1220.  Patient was post op CAB x3 intubated w/ Sz 7.5 ETT, secured 23@ teeth. Commercial ETT mart placed. Placed on following vent settings. BS equal bilaterally and ETCO2 placed.    Vent Mode: CMV/AC  FiO2 (%): 60 %  Resp Rate (Set): 18 breaths/min  Tidal Volume (Set, mL): 500 mL  PEEP (cm H2O): 5 cmH2O  Resp: 18    Report was received from surgical team.    Delmy Mederos, RT

## 2024-08-07 NOTE — OP NOTE
OPERATIVE REPORT     OPERATING ROOM:  Room 8    August 7, 2024    PROCEDURES PERFORMED:   Median sternotomy   Take down of the left internal mammary artery    Endoscopic greater saphenous vein procurement from the left lower extremity    Epiaortic ultrasound of the ascending aorta    Placement on central cardiopulmonary bypass    Triple vessel coronary artery bypass grafting;   -  Left internal mammary artery to the left anterior descending coronary artery  -  Separate reversed saphenous vein grafts to obtuse marginal and right posterior descending coronary arteries.    Placement of temporary atrial and ventricular pacing wires     SURGEONS:    Attending Surgeon:  Ольга Aparicio MD  Resident Surgeon:  Dylan Faith MD  Physician Assistant:  Alyssa Morales PA-C     ANESTHESIA:  General endotracheal    SKIN PREP:  Betadine and Duraprep    INCISION:  Median sternotomy, skip incisions left lower extremity      DRAINS: Two 36 Fr mediastinal and one 24 Fr Sabas drain right and left pleural spaces   CULTURES: None  SPECIMENS: None  CLOSURE: Routine     PREOPERATIVE DIAGNOSES:  Dyspnea on exertion  Precordial pain  Severe multivessel coronary artery disease  Charcot-Carla-Tooth disease     POSTOPERATIVE DIAGNOSES:  Same     BRIEF HISTORY:    Enrike Sotomayor is a 83 year old year old who presented with dyspnea on exertion and chest pain. He was found to have severe multivessel disease on coronary angiogram    The above procedures were planned.     FINDINGS AT OPERATION: His ascending aorta was normal in size and free of any plaque seen on epiaortic ultrasound.  His pulmonary artery pressures were normal.  His left internal mammary artery was a 2.5  mm in diameter conduit with excellent flow.  The reversed saphenous vein graft measured 3.5 to 4.5 mm in diameter and was of fair to good quality.  The right posterior descending coronary artery was a 1.5 mm in diameter target vessel, a good vessel for bypass grafting.  The medial  branch of the obtuse marginal was a 1.5 mm in diameter target vessel, a fair to good vessel for bypass grafting.  The left anterior descending coronary artery in the interventricular groove was a 2 mm in diameter target vessel, an excellent vessel for bypass grafting.  His overal heart function improved after coming off cardiopulmonary bypass.      PROCEDURES:  The patient arrived in the operating room and was positioned supine.  An arterial line was placed.  Satisfactory general endotracheal anesthesia was induced.  A transesophageal probe, Chanute-Meera catheter and Damon catheter were inserted.  The patient's neck, chest, abdomen, both groins and lower extremities were prepped and draped in a standard sterile fashion.      A complete median sternotomy was made.  With the aid of the Rultract retractor, the left internal mammary artery was taken down from the xiphoid process to the subclavian vein.   At the same time Alyssa Morales made an incision in the left upper leg and 5 cm of the greater saphenous vein was exposed. The endoscope was then passed proximally and distally and the greater saphenous vein was dissected out circumferentially.  Its branches were clipped or cauterized.  It was clipped proximally and distally and extracted. It was cannulated and distended.  Its branches were controlled with Ligaclips.  The leg wound was rendered hemostatic and closed in layers using running 2-0 and 3-0 Vicryl.  Dermabond was applied to the skin.     Heparin was administered.  The left internal mammary artery was prepared in the usual fashion.  A Lopez retractor was inserted.  The pericardium was opened and tented up on pericardial sutures.  The aorta was  from the pulmonary artery.  Epiaortic ultrasound of the ascending aorta was carried out.  Pursestring sutures were placed in the ascending aorta and right atrium for cannulation.  When the ACT was appropriate cannulation was performed and central cardiopulmonary  bypass was established.  The patient's temperature was kept warm.   The aorta was cross-clamped and 1200 mL of cold blood cardioplegia was administered antegrade.  A good arrest was achieved.  Cold topical saline and slush was applied to the heart intermittently during the period of aortic cross-clamp.      Attention was first turned to the right posterior descending coronary artery.  It was dissected out for a distance of 1 cm and then opened for a distance of 8 mm.  It was bypassed in an end to side fashion using reversed saphenous vein graft and running 7-0 Prolene.  The vein graft was flushed with cold blood cardioplegia and sized to the ascending aorta.  The patient received 400 mL of cold blood cardioplegia in an antegrade fashion. Next attention was turned to the obtuse marginal coronary artery.  It was dissected out for a distance of 1 cm and then opened for a distance of 8 mm.  It was bypassed in an end to side fashion using reversed saphenous vein graft and running 7-0 Prolene.  The vein graft was flushed with cold blood cardioplegia and sized to the ascending aorta and the patient received another 400 mL of cold blood cardioplegia in an antegrade fashion. Finally attention was turned to the left anterior descending coronary artery in the interventricular groove.  It was dissected out in its apical third for a distance of 1 cm and then opened for a distance of 8 mm.  A pleural rent was created for passage of the left internal mammary artery and then the final distal anastomosis was performed between the left internal mammary artery and the left anterior descending coronary artery in an end to side fashion using running 7-0 Prolene. As this last distal anastomosis was being performed gentle warming was begun.  The gray occluder was briefly released from the left internal mammary artery and good flow was seen down the left anterior descending coronary artery.  The gray occluder was once more applied to the  left internal mammary artery and the patient received a final dose of cold blood cardioplegia in an antegrade  fashion.  It had been decided to perform the 2 proximal anastomoses with the aortic cross-clamp in place.  Therefore 2, 4 mm punch aortotomies were created.  The proximal anastomoses were performed in an end to side fashion using running 6-0 Prolene.     The gray occluder was released from the left internal mammary artery and a hot shot was delivered in an antegrade fashion.  The aortic cross-clamp was released.  The aortic cross-clamp time was 1 hour and 15 minutes.  The proximal and distal anastomoses were examined and found to be hemostatic.  The patient was defibrillated twice.  Ventricular and atrial pacing wires were applied and the patient was A-V sequentially paced at a rate of 90. The left and right pleural spaces were drained of any accumulated blood and gentle ventilation resumed. The root vent was removed and that site repaired with plegetted 4-0 Prolene.      The patient was placed on low dose epinephrine and neosynephrine.  When he was warm the heart was allowed to fill and eject and the patient  from cardiopulmonary bypass without difficulty. Total time on cardiopulmonary bypass was 1 hour and 31 minutes. Protamine was administered to reverse the heparin.  The patient was decannulated and the cannulation sites were repaired with 4-0 Prolene.  Hemostasis was satisfactory.    The drains were placed and secured to the skin. The sternum was approximated with a combination of single and double stainless steel sternal wires.   The sternal wound was irrigated with warm antibiotic-containing solution.  It was closed in 4 layers using 0 Stratafix for 2 layers, 2-0 Stratafix for the next layer and a subcuticular stitch of 4-0 Monocryl.      The sponge, needle and instrument counts were reported as correct.      The estimated blood loss was 300 mL.      Enrike Sotomayor was brought to the  Intensive Care Unit in critical but stable condition.    Complications: None     Ольга Aparicio MD on 8/7/2024 at 12:10 PM

## 2024-08-07 NOTE — ANESTHESIA PROCEDURE NOTES
Central Line/PA Catheter Placement    Pre-Procedure   Staff -        Anesthesiologist:  Greer Garibay MD       Performed By: anesthesiologist       Location: OR       Pre-Anesthestic Checklist: patient identified, IV checked, site marked, risks and benefits discussed, informed consent, monitors and equipment checked, pre-op evaluation and at physician/surgeon's request  Timeout:       Correct Patient: Yes        Correct Procedure: Yes        Correct Site: Yes        Correct Position: Yes        Correct Laterality: Yes   Line Placement:   This line was placed Post Induction starting at 8/7/2024 7:43 AM and ending at 8/7/2024 7:56 AM    Procedure   Procedure: central line       Laterality: right       Insertion Site: internal jugular.       Patient Position: supine  Sterile Prep        All elements of maximal sterile barrier technique followed       Patient Prep/Sterile Barriers: draped, hand hygiene, gloves , hat , mask , draped, gown, sterile gel and probe cover       Skin prep: Chloraprep  Insertion/Injection        Technique: ultrasound guided and Seldinger Technique        1. Ultrasound was used to evaluate the access site.       2. Vein evaluated via ultrasound for patency/adequacy.       3. Using real-time ultrasound the needle/catheter was observed entering the artery/vein.       4. Permanent image was captured and entered into the patient's record.       Introducer Type: 9 Fr, 2-lumen MAC        Type: PA/CVC with Introducer       PA Catheter Type: CCO         Appropriate RV, RA and PA waveforms noted:  Yes            Balloon down at end of the procedure:   Narrative         Secured by: suture       Tegaderm and Biopatch dressing used.       Complications: None apparent,        blood aspirated from all lumens,        Verification method: Ultrasound

## 2024-08-07 NOTE — ANESTHESIA POSTPROCEDURE EVALUATION
Patient: Enrike Sotomayor    Procedure: Procedure(s):  CORONARY ARTERY BYPASS GRAFT TIMES THREE, LEFT INTERNAL MAMMARY ARTERY HARVEST, LEFT LEG ENDOSCOPIC VESSEL PROCUREMENT  ANESTHESIA TRANSESOPHAGEAL ECHOCARDIOGRAM, EPI-AORTIC ULTRASOUND       Anesthesia Type:  General    Note:  Disposition: ICU            ICU Sign Out: Anesthesiologist/ICU physician sign out WAS performed   Postop Pain Control:    PONV:    Neuro/Psych:    Airway/Respiratory:             Sign Out: AIRWAY IN SITU/Resp. Support   CV/Hemodynamics: Uneventful            Sign Out: Acceptable CV status; No obvious hypovolemia; No obvious fluid overload   Other NRE: NONE   DID A NON-ROUTINE EVENT OCCUR? No           Last vitals:  Vitals:    08/07/24 1400 08/07/24 1415 08/07/24 1430   BP:      Pulse: 89 89 89   Resp:      Temp:      SpO2: 100% 100% 100%       Electronically Signed By: Greer Garibay MD  August 7, 2024  3:36 PM

## 2024-08-07 NOTE — CONSULTS
PULMONARY / CRITICAL CARE CONSULTATION NOTE      Consultation - Pulmonary/Critical Care Medicine  Enrike Sotomayor,  1940, MRN [id number]  Date / Time of Admission:  2024  5:25 AM    Admitting Dx: Dyspnea on exertion [R06.09]  Precordial pain [R07.2]  Abnormal computed tomography angiography of heart [R93.1]    PCP: Paul Anderson, [phone]  Consulting physician:  Ольга Aparicio MD   Code status:  Full Code       Extended Emergency Contact Information  Primary Emergency Contact: Tori Sotomayor  Address: [address] [place] [address] [place]  Home Phone: [phone]  Mobile Phone: [phone]  Relation: Spouse  Secondary Emergency Contact: Shanice Dominique  Address: [address] [place] [address]  Mobile Phone: [phone]  Relation: Daughter       ID:  Enrike Sotomayor is a 83 year old male with CAD, HTN, CMT. Underwent CABG x 3 with Dr. Aparicio today.         ASSESSMENT & PLAN BY SYSTEM   Systems to Assess:   Pulmonary: Post op vent management; underlying asthma  Wean supplemental O2 as tolerated; goal O2 sat > 92%.  HOB > 30 degrees to limit aspiration risk.    Check ABG and adjust support as indicated; avoid acidotic state.   Check CXR  Once hemodynamically stable, plan to proceed to wake, wean, and extubate with goal < 6-hours.  Goal extubation by 18:14  Cardiovascular: CAD s/p 3 vessel CAB; hypertension  Cardiac monitoring.   SBP goal > 90 mmHg, MAP goal > 65 mmHg.    Goal CI 2-3   Goal PAD 20  Vasoactive gtts per CV-surgery.   Temporary pacing wires present; will use in setting of symptomatic arrhythmia.   Currently paced @ 90  Underlying rhythm: not checked  Neurological: sedation for vent synchrony and comfort. Hx Charcot-Carla Tooth disease, baseline weakness. Unable to stand without assistance    Neuro checks per ICU protocol.   Sedate with precedex until ready to wean and extubate.   Pain control: PRN  Goal RASS 0 to -1   GI/: no acute  issues  NPO until extubated and fully awake.   Damon catheter in place for accurate measurement of I/O.   GI prophylaxis:  Omeprazole  Renal: no acute issues    Monitor I/O's.  Electrolyte repletion PRN.  Avoid/limit nephrotoxic agents.   IVFs: per CV-surgery  Heme/Coag: Acute blood loss anemia; coagulopathy secondary to pump run.   Monitor H/H.   Transfuse per CV-surgery.   Monitor chest tube output hourly; notify CV-surgery for excessive output or abrupt stop in output.   DVT prophylaxis:  SubQ heparin  Infectious disease:   General precautions.   Remove lines and drains once no longer required.   Endocrine: hyperglycemia   RHI gtt per ICU protocol.   Musculoskeletal:   Bedrest; initiate mobility protocol.     Lines: A-line, Day# 1, Central line, Day# 1 or [place], Day# 1      Code Status:  FULL CODE    Thank you for this consultation.  Please call if any questions or concerns.        This patient is considered critically ill and requires ICU level of care due to immediate post CV-surgical procedure. High risk for acute hemodynamic collapse and death.     Total Critical Care time, not including separate billable procedure time:  32 minutes.    Kathrin Murray, CNP  [facility] Pulmonary/Critical Care      Chief Complaint chief complaint       HPI    Enrike Sotomayor is a 83 year old male with CAD, asthma, Charcot-Carla tooth disease with lower extremity weakness, hypertension. Underwent CABG x 3 with Dr. Aparicio today. Procedure reported as uncomplicated. No difficulty with line placement or intubation. Preop Echo showed EF of 40% with no valve abnormalities; this improved to 55% post procedure, on epi. No difficulty going on nor coming off pump. EBL 260mL; received 170mL Cell Saver and a dose of DDAVP. No bleeding concerns at end of case. Arrived to ICU on epi and phenylephrine. Sedated on precedex.   Direct sign out received at the bedside from Dr. Garibay.           Review of Systems   Review of systems  not obtained due to patient factors.     Active Problem List   Patient Active Problem List    Diagnosis Date Noted    Coronary artery disease involving native coronary artery of native heart, unspecified whether angina present 08/07/2024     Priority: Medium    Asthma      Priority: Medium    Dyspnea on exertion 05/08/2024     Priority: Medium    Precordial pain 05/08/2024     Priority: Medium    Bruit of left carotid artery 05/08/2024     Priority: Medium    CMT (Charcot-Carla-Tooth disease) 05/31/2023     Priority: Medium         Medical/Surgical History   Past Medical History:   Diagnosis Date    Aortic valve sclerosis     Arteriosclerosis of right carotid artery     Bilateral cataracts     CAD (coronary artery disease)     Charcot's joint of foot     Charcot-Carla-Tooth disease     Malignant Hyperthermia precautions per anesthesia    Chronic kidney disease     Chronic rhinitis     Chronic vasomotor rhinitis     Constipation by delayed colonic transit     Decreased hearing of both ears     Grade I diastolic dysfunction     Heart murmur     HLD (hyperlipidemia)     HTN (hypertension)     Lyme disease     Malignant hyperthermia     Precautions risk per anesthesia cervantes to Charcot Carla tooth disease    Nonsenile cataract Mar 2023    Obesity     SHAINA on CPAP     Prostatic hypertrophy     Psychophysiological insomnia     Retinal arterial branch occlusion     Squamous cell carcinoma of skin of face     TIA (transient ischemic attack)     Visual field cut     Right     Past Surgical History:   Procedure Laterality Date    carotid endartectomy Right     CATARACT IOL, RT/LT  Mar 2023    CV CORONARY ANGIOGRAM N/A 06/24/2024    Procedure: Coronary Angiogram;  Surgeon: Oscar Whittington MD;  Location: [facility] CATH LAB CV    CV LEFT HEART CATH N/A 06/24/2024    Procedure: Left Heart Catheterization;  Surgeon: Oscar Whittington MD;  Location: [facility] CATH LAB CV    KNEE SURGERY Left     REPLACEMENT TOTAL KNEE Right      TONSILLECTOMY & ADENOIDECTOMY           Allergies   Allergies   Allergen Reactions    Pneumovax  [Pneumococcal Polysaccharide Vaccine]      Other reaction(s): fever,swollen, red arm         Medications:  OUTpatient medications   Prior to Admission medications    Medication Sig Start Date End Date Taking? Authorizing Provider   aspirin 81 MG EC tablet Take 81 mg by mouth every evening   Yes Reported, Patient   atorvastatin (LIPITOR) 80 MG tablet Take 80 mg by mouth daily   Yes Unknown, Entered By History   diltiazem ER (DILT-XR) 240 MG 24 hr ER beaded capsule Take 1 capsule by mouth daily 7/1/24  Yes Reported, Patient   fluticasone (FLONASE) 50 MCG/ACT nasal spray Spray 1 spray into each nostril every other night before bed. Uses on even days.   Yes Reported, Patient   losartan (COZAAR) 100 MG tablet Take 1 tablet by mouth every morning 5/17/24  Yes Reported, Patient   sildenafil (VIAGRA) 100 MG tablet Take 100 mg by mouth daily as needed   Yes Reported, Patient           Medications:  INpatient medications   Current Facility-Administered Medications   Medication Dose Route Frequency Provider Last Rate Last Admin    acetaminophen (TYLENOL) tablet 975 mg  975 mg Oral Q8H McCrone, Alyssa, PA-C        aspirin (ASA) chewable tablet 162 mg  162 mg Oral or NG Tube Once McCrone, Alyssa, PA-C        Or    aspirin (ASA) Suppository 300 mg  300 mg Rectal Once McCrone, Alyssa, PA-C        [START ON 8/8/2024] aspirin (ASA) chewable tablet 162 mg  162 mg Oral or NG Tube Daily McCrone, Alyssa, PA-C        Or    [START ON 8/8/2024] aspirin (ASA) Suppository 300 mg  300 mg Rectal Daily McCrone, Alyssa, PA-C        atorvastatin (LIPITOR) tablet 80 mg  80 mg Oral Daily McCrone, Alyssa, PA-C        ceFAZolin Sodium (ANCEF) injection 2 g  2 g Intravenous Q8H McCrone, Alyssa, PA-C        chlorhexidine (PERIDEX) 0.12 % solution 15 mL  15 mL Mouth/Throat Q12H Kathrin Murray APRN CNP        [START ON 8/8/2024] heparin ANTICOAGULANT injection 5,000  Units  5,000 Units Subcutaneous Q8H McCrone, Alyssa, PA-C        Lidocaine (LIDOCARE) 4 % Patch 1-2 patch  1-2 patch Transdermal Q24H McCrone, Alyssa, PA-C        pantoprazole (PROTONIX) 2 mg/mL suspension 40 mg  40 mg Oral or NG Tube Daily McCrone, Alyssa, PA-C        Or    pantoprazole (PROTONIX) EC tablet 40 mg  40 mg Oral Daily McCrone, Alyssa, PA-C        [START ON 8/8/2024] polyethylene glycol (MIRALAX) Packet 17 g  17 g Oral Daily McCrone, Alyssa, PA-C        senna-docusate (SENOKOT-S/PERICOLACE) 8.6-50 MG per tablet 1 tablet  1 tablet Oral BID McCrone, Alyssa, PA-C               Family History  Social History   Reviewed  Family History   Problem Relation Age of Onset    Cancer Mother     Hypertension Father     Glaucoma No family hx of     Macular Degeneration No family hx of      Reviewed  Social History     Socioeconomic History    Marital status:      Spouse name: Not on file    Number of children: Not on file    Years of education: Not on file    Highest education level: Not on file   Occupational History    Not on file   Tobacco Use    Smoking status: Former     Current packs/day: 1.00     Average packs/day: 1 pack/day for 5.0 years (5.0 ttl pk-yrs)     Types: Cigarettes    Smokeless tobacco: Never   Substance and Sexual Activity    Alcohol use: Yes     Alcohol/week: 6.0 standard drinks of alcohol     Types: 6 Standard drinks or equivalent per week    Drug use: Never    Sexual activity: Not Currently     Partners: Female     Birth control/protection: Male Surgical   Other Topics Concern    Not on file   Social History Narrative    Not on file     Social Determinants of Health     Financial Resource Strain: Not on File (2/16/2021)    Received from [facility]    Financial Resource Strain     Financial Resource Strain: 0   Food Insecurity: Not on File (2/16/2021)    Received from [facility]    Food Insecurity     Food: 0   Transportation Needs: Not on File (2/16/2021)    Received from [facility]     Transportation Needs     Transportation: 0   Physical Activity: Not on File (2021)    [facility] from [facility]    Physical Activity     Physical Activity: 0   Stress: Not on File (2021)    Received from [facility]    Stress     Stress: 0   Social Connections: Not on File (2021)    Received from [facility]    Social Connections     Social Connections and Isolation: 0   Interpersonal Safety: Not on file   Housing Stability: Not on File (2021)    Received from [facility]    Housing Stability     Housin      Psychosocial Needs  Social History     Social History Narrative    Not on file     Additional psychosocial needs reviewed per nursing assessment.      Physical Exam   VITALS:  BP (!) 144/75 (BP Location: Right arm)   Pulse 68   Temp 97  F (36.1  C) (Oral)   Resp 18   Wt 89.8 kg (198 lb)   SpO2 97%   BMI 30.11 kg/m    [unfilled]    PHYSICAL EXAM:   GEN: intubated and sedated  HEENT:  OETT in place; AT/NC.   NECK: Supple.  RIJ introducer with PA-C in place.   PULM:  unlabored on full vent; lungs are clear and equal. No wheeze.   CVS:   100% AV Paced; S1S2 no murmur. Chest tubes in place.   ABDOMEN:   soft ntnd  EXTREMITIES/SKIN:    visible skin intact.   NEURO:  .  sedated    I&O:    Intake/Output Summary (Last 24 hours) at 2024 1232  Last data filed at 2024 1145  Gross per 24 hour   Intake 2620 ml   Output 2060 ml   Net 560 ml        Pertinent Labs   Lab Results: personally reviewed.      CBC:  Recent Labs   Lab 24  1123 24  1120 24  1031 24  0812 24  0821   WBC 17.7*  --   --   --  7.0   HGB 10.4* 10.7* 10.2*   < > 12.7*   HCT 31.2*  --   --   --  37.9*     --   --   --  239    < > = values in this interval not displayed.     Chemistry:  Recent Labs   Lab 24  1120 24  1031 24  0951 24  0812 24  0821    136 136   < > 139   CO2  --   --   --   --  24   BUN  --   --   --   --  25.2*   ALBUMIN  --    --   --   --  3.8   ALT  --   --   --   --  16   AST  --   --   --   --  21   ALKPHOS  --   --   --   --  104    < > = values in this interval not displayed.     Coags:  Lab Results   Component Value Date    INR 1.79 (H) 08/07/2024    INR 1.24 (H) 08/01/2024       Microbiology:  None this admission       Radiology:  Chest X-Ray: post op film pending

## 2024-08-08 ENCOUNTER — APPOINTMENT (OUTPATIENT)
Dept: OCCUPATIONAL THERAPY | Facility: HOSPITAL | Age: 84
DRG: 235 | End: 2024-08-08
Attending: THORACIC SURGERY (CARDIOTHORACIC VASCULAR SURGERY)
Payer: COMMERCIAL

## 2024-08-08 ENCOUNTER — APPOINTMENT (OUTPATIENT)
Dept: RADIOLOGY | Facility: HOSPITAL | Age: 84
DRG: 235 | End: 2024-08-08
Attending: PHYSICIAN ASSISTANT
Payer: COMMERCIAL

## 2024-08-08 LAB
ALBUMIN SERPL BCG-MCNC: 3.9 G/DL (ref 3.5–5.2)
ALP SERPL-CCNC: 58 U/L (ref 40–150)
ALT SERPL W P-5'-P-CCNC: 10 U/L (ref 0–70)
ANION GAP SERPL CALCULATED.3IONS-SCNC: 14 MMOL/L (ref 7–15)
AST SERPL W P-5'-P-CCNC: 33 U/L (ref 0–45)
BASE EXCESS BLDV CALC-SCNC: -2.4 MMOL/L (ref -3–3)
BILIRUB SERPL-MCNC: 0.4 MG/DL
BUN SERPL-MCNC: 22.2 MG/DL (ref 8–23)
CA-I BLD-MCNC: 4.4 MG/DL (ref 4.4–5.2)
CA-I BLD-MCNC: 4.4 MG/DL (ref 4.4–5.2)
CA-I BLD-MCNC: 4.5 MG/DL (ref 4.4–5.2)
CALCIUM SERPL-MCNC: 7.8 MG/DL (ref 8.8–10.4)
CHLORIDE SERPL-SCNC: 107 MMOL/L (ref 98–107)
CREAT SERPL-MCNC: 1.16 MG/DL (ref 0.67–1.17)
EGFRCR SERPLBLD CKD-EPI 2021: 63 ML/MIN/1.73M2
ERYTHROCYTE [DISTWIDTH] IN BLOOD BY AUTOMATED COUNT: 12.4 % (ref 10–15)
GLUCOSE BLDC GLUCOMTR-MCNC: 125 MG/DL (ref 70–99)
GLUCOSE BLDC GLUCOMTR-MCNC: 127 MG/DL (ref 70–99)
GLUCOSE BLDC GLUCOMTR-MCNC: 128 MG/DL (ref 70–99)
GLUCOSE BLDC GLUCOMTR-MCNC: 135 MG/DL (ref 70–99)
GLUCOSE BLDC GLUCOMTR-MCNC: 136 MG/DL (ref 70–99)
GLUCOSE BLDC GLUCOMTR-MCNC: 136 MG/DL (ref 70–99)
GLUCOSE BLDC GLUCOMTR-MCNC: 142 MG/DL (ref 70–99)
GLUCOSE BLDC GLUCOMTR-MCNC: 163 MG/DL (ref 70–99)
GLUCOSE BLDC GLUCOMTR-MCNC: 171 MG/DL (ref 70–99)
GLUCOSE SERPL-MCNC: 144 MG/DL (ref 70–99)
HCO3 BLDV-SCNC: 23 MMOL/L (ref 21–28)
HCO3 SERPL-SCNC: 20 MMOL/L (ref 22–29)
HCT VFR BLD AUTO: 26.6 % (ref 40–53)
HGB BLD-MCNC: 8.9 G/DL (ref 13.3–17.7)
MAGNESIUM SERPL-MCNC: 2.5 MG/DL (ref 1.7–2.3)
MCH RBC QN AUTO: 30.8 PG (ref 26.5–33)
MCHC RBC AUTO-ENTMCNC: 33.5 G/DL (ref 31.5–36.5)
MCV RBC AUTO: 92 FL (ref 78–100)
O2/TOTAL GAS SETTING VFR VENT: 21 %
OXYHGB MFR BLDV: 60 % (ref 70–75)
PCO2 BLDV: 39 MM HG (ref 40–50)
PH BLDV: 7.37 [PH] (ref 7.32–7.43)
PHOSPHATE SERPL-MCNC: 4.4 MG/DL (ref 2.5–4.5)
PLATELET # BLD AUTO: 171 10E3/UL (ref 150–450)
PO2 BLDV: 33 MM HG (ref 25–47)
POTASSIUM SERPL-SCNC: 3.7 MMOL/L (ref 3.4–5.3)
POTASSIUM SERPL-SCNC: 4 MMOL/L (ref 3.4–5.3)
PROT SERPL-MCNC: 5.9 G/DL (ref 6.4–8.3)
RBC # BLD AUTO: 2.89 10E6/UL (ref 4.4–5.9)
SAO2 % BLDV: 60.7 % (ref 70–75)
SODIUM SERPL-SCNC: 141 MMOL/L (ref 135–145)
WBC # BLD AUTO: 10.8 10E3/UL (ref 4–11)

## 2024-08-08 PROCEDURE — 97166 OT EVAL MOD COMPLEX 45 MIN: CPT | Mod: GO

## 2024-08-08 PROCEDURE — 84100 ASSAY OF PHOSPHORUS: CPT | Performed by: PHYSICIAN ASSISTANT

## 2024-08-08 PROCEDURE — 71045 X-RAY EXAM CHEST 1 VIEW: CPT

## 2024-08-08 PROCEDURE — 250N000013 HC RX MED GY IP 250 OP 250 PS 637: Performed by: PHYSICIAN ASSISTANT

## 2024-08-08 PROCEDURE — 82805 BLOOD GASES W/O2 SATURATION: CPT | Performed by: THORACIC SURGERY (CARDIOTHORACIC VASCULAR SURGERY)

## 2024-08-08 PROCEDURE — 94799 UNLISTED PULMONARY SVC/PX: CPT

## 2024-08-08 PROCEDURE — 80053 COMPREHEN METABOLIC PANEL: CPT | Performed by: PHYSICIAN ASSISTANT

## 2024-08-08 PROCEDURE — 250N000012 HC RX MED GY IP 250 OP 636 PS 637: Performed by: PHYSICIAN ASSISTANT

## 2024-08-08 PROCEDURE — 250N000011 HC RX IP 250 OP 636: Performed by: PHYSICIAN ASSISTANT

## 2024-08-08 PROCEDURE — 82330 ASSAY OF CALCIUM: CPT | Performed by: PHYSICIAN ASSISTANT

## 2024-08-08 PROCEDURE — 97110 THERAPEUTIC EXERCISES: CPT | Mod: GO

## 2024-08-08 PROCEDURE — 82330 ASSAY OF CALCIUM: CPT | Performed by: THORACIC SURGERY (CARDIOTHORACIC VASCULAR SURGERY)

## 2024-08-08 PROCEDURE — 84132 ASSAY OF SERUM POTASSIUM: CPT | Performed by: THORACIC SURGERY (CARDIOTHORACIC VASCULAR SURGERY)

## 2024-08-08 PROCEDURE — P9045 ALBUMIN (HUMAN), 5%, 250 ML: HCPCS | Performed by: PHYSICIAN ASSISTANT

## 2024-08-08 PROCEDURE — 97535 SELF CARE MNGMENT TRAINING: CPT | Mod: GO

## 2024-08-08 PROCEDURE — 250N000011 HC RX IP 250 OP 636: Mod: JZ | Performed by: THORACIC SURGERY (CARDIOTHORACIC VASCULAR SURGERY)

## 2024-08-08 PROCEDURE — 200N000001 HC R&B ICU

## 2024-08-08 PROCEDURE — 999N000157 HC STATISTIC RCP TIME EA 10 MIN

## 2024-08-08 PROCEDURE — 83735 ASSAY OF MAGNESIUM: CPT | Performed by: PHYSICIAN ASSISTANT

## 2024-08-08 PROCEDURE — 85027 COMPLETE CBC AUTOMATED: CPT | Performed by: PHYSICIAN ASSISTANT

## 2024-08-08 RX ORDER — NICOTINE POLACRILEX 4 MG
15-30 LOZENGE BUCCAL
Status: DISCONTINUED | OUTPATIENT
Start: 2024-08-08 | End: 2024-08-13 | Stop reason: HOSPADM

## 2024-08-08 RX ORDER — POTASSIUM CHLORIDE 29.8 MG/ML
20 INJECTION INTRAVENOUS ONCE
Status: COMPLETED | OUTPATIENT
Start: 2024-08-08 | End: 2024-08-08

## 2024-08-08 RX ORDER — FUROSEMIDE 10 MG/ML
20 INJECTION INTRAMUSCULAR; INTRAVENOUS ONCE
Status: COMPLETED | OUTPATIENT
Start: 2024-08-08 | End: 2024-08-08

## 2024-08-08 RX ORDER — DEXTROSE MONOHYDRATE 25 G/50ML
25-50 INJECTION, SOLUTION INTRAVENOUS
Status: DISCONTINUED | OUTPATIENT
Start: 2024-08-08 | End: 2024-08-13 | Stop reason: HOSPADM

## 2024-08-08 RX ADMIN — POLYETHYLENE GLYCOL 3350 17 G: 17 POWDER, FOR SOLUTION ORAL at 09:03

## 2024-08-08 RX ADMIN — LIDOCAINE 1 PATCH: 246 PATCH TOPICAL at 12:52

## 2024-08-08 RX ADMIN — POTASSIUM CHLORIDE 20 MEQ: 29.8 INJECTION, SOLUTION INTRAVENOUS at 01:03

## 2024-08-08 RX ADMIN — ACETAMINOPHEN 975 MG: 325 TABLET ORAL at 03:56

## 2024-08-08 RX ADMIN — HEPARIN SODIUM 5000 UNITS: 10000 INJECTION, SOLUTION INTRAVENOUS; SUBCUTANEOUS at 14:01

## 2024-08-08 RX ADMIN — INSULIN GLARGINE 4 UNITS: 100 INJECTION, SOLUTION SUBCUTANEOUS at 09:08

## 2024-08-08 RX ADMIN — HEPARIN SODIUM 5000 UNITS: 10000 INJECTION, SOLUTION INTRAVENOUS; SUBCUTANEOUS at 22:03

## 2024-08-08 RX ADMIN — ONDANSETRON 4 MG: 2 INJECTION INTRAMUSCULAR; INTRAVENOUS at 04:52

## 2024-08-08 RX ADMIN — PANTOPRAZOLE SODIUM 40 MG: 40 TABLET, DELAYED RELEASE ORAL at 09:03

## 2024-08-08 RX ADMIN — FUROSEMIDE 20 MG: 10 INJECTION, SOLUTION INTRAMUSCULAR; INTRAVENOUS at 14:00

## 2024-08-08 RX ADMIN — CALCIUM GLUCONATE 1 G: 20 INJECTION, SOLUTION INTRAVENOUS at 04:31

## 2024-08-08 RX ADMIN — CEFAZOLIN SODIUM 2 G: 2 INJECTION, SOLUTION INTRAVENOUS at 03:39

## 2024-08-08 RX ADMIN — ACETAMINOPHEN 975 MG: 325 TABLET ORAL at 19:59

## 2024-08-08 RX ADMIN — ALBUMIN HUMAN 12.5 G: 0.05 INJECTION, SOLUTION INTRAVENOUS at 09:03

## 2024-08-08 RX ADMIN — ATORVASTATIN CALCIUM 80 MG: 40 TABLET, FILM COATED ORAL at 20:00

## 2024-08-08 RX ADMIN — SENNOSIDES AND DOCUSATE SODIUM 1 TABLET: 8.6; 5 TABLET ORAL at 20:01

## 2024-08-08 RX ADMIN — CEFAZOLIN SODIUM 2 G: 2 INJECTION, SOLUTION INTRAVENOUS at 14:00

## 2024-08-08 RX ADMIN — ACETAMINOPHEN 975 MG: 325 TABLET ORAL at 12:52

## 2024-08-08 RX ADMIN — SENNOSIDES AND DOCUSATE SODIUM 1 TABLET: 8.6; 5 TABLET ORAL at 09:03

## 2024-08-08 RX ADMIN — ASPIRIN 81 MG CHEWABLE TABLET 162 MG: 81 TABLET CHEWABLE at 09:03

## 2024-08-08 RX ADMIN — CALCIUM GLUCONATE 1 G: 20 INJECTION, SOLUTION INTRAVENOUS at 09:45

## 2024-08-08 RX ADMIN — INSULIN ASPART 1 UNITS: 100 INJECTION, SOLUTION INTRAVENOUS; SUBCUTANEOUS at 18:29

## 2024-08-08 RX ADMIN — INSULIN ASPART 1 UNITS: 100 INJECTION, SOLUTION INTRAVENOUS; SUBCUTANEOUS at 09:08

## 2024-08-08 ASSESSMENT — ACTIVITIES OF DAILY LIVING (ADL)
ADLS_ACUITY_SCORE: 35
ADLS_ACUITY_SCORE: 34
ADLS_ACUITY_SCORE: 33
ADLS_ACUITY_SCORE: 33
ADLS_ACUITY_SCORE: 31
ADLS_ACUITY_SCORE: 33
ADLS_ACUITY_SCORE: 34
ADLS_ACUITY_SCORE: 33
DEPENDENT_IADLS:: INDEPENDENT
ADLS_ACUITY_SCORE: 33
ADLS_ACUITY_SCORE: 33
ADLS_ACUITY_SCORE: 34
ADLS_ACUITY_SCORE: 33
ADLS_ACUITY_SCORE: 35
ADLS_ACUITY_SCORE: 33

## 2024-08-08 NOTE — PROGRESS NOTES
"CVTS Daily Progress Note   POD#1 s/p CAB x3  Attending: Dr Aparicio  LOS: 1    SUBJECTIVE/INTERVAL EVENTS:    Patient arrived to ICU from OR yesterday afternoon. He was subsequently extubated and weaning from pressors. No acute events overnight. Patient progressing well. Maintaining oxygen saturations on nasal cannula. Normotensive on Ramon 0.4. Up to chair. Pain well controlled. -BM / flatus. Tolerating diet. UOP adequate. Chest tube output appropriate. Hgb 8.9. Patient denies new chest pain, shortness of breath, abdominal pain, calf pain, nausea. Patient has no questions today.     OBJECTIVE:  Temp:  [95.3  F (35.2  C)-100.4  F (38  C)] 99.3  F (37.4  C)  Pulse:  [79-90] 80  Resp:  [14-23] 23  BP: ()/(49-67) 88/50  MAP:  [47 mmHg-234 mmHg] 57 mmHg  Arterial Line BP: ()/() 92/40  FiO2 (%):  [40 %-60 %] 40 %  SpO2:  [91 %-100 %] 96 %  Vitals:    08/07/24 0532 08/08/24 0615   Weight: 89.8 kg (198 lb) 89.7 kg (197 lb 11.2 oz)       Clinically Significant Risk Factors          # Hypocalcemia: Lowest Ca = 7.8 mg/dL in last 2 days, will monitor and replace as appropriate     # Hypoalbuminemia: Lowest albumin = 3.3 g/dL at 8/7/2024 12:38 PM, will monitor as appropriate  # Coagulation Defect: INR = 1.58 (Ref range: 0.85 - 1.15) and/or PTT = 58 Seconds (Ref range: 22 - 38 Seconds), will monitor for bleeding         # Acute Hypoxic Respiratory Failure: Documented O2 saturation < 90%. Continue supplemental oxygen as needed  # Acute Hypercapnic Respiratory Failure: based on blood gas results.  Continue supplemental oxygen and ventilatory support as indicated.         # Obesity: Estimated body mass index is 30.06 kg/m  as calculated from the following:    Height as of 8/1/24: 1.727 m (5' 8\").    Weight as of this encounter: 89.7 kg (197 lb 11.2 oz)., PRESENT ON ADMISSION     # Asthma: noted on problem list         Current Medications:    Scheduled Meds:  Current Facility-Administered Medications   Medication Dose " Route Frequency Provider Last Rate Last Admin    acetaminophen (TYLENOL) tablet 975 mg  975 mg Oral Q8H McCrone, Alyssa, PA-C   975 mg at 08/08/24 0356    aspirin (ASA) chewable tablet 162 mg  162 mg Oral or NG Tube Daily McCrone, Alyssa, PA-C        Or    aspirin (ASA) Suppository 300 mg  300 mg Rectal Daily McCrone, Alyssa, PA-C        atorvastatin (LIPITOR) tablet 80 mg  80 mg Oral Daily McCrone, Alyssa, PA-C   80 mg at 08/07/24 2014    ceFAZolin (ANCEF) 2 g in 100 mL D5W intermittent infusion  2 g Intravenous Q8H Ольга Aparicio  mL/hr at 08/08/24 0339 2 g at 08/08/24 0339    chlorhexidine (PERIDEX) 0.12 % solution 15 mL  15 mL Mouth/Throat Q12H Kathrin Murray APRN CNP   15 mL at 08/07/24 1941    heparin ANTICOAGULANT injection 5,000 Units  5,000 Units Subcutaneous Q8H McCrone, Alyssa, PA-C        Lidocaine (LIDOCARE) 4 % Patch 1-2 patch  1-2 patch Transdermal Q24H McCrone, Alyssa, PA-C   1 patch at 08/07/24 1357    pantoprazole (PROTONIX) 2 mg/mL suspension 40 mg  40 mg Oral or NG Tube Daily McCrone, Alsysa, PA-C   40 mg at 08/07/24 1632    Or    pantoprazole (PROTONIX) EC tablet 40 mg  40 mg Oral Daily McCrone, Alyssa, PA-C        polyethylene glycol (MIRALAX) Packet 17 g  17 g Oral Daily McCrone, Alyssa, PA-C        senna-docusate (SENOKOT-S/PERICOLACE) 8.6-50 MG per tablet 1 tablet  1 tablet Oral BID McCrone, Alyssa, PA-C   1 tablet at 08/07/24 1940     Continuous Infusions:  Current Facility-Administered Medications   Medication Dose Route Frequency Provider Last Rate Last Admin    dexmedeTOMIDine (PRECEDEX) 4 mcg/mL in sodium chloride 0.9 % 100 mL infusion  0.1-1.2 mcg/kg/hr Intravenous Continuous McCrone, Alyssa, PA-C   Stopped at 08/07/24 1507    EPINEPHrine (ADRENALIN) 5 mg in sodium chloride 0.9 % 250 mL infusion CENTRAL  0.01-0.1 mcg/kg/min Intravenous Continuous PRN Alyssa Morales PA-C        insulin regular (MYXREDLIN) 1 unit/mL infusion  0-24 Units/hr Intravenous Continuous Alyssa Morales PA-C 1.5 mL/hr at  08/08/24 0722 1.5 Units/hr at 08/08/24 0722    niCARdipine 40 mg in 200 mL NS (CARDENE) infusion  0.5-15 mg/hr Intravenous Continuous PRN McCrone, Alyssa, PA-C        phenylephrine (ABBIE-SYNEPHRINE) 50 mg in NaCl 0.9 % 250 mL infusion  0.1-4 mcg/kg/min Intravenous Continuous PRN McCrone, Alyssa, PA-C 10.8 mL/hr at 08/08/24 0738 0.4 mcg/kg/min at 08/08/24 0738     PRN Meds:.  Current Facility-Administered Medications   Medication Dose Route Frequency Provider Last Rate Last Admin    [START ON 8/10/2024] acetaminophen (TYLENOL) tablet 650 mg  650 mg Oral Q4H PRN McCrone, Alyssa, PA-C        bisacodyl (DULCOLAX) suppository 10 mg  10 mg Rectal Daily PRN McCrone, Alyssa, PA-C        calcium gluconate 1 g in 50 mL in sodium chloride intermittent infusion  1 g Intravenous Once PRN McCrone, Alyssa, PA-C   1 g at 08/08/24 0431    calcium gluconate 2 g in  mL intermittent infusion  2 g Intravenous Once PRN McCrone, Alyssa, PA-C        calcium gluconate 3 g in sodium chloride 0.9 % 100 mL intermittent infusion  3 g Intravenous Once PRN McCrone, Alyssa, PA-C        glucose gel 15-30 g  15-30 g Oral Q15 Min PRN McCrone, Alyssa, PA-C        Or    dextrose 50 % injection 25-50 mL  25-50 mL Intravenous Q15 Min PRN McCrone, Alyssa, PA-C        Or    glucagon injection 1 mg  1 mg Subcutaneous Q15 Min PRN McCrone, Alyssa, PA-C        EPINEPHrine (ADRENALIN) 5 mg in sodium chloride 0.9 % 250 mL infusion CENTRAL  0.01-0.1 mcg/kg/min Intravenous Continuous PRN McCrone, Alyssa, PA-C        hydrALAZINE (APRESOLINE) injection 10 mg  10 mg Intravenous Q30 Min PRN McCrone, Alyssa, PA-C        HYDROmorphone (DILAUDID) injection 0.2 mg  0.2 mg Intravenous Q2H PRN McCrone, Alyssa, PA-C        Or    HYDROmorphone (DILAUDID) injection 0.4 mg  0.4 mg Intravenous Q2H PRN Alyssa Morales PA-C   0.2 mg at 08/07/24 1511    lactated ringers BOLUS 250 mL  250 mL Intravenous Q15 Min PRN Alyssa Morales PA-C        magnesium hydroxide (MILK OF MAGNESIA) suspension 30 mL  30 mL  Oral Daily PRN McCrone, Alyssa, PA-C        naloxone (NARCAN) injection 0.2 mg  0.2 mg Intravenous Q2 Min PRN Ольга Aparicio MD        Or    naloxone (NARCAN) injection 0.4 mg  0.4 mg Intravenous Q2 Min PRN Ольга Aparicio MD        Or    naloxone (NARCAN) injection 0.2 mg  0.2 mg Intramuscular Q2 Min PRN Ольга Aparicio MD        Or    naloxone (NARCAN) injection 0.4 mg  0.4 mg Intramuscular Q2 Min PRN Ольга Aparicio MD        niCARdipine 40 mg in 200 mL NS (CARDENE) infusion  0.5-15 mg/hr Intravenous Continuous PRN McCrone, Alyssa, PA-C        ondansetron (ZOFRAN ODT) ODT tab 4 mg  4 mg Oral Q6H PRN McCrone, Alyssa, PA-C        Or    ondansetron (ZOFRAN) injection 4 mg  4 mg Intravenous Q6H PRN McCrone, Alyssa, PA-C   4 mg at 08/08/24 0452    oxyCODONE (ROXICODONE) tablet 5 mg  5 mg Oral Q4H PRN McCrone, Alyssa, PA-C        Or    oxyCODONE (ROXICODONE) tablet 10 mg  10 mg Oral Q4H PRN McCrone, Alyssa, PA-C        phenylephrine (ABBIE-SYNEPHRINE) 50 mg in NaCl 0.9 % 250 mL infusion  0.1-4 mcg/kg/min Intravenous Continuous PRN McCrone, Alyssa, PA-C 10.8 mL/hr at 08/08/24 0738 0.4 mcg/kg/min at 08/08/24 0738    prochlorperazine (COMPAZINE) injection 5 mg  5 mg Intravenous Q6H PRN McCrone, Alyssa, PA-C        Or    prochlorperazine (COMPAZINE) tablet 5 mg  5 mg Oral Q6H PRN McCrone, Alyssa, PA-C           Cardiographics:    Telemetry monitoring demonstrates NSR with rates in the 60's per my personal review.    Imaging:  Results for orders placed or performed during the hospital encounter of 08/07/24   XR Chest Port 1 View    Impression    IMPRESSION:     Endotracheal tube tip is 4 cm above the yelena. Gastric drainage tube tip is in the stomach body. Mediastinal and bilateral pleural drains. Right internal jugular approach Cherry-Meera catheter with tip in the region of the right pulmonary artery. Multiple   median sternotomy wires and mediastinal clips.    Lung volumes are low with left greater than right bibasilar  atelectasis. Small left pleural effusion. No visible right pleural effusion or pneumothorax.    Nonenlarged cardiac silhouette.   XR Chest Port 1 View    Impression    IMPRESSION: Interval extubation and removal of nasogastric tube. Right IJ Fernwood-Meera catheter, mediastinal drain, sternal wires, mediastinal surgical clips and left chest tube redemonstrated. Trace left pleural effusion. Left basilar atelectasis has   improved. No right pleural effusion. No new focal airspace opacity, pneumothorax or pulmonary vascular congestion.       Labs, personally reviewed.  Hemoglobin   Date Value Ref Range Status   08/08/2024 8.9 (L) 13.3 - 17.7 g/dL Final   08/07/2024 10.4 (L) 13.3 - 17.7 g/dL Final   08/07/2024 10.4 (L) 13.3 - 17.7 g/dL Final     Hemoglobin POCT   Date Value Ref Range Status   08/07/2024 10.5 (L) 13.3 - 17.7 g/dL Final   08/07/2024 10.7 (L) 13.3 - 17.7 g/dL Final   08/07/2024 10.2 (L) 13.3 - 17.7 g/dL Final     WBC Count   Date Value Ref Range Status   08/08/2024 10.8 4.0 - 11.0 10e3/uL Final   08/07/2024 17.4 (H) 4.0 - 11.0 10e3/uL Final   08/07/2024 17.7 (H) 4.0 - 11.0 10e3/uL Final     Platelet Count   Date Value Ref Range Status   08/08/2024 171 150 - 450 10e3/uL Final   08/07/2024 194 150 - 450 10e3/uL Final   08/07/2024 208 150 - 450 10e3/uL Final     Creatinine   Date Value Ref Range Status   08/08/2024 1.16 0.67 - 1.17 mg/dL Final   08/07/2024 0.94 0.67 - 1.17 mg/dL Final   08/01/2024 1.12 0.67 - 1.17 mg/dL Final     Potassium   Date Value Ref Range Status   08/08/2024 4.0 3.4 - 5.3 mmol/L Final   08/08/2024 3.7 3.4 - 5.3 mmol/L Final   08/07/2024 4.4 3.4 - 5.3 mmol/L Final   02/23/2022 4.2 3.5 - 5.0 mmol/L Final   09/08/2021 4.6 3.5 - 5.0 mmol/L Final   01/13/2021 4.5 3.5 - 5.0 mmol/L Final     Potassium POCT   Date Value Ref Range Status   08/07/2024 4.2 3.4 - 5.3 mmol/L Final   08/07/2024 4.5 3.4 - 5.3 mmol/L Final   08/07/2024 5.1 3.4 - 5.3 mmol/L Final     Magnesium   Date Value Ref Range Status    08/08/2024 2.5 (H) 1.7 - 2.3 mg/dL Final   08/07/2024 3.1 (H) 1.7 - 2.3 mg/dL Final   08/01/2024 2.0 1.7 - 2.3 mg/dL Final          I/O:  I/O last 3 completed shifts:  In: 6211.66 [P.O.:720; I.V.:4151.66; Other:170; NG/GT:270; IV Piggyback:150]  Out: 4330 [Urine:3230; Blood:260; Chest Tube:840]       Physical Exam:    General: Patient seen up in chair. NAD. Conversant. Pleasant.   HEENT: TAHIR, no sclera icterus, moist mucosa, ET tube secure, no tracheal deviation  CV: RRR on monitor. 2+ peripheral pulses in all extremities. Mild edema.   Pulm: Non-labored effort on 2L NC. Chest tubes in place, serosanguinous output, no air leak. Incision C/D/I.  Abd: Soft, NT, ND  : Damon with radha urine  Ext: Mild pedal edema, SCDs in place, warm, distal pulses intact  Neuro: CNs grossly intact.       ASSESSMENT/PLAN:    Enrike Sotomayor is a 83 year old male with a history of CAD who is s/p CAB x 3.    Active Problems:    Coronary artery disease involving native coronary artery of native heart, unspecified whether angina present        NEURO:   - Scheduled Tylenol/lidocaine patches and PRN Tylenol/oxycodone/dilaudid for pain    CV:   - Pre-op EF 45-50%  - Normotensive  - Weaning pressors as hemodynamics allow, currently on Ramon 0.4  - Beta blocker pending weaning from pressors  - ASA 162mg  - Atorvastatin 80 mg daily  - Chest tubes to remain today    PULM:   - Extubated POD#0  - Maintaining oxygen saturations on nasal canula  - Encourage pulmonary toilet    FEN/GI:  - NPO, MIVF while intubated  - Continue electrolyte replacement protocol  - Diet: Cardiac, ADAT   - Bowel regimen    RENAL:  - Adequate UOP/hr. Continue to monitor closely.  - Cr 1.16  - Damon to remain in for close monitoring of I/O and during period of diuresis/relative immobility.   - Diuresis PRN and pending weaning from pressors    HEME:  - Acute blood loss anemia post-op.   - Hgb stable, no bleeding concerns. Hep SQ, ASA    ID:  - Diana op ppx complete,  afebrile . No concerns for infection    ENDO:   - Transition to SSI    PPx:   - DVT: SCDs, SQ heparin TID, ambulation   - GI: Protonix 40mg PO daily    DISPO:   - Continue critical care in ICU-may transfer later if off pressor  - Medically Ready for Discharge: Anticipated in 5+ Days     Patient discussed with Dr. Diaz and Dr Aparicio.      Alyssa Morales PA-C  [facility] Cardiothoracic Surgery  Vocera or pager [phone]

## 2024-08-08 NOTE — PLAN OF CARE
[facility] - ICU    RN Progress Note:            Pertinent Assessments:      Please refer to flowsheet rows for full assessment     Minimal pain, not requiring PRN pain medication.  Complaint of nausea this AM, PRN Zofran was administered.  Was able to pass flatus this morning, advance to low saturated fat and low sodium diet.           Key Events - This Shift:       No pressors at start of shift.  Decreased blood pressures after standing bedside, phenylephrine started.  Around midnight CVP was 4 to 6 and urine output appeared to be decreasing.  250 ml of albumin 5% was given.  Urine output has stayed in the 30 - 40 ml per hour range.      Increased CT output since around 0400.  Patient dumped 130 ml when getting up into chair.  Vital signs have otherwise been stable with activity.    Heavy assist x3 with activity.  Patient states he has been feeling weaker.  Has diagnosis of Charoet-chente tooth syndrome.    Later in shift patient appeared to have occasional lost capture from temporarily pacemaker.  Check monitor, native beat present and captured.                Barriers to Discharge / Downgrade:     Post op day 1 from CABG x3.         Point of Contact Update: YES-OR-NO: Yes  Name: Tori Sotomayor  Phone Number: [phone]  Summary of Conversation: Patient extubation update.       Glenroy Shabazz RN

## 2024-08-08 NOTE — TREATMENT PLAN
RCAT Treatment Plan    Patient Score: 12  Patient Acuity: 3    Clinical Indication for Therapy: atelectasis,prevent atelectasis    Therapy Ordered: Flutter valve, IS QID    Assessment Summary: Patient was a cabx3 extubated to 2LNC with diminished BS bilateral. Patient was instucted on the use of IS and FV and performed well. RT will continue to encourage the use of the IS and FV.    Kenneth H. Kjellberg  8/7/20241

## 2024-08-08 NOTE — PROGRESS NOTES
Patient extubated at 2027, placed on 2LNC, BS diminished bilaterol, patient cough is moderate, non-productive, patient is able to verbalize upon extubation, no complications at this time.  Will monitor.  Kenneth H. Kjellberg

## 2024-08-08 NOTE — CONSULTS
Care Management Initial Consult    General Information  Assessment completed with: Patient,    Type of CM/SW Visit: Initial Assessment    Primary Care Provider verified and updated as needed: Yes   Readmission within the last 30 days: no previous admission in last 30 days         Advance Care Planning: Advance Care Planning Reviewed: present on chart          Communication Assessment  Patient's communication style: spoken language (English or Bilingual)    Hearing Difficulty or Deaf: yes   Wear Glasses or Blind: yes    Cognitive  Cognitive/Neuro/Behavioral: WDL  Level of Consciousness: alert  Arousal Level: opens eyes spontaneously  Orientation: oriented x 4  Mood/Behavior: behavior appropriate to situation  Best Language: 0 - No aphasia  Speech: hoarse    Living Environment:   People in home: spouse     Current living Arrangements: house      Able to return to prior arrangements: yes       Family/Social Support:  Care provided by: self  Provides care for: no one  Marital Status:   Wife          Description of Support System: Supportive, Involved         Current Resources:   Patient receiving home care services: No     Community Resources: None  Equipment currently used at home: none  Supplies currently used at home: None    Employment/Financial:  Employment Status:          Financial Concerns:             Does the patient's insurance plan have a 3 day qualifying hospital stay waiver?  No    Lifestyle & Psychosocial Needs:  Social Determinants of Health     Food Insecurity: Not on File (2021)    Received from [facility]    Food Insecurity     Food: 0   Depression: Not at risk (2024)    PHQ-2     PHQ-2 Score: 0   Housing Stability: Not on File (2021)    Received from [facility]    Housing Stability     [place]ousin   Tobacco Use: Medium Risk (2024)    Patient History     Smoking Tobacco Use: Former     Smokeless Tobacco Use: Never     Passive Exposure: Not on file   Financial Resource  Strain: Not on File (2/16/2021)    Received from [facility]    Financial Resource Strain     Financial Resource Strain: 0   Alcohol Use: Not on file   Transportation Needs: Not on File (2/16/2021)    Received from [facility]    Transportation Needs     Transportation: 0   Physical Activity: Not on File (2/16/2021)    Received from [facility]    Physical Activity     Physical Activity: 0   Interpersonal Safety: Not on file   Stress: Not on File (2/16/2021)    Received from [facility]    Stress     Stress: 0   Social Connections: Not on File (2/16/2021)    Received from [facility]    Social Connections     Social Connections and Isolation: 0   Health Literacy: Not on file       Functional Status:  Prior to admission patient needed assistance:   Dependent ADLs:: Independent  Dependent IADLs:: Independent         Additional Information:    Assessment completed with patient. Spouse present in room.  Patient reports he lives in his house with spouse. He is independent with ADLs/IADLs, ambulates without devices and only service in the home are housekeeping. Spouse is primary family contact and willing to transport at discharge.         Ana Luisa Le RN

## 2024-08-08 NOTE — PROGRESS NOTES
08/08/24 0740   Appointment Info   Signing Clinician's Name / Credentials (OT) Nancy Watkins OTD OTR/L   Living Environment   People in Home spouse   Current Living Arrangements house   Home Accessibility stairs to enter home;stairs within home   Number of Stairs, Main Entrance 2   Number of Stairs, Within Home, Primary greater than 10 stairs   Living Environment Comments Walk in shower on upper level, has bedrooms available to stay on main level, 2 steps down into living room space   Self-Care   Regular Exercise Yes   Activity/Exercise Type other (see comments)  (golf)   Equipment Currently Used at Home shower chair   Fall history within last six months yes   Number of times patient has fallen within last six months 2   Activity/Exercise/Self-Care Comment Typically ind with all ADLs and IADLs - does most yardwork and house projects   General Information   Onset of Illness/Injury or Date of Surgery 08/07/24   Referring Physician Ольга Aparicio MD   Patient/Family Therapy Goal Statement (OT) To get stronger   Existing Precautions/Restrictions cardiac;sternal   Left Upper Extremity (Weight-bearing Status) other (see comments)   Right Upper Extremity (Weight-bearing Status) other (see comments)   General Observations and Info 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 - reports increased LE weakness and balance deficits recently   Cognitive Status Examination   Orientation Status orientation to person, place and time   Affect/Mental Status (Cognitive) WFL   Follows Commands WFL   Visual Perception   Visual Impairment/Limitations corrective lenses full-time   Posture   Posture not impaired   Range of Motion Comprehensive   General Range of Motion bilateral upper extremity ROM WFL   Comment, General Range of Motion Not formally assessed d/t sternal precautions   Strength Comprehensive (MMT)   General Manual Muscle Testing (MMT) Assessment no strength deficits identified   Comment, General Manual Muscle Testing (MMT)  Assessment Not formally assessed d/t sternal precautions   Bed Mobility   Bed Mobility supine-sit   Supine-Sit [place] (Bed Mobility) moderate assist (50% patient effort);2 person assist   Assistive Device (Bed Mobility) draw sheet   Transfers   Transfers sit-stand transfer;toilet transfer   Sit-Stand Transfer   Sit-Stand [place] (Transfers) minimum assist (75% patient effort)   Toilet Transfer   [place] Level (Toilet Transfer) minimum assist (75% patient effort);2 person assist   Balance   Balance Assessment standing dynamic balance   Standing Balance: Dynamic minimal assist   Activities of Daily Living   BADL Assessment/Intervention lower body dressing;toileting   Lower Body Dressing Assessment/Training   [place] Level (Lower Body Dressing) maximum assist (25% patient effort)   Toileting   [place] Level (Toileting) moderate assist (50% patient effort)   Clinical Impression   Criteria for Skilled Therapeutic Interventions Met (OT) Yes, treatment indicated   OT Diagnosis Decreased activity tolerance and ind with ADLs   Influenced by the following impairments S/p CABGx3   OT Problem List-Impairments impacting ADL activity tolerance impaired;balance;pain;post-surgical precautions;strength;mobility   Assessment of Occupational Performance 3-5 Performance Deficits   Identified Performance Deficits fxl transfers, mobility, dressing, toileting, activity tolerance   Planned Therapy Interventions (OT) ADL retraining;bed mobility training;transfer training;progressive activity/exercise;risk factor education;home program guidelines;strengthening   Clinical Decision Making Complexity (OT) detailed assessment/moderate complexity   Risk & Benefits of therapy have been explained evaluation/treatment results reviewed;care plan/treatment goals reviewed;risks/benefits reviewed;current/potential barriers reviewed;patient   OT Total Evaluation Time   OT Eval, Moderate Complexity Minutes (57638) 8   OT Goals    Therapy Frequency (OT) 2 times/day   OT Predicted Duration/Target Date for Goal Attainment 08/14/24   OT Goals Cardiac Phase 1   OT: Understanding of cardiac education to maximize quality of life, condition management, and health outcomes Patient;Verbalize   OT: Perform aerobic activity with stable cardiovascular response intermittent;10 minutes   OT: Functional/aerobic ambulation tolerance with stable cardiovascular response in order to return to home and community environment Modified independent;Greater than 300 feet   OT: Navigation of stairs simulating home set up with stable cardiovascular response in order to return to home and community environment Supervision/SBA;Greater than 10 stairs   Interventions   Interventions Quick Adds Cardiac Rehab   Self-Care/Home Management   Self-Care/Home Mgmt/ADL, Compensatory, Meal Prep Minutes (53444) 10   Symptoms Noted During/After Treatment (Meal Preparation/Planning Training) none   Treatment Detail/Skilled Intervention Eval completed, treatment initiated. Instructed sternal precautions related to ADLs and transfers, pt demos understanding. Cueing for set up prior to transfers - STS CGA following cueing, no dizziness noted, incidental assist from second person for lines. Tolerated standing ~ 5 minutes, BP ranging 120-140's/80's. Able to shift weight CGA, CGA for controlled descent to chair, cueing for safety and sternal precautions   Therapeutic Procedures/Exercise   Therapeutic Procedure: strength, endurance, ROM, flexibillity minutes (45926) 8   Symptoms Noted During/After Treatment none   Treatment Detail/Skilled Intervention See calisthenics   Treatment Time Includes (CR Only) See specific exercise details intervention group(s);Monitoring of vital signs (see vital signs flowsheet for details);Extra time managing multiple lines/tubes   Calisthenics   Type Hip Flexor: kicks;March in place;Knee Flexion;Knee Bends;Hip Abductors   Symptoms Denies symptoms    Cardiovascular Response Normal   Exercise Details Seated miguelina completed - min cueing for pacing   Vital Signs Details /62 following exer   Cardiac Education   Education Provided Precautions   Cardiac Rehab Phase II Plan   Phase II Order Received Yes   Phase II Appointment Status Scheduled   Date/Time 8/20 7:45am   Location [facility]   OT Discharge Planning   OT Plan Progress standing tolerance, ambulation once lines out, bed mobility, miguelina   OT Discharge Recommendation (DC Rec) home with outpatient cardiac rehab   OT Rationale for DC Rec Pt progressing as expected with therapy, has supportive spouse at home, anticipate progress to home with OP cardiac rehab   OT Brief overview of current status CGA STS transfers, CGA standing tolerance   Total Session Time   Timed Code Treatment Minutes 18   Total Session Time (sum of timed and untimed services) 26

## 2024-08-09 ENCOUNTER — APPOINTMENT (OUTPATIENT)
Dept: OCCUPATIONAL THERAPY | Facility: HOSPITAL | Age: 84
DRG: 235 | End: 2024-08-09
Attending: THORACIC SURGERY (CARDIOTHORACIC VASCULAR SURGERY)
Payer: COMMERCIAL

## 2024-08-09 LAB
ATRIAL RATE - MUSE: 52 BPM
CA-I BLD-MCNC: 4.5 MG/DL (ref 4.4–5.2)
CA-I BLD-MCNC: 4.6 MG/DL (ref 4.4–5.2)
DIASTOLIC BLOOD PRESSURE - MUSE: NORMAL MMHG
GLUCOSE BLDC GLUCOMTR-MCNC: 107 MG/DL (ref 70–99)
GLUCOSE BLDC GLUCOMTR-MCNC: 110 MG/DL (ref 70–99)
GLUCOSE BLDC GLUCOMTR-MCNC: 115 MG/DL (ref 70–99)
GLUCOSE BLDC GLUCOMTR-MCNC: 142 MG/DL (ref 70–99)
INTERPRETATION ECG - MUSE: NORMAL
MAGNESIUM SERPL-MCNC: 2.4 MG/DL (ref 1.7–2.3)
MAGNESIUM SERPL-MCNC: 2.4 MG/DL (ref 1.7–2.3)
P AXIS - MUSE: NORMAL DEGREES
PHOSPHATE SERPL-MCNC: 3.2 MG/DL (ref 2.5–4.5)
PHOSPHATE SERPL-MCNC: 3.3 MG/DL (ref 2.5–4.5)
POTASSIUM SERPL-SCNC: 4.2 MMOL/L (ref 3.4–5.3)
POTASSIUM SERPL-SCNC: 4.3 MMOL/L (ref 3.4–5.3)
PR INTERVAL - MUSE: NORMAL MS
QRS DURATION - MUSE: 86 MS
QT - MUSE: 360 MS
QTC - MUSE: 399 MS
R AXIS - MUSE: -15 DEGREES
SYSTOLIC BLOOD PRESSURE - MUSE: NORMAL MMHG
T AXIS - MUSE: 15 DEGREES
VENTRICULAR RATE- MUSE: 74 BPM

## 2024-08-09 PROCEDURE — 83735 ASSAY OF MAGNESIUM: CPT | Performed by: THORACIC SURGERY (CARDIOTHORACIC VASCULAR SURGERY)

## 2024-08-09 PROCEDURE — 97535 SELF CARE MNGMENT TRAINING: CPT | Mod: GO

## 2024-08-09 PROCEDURE — 36415 COLL VENOUS BLD VENIPUNCTURE: CPT | Performed by: PHYSICIAN ASSISTANT

## 2024-08-09 PROCEDURE — 250N000013 HC RX MED GY IP 250 OP 250 PS 637: Performed by: PHYSICIAN ASSISTANT

## 2024-08-09 PROCEDURE — 84100 ASSAY OF PHOSPHORUS: CPT | Performed by: THORACIC SURGERY (CARDIOTHORACIC VASCULAR SURGERY)

## 2024-08-09 PROCEDURE — 250N000011 HC RX IP 250 OP 636: Performed by: SURGERY

## 2024-08-09 PROCEDURE — 250N000011 HC RX IP 250 OP 636: Performed by: PHYSICIAN ASSISTANT

## 2024-08-09 PROCEDURE — 84100 ASSAY OF PHOSPHORUS: CPT | Performed by: PHYSICIAN ASSISTANT

## 2024-08-09 PROCEDURE — 999N000157 HC STATISTIC RCP TIME EA 10 MIN

## 2024-08-09 PROCEDURE — 97110 THERAPEUTIC EXERCISES: CPT | Mod: GO

## 2024-08-09 PROCEDURE — 82330 ASSAY OF CALCIUM: CPT | Performed by: PHYSICIAN ASSISTANT

## 2024-08-09 PROCEDURE — 93005 ELECTROCARDIOGRAM TRACING: CPT

## 2024-08-09 PROCEDURE — 120N000013 HC R&B IMCU

## 2024-08-09 PROCEDURE — 94799 UNLISTED PULMONARY SVC/PX: CPT

## 2024-08-09 PROCEDURE — 83735 ASSAY OF MAGNESIUM: CPT | Performed by: PHYSICIAN ASSISTANT

## 2024-08-09 PROCEDURE — 84132 ASSAY OF SERUM POTASSIUM: CPT | Performed by: PHYSICIAN ASSISTANT

## 2024-08-09 PROCEDURE — 84132 ASSAY OF SERUM POTASSIUM: CPT | Performed by: THORACIC SURGERY (CARDIOTHORACIC VASCULAR SURGERY)

## 2024-08-09 PROCEDURE — 93010 ELECTROCARDIOGRAM REPORT: CPT | Performed by: STUDENT IN AN ORGANIZED HEALTH CARE EDUCATION/TRAINING PROGRAM

## 2024-08-09 RX ORDER — METOPROLOL TARTRATE 25 MG/1
25 TABLET, FILM COATED ORAL 2 TIMES DAILY
Status: DISCONTINUED | OUTPATIENT
Start: 2024-08-09 | End: 2024-08-10

## 2024-08-09 RX ORDER — FUROSEMIDE 10 MG/ML
40 INJECTION INTRAMUSCULAR; INTRAVENOUS
Status: DISCONTINUED | OUTPATIENT
Start: 2024-08-09 | End: 2024-08-12

## 2024-08-09 RX ADMIN — ASPIRIN 81 MG CHEWABLE TABLET 162 MG: 81 TABLET CHEWABLE at 08:30

## 2024-08-09 RX ADMIN — SENNOSIDES AND DOCUSATE SODIUM 1 TABLET: 8.6; 5 TABLET ORAL at 20:18

## 2024-08-09 RX ADMIN — METOPROLOL TARTRATE 25 MG: 25 TABLET, FILM COATED ORAL at 08:30

## 2024-08-09 RX ADMIN — AMIODARONE HYDROCHLORIDE 1 MG/MIN: 1.8 INJECTION, SOLUTION INTRAVENOUS at 06:56

## 2024-08-09 RX ADMIN — AMIODARONE HYDROCHLORIDE 0.5 MG/MIN: 1.8 INJECTION, SOLUTION INTRAVENOUS at 13:56

## 2024-08-09 RX ADMIN — OXYCODONE HYDROCHLORIDE 5 MG: 5 TABLET ORAL at 08:38

## 2024-08-09 RX ADMIN — POLYETHYLENE GLYCOL 3350 17 G: 17 POWDER, FOR SOLUTION ORAL at 08:37

## 2024-08-09 RX ADMIN — ACETAMINOPHEN 975 MG: 325 TABLET ORAL at 05:09

## 2024-08-09 RX ADMIN — HEPARIN SODIUM 5000 UNITS: 10000 INJECTION, SOLUTION INTRAVENOUS; SUBCUTANEOUS at 05:15

## 2024-08-09 RX ADMIN — SENNOSIDES AND DOCUSATE SODIUM 1 TABLET: 8.6; 5 TABLET ORAL at 08:31

## 2024-08-09 RX ADMIN — HEPARIN SODIUM 5000 UNITS: 10000 INJECTION, SOLUTION INTRAVENOUS; SUBCUTANEOUS at 13:56

## 2024-08-09 RX ADMIN — ACETAMINOPHEN 975 MG: 325 TABLET ORAL at 20:18

## 2024-08-09 RX ADMIN — ATORVASTATIN CALCIUM 80 MG: 40 TABLET, FILM COATED ORAL at 20:19

## 2024-08-09 RX ADMIN — AMIODARONE HYDROCHLORIDE 150 MG: 1.5 INJECTION, SOLUTION INTRAVENOUS at 06:34

## 2024-08-09 RX ADMIN — METOPROLOL TARTRATE 25 MG: 25 TABLET, FILM COATED ORAL at 20:18

## 2024-08-09 RX ADMIN — OXYCODONE HYDROCHLORIDE 5 MG: 5 TABLET ORAL at 20:19

## 2024-08-09 RX ADMIN — INSULIN ASPART 1 UNITS: 100 INJECTION, SOLUTION INTRAVENOUS; SUBCUTANEOUS at 12:20

## 2024-08-09 RX ADMIN — FUROSEMIDE 40 MG: 10 INJECTION, SOLUTION INTRAMUSCULAR; INTRAVENOUS at 18:39

## 2024-08-09 RX ADMIN — HEPARIN SODIUM 5000 UNITS: 10000 INJECTION, SOLUTION INTRAVENOUS; SUBCUTANEOUS at 21:41

## 2024-08-09 RX ADMIN — ACETAMINOPHEN 975 MG: 325 TABLET ORAL at 12:19

## 2024-08-09 RX ADMIN — HYDRALAZINE HYDROCHLORIDE 10 MG: 20 INJECTION INTRAMUSCULAR; INTRAVENOUS at 05:10

## 2024-08-09 RX ADMIN — PANTOPRAZOLE SODIUM 40 MG: 40 TABLET, DELAYED RELEASE ORAL at 08:30

## 2024-08-09 RX ADMIN — LIDOCAINE 2 PATCH: 246 PATCH TOPICAL at 12:20

## 2024-08-09 RX ADMIN — FUROSEMIDE 40 MG: 10 INJECTION, SOLUTION INTRAMUSCULAR; INTRAVENOUS at 08:31

## 2024-08-09 ASSESSMENT — ACTIVITIES OF DAILY LIVING (ADL)
ADLS_ACUITY_SCORE: 34
ADLS_ACUITY_SCORE: 33
ADLS_ACUITY_SCORE: 34
ADLS_ACUITY_SCORE: 33
ADLS_ACUITY_SCORE: 34
ADLS_ACUITY_SCORE: 34

## 2024-08-09 NOTE — PLAN OF CARE
Problem: Adult Inpatient Plan of Care  Goal: Plan of Care Review  Description: The Plan of Care Review/Shift note should be completed every shift.  The Outcome Evaluation is a brief statement about your assessment that the patient is improving, declining, or no change.  This information will be displayed automatically on your shift  note.  Outcome: Progressing     Problem: Risk for Delirium  Goal: Optimal Coping  Outcome: Progressing  Intervention: Optimize Psychosocial Adjustment to Delirium  Recent Flowsheet Documentation  Taken 8/8/2024 2000 by Reva Ballard RN  Family/Support System Care:   caregiver stress acknowledged   presence promoted   involvement promoted     Problem: Cardiovascular Surgery  Goal: Improved Activity Tolerance  Outcome: Progressing  Goal: Optimal Coping with Heart Surgery  Outcome: Progressing  Intervention: Support Psychosocial Response to Surgery  Recent Flowsheet Documentation  Taken 8/8/2024 2000 by Reva Ballard, RN  Family/Support System Care:   caregiver stress acknowledged   presence promoted   involvement promoted   Goal Outcome Evaluation:      [facility] - ICU    RN Progress Note:            Pertinent Assessments:      Please refer to flowsheet rows for full assessment     Alert and oriented. Vitals stable. Chest incision site pain with coughing. Damon patent with low urine out put.           Key Events - This Shift:       Bradycardiac up in the chair tolerated well.   Overnight CPAP tolerated well.  Pain controlled with scheduled Tylenol.   A-fib 's CV surgery was notified Amiodarone per order.                 Barriers to Discharge / Downgrade:     Per provider.

## 2024-08-09 NOTE — PROGRESS NOTES
RCAT Treatment Plan    Patient Score: 8  Patient Acuity: 4    Clinical Indication for Therapy: prevent atelectasis    Therapy Ordered: Patient obtaining 1000 mL on IS and able to demonstrate proper use of IS and Flutter valve today.     Assessment Summary: Patient to continue IS and Flutter valve independently.     Jennifer Barnes RT  8/9/2024

## 2024-08-09 NOTE — PROGRESS NOTES
"CVTS Daily Progress Note   POD#2 s/p CAB x3  Attending: Dr Aparicio  LOS: 2    SUBJECTIVE/INTERVAL EVENTS:    No acute events overnight. Rate controlled Afib this am. Amio bolus and infusion. Patient progressing well. Maintaining oxygen saturations on nasal cannula. Normotensive. Up to chair. Pain well controlled. -BM / f+latus. Tolerating diet. UOP adequate. Chest tube output appropriate. Hgb stable. Patient denies new chest pain, shortness of breath, abdominal pain, calf pain, nausea. Patient has no questions today.     OBJECTIVE:  Temp:  [98  F (36.7  C)-99.1  F (37.3  C)] 98.7  F (37.1  C)  Pulse:  [] 94  Resp:  [12-40] 22  BP: ()/(51-70) 114/57  MAP:  [59 mmHg-81 mmHg] 63 mmHg  Arterial Line BP: (105-151)/(38-51) 121/40  SpO2:  [87 %-97 %] 94 %  Vitals:    08/07/24 0532 08/08/24 0615 08/09/24 0629   Weight: 89.8 kg (198 lb) 89.7 kg (197 lb 11.2 oz) 92.4 kg (203 lb 9.6 oz)       Clinically Significant Risk Factors          # Hypocalcemia: Lowest Ca = 7.8 mg/dL in last 2 days, will monitor and replace as appropriate     # Hypoalbuminemia: Lowest albumin = 3.3 g/dL at 8/7/2024 12:38 PM, will monitor as appropriate    # Coagulation Defect: INR = 1.58 (Ref range: 0.85 - 1.15) and/or PTT = 58 Seconds (Ref range: 22 - 38 Seconds), will monitor for bleeding         # Acute Hypoxic Respiratory Failure: Documented O2 saturation < 90%. Continue supplemental oxygen as needed  # Acute Hypercapnic Respiratory Failure: based on blood gas results.  Continue supplemental oxygen and ventilatory support as indicated.         # Obesity: Estimated body mass index is 30.96 kg/m  as calculated from the following:    Height as of 8/1/24: 1.727 m (5' 8\").    Weight as of this encounter: 92.4 kg (203 lb 9.6 oz)., PRESENT ON ADMISSION     # Asthma: noted on problem list  # History of CABG: noted on surgical history        Current Medications:    Scheduled Meds:  Current Facility-Administered Medications   Medication Dose Route " Frequency Provider Last Rate Last Admin    acetaminophen (TYLENOL) tablet 975 mg  975 mg Oral Q8H McCrone, Alyssa, PA-C   975 mg at 08/09/24 0509    aspirin (ASA) chewable tablet 162 mg  162 mg Oral or NG Tube Daily McCrone, Alyssa, PA-C   162 mg at 08/09/24 0830    Or    aspirin (ASA) Suppository 300 mg  300 mg Rectal Daily McCrone, Alyssa, PA-C        atorvastatin (LIPITOR) tablet 80 mg  80 mg Oral Daily McCrone, Alyssa, PA-C   80 mg at 08/08/24 2000    furosemide (LASIX) injection 40 mg  40 mg Intravenous BID McCrone, Alyssa, PA-C   40 mg at 08/09/24 0831    heparin ANTICOAGULANT injection 5,000 Units  5,000 Units Subcutaneous Q8H McCrone, Alyssa, PA-C   5,000 Units at 08/09/24 0515    insulin aspart (NovoLOG) injection (RAPID ACTING)  1-7 Units Subcutaneous TID AC McCrone, Alyssa, PA-C   1 Units at 08/08/24 1829    insulin aspart (NovoLOG) injection (RAPID ACTING)  1-5 Units Subcutaneous At Bedtime McCrone, Alyssa, PA-C        Lidocaine (LIDOCARE) 4 % Patch 1-2 patch  1-2 patch Transdermal Q24H McCrone, Alyssa, PA-C   1 patch at 08/08/24 1252    metoprolol tartrate (LOPRESSOR) tablet 25 mg  25 mg Oral BID McCrone, Alyssa, PA-C   25 mg at 08/09/24 0830    pantoprazole (PROTONIX) 2 mg/mL suspension 40 mg  40 mg Oral or NG Tube Daily McCrone, Alyssa, PA-C   40 mg at 08/07/24 1632    Or    pantoprazole (PROTONIX) EC tablet 40 mg  40 mg Oral Daily McCrone, Alyssa, PA-C   40 mg at 08/09/24 0830    polyethylene glycol (MIRALAX) Packet 17 g  17 g Oral Daily McCrone, Alyssa, PA-C   17 g at 08/09/24 0837    senna-docusate (SENOKOT-S/PERICOLACE) 8.6-50 MG per tablet 1 tablet  1 tablet Oral BID McCrone, Alyssa, PA-C   1 tablet at 08/09/24 0831     Continuous Infusions:  Current Facility-Administered Medications   Medication Dose Route Frequency Provider Last Rate Last Admin    amiodarone (NEXTERONE) 1.8 mg/mL in dextrose 5% 200 mL ADULT STANDARD infusion  1 mg/min Intravenous Continuous Alyssa Morales PA-C 33.3 mL/hr at 08/09/24 0800 1 mg/min at  08/09/24 0800    amiodarone (NEXTERONE) 1.8 mg/mL in dextrose 5% 200 mL ADULT STANDARD infusion  0.5 mg/min Intravenous Continuous McCrone, Alyssa, PA-C         PRN Meds:.  Current Facility-Administered Medications   Medication Dose Route Frequency Provider Last Rate Last Admin    [START ON 8/10/2024] acetaminophen (TYLENOL) tablet 650 mg  650 mg Oral Q4H PRN McCrone, Alyssa, PA-C        bisacodyl (DULCOLAX) suppository 10 mg  10 mg Rectal Daily PRN McCrone, Alyssa, PA-C        calcium gluconate 1 g in 50 mL in sodium chloride intermittent infusion  1 g Intravenous Once PRN McCrone, Alyssa, PA-C   1 g at 08/08/24 0945    calcium gluconate 2 g in  mL intermittent infusion  2 g Intravenous Once PRN McCrone, Alyssa, PA-C        calcium gluconate 3 g in sodium chloride 0.9 % 100 mL intermittent infusion  3 g Intravenous Once PRN McCrone, Alyssa, PA-C        glucose gel 15-30 g  15-30 g Oral Q15 Min PRN McCrone, Alyssa, PA-C        Or    dextrose 50 % injection 25-50 mL  25-50 mL Intravenous Q15 Min PRN McCrone, Alyssa, PA-C        Or    glucagon injection 1 mg  1 mg Subcutaneous Q15 Min PRN McCrone, Alyssa, PA-C        hydrALAZINE (APRESOLINE) injection 10 mg  10 mg Intravenous Q30 Min PRN McCrone, Alyssa, PA-C   10 mg at 08/09/24 0510    lactated ringers BOLUS 250 mL  250 mL Intravenous Q15 Min PRN McCrone, Alyssa, PA-C        magnesium hydroxide (MILK OF MAGNESIA) suspension 30 mL  30 mL Oral Daily PRN McCrone, Alyssa, PA-C        naloxone (NARCAN) injection 0.2 mg  0.2 mg Intravenous Q2 Min PRN McCrone, Alyssa, PA-C        Or    naloxone (NARCAN) injection 0.4 mg  0.4 mg Intravenous Q2 Min PRN McCrone, Alyssa, PA-C        Or    naloxone (NARCAN) injection 0.2 mg  0.2 mg Intramuscular Q2 Min PRN McCrone, Alyssa, PA-C        Or    naloxone (NARCAN) injection 0.4 mg  0.4 mg Intramuscular Q2 Min PRN McCrone, TAY Shah-DIAMOND        ondansetron (ZOFRAN ODT) ODT tab 4 mg  4 mg Oral Q6H PRN McCrone, AlyssaTAY franco-C        Or    ondansetron (ZOFRAN) injection  4 mg  4 mg Intravenous Q6H PRN McCrone, Alyssa, PA-C   4 mg at 08/08/24 0452    oxyCODONE (ROXICODONE) tablet 5 mg  5 mg Oral Q4H PRN McCrone, Alyssa, PA-C   5 mg at 08/09/24 0838    Or    oxyCODONE (ROXICODONE) tablet 10 mg  10 mg Oral Q4H PRN McCrone, Alyssa, PA-C        prochlorperazine (COMPAZINE) injection 5 mg  5 mg Intravenous Q6H PRN McCrone, Alyssa, PA-C        Or    prochlorperazine (COMPAZINE) tablet 5 mg  5 mg Oral Q6H PRN McCrone, Alyssa, PA-C           Cardiographics:    Telemetry monitoring demonstrates rate controlled atrial fibrillation with rates in the 80-90's per my personal review.    Imaging:  Results for orders placed or performed during the hospital encounter of 08/07/24   XR Chest Port 1 View    Impression    IMPRESSION:     Endotracheal tube tip is 4 cm above the yelena. Gastric drainage tube tip is in the stomach body. Mediastinal and bilateral pleural drains. Right internal jugular approach [place]-Meera catheter with tip in the region of the right pulmonary artery. Multiple   median sternotomy wires and mediastinal clips.    Lung volumes are low with left greater than right bibasilar atelectasis. Small left pleural effusion. No visible right pleural effusion or pneumothorax.    Nonenlarged cardiac silhouette.   XR Chest Port 1 View    Impression    IMPRESSION: Interval extubation and removal of nasogastric tube. Right IJ [place]-Meera catheter, mediastinal drain, sternal wires, mediastinal surgical clips and left chest tube redemonstrated. Trace left pleural effusion. Left basilar atelectasis has   improved. No right pleural effusion. No new focal airspace opacity, pneumothorax or pulmonary vascular congestion.       Labs, personally reviewed.  Hemoglobin   Date Value Ref Range Status   08/08/2024 8.9 (L) 13.3 - 17.7 g/dL Final   08/07/2024 10.4 (L) 13.3 - 17.7 g/dL Final   08/07/2024 10.4 (L) 13.3 - 17.7 g/dL Final     Hemoglobin POCT   Date Value Ref Range Status   08/07/2024 10.5 (L) 13.3 - 17.7 g/dL  Final   08/07/2024 10.7 (L) 13.3 - 17.7 g/dL Final   08/07/2024 10.2 (L) 13.3 - 17.7 g/dL Final     WBC Count   Date Value Ref Range Status   08/08/2024 10.8 4.0 - 11.0 10e3/uL Final   08/07/2024 17.4 (H) 4.0 - 11.0 10e3/uL Final   08/07/2024 17.7 (H) 4.0 - 11.0 10e3/uL Final     Platelet Count   Date Value Ref Range Status   08/08/2024 171 150 - 450 10e3/uL Final   08/07/2024 194 150 - 450 10e3/uL Final   08/07/2024 208 150 - 450 10e3/uL Final     Creatinine   Date Value Ref Range Status   08/08/2024 1.16 0.67 - 1.17 mg/dL Final   08/07/2024 0.94 0.67 - 1.17 mg/dL Final   08/01/2024 1.12 0.67 - 1.17 mg/dL Final     Potassium   Date Value Ref Range Status   08/09/2024 4.3 3.4 - 5.3 mmol/L Final   08/09/2024 4.2 3.4 - 5.3 mmol/L Final   08/08/2024 4.0 3.4 - 5.3 mmol/L Final   02/23/2022 4.2 3.5 - 5.0 mmol/L Final   09/08/2021 4.6 3.5 - 5.0 mmol/L Final   01/13/2021 4.5 3.5 - 5.0 mmol/L Final     Potassium POCT   Date Value Ref Range Status   08/07/2024 4.2 3.4 - 5.3 mmol/L Final   08/07/2024 4.5 3.4 - 5.3 mmol/L Final   08/07/2024 5.1 3.4 - 5.3 mmol/L Final     Magnesium   Date Value Ref Range Status   08/09/2024 2.4 (H) 1.7 - 2.3 mg/dL Final   08/09/2024 2.4 (H) 1.7 - 2.3 mg/dL Final   08/08/2024 2.5 (H) 1.7 - 2.3 mg/dL Final          I/O:  I/O last 3 completed shifts:  In: 103.2 [I.V.:103.2]  Out: 1085 [Urine:675; Chest Tube:410]       Physical Exam:    General: Patient seen up in chair. NAD. Conversant. Pleasant.   HEENT: TAHIR, no sclera icterus, moist mucosa  CV: RRR on monitor. 2+ peripheral pulses in all extremities. Mild edema.   Pulm: Non-labored effort on 2L NC. Chest tubes in place, serosanguinous output, no air leak. Incision C/D/I.  Abd: Soft, NT, ND  : Damon with radha urine  Ext: Mild pedal edema, SCDs in place, warm, distal pulses intact  Neuro: CNs grossly intact.       ASSESSMENT/PLAN:    Enrike Sotomayor is a 83 year old male with a history of CAD who is s/p CAB x 3.    Active Problems:     Coronary artery disease involving native coronary artery of native heart, unspecified whether angina present        NEURO:   - Scheduled Tylenol/lidocaine patches and PRN Tylenol/oxycodone/dilaudid for pain    CV:   - Pre-op EF 45-50%  - Normotensive  - Metoprolol 25 mg PO BID with parameters  - ASA 162mg  - Atorvastatin 80 mg daily  - Chest tubes to remain today  - Amiodarone bolus and infusion    PULM:   - Extubated POD#0  - Maintaining oxygen saturations on nasal canula  - Encourage pulmonary toilet    FEN/GI:  - NPO, MIVF while intubated  - Continue electrolyte replacement protocol  - Diet: Cardiac, ADAT   - Bowel regimen    RENAL:  - Adequate UOP/hr. Continue to monitor closely.  - Cr 1.16  - Damon to be removed today  - Diuresis with Lasix 40 mg IV BID    HEME:  - Acute blood loss anemia post-op.   - Hgb stable, no bleeding concerns. Hep SQ, ASA    ID:  - Diana op ppx complete, afebrile . No concerns for infection    ENDO:   - Transition to SSI    PPx:   - DVT: SCDs, SQ heparin TID, ambulation   - GI: Protonix 40mg PO daily    DISPO:   - Transfer to general telemetry when bed available  - Medically Ready for Discharge: anticipate 4-5 days     Patient discussed with Dr Aparicio.      Alyssa Morales PA-C  [facility] Cardiothoracic Surgery  [employer] or pager [phone]

## 2024-08-09 NOTE — CONSULTS
NUTRITION EDUCATION      REASON:  Provider order for diet education after Cardiac surgery    Patient is s/p CABG 8/7/24.      NUTRITION HISTORY:  Met patient and patient's spouse Tori.  Tori makes meals from scratch.  They eat a variety of meats, vegetables and fruit.  They avoid fried foods.    NUTRITION DIAGNOSIS:  Food- and nutrition-related knowledge deficit R/t heart disease    INTERVENTIONS:    Nutrition Prescription:  Heart healthy, Low Fat, Low sodium    Implementation:      *  Nutrition Education (Content):   A)  Provided handout Heart Healthy Nutrition Therapy, Label reading tips, Eat Right with My plate.    B)  Discussed low sodium, aiming for 2300 mg sodium/day, this is the amount of sodium in 1 teaspoon table salt.  Low fat-avoid obvious fat, choose heart healthy fat such as olive oil, avocados, nuts.  Work in vegetables, up to 1/2 plate of vegetables for fiber.      *  Nutrition Education (Application):   A)  Discussed current eating habits and recommended alternative food choices      *  Anticipate good compliance      *  Diet Education - refer to Education Flowsheet    Goals:      *  Patient participated in diet education and is considering increasing intake of vegetables in his day.      *  All of the above goals met during the education session    Follow Up/Monitoring:      *  Provided RD contact information for future questions      *  Further diet education opportunities in the cardiac rehab outpatient program.

## 2024-08-09 NOTE — PLAN OF CARE
Goal Outcome Evaluation:                        Problem: Adult Inpatient Plan of Care  Goal: Absence of Hospital-Acquired Illness or Injury  Intervention: Identify and Manage Fall Risk  Recent Flowsheet Documentation  Taken 8/8/2024 1600 by Marily Roper RN  Safety Promotion/Fall Prevention:   activity supervised   mobility aid in reach   nonskid shoes/slippers when out of bed   patient and family education   room door open   room near nurse's station  Taken 8/8/2024 1200 by Marily Roper RN  Safety Promotion/Fall Prevention:   activity supervised   mobility aid in reach   nonskid shoes/slippers when out of bed   patient and family education   room door open   room near nurse's station  Taken 8/8/2024 0800 by Marily Roper RN  Safety Promotion/Fall Prevention:   activity supervised   mobility aid in reach   nonskid shoes/slippers when out of bed   patient and family education   room door open   room near nurse's station  Intervention: Prevent Skin Injury  Recent Flowsheet Documentation  Taken 8/8/2024 1600 by Marily Roper RN  Body Position: position changed independently  Intervention: Prevent and Manage VTE (Venous Thromboembolism) Risk  Recent Flowsheet Documentation  Taken 8/8/2024 1600 by Marily Roper RN  VTE Prevention/Management: SCDs on (sequential compression devices)  Taken 8/8/2024 1200 by Marily Roper RN  VTE Prevention/Management: SCDs on (sequential compression devices)  Taken 8/8/2024 0800 by Marily Roper RN  VTE Prevention/Management: SCDs on (sequential compression devices)  Intervention: Prevent Infection  Recent Flowsheet Documentation  Taken 8/8/2024 1600 by Marily Roper RN  Infection Prevention:   single patient room provided   hand hygiene promoted  Taken 8/8/2024 1200 by Marily Roper RN  Infection Prevention:   single patient room provided   hand hygiene promoted  Taken 8/8/2024 0800 by Marily Roper RN  Infection Prevention:   single patient room provided   hand  hygiene promoted  Goal: Optimal Comfort and Wellbeing  Intervention: Monitor Pain and Promote Comfort  Recent Flowsheet Documentation  Taken 8/8/2024 1200 by Marily Ropre RN  Pain Management Interventions: declines  Taken 8/8/2024 0800 by Marily Roper RN  Pain Management Interventions: declines  Intervention: Provide Person-Centered Care  Recent Flowsheet Documentation  Taken 8/8/2024 1600 by Marily Roper RN  Trust Relationship/Rapport:   care explained   questions answered   questions encouraged   reassurance provided  Taken 8/8/2024 1200 by Marily Roper RN  Trust Relationship/Rapport:   care explained   questions answered   questions encouraged   reassurance provided  Taken 8/8/2024 0800 by Marily Roper RN  Trust Relationship/Rapport:   care explained   questions answered   questions encouraged   reassurance provided   [facility] - ICU    RN Progress Note:            Pertinent Assessments:      Please refer to flowsheet rows for full assessment     Lines out, pressors off           Key Events - This Shift:       Patient up to the chair most of this shift. Titrated off Ramon, lines pulled this afternoon. Patient remains on 4L NC, has been using IS independently, proper technique noted. Patient rating pain 1/10, states it goes up to 4 with activity, declines the need for PRN pain meds.    Damon catheter output has been sluggish, urine dark red. Catheter was fulshed x1, no clots noted. PA updated, lasix was ordered x1.                 Barriers to Discharge / Downgrade:     Per provider         Point of Contact Update: YES-OR-NO: Yes    Name:Wife updated at the bedside throughout this shift.

## 2024-08-10 ENCOUNTER — APPOINTMENT (OUTPATIENT)
Dept: OCCUPATIONAL THERAPY | Facility: HOSPITAL | Age: 84
DRG: 235 | End: 2024-08-10
Attending: THORACIC SURGERY (CARDIOTHORACIC VASCULAR SURGERY)
Payer: COMMERCIAL

## 2024-08-10 LAB
CA-I BLD-MCNC: 4.4 MG/DL (ref 4.4–5.2)
GLUCOSE BLDC GLUCOMTR-MCNC: 104 MG/DL (ref 70–99)
GLUCOSE BLDC GLUCOMTR-MCNC: 113 MG/DL (ref 70–99)
GLUCOSE BLDC GLUCOMTR-MCNC: 127 MG/DL (ref 70–99)
GLUCOSE BLDC GLUCOMTR-MCNC: 93 MG/DL (ref 70–99)
MAGNESIUM SERPL-MCNC: 2.1 MG/DL (ref 1.7–2.3)
PHOSPHATE SERPL-MCNC: 2.7 MG/DL (ref 2.5–4.5)
POTASSIUM SERPL-SCNC: 3.9 MMOL/L (ref 3.4–5.3)

## 2024-08-10 PROCEDURE — 97535 SELF CARE MNGMENT TRAINING: CPT | Mod: GO

## 2024-08-10 PROCEDURE — 84132 ASSAY OF SERUM POTASSIUM: CPT | Performed by: PHYSICIAN ASSISTANT

## 2024-08-10 PROCEDURE — 82330 ASSAY OF CALCIUM: CPT | Performed by: PHYSICIAN ASSISTANT

## 2024-08-10 PROCEDURE — 93005 ELECTROCARDIOGRAM TRACING: CPT

## 2024-08-10 PROCEDURE — 250N000013 HC RX MED GY IP 250 OP 250 PS 637: Performed by: PHYSICIAN ASSISTANT

## 2024-08-10 PROCEDURE — 83735 ASSAY OF MAGNESIUM: CPT | Performed by: PHYSICIAN ASSISTANT

## 2024-08-10 PROCEDURE — 120N000013 HC R&B IMCU

## 2024-08-10 PROCEDURE — 250N000011 HC RX IP 250 OP 636: Mod: JZ | Performed by: PHYSICIAN ASSISTANT

## 2024-08-10 PROCEDURE — 258N000003 HC RX IP 258 OP 636: Performed by: THORACIC SURGERY (CARDIOTHORACIC VASCULAR SURGERY)

## 2024-08-10 PROCEDURE — 999N000157 HC STATISTIC RCP TIME EA 10 MIN

## 2024-08-10 PROCEDURE — 36415 COLL VENOUS BLD VENIPUNCTURE: CPT | Performed by: PHYSICIAN ASSISTANT

## 2024-08-10 PROCEDURE — 93010 ELECTROCARDIOGRAM REPORT: CPT | Performed by: INTERNAL MEDICINE

## 2024-08-10 PROCEDURE — 84100 ASSAY OF PHOSPHORUS: CPT | Performed by: PHYSICIAN ASSISTANT

## 2024-08-10 PROCEDURE — 97110 THERAPEUTIC EXERCISES: CPT | Mod: GO

## 2024-08-10 PROCEDURE — 250N000009 HC RX 250: Performed by: THORACIC SURGERY (CARDIOTHORACIC VASCULAR SURGERY)

## 2024-08-10 PROCEDURE — 5A09357 ASSISTANCE WITH RESPIRATORY VENTILATION, LESS THAN 24 CONSECUTIVE HOURS, CONTINUOUS POSITIVE AIRWAY PRESSURE: ICD-10-PCS | Performed by: PHYSICIAN ASSISTANT

## 2024-08-10 RX ORDER — AMIODARONE HYDROCHLORIDE 200 MG/1
200 TABLET ORAL DAILY
Status: DISCONTINUED | OUTPATIENT
Start: 2024-08-17 | End: 2024-08-10

## 2024-08-10 RX ORDER — AMIODARONE HYDROCHLORIDE 200 MG/1
200 TABLET ORAL DAILY
Status: DISCONTINUED | OUTPATIENT
Start: 2024-08-10 | End: 2024-08-10

## 2024-08-10 RX ORDER — AMIODARONE HYDROCHLORIDE 200 MG/1
200 TABLET ORAL DAILY
Status: DISCONTINUED | OUTPATIENT
Start: 2024-08-16 | End: 2024-08-11

## 2024-08-10 RX ORDER — ASPIRIN 81 MG/1
81 TABLET, CHEWABLE ORAL DAILY
Status: DISCONTINUED | OUTPATIENT
Start: 2024-08-11 | End: 2024-08-11

## 2024-08-10 RX ORDER — AMIODARONE HYDROCHLORIDE 200 MG/1
200 TABLET ORAL 2 TIMES DAILY
Status: DISCONTINUED | OUTPATIENT
Start: 2024-08-14 | End: 2024-08-11

## 2024-08-10 RX ORDER — AMIODARONE HYDROCHLORIDE 200 MG/1
200 TABLET ORAL 2 TIMES DAILY
Status: DISCONTINUED | OUTPATIENT
Start: 2024-08-14 | End: 2024-08-10

## 2024-08-10 RX ORDER — AMIODARONE HYDROCHLORIDE 200 MG/1
400 TABLET ORAL 2 TIMES DAILY
Status: DISCONTINUED | OUTPATIENT
Start: 2024-08-10 | End: 2024-08-10

## 2024-08-10 RX ORDER — AMIODARONE HYDROCHLORIDE 200 MG/1
400 TABLET ORAL 2 TIMES DAILY
Status: DISCONTINUED | OUTPATIENT
Start: 2024-08-10 | End: 2024-08-11

## 2024-08-10 RX ADMIN — AMIODARONE HYDROCHLORIDE 400 MG: 200 TABLET ORAL at 20:40

## 2024-08-10 RX ADMIN — APIXABAN 5 MG: 5 TABLET, FILM COATED ORAL at 09:37

## 2024-08-10 RX ADMIN — FUROSEMIDE 40 MG: 10 INJECTION, SOLUTION INTRAMUSCULAR; INTRAVENOUS at 08:06

## 2024-08-10 RX ADMIN — ASPIRIN 81 MG CHEWABLE TABLET 162 MG: 81 TABLET CHEWABLE at 08:04

## 2024-08-10 RX ADMIN — AMIODARONE HYDROCHLORIDE 400 MG: 200 TABLET ORAL at 09:37

## 2024-08-10 RX ADMIN — SODIUM PHOSPHATE, MONOBASIC, MONOHYDRATE AND SODIUM PHOSPHATE, DIBASIC, ANHYDROUS 9 MMOL: 142; 276 INJECTION, SOLUTION INTRAVENOUS at 08:08

## 2024-08-10 RX ADMIN — CALCIUM GLUCONATE 1 G: 20 INJECTION, SOLUTION INTRAVENOUS at 06:24

## 2024-08-10 RX ADMIN — HEPARIN SODIUM 5000 UNITS: 10000 INJECTION, SOLUTION INTRAVENOUS; SUBCUTANEOUS at 06:24

## 2024-08-10 RX ADMIN — ACETAMINOPHEN 975 MG: 325 TABLET ORAL at 04:59

## 2024-08-10 RX ADMIN — METOPROLOL TARTRATE 37.5 MG: 25 TABLET, FILM COATED ORAL at 08:05

## 2024-08-10 RX ADMIN — FUROSEMIDE 40 MG: 10 INJECTION, SOLUTION INTRAMUSCULAR; INTRAVENOUS at 17:54

## 2024-08-10 RX ADMIN — APIXABAN 5 MG: 5 TABLET, FILM COATED ORAL at 20:40

## 2024-08-10 RX ADMIN — ATORVASTATIN CALCIUM 80 MG: 40 TABLET, FILM COATED ORAL at 20:43

## 2024-08-10 RX ADMIN — SENNOSIDES AND DOCUSATE SODIUM 1 TABLET: 8.6; 5 TABLET ORAL at 08:06

## 2024-08-10 RX ADMIN — PANTOPRAZOLE SODIUM 40 MG: 40 TABLET, DELAYED RELEASE ORAL at 08:06

## 2024-08-10 RX ADMIN — POLYETHYLENE GLYCOL 3350 17 G: 17 POWDER, FOR SOLUTION ORAL at 08:05

## 2024-08-10 RX ADMIN — AMIODARONE HYDROCHLORIDE 200 MG: 200 TABLET ORAL at 08:14

## 2024-08-10 ASSESSMENT — ACTIVITIES OF DAILY LIVING (ADL)
ADLS_ACUITY_SCORE: 37
ADLS_ACUITY_SCORE: 34
ADLS_ACUITY_SCORE: 37
ADLS_ACUITY_SCORE: 37
ADLS_ACUITY_SCORE: 34
ADLS_ACUITY_SCORE: 37
ADLS_ACUITY_SCORE: 34
ADLS_ACUITY_SCORE: 36
ADLS_ACUITY_SCORE: 36
ADLS_ACUITY_SCORE: 34
ADLS_ACUITY_SCORE: 34
ADLS_ACUITY_SCORE: 37
ADLS_ACUITY_SCORE: 34
ADLS_ACUITY_SCORE: 37
ADLS_ACUITY_SCORE: 34
ADLS_ACUITY_SCORE: 37
ADLS_ACUITY_SCORE: 34
ADLS_ACUITY_SCORE: 34
ADLS_ACUITY_SCORE: 36

## 2024-08-10 NOTE — PLAN OF CARE
Problem: Adult Inpatient Plan of Care  Goal: Absence of Hospital-Acquired Illness or Injury  Intervention: Prevent Infection  Recent Flowsheet Documentation  Taken 8/10/2024 0400 by Marni Dickey RN  Infection Prevention:   environmental surveillance performed   hand hygiene promoted   rest/sleep promoted  Taken 8/10/2024 0000 by Marni Dickey RN  Infection Prevention:   environmental surveillance performed   hand hygiene promoted   rest/sleep promoted  Taken 8/9/2024 2000 by Marni Dickey RN  Infection Prevention:   environmental surveillance performed   hand hygiene promoted   rest/sleep promoted       Problem: Cardiovascular Surgery  Goal: Absence of Infection Signs and Symptoms  Outcome: Progressing  Intervention: Prevent or Manage Infection  Recent Flowsheet Documentation  Taken 8/10/2024 0400 by Marni Dickey RN  Infection Prevention:   environmental surveillance performed   hand hygiene promoted   rest/sleep promoted  Taken 8/10/2024 0000 by Marni Dickey RN  Infection Prevention:   environmental surveillance performed   hand hygiene promoted   rest/sleep promoted  Taken 8/9/2024 2000 by Marni Dickey RN  Infection Prevention:   environmental surveillance performed   hand hygiene promoted   rest/sleep promoted      Problem: Cardiovascular Surgery  Goal: Effective Oxygenation and Ventilation  Outcome: Progressing  Intervention: Promote Airway Secretion Clearance  Recent Flowsheet Documentation  Taken 8/10/2024 0400 by Marni Dickey RN  Administration (IS):   instruction provided, follow-up   proper technique demonstrated  Cough And Deep Breathing: done independently per patient  Patient Tolerance (IS): good  Taken 8/10/2024 0000 by Marni Dickey RN  Administration (IS):   instruction provided, follow-up   proper technique demonstrated  Cough And Deep Breathing: done independently per patient  Patient Tolerance (IS): good  Taken 8/9/2024 2000 by Marni Dickey RN  Administration (IS):   instruction  "provided, follow-up   proper technique demonstrated  Cough And Deep Breathing: done independently per patient  Patient Tolerance (IS): good     Goal Outcome Evaluation:    [facility] - ICU    RN Progress Note:            Pertinent Assessments:      Please refer to flowsheet rows for full assessment     Patient alert and  oriented x4.  Moves all extremities. Has been increasing activity slowly over past 24 hours. Reports walking in hallways x2 yesterday and up in chair \"several\" times. Patient in Controlled Afib in 50's with occasional PVC's. On 1-2L O2 per NC and 2L per CPAP. Reports not eating diet much and feeling the need to have a BM. Passing flatus. Attempted to have BM x2. Accuchecks WDL. Voiding per urinal without difficulty. Oxy x1.            Key Events - This Shift:       No acute overnight events.                Barriers to Discharge / Downgrade:     Pacer wires (capped), chest tubes         Point of Contact Update: YES-OR-NO: No   If No, reason:   Name:  Phone Number:  Summary of Conversation:              "

## 2024-08-10 NOTE — PLAN OF CARE
[facility] - ICU    RN Progress Note:            Pertinent Assessments:      Please refer to flowsheet rows for full assessment     Pt alertx4, lung sounds clear. Pt had large BM at 0900. Up to chair for meals. Pt now on amiodarone 400 mg bid.    Telemetry reads A.fib. (was Sinus Keanu prior to 0750)    Protocols:  K+: 3.9, am recheck, 8/11.  Magnesium: 2.1, am recheck, 8/11.  Phosphorus: 2.7, replaced, am recheck, 8/11.  Ionized Calcium: 4.4, replaced, am recheck, 8/11.             Key Events - This Shift:       0830 Chest tubes and wires pulled.                Barriers to Discharge / Downgrade:     Ambulating more, heart rhythm.         Point of Contact Update: YES-OR-NO: Yes  If No, reason:   Name:  Phone Number:  Summary of Conversation: Family and patient updated in room on plan of care and goals.          Problem: Cardiovascular Surgery  Goal: Effective Bowel Elimination  Outcome: Progressing  Goal: Effective Cardiac Function  Outcome: Progressing  Goal: Optimal Cerebral Tissue Perfusion  Outcome: Progressing  Intervention: Protect and Optimize Cerebral Perfusion  Recent Flowsheet Documentation  Taken 8/10/2024 1200 by Liberty Saenz RN  Glycemic Management: blood glucose monitored  Taken 8/10/2024 0800 by Liberty Saenz RN  Glycemic Management: blood glucose monitored  Goal: Fluid and Electrolyte Balance  Outcome: Progressing  Goal: Acceptable Pain Control  Outcome: Progressing  Goal: Effective Urinary Elimination  Outcome: Progressing  Goal: Effective Oxygenation and Ventilation  Outcome: Progressing  Intervention: Promote Airway Secretion Clearance  Recent Flowsheet Documentation  Taken 8/10/2024 1200 by Liberty Saenz RN  Cough And Deep Breathing: done independently per patient  Taken 8/10/2024 0800 by Liberty Saenz RN  Administration (IS):   instruction provided, follow-up   proper technique demonstrated  Cough And Deep Breathing: done independently per patient  Patient  Tolerance (IS): good     Problem: Cardiovascular Surgery  Goal: Nausea and Vomiting Relief  Outcome: Met

## 2024-08-10 NOTE — PROGRESS NOTES
"CVTS Daily Progress Note   POD#3 s/p CAB x3  Attending: Dr Aparicio  LOS: 3    SUBJECTIVE/INTERVAL EVENTS:    No acute events overnight. Converted to NSR last evening. Amiodarone PO daily. Patient progressing well. Maintaining oxygen saturations on RA. Normotensive. Up to chair. Pain well controlled. -BM / +flatus. Tolerating diet. UOP adequate. Chest tube output appropriate. Hgb stable. Patient denies new chest pain, shortness of breath, abdominal pain, calf pain, nausea. Patient has no questions today.     OBJECTIVE:  Temp:  [98  F (36.7  C)-99.9  F (37.7  C)] 99  F (37.2  C)  Pulse:  [50-94] 61  Resp:  [17-28] 18  BP: ()/(50-79) 163/79  Cuff Mean (mmHg):  [83] 83  SpO2:  [92 %-98 %] 96 %  Vitals:    08/07/24 0532 08/08/24 0615 08/09/24 0629 08/10/24 0500   Weight: 89.8 kg (198 lb) 89.7 kg (197 lb 11.2 oz) 92.4 kg (203 lb 9.6 oz) 93.1 kg (205 lb 4 oz)       Clinically Significant Risk Factors              # Hypoalbuminemia: Lowest albumin = 3.3 g/dL at 8/7/2024 12:38 PM, will monitor as appropriate            # Acute Hypoxic Respiratory Failure: Documented O2 saturation < 90%. Continue supplemental oxygen as needed  # Acute Hypercapnic Respiratory Failure: based on blood gas results.  Continue supplemental oxygen and ventilatory support as indicated.         # Obesity: Estimated body mass index is 31.21 kg/m  as calculated from the following:    Height as of 8/1/24: 1.727 m (5' 8\").    Weight as of this encounter: 93.1 kg (205 lb 4 oz)., PRESENT ON ADMISSION     # Asthma: noted on problem list  # History of CABG: noted on surgical history        Current Medications:    Scheduled Meds:  Current Facility-Administered Medications   Medication Dose Route Frequency Provider Last Rate Last Admin    amiodarone (PACERONE) tablet 200 mg  200 mg Oral Daily McCrone, Alyssa, PA-C        aspirin (ASA) chewable tablet 162 mg  162 mg Oral or NG Tube Daily McCrone, Alyssa, PA-C   162 mg at 08/09/24 0830    Or    aspirin (ASA) " Suppository 300 mg  300 mg Rectal Daily McCrone, Alyssa, PA-C        atorvastatin (LIPITOR) tablet 80 mg  80 mg Oral Daily McCrone, Alyssa, PA-C   80 mg at 08/09/24 2019    furosemide (LASIX) injection 40 mg  40 mg Intravenous BID McCrone, Alyssa, PA-C   40 mg at 08/09/24 1839    heparin ANTICOAGULANT injection 5,000 Units  5,000 Units Subcutaneous Q8H McCrone, Alyssa, PA-C   5,000 Units at 08/10/24 0624    insulin aspart (NovoLOG) injection (RAPID ACTING)  1-7 Units Subcutaneous TID AC McCrone, Alyssa, PA-C   1 Units at 08/09/24 1220    insulin aspart (NovoLOG) injection (RAPID ACTING)  1-5 Units Subcutaneous At Bedtime McCrone, Alyssa, PA-C        Lidocaine (LIDOCARE) 4 % Patch 1-2 patch  1-2 patch Transdermal Q24H McCrone, Alyssa, PA-C   2 patch at 08/09/24 1220    metoprolol tartrate (LOPRESSOR) half-tab 37.5 mg  37.5 mg Oral BID McCrone, Alyssa, PA-C        pantoprazole (PROTONIX) 2 mg/mL suspension 40 mg  40 mg Oral or NG Tube Daily McCrone, Alyssa, PA-C   40 mg at 08/07/24 1632    Or    pantoprazole (PROTONIX) EC tablet 40 mg  40 mg Oral Daily McCrone, Alyssa, PA-C   40 mg at 08/09/24 0830    polyethylene glycol (MIRALAX) Packet 17 g  17 g Oral Daily McCrone, Alyssa, PA-C   17 g at 08/09/24 0837    senna-docusate (SENOKOT-S/PERICOLACE) 8.6-50 MG per tablet 1 tablet  1 tablet Oral BID McCrone, Alyssa, PA-C   1 tablet at 08/09/24 2018    sodium phosphate 9 mmol in sodium chloride 0.9 % 250 mL intermittent infusion  9 mmol Intravenous Once Ольга Aparicio MD         Continuous Infusions:  Current Facility-Administered Medications   Medication Dose Route Frequency Provider Last Rate Last Admin     PRN Meds:.  Current Facility-Administered Medications   Medication Dose Route Frequency Provider Last Rate Last Admin    acetaminophen (TYLENOL) tablet 650 mg  650 mg Oral Q4H PRN McCrone, Alyssa, PA-C        bisacodyl (DULCOLAX) suppository 10 mg  10 mg Rectal Daily PRN Alyssa Morales PA-C        calcium gluconate 1 g in 50 mL in sodium  chloride intermittent infusion  1 g Intravenous Once PRN McCrone, Alyssa, PA-C   1 g at 08/10/24 0624    calcium gluconate 2 g in  mL intermittent infusion  2 g Intravenous Once PRN McCrone, Alyssa, PA-C        calcium gluconate 3 g in sodium chloride 0.9 % 100 mL intermittent infusion  3 g Intravenous Once PRN McCrone, Alyssa, PA-C        glucose gel 15-30 g  15-30 g Oral Q15 Min PRN McCrone, Alyssa, PA-C        Or    dextrose 50 % injection 25-50 mL  25-50 mL Intravenous Q15 Min PRN McCrone, Alyssa, PA-C        Or    glucagon injection 1 mg  1 mg Subcutaneous Q15 Min PRN McCrone, Alyssa, PA-C        hydrALAZINE (APRESOLINE) injection 10 mg  10 mg Intravenous Q30 Min PRN McCrone, Alyssa, PA-C   10 mg at 08/09/24 0510    lactated ringers BOLUS 250 mL  250 mL Intravenous Q15 Min PRN McCrone, Alyssa, PA-C        magnesium hydroxide (MILK OF MAGNESIA) suspension 30 mL  30 mL Oral Daily PRN McCrone, Alyssa, PA-C        naloxone (NARCAN) injection 0.2 mg  0.2 mg Intravenous Q2 Min PRN McCrone, Alyssa, PA-C        Or    naloxone (NARCAN) injection 0.4 mg  0.4 mg Intravenous Q2 Min PRN McCrone, Alyssa, PA-C        Or    naloxone (NARCAN) injection 0.2 mg  0.2 mg Intramuscular Q2 Min PRN McCrone, Alyssa, PA-C        Or    naloxone (NARCAN) injection 0.4 mg  0.4 mg Intramuscular Q2 Min PRN McCrone, Alyssa, PA-C        ondansetron (ZOFRAN ODT) ODT tab 4 mg  4 mg Oral Q6H PRN McCrone, Alyssa, PA-C        Or    ondansetron (ZOFRAN) injection 4 mg  4 mg Intravenous Q6H PRN McCrone, Alyssa, PA-C   4 mg at 08/08/24 0452    oxyCODONE (ROXICODONE) tablet 5 mg  5 mg Oral Q4H PRN McCrone, Alyssa, PA-C   5 mg at 08/09/24 2019    Or    oxyCODONE (ROXICODONE) tablet 10 mg  10 mg Oral Q4H PRN McCroneAlyssa PA-C        prochlorperazine (COMPAZINE) injection 5 mg  5 mg Intravenous Q6H PRN McCroneAlyssa PA-C        Or    prochlorperazine (COMPAZINE) tablet 5 mg  5 mg Oral Q6H PRN McCroneAlyssa PA-C           Cardiographics:    Telemetry monitoring demonstrates NSR  with rates in the 80's per my personal review.    Imaging:  Results for orders placed or performed during the hospital encounter of 08/07/24   XR Chest Port 1 View    Impression    IMPRESSION:     Endotracheal tube tip is 4 cm above the yelena. Gastric drainage tube tip is in the stomach body. Mediastinal and bilateral pleural drains. Right internal jugular approach Bogue-Meera catheter with tip in the region of the right pulmonary artery. Multiple   median sternotomy wires and mediastinal clips.    Lung volumes are low with left greater than right bibasilar atelectasis. Small left pleural effusion. No visible right pleural effusion or pneumothorax.    Nonenlarged cardiac silhouette.   XR Chest Port 1 View    Impression    IMPRESSION: Interval extubation and removal of nasogastric tube. Right IJ Bogue-Meera catheter, mediastinal drain, sternal wires, mediastinal surgical clips and left chest tube redemonstrated. Trace left pleural effusion. Left basilar atelectasis has   improved. No right pleural effusion. No new focal airspace opacity, pneumothorax or pulmonary vascular congestion.       Labs, personally reviewed.  Hemoglobin   Date Value Ref Range Status   08/08/2024 8.9 (L) 13.3 - 17.7 g/dL Final   08/07/2024 10.4 (L) 13.3 - 17.7 g/dL Final   08/07/2024 10.4 (L) 13.3 - 17.7 g/dL Final     Hemoglobin POCT   Date Value Ref Range Status   08/07/2024 10.5 (L) 13.3 - 17.7 g/dL Final   08/07/2024 10.7 (L) 13.3 - 17.7 g/dL Final   08/07/2024 10.2 (L) 13.3 - 17.7 g/dL Final     WBC Count   Date Value Ref Range Status   08/08/2024 10.8 4.0 - 11.0 10e3/uL Final   08/07/2024 17.4 (H) 4.0 - 11.0 10e3/uL Final   08/07/2024 17.7 (H) 4.0 - 11.0 10e3/uL Final     Platelet Count   Date Value Ref Range Status   08/08/2024 171 150 - 450 10e3/uL Final   08/07/2024 194 150 - 450 10e3/uL Final   08/07/2024 208 150 - 450 10e3/uL Final     Creatinine   Date Value Ref Range Status   08/08/2024 1.16 0.67 - 1.17 mg/dL Final   08/07/2024 0.94  0.67 - 1.17 mg/dL Final   08/01/2024 1.12 0.67 - 1.17 mg/dL Final     Potassium   Date Value Ref Range Status   08/10/2024 3.9 3.4 - 5.3 mmol/L Final   08/09/2024 4.3 3.4 - 5.3 mmol/L Final   08/09/2024 4.2 3.4 - 5.3 mmol/L Final   02/23/2022 4.2 3.5 - 5.0 mmol/L Final   09/08/2021 4.6 3.5 - 5.0 mmol/L Final   01/13/2021 4.5 3.5 - 5.0 mmol/L Final     Potassium POCT   Date Value Ref Range Status   08/07/2024 4.2 3.4 - 5.3 mmol/L Final   08/07/2024 4.5 3.4 - 5.3 mmol/L Final   08/07/2024 5.1 3.4 - 5.3 mmol/L Final     Magnesium   Date Value Ref Range Status   08/10/2024 2.1 1.7 - 2.3 mg/dL Final   08/09/2024 2.4 (H) 1.7 - 2.3 mg/dL Final   08/09/2024 2.4 (H) 1.7 - 2.3 mg/dL Final          I/O:  I/O last 3 completed shifts:  In: 881 [P.O.:700; I.V.:181]  Out: 1765 [Urine:1325; Chest Tube:440]       Physical Exam:    General: Patient seen up in chair. NAD. Conversant. Pleasant.   HEENT: TAHIR, no sclera icterus, moist mucosa  CV: RRR on monitor. 2+ peripheral pulses in all extremities. Mild edema.   Pulm: Non-labored effort on RA. Chest tubes in place, serosanguinous output, no air leak. Incision C/D/I.  Abd: Soft, NT, ND  : Voiding  Ext: Mild pedal edema, SCDs in place, warm, distal pulses intact  Neuro: CNs grossly intact.       ASSESSMENT/PLAN:    Enrike Sotomayor is a 83 year old male with a history of CAD who is s/p CAB x 3.    Active Problems:    Coronary artery disease involving native coronary artery of native heart, unspecified whether angina present        NEURO:   - Scheduled Tylenol/lidocaine patches and PRN Tylenol/oxycodone/dilaudid for pain    CV:   - Pre-op EF 45-50%  - Normotensive  - Metoprolol 25 mg PO BID with parameters-increased to 37.5 mg PO BID  - ASA 162mg  - Atorvastatin 80 mg daily  - Chest tubes and TPW's removed without immediate complications  - Amiodarone 200 mg PO daily  - EKG pending today  - Afib clinic appt scheduled    PULM:   - Extubated POD#0  - Maintaining oxygen saturations RA,  home CPAP overnight  - Encourage pulmonary toilet    FEN/GI:  - Continue electrolyte replacement protocol  - Diet: Cardiac, ADAT   - Bowel regimen    RENAL:  - Adequate UOP/hr. Continue to monitor closely.  - Cr 1.16  - Damon removed POD#2  - Diuresis with Lasix 40 mg IV BID    HEME:  - Acute blood loss anemia post-op.   - Hgb stable, no bleeding concerns. Hep SQ, ASA    ID:  - Diana op ppx complete, afebrile . No concerns for infection    ENDO:   - Transition to SSI    PPx:   - DVT: SCDs, SQ heparin TID, ambulation   - GI: Protonix 40mg PO daily    DISPO:   - Transfer to general telemetry when bed available  - Medically Ready for Discharge: anticipate 2-3days     Patient discussed with Dr Aparicio and Dr Emily Morales PA-C  [facility] Cardiothoracic Surgery  Vocera or pager [phone]

## 2024-08-10 NOTE — PROVIDER NOTIFICATION
RC Note    Patient's Home CPAP setup/H2O filled to the max line. O2 bled in.    08/09/24 1900   Tech Time   $Tech Time (10 minute increments) 1   Mode: CPAP/ BiPAP/ AVAPS/ AVAPS AE   CPAP/BiPAP/ AVAPS/ AVAPS AE Mode CPAP   Equipment   Device home unit   CPAP/BiPAP/Settings   BIPAP/CPAP On Standby Standby

## 2024-08-11 ENCOUNTER — APPOINTMENT (OUTPATIENT)
Dept: OCCUPATIONAL THERAPY | Facility: HOSPITAL | Age: 84
DRG: 235 | End: 2024-08-11
Attending: THORACIC SURGERY (CARDIOTHORACIC VASCULAR SURGERY)
Payer: COMMERCIAL

## 2024-08-11 DIAGNOSIS — I97.89 POSTOPERATIVE ATRIAL FIBRILLATION (H): Primary | ICD-10-CM

## 2024-08-11 DIAGNOSIS — I48.91 POSTOPERATIVE ATRIAL FIBRILLATION (H): Primary | ICD-10-CM

## 2024-08-11 LAB
ATRIAL RATE - MUSE: 202 BPM
CA-I BLD-MCNC: 4.5 MG/DL (ref 4.4–5.2)
DIASTOLIC BLOOD PRESSURE - MUSE: NORMAL MMHG
GLUCOSE BLDC GLUCOMTR-MCNC: 123 MG/DL (ref 70–99)
GLUCOSE BLDC GLUCOMTR-MCNC: 128 MG/DL (ref 70–99)
GLUCOSE BLDC GLUCOMTR-MCNC: 146 MG/DL (ref 70–99)
GLUCOSE BLDC GLUCOMTR-MCNC: 99 MG/DL (ref 70–99)
INTERPRETATION ECG - MUSE: NORMAL
MAGNESIUM SERPL-MCNC: 1.9 MG/DL (ref 1.7–2.3)
P AXIS - MUSE: NORMAL DEGREES
PHOSPHATE SERPL-MCNC: 2.8 MG/DL (ref 2.5–4.5)
PLATELET # BLD AUTO: 168 10E3/UL (ref 150–450)
POTASSIUM SERPL-SCNC: 4.1 MMOL/L (ref 3.4–5.3)
PR INTERVAL - MUSE: NORMAL MS
QRS DURATION - MUSE: 90 MS
QT - MUSE: 370 MS
QTC - MUSE: 429 MS
R AXIS - MUSE: -12 DEGREES
SYSTOLIC BLOOD PRESSURE - MUSE: NORMAL MMHG
T AXIS - MUSE: 4 DEGREES
VENTRICULAR RATE- MUSE: 81 BPM

## 2024-08-11 PROCEDURE — 120N000013 HC R&B IMCU

## 2024-08-11 PROCEDURE — 83735 ASSAY OF MAGNESIUM: CPT | Performed by: THORACIC SURGERY (CARDIOTHORACIC VASCULAR SURGERY)

## 2024-08-11 PROCEDURE — 97110 THERAPEUTIC EXERCISES: CPT | Mod: GO

## 2024-08-11 PROCEDURE — 999N000157 HC STATISTIC RCP TIME EA 10 MIN

## 2024-08-11 PROCEDURE — 250N000013 HC RX MED GY IP 250 OP 250 PS 637: Performed by: PHYSICIAN ASSISTANT

## 2024-08-11 PROCEDURE — 97535 SELF CARE MNGMENT TRAINING: CPT | Mod: GO

## 2024-08-11 PROCEDURE — 85049 AUTOMATED PLATELET COUNT: CPT | Performed by: PHYSICIAN ASSISTANT

## 2024-08-11 PROCEDURE — 84132 ASSAY OF SERUM POTASSIUM: CPT | Performed by: THORACIC SURGERY (CARDIOTHORACIC VASCULAR SURGERY)

## 2024-08-11 PROCEDURE — 84100 ASSAY OF PHOSPHORUS: CPT | Performed by: THORACIC SURGERY (CARDIOTHORACIC VASCULAR SURGERY)

## 2024-08-11 PROCEDURE — 250N000011 HC RX IP 250 OP 636: Performed by: PHYSICIAN ASSISTANT

## 2024-08-11 PROCEDURE — 99223 1ST HOSP IP/OBS HIGH 75: CPT | Performed by: GENERAL ACUTE CARE HOSPITAL

## 2024-08-11 PROCEDURE — 36415 COLL VENOUS BLD VENIPUNCTURE: CPT | Performed by: PHYSICIAN ASSISTANT

## 2024-08-11 PROCEDURE — 250N000013 HC RX MED GY IP 250 OP 250 PS 637: Performed by: THORACIC SURGERY (CARDIOTHORACIC VASCULAR SURGERY)

## 2024-08-11 PROCEDURE — 82330 ASSAY OF CALCIUM: CPT | Performed by: PHYSICIAN ASSISTANT

## 2024-08-11 RX ORDER — AMIODARONE HYDROCHLORIDE 200 MG/1
200 TABLET ORAL 2 TIMES DAILY
Status: DISCONTINUED | OUTPATIENT
Start: 2024-08-14 | End: 2024-08-13 | Stop reason: HOSPADM

## 2024-08-11 RX ORDER — MAGNESIUM OXIDE 400 MG/1
400 TABLET ORAL EVERY 4 HOURS
Status: COMPLETED | OUTPATIENT
Start: 2024-08-11 | End: 2024-08-11

## 2024-08-11 RX ORDER — ASPIRIN 81 MG/1
81 TABLET, CHEWABLE ORAL DAILY
Status: DISCONTINUED | OUTPATIENT
Start: 2024-08-12 | End: 2024-08-13 | Stop reason: HOSPADM

## 2024-08-11 RX ORDER — METOPROLOL TARTRATE 25 MG/1
50 TABLET, FILM COATED ORAL 2 TIMES DAILY
Status: DISCONTINUED | OUTPATIENT
Start: 2024-08-11 | End: 2024-08-13 | Stop reason: HOSPADM

## 2024-08-11 RX ORDER — AMIODARONE HYDROCHLORIDE 200 MG/1
200 TABLET ORAL DAILY
Status: DISCONTINUED | OUTPATIENT
Start: 2024-08-16 | End: 2024-08-13 | Stop reason: HOSPADM

## 2024-08-11 RX ORDER — AMIODARONE HYDROCHLORIDE 200 MG/1
400 TABLET ORAL 2 TIMES DAILY
Status: DISCONTINUED | OUTPATIENT
Start: 2024-08-11 | End: 2024-08-13 | Stop reason: HOSPADM

## 2024-08-11 RX ADMIN — MAGNESIUM OXIDE TAB 400 MG (241.3 MG ELEMENTAL MG) 400 MG: 400 (241.3 MG) TAB at 08:07

## 2024-08-11 RX ADMIN — SENNOSIDES AND DOCUSATE SODIUM 1 TABLET: 8.6; 5 TABLET ORAL at 08:08

## 2024-08-11 RX ADMIN — ASPIRIN 81 MG CHEWABLE TABLET 81 MG: 81 TABLET CHEWABLE at 08:08

## 2024-08-11 RX ADMIN — AMIODARONE HYDROCHLORIDE 400 MG: 200 TABLET ORAL at 08:08

## 2024-08-11 RX ADMIN — METOPROLOL TARTRATE 50 MG: 25 TABLET, FILM COATED ORAL at 08:08

## 2024-08-11 RX ADMIN — MAGNESIUM OXIDE TAB 400 MG (241.3 MG ELEMENTAL MG) 400 MG: 400 (241.3 MG) TAB at 12:48

## 2024-08-11 RX ADMIN — POLYETHYLENE GLYCOL 3350 17 G: 17 POWDER, FOR SOLUTION ORAL at 08:08

## 2024-08-11 RX ADMIN — ATORVASTATIN CALCIUM 80 MG: 40 TABLET, FILM COATED ORAL at 20:26

## 2024-08-11 RX ADMIN — LIDOCAINE 2 PATCH: 246 PATCH TOPICAL at 17:46

## 2024-08-11 RX ADMIN — APIXABAN 5 MG: 5 TABLET, FILM COATED ORAL at 08:08

## 2024-08-11 RX ADMIN — FUROSEMIDE 40 MG: 10 INJECTION, SOLUTION INTRAMUSCULAR; INTRAVENOUS at 17:47

## 2024-08-11 RX ADMIN — FUROSEMIDE 40 MG: 10 INJECTION, SOLUTION INTRAMUSCULAR; INTRAVENOUS at 08:08

## 2024-08-11 RX ADMIN — APIXABAN 5 MG: 5 TABLET, FILM COATED ORAL at 20:26

## 2024-08-11 RX ADMIN — AMIODARONE HYDROCHLORIDE 400 MG: 200 TABLET ORAL at 20:26

## 2024-08-11 RX ADMIN — PANTOPRAZOLE SODIUM 40 MG: 40 TABLET, DELAYED RELEASE ORAL at 08:07

## 2024-08-11 ASSESSMENT — ACTIVITIES OF DAILY LIVING (ADL)
ADLS_ACUITY_SCORE: 36
ADLS_ACUITY_SCORE: 36
ADLS_ACUITY_SCORE: 37
ADLS_ACUITY_SCORE: 36
ADLS_ACUITY_SCORE: 37
ADLS_ACUITY_SCORE: 36
ADLS_ACUITY_SCORE: 37
ADLS_ACUITY_SCORE: 36
ADLS_ACUITY_SCORE: 37
ADLS_ACUITY_SCORE: 36

## 2024-08-11 NOTE — CONSULTS
We have been asked to see Enrike Sotomayor at [facility] by Dr. Ольга Aparicio for evaluation and management of atrial fibrillation.    Impression and Plan     Assessment:  New-onset likely postoperative atrial fibrillation first noted 8/9/2024. He no longer has symptoms and and ventricular rates are controlled. [id number] score is at least 7 (cardiomyopathy, hypertension, age 75 or greater, transient ischemic attack, coronary artery disease).  Coronary artery disease status post three-vessel coronary artery bypass grafting (left internal mammary artery to the left anterior descending artery, reversed saphenous venous graft to an obtuse marginal artery branch and the right posterior descending artery) 8/7/2024. He denies angina.  Ischemic cardiomyopathy with left ventricular ejection fraction of 45-50%. No signs or symptoms of congestive heart failure.  Possibly symptomatic right internal carotid artery stenosis with what sounds like a presenting transient ischemic attack/amaurosis fugax status post right carotid endarterectomy in 2012. Carotid ultrasound 6/12/2024 showed moderate left internal carotid artery stenosis but no significant right internal carotid artery stenosis.  Essential hypertension.  Hyperlipidemia. Last LDL 99 mg/dL.  Obstructive sleep apnea on home CPAP.  Obesity with body mass index 31.05 kg/m .    Plan:  Continue oral amiodarone  Continue apixaban 5 mg twice daily  We will make arrangements for him to follow-up in our atrial fibrillation clinic in 4 week at which point the need for continued amiodarone and apixaban can be determined  Continue atorvastatin 80 mg daily and if his LDL remains 70 mg/dL or higher despite this, adjuvant lipid lowering therapy with either ezetimibe or a PCSK9 inhibitor may be warranted  Continue metoprolol and resume home losartan when able  We will sign off at this time. Please do not hesitate to contact us with additional questions or concerns.    Primary  "Cardiologist: Dr. Justin Corona    Clinically Significant Risk Factors              # Hypoalbuminemia: Lowest albumin = 3.3 g/dL at 8/7/2024 12:38 PM, will monitor as appropriate               # Obesity: Estimated body mass index is 31.05 kg/m  as calculated from the following:    Height as of 8/1/24: 1.727 m (5' 8\").    Weight as of this encounter: 92.6 kg (204 lb 3.2 oz).      # Asthma: noted on problem list  # History of CABG: noted on surgical history    History of Present Illness      Mr. Enrike Sotomayor is a 83 year old male with a history of recently diagnosed CAD s/p 3V CABG 8/7/2024, right CEA in 2012, HTN and HLD who developed postop AF on 8/9/2024. He initially felt palpitations but after starting IV then oral amiodarone, his symptoms have subsided. He briefly converted to sinus rhythm but is currently back in atrial fibrillation. At this time he denies chest pain/pressure/tightness, shortness of breath at rest or with exertion, light headedness/dizziness, pre-syncope, syncope, lower extremity swelling, palpitations, paroxysmal nocturnal dyspnea (PND), or orthopnea.    He was having progressive exertional dyspnea which led to his eventual diagnosis of CAD. He does not recall having any palpitations prior to this hospitalization.    Review of Systems:  Further review of systems is otherwise negative/noncontributory (based on review of medical record (admission H&P) and 13 point review of systems reviewed. Pertinent positives noted).    Cardiac Diagnostics     ECG 8/10/2024 (personally reviewed and interpreted): atrial fibrillation with ventricular rate 81 bpm, LVH by voltage criteria, possible anterior and septal infarcts    Telemetry (personally reviewed): rate-controlled AF    Echocardiogram 8/1/2024 (results reviewed):   1. The left ventricle is normal in size. Left ventricular systolic performance is mildly reduced. The ejection fraction is estimated to be 45-50%.  2. There is moderate mid to distal " anterior hypokinesis along with a small region of apical hypokinesis.  3. There is very mild aortic stenosis.  4. Normal right ventricular size and systolic performance.    Cardiac Cath 6/24/2024 (results reviewed):   LM:no obstruction  LAD:mid-vessel subtotal occlusion v.  w/ L-L collaterals  Lcx:OM1 w/ long area of 75% narrowing  RCA: dominant, proximal 90% , mid-vessel 99% narrowing w/ some antegrade and some L-L distal filling     LVEDP: 18  AV gradient: 12    CT coronary angiogram 6/12/2024 (report reviewed):    Prox Cx to Mid Cx lesion has moderate luminal stenosis in the range of 50-69%.    Hazy images in proximal RCA and proximal LAD, cannot exclude hemodynamically significant obstruction.  FFR pending.  1. The 50-69 % stenosis in the circumflex coronary artery proximal segment has a high likelihood of lesion specific ischemia with an FFRct value of 0.61.   2. The stenosis in the LAD coronary artery proximal segment has a high likelihood of lesion specific ischemia with an FFRct value of 0.65.   3. The stenosis in the proximal RCA is of indeterminate significance, due to poor image quality.     Cardiac Stress Testing 3/27/2024 (results reviewed):   Stress findings:  1. Non-diagnostic exercise stress echocardiogram due to a combination of poor image quality and the inability to achieve target heart rate. Visually estimated post exercise left ventricular ejection fraction appears unchanged at 55-60%.  2. Non-diagnostic exercise stress electrocardiogram due to the inability to achieve target heart rate. No ST segment changes observed with the achieved workload.  3. Functional capacity is impaired. The patient exercised for 3:00 minutes on the Justino protocol, achieving 4.6 METs and 80% the age-predicted maximum heart rate. The patient reported dyspnea with exercise but did not experience chest pain.     Rest findings:  1. Left ventricular chamber size are wall thickness are normal. Overall systolic function  is normal but there is suggestion of distal septal and apical hypokineis. The visually estimated left ventricular ejection fraction is 55-60%.  2. Right ventricle is not well seen. Limited views suggest normal chamber size and systolic function.  3. No hemodynamically significant left-sided valvular abnormalities. The right-sided heart valves are not visualized.     No prior study available for comparison. Recommend stress cardiac MRI for further assessment or pharmacologic nuclear stress testing if MRI cannot be performed.    Medical History  Surgical History Family History Social History   Past Medical History:   Diagnosis Date    Aortic valve sclerosis     Arteriosclerosis of right carotid artery     Bilateral cataracts     CAD (coronary artery disease)     Charcot's joint of foot     Charcot-Carla-Tooth disease     Malignant Hyperthermia precautions per anesthesia    Chronic kidney disease     Chronic rhinitis     Chronic vasomotor rhinitis     Constipation by delayed colonic transit     Decreased hearing of both ears     Grade I diastolic dysfunction     Heart murmur     HLD (hyperlipidemia)     HTN (hypertension)     Lyme disease     Malignant hyperthermia     Precautions risk per anesthesia cervantes to Charcot Carla tooth disease    Nonsenile cataract Mar 2023    Obesity     SHAINA on CPAP     Prostatic hypertrophy     Psychophysiological insomnia     Retinal arterial branch occlusion     Squamous cell carcinoma of skin of face     TIA (transient ischemic attack)     Visual field cut     Right     Past Surgical History:   Procedure Laterality Date    carotid endartectomy Right     CATARACT IOL, RT/LT  Mar 2023    CORONARY ARTERY BYPASS GRAFT, WITH ENDOSCOPIC VESSEL PROCUREMENT N/A 8/7/2024    Procedure: CORONARY ARTERY BYPASS GRAFT TIMES THREE, LEFT INTERNAL MAMMARY ARTERY HARVEST, LEFT LEG ENDOSCOPIC VESSEL PROCUREMENT;  Surgeon: Ольга Aparicio MD;  Location: [facility] OR     CORONARY ANGIOGRAM N/A  06/24/2024    Procedure: Coronary Angiogram;  Surgeon: Oscar Whittington MD;  Location: [facility] CATH LAB CV    CV LEFT HEART CATH N/A 06/24/2024    Procedure: Left Heart Catheterization;  Surgeon: Oscar Whittington MD;  Location: [facility] CATH LAB CV    KNEE SURGERY Left     REPLACEMENT TOTAL KNEE Right     TONSILLECTOMY & ADENOIDECTOMY      TRANSESOPHAGEAL ECHOCARDIOGRAM INTRAOPERATIVE N/A 8/7/2024    Procedure: ANESTHESIA TRANSESOPHAGEAL ECHOCARDIOGRAM, EPI-AORTIC ULTRASOUND;  Surgeon: Ольга Aparicio MD;  Location: [facility] Main OR     Family History   Problem Relation Age of Onset    Cancer Mother     Hypertension Father     Glaucoma No family hx of     Macular Degeneration No family hx of            Social History     Socioeconomic History    Marital status:      Spouse name: Not on file    Number of children: Not on file    Years of education: Not on file    Highest education level: Not on file   Occupational History    Not on file   Tobacco Use    Smoking status: Former     Current packs/day: 1.00     Average packs/day: 1 pack/day for 5.0 years (5.0 ttl pk-yrs)     Types: Cigarettes    Smokeless tobacco: Never   Substance and Sexual Activity    Alcohol use: Yes     Alcohol/week: 6.0 standard drinks of alcohol     Types: 6 Standard drinks or equivalent per week    Drug use: Never    Sexual activity: Not Currently     Partners: Female     Birth control/protection: Male Surgical   Other Topics Concern    Not on file   Social History Narrative    Not on file     Social Determinants of Health     Financial Resource Strain: Not on File (2/16/2021)    Received from [facility]    Financial Resource Strain     Financial Resource Strain: 0   Food Insecurity: Not on File (2/16/2021)    Received from [facility]    Food Insecurity     Food: 0   Transportation Needs: Not on File (2/16/2021)    Received from [facility]    Transportation Needs     Transportation: 0   Physical Activity: Not on File  (2021)    Received from [facility]    Physical Activity     Physical Activity: 0   Stress: Not on File (2021)    Received from [facility]    Stress     Stress: 0   Social Connections: Not on File (2021)    Received from [facility]    Social Connections     Social Connections and Isolation: 0   Interpersonal Safety: Not on file   Housing Stability: Not on File (2021)    Received from [facility]    Housing Stability     Housin             Physical Examination   VITALS: /58 (BP Location: Left arm)   Pulse 81   Temp 99.2  F (37.3  C) (Oral)   Resp 18   Wt 92.6 kg (204 lb 3.2 oz)   SpO2 96%   BMI 31.05 kg/m    BMI: Body mass index is 31.05 kg/m .  Wt Readings from Last 3 Encounters:   24 92.6 kg (204 lb 3.2 oz)   24 89.8 kg (198 lb 1 oz)   24 88.5 kg (195 lb)         Intake/Output Summary (Last 24 hours) at 2024 0951  Last data filed at 2024 0719  Gross per 24 hour   Intake 250 ml   Output 2025 ml   Net -1775 ml       General Appearance:  Alert, cooperative, no distress, appears stated age    Head:  Normocephalic, without obvious abnormality, atraumatic   Eyes:  PERRL, conjunctiva/corneas clear, EOM's intact   Ears:  Normal external ear canals bilaterally   Nose: Nares normal, septum midline, no drainage   Throat: Lips, mucosa, and tongue normal; teeth and gums normal   Neck: Supple, symmetrical, trachea midline, no adenopathy, no carotid bruit or JVD    Back:   Symmetric, no abnormal curvature, ROM normal   Lungs:   Clear to auscultation bilaterally, respirations unlabored   Chest Wall:  No tenderness or deformity   Heart:  Irregularly irregular. S1, S2 normal,no murmur, rub or gallop   Abdomen:   Soft, non-tender, bowel sounds active all four quadrants,  no masses, no organomegaly   Extremities: Extremities normal, atraumatic, no cyanosis or edema   Skin: Skin color, texture, turgor normal, no rashes or lesions   Psychiatric: Normal affect,  pleasant and cooperative.   Neurologic: Alert and oriented X 3, Moves all 4 extremities            Imaging      Carotid ultrasound 6/12/2024 (report reviewed):  1.  RIGHT: Mild plaque formation, velocities consistent with less than 50% stenosis in the right internal carotid artery. Delayed systolic upstrokes/monophasic flow in the right subclavian artery, suggesting more proximal or brachiocephalic artery disease.  2.  LEFT: Moderate plaque formation, velocities consistent with 50-69% stenosis in the left internal carotid artery.    CXR 8/8/2024 (report reviewed):  Interval extubation and removal of nasogastric tube.   Right IJ [place]era catheter, mediastinal drain, sternal wires, mediastinal surgical clips and left chest tube redemonstrated.   Trace left pleural effusion.   Left basilar atelectasis has improved.   No right pleural effusion.   No new focal airspace opacity, pneumothorax or pulmonary vascular congestion     Lab Results    Chemistry/lipid CBC Cardiac Enzymes/BNP/TSH/INR   Recent Labs   Lab Test 06/24/24  0841   CHOL 160   HDL 49   LDL 99   TRIG 61     Recent Labs   Lab Test 06/24/24  0841 06/19/24  1019 05/24/23  1607   LDL 99 92 83     Recent Labs   Lab Test 08/11/24  0752 08/11/24  0400 08/08/24  0008 08/08/24  0001   NA  --   --   --  141   POTASSIUM  --  4.1   < > 3.7   CHLORIDE  --   --   --  107   CO2  --   --   --  20*   *  --    < > 144*   BUN  --   --   --  22.2   CR  --   --   --  1.16   GFRESTIMATED  --   --   --  63   LEONA  --   --   --  7.8*    < > = values in this interval not displayed.     Recent Labs   Lab Test 08/08/24  0001 08/07/24  1238 08/01/24  0821   CR 1.16 0.94 1.12     Recent Labs   Lab Test 08/01/24  0821   A1C 5.4          Recent Labs   Lab Test 08/11/24  0400 08/08/24  0414   WBC  --  10.8   HGB  --  8.9*   HCT  --  26.6*   MCV  --  92    171     Recent Labs   Lab Test 08/08/24  0414 08/07/24  1258 08/07/24  1238   HGB 8.9* 10.5* 10.4*      Recent Labs   Lab  Test 05/09/23  1651   TSH 2.59     Recent Labs   Lab Test 08/07/24  1238 08/07/24  1123 08/01/24  0821   INR 1.58* 1.79* 1.24*           Current Inpatient Scheduled Medications   Scheduled Meds:  Current Facility-Administered Medications   Medication Dose Route Frequency Provider Last Rate Last Admin    amiodarone (PACERONE) tablet 400 mg  400 mg Oral or FT or NG tube BID McCrone, Alyssa, PA-C   400 mg at 08/11/24 0808    Followed by    [START ON 8/14/2024] amiodarone (PACERONE) tablet 200 mg  200 mg Oral or FT or NG tube BID McCrone, Alyssa, PA-C        Followed by    [START ON 8/16/2024] amiodarone (PACERONE) tablet 200 mg  200 mg Oral or FT or NG tube Daily McCrone, Alyssa, PA-C        apixaban ANTICOAGULANT (ELIQUIS) tablet 5 mg  5 mg Oral BID McCrone, Alyssa, PA-C   5 mg at 08/11/24 0808    aspirin (ASA) chewable tablet 81 mg  81 mg Oral or NG Tube Daily McCrone, Alyssa, PA-C   81 mg at 08/11/24 0808    atorvastatin (LIPITOR) tablet 80 mg  80 mg Oral Daily McCrone, Alyssa, PA-C   80 mg at 08/10/24 2043    furosemide (LASIX) injection 40 mg  40 mg Intravenous BID McCrone, Alyssa, PA-C   40 mg at 08/11/24 0808    insulin aspart (NovoLOG) injection (RAPID ACTING)  1-7 Units Subcutaneous TID AC McCrone, Alyssa, PA-C   1 Units at 08/09/24 1220    insulin aspart (NovoLOG) injection (RAPID ACTING)  1-5 Units Subcutaneous At Bedtime McCrone, Alyssa, PA-C        Lidocaine (LIDOCARE) 4 % Patch 1-2 patch  1-2 patch Transdermal Q24H McCrone, Alyssa, PA-C   2 patch at 08/09/24 1220    magnesium oxide (MAG-OX) tablet 400 mg  400 mg Oral Q4H Ольга Aparicio MD   400 mg at 08/11/24 0807    metoprolol tartrate (LOPRESSOR) tablet 50 mg  50 mg Oral BID McCrone, Alyssa, PA-C   50 mg at 08/11/24 0808    pantoprazole (PROTONIX) 2 mg/mL suspension 40 mg  40 mg Oral or NG Tube Daily Alyssa Morales PA-C   40 mg at 08/07/24 1632    Or    pantoprazole (PROTONIX) EC tablet 40 mg  40 mg Oral Daily Alyssa Morales PA-C   40 mg at 08/11/24 0807    polyethylene  glycol (MIRALAX) Packet 17 g  17 g Oral Daily McCroneAlyssa, PA-C   17 g at 08/11/24 0808    senna-docusate (SENOKOT-S/PERICOLACE) 8.6-50 MG per tablet 1 tablet  1 tablet Oral BID McCrone Alyssa, PA-C   1 tablet at 08/11/24 0808          Medications Prior to Admission   Prior to Admission medications    Medication Sig Start Date End Date Taking? Authorizing Provider   aspirin 81 MG EC tablet Take 81 mg by mouth every evening   Yes Reported, Patient   atorvastatin (LIPITOR) 80 MG tablet Take 80 mg by mouth daily   Yes Unknown, Entered By History   diltiazem ER (DILT-XR) 240 MG 24 hr ER beaded capsule Take 1 capsule by mouth daily 7/1/24  Yes Reported, Patient   fluticasone (FLONASE) 50 MCG/ACT nasal spray Spray 1 spray into each nostril every other night before bed. Uses on even days.   Yes Reported, Patient   losartan (COZAAR) 100 MG tablet Take 1 tablet by mouth every morning 5/17/24  Yes Reported, Patient   sildenafil (VIAGRA) 100 MG tablet Take 100 mg by mouth daily as needed   Yes Reported, Patient          Clayton Kennedy MD [facility]  Non-Invasive Cardiologist  [facility]  Pager [phone]

## 2024-08-11 NOTE — PLAN OF CARE
Problem: Cardiovascular Surgery  Goal: Effective Oxygenation and Ventilation  Outcome: Progressing  Intervention:   Recent Flowsheet Documentation  Taken 8/11/2024 0400 by Kelsy Najera RN  Administration (IS): self-administered  Cough And Deep Breathing: done independently per patient  Patient Tolerance (IS): good  Taken 8/11/2024 0000 by Kelsy Najera RN  Administration (IS): self-administered  Level Incentive Spirometer (mL): 500  Cough And Deep Breathing: done independently per patient  Incentive Spirometer Predicted Level (mL): 1000  Number of Repetitions (IS): 10  Patient Tolerance (IS): good     Problem: Cardiovascular Surgery  Goal: Acceptable Pain Control  Outcome: Progressing  Uneventful shift.  Denies pain.  Home CPAP overnight, no oxygen required.  Sinus micaela in the 50's with frequent PVC's. /90, 130/60

## 2024-08-11 NOTE — PLAN OF CARE
Problem: Adult Inpatient Plan of Care  Goal: Plan of Care Review  Description: The Plan of Care Review/Shift note should be completed every shift.  The Outcome Evaluation is a brief statement about your assessment that the patient is improving, declining, or no change.  This information will be displayed automatically on your shift  note.  Outcome: Progressing   Goal Outcome Evaluation:    Pt has been sinus bradycardiac with HR 50s, BP stable, asymptomatic. No complaints of any chest pain. BG stable. X3 BM this evening, hold Senna.       Kayode Frances RN

## 2024-08-11 NOTE — PROGRESS NOTES
"CVTS Daily Progress Note   POD#4 s/p CAB x3  Attending: Dr Aparicio  LOS: 4    SUBJECTIVE/INTERVAL EVENTS:    No acute events overnight. Converted to NSR at midnight, now in afib again this am.  Patient progressing well. Maintaining oxygen saturations on RA. Normotensive. Up to chair. Pain well controlled. +BM / +flatus. Tolerating diet. UOP adequate. Hgb stable. Patient denies new chest pain, shortness of breath, abdominal pain, calf pain, nausea. Patient has no questions today.     OBJECTIVE:  Temp:  [98.4  F (36.9  C)-99.8  F (37.7  C)] 99.2  F (37.3  C)  Pulse:  [] 81  Resp:  [18-20] 18  BP: (107-145)/(54-91) 126/58  SpO2:  [94 %-96 %] 96 %  Vitals:    08/07/24 0532 08/08/24 0615 08/09/24 0629 08/10/24 0500   Weight: 89.8 kg (198 lb) 89.7 kg (197 lb 11.2 oz) 92.4 kg (203 lb 9.6 oz) 93.1 kg (205 lb 4 oz)    08/11/24 0600   Weight: 92.6 kg (204 lb 3.2 oz)       Clinically Significant Risk Factors              # Hypoalbuminemia: Lowest albumin = 3.3 g/dL at 8/7/2024 12:38 PM, will monitor as appropriate            # Acute Hypoxic Respiratory Failure: Documented O2 saturation < 90%. Continue supplemental oxygen as needed  # Acute Hypercapnic Respiratory Failure: based on blood gas results.  Continue supplemental oxygen and ventilatory support as indicated.         # Obesity: Estimated body mass index is 31.05 kg/m  as calculated from the following:    Height as of 8/1/24: 1.727 m (5' 8\").    Weight as of this encounter: 92.6 kg (204 lb 3.2 oz).      # Asthma: noted on problem list  # History of CABG: noted on surgical history        Current Medications:    Scheduled Meds:  Current Facility-Administered Medications   Medication Dose Route Frequency Provider Last Rate Last Admin    amiodarone (PACERONE) tablet 400 mg  400 mg Oral or FT or NG tube BID Alyssa Morales PA-C   400 mg at 08/10/24 2040    Followed by    [START ON 8/14/2024] amiodarone (PACERONE) tablet 200 mg  200 mg Oral or FT or NG tube BID McCrone, " Alyssa, PA-C        Followed by    [START ON 8/16/2024] amiodarone (PACERONE) tablet 200 mg  200 mg Oral or FT or NG tube Daily McCrone, Alyssa, PA-C        apixaban ANTICOAGULANT (ELIQUIS) tablet 5 mg  5 mg Oral BID McCrone, Alyssa, PA-C   5 mg at 08/10/24 2040    aspirin (ASA) chewable tablet 81 mg  81 mg Oral or NG Tube Daily McCrone, Alyssa, PA-C        atorvastatin (LIPITOR) tablet 80 mg  80 mg Oral Daily McCrone, Alyssa, PA-C   80 mg at 08/10/24 2043    furosemide (LASIX) injection 40 mg  40 mg Intravenous BID McCrone, Alyssa, PA-C   40 mg at 08/10/24 1754    insulin aspart (NovoLOG) injection (RAPID ACTING)  1-7 Units Subcutaneous TID AC McCrone, Alyssa, PA-C   1 Units at 08/09/24 1220    insulin aspart (NovoLOG) injection (RAPID ACTING)  1-5 Units Subcutaneous At Bedtime McCrone, Alyssa, PA-C        Lidocaine (LIDOCARE) 4 % Patch 1-2 patch  1-2 patch Transdermal Q24H McCrone, Alyssa, PA-C   2 patch at 08/09/24 1220    magnesium oxide (MAG-OX) tablet 400 mg  400 mg Oral Q4H Ольга Aparicio MD        metoprolol tartrate (LOPRESSOR) tablet 50 mg  50 mg Oral BID McCrone, Alyssa, PA-C        pantoprazole (PROTONIX) 2 mg/mL suspension 40 mg  40 mg Oral or NG Tube Daily McCrone, Alyssa, PA-C   40 mg at 08/07/24 1632    Or    pantoprazole (PROTONIX) EC tablet 40 mg  40 mg Oral Daily McCrone, Alyssa, PA-C   40 mg at 08/10/24 0806    polyethylene glycol (MIRALAX) Packet 17 g  17 g Oral Daily McCrone, Alyssa, PA-C   17 g at 08/10/24 0805    senna-docusate (SENOKOT-S/PERICOLACE) 8.6-50 MG per tablet 1 tablet  1 tablet Oral BID McCrone, Alyssa, PA-C   1 tablet at 08/10/24 0806     Continuous Infusions:  Current Facility-Administered Medications   Medication Dose Route Frequency Provider Last Rate Last Admin     PRN Meds:.  Current Facility-Administered Medications   Medication Dose Route Frequency Provider Last Rate Last Admin    acetaminophen (TYLENOL) tablet 650 mg  650 mg Oral Q4H PRN Alyssa Morales PA-C        bisacodyl (DULCOLAX)  suppository 10 mg  10 mg Rectal Daily PRN McCrone, Alyssa, PA-C        calcium gluconate 1 g in 50 mL in sodium chloride intermittent infusion  1 g Intravenous Once PRN McCrone, Alyssa, PA-C   1 g at 08/10/24 0624    calcium gluconate 2 g in  mL intermittent infusion  2 g Intravenous Once PRN McCrone, Alyssa, PA-C        calcium gluconate 3 g in sodium chloride 0.9 % 100 mL intermittent infusion  3 g Intravenous Once PRN McCrone, Alyssa, PA-C        glucose gel 15-30 g  15-30 g Oral Q15 Min PRN McCrone, Alyssa, PA-C        Or    dextrose 50 % injection 25-50 mL  25-50 mL Intravenous Q15 Min PRN McCrone, Alyssa, PA-C        Or    glucagon injection 1 mg  1 mg Subcutaneous Q15 Min PRN McCrone, Alyssa, PA-C        hydrALAZINE (APRESOLINE) injection 10 mg  10 mg Intravenous Q30 Min PRN McCrone, Alyssa, PA-C   10 mg at 08/09/24 0510    lactated ringers BOLUS 250 mL  250 mL Intravenous Q15 Min PRN McCrone, Alyssa, PA-C        magnesium hydroxide (MILK OF MAGNESIA) suspension 30 mL  30 mL Oral Daily PRN McCrone, Alyssa, PA-C        naloxone (NARCAN) injection 0.2 mg  0.2 mg Intravenous Q2 Min PRN McCrone, Alyssa, PA-C        Or    naloxone (NARCAN) injection 0.4 mg  0.4 mg Intravenous Q2 Min PRN McCrone, Alyssa, PA-C        Or    naloxone (NARCAN) injection 0.2 mg  0.2 mg Intramuscular Q2 Min PRN McCrone, Alyssa, PA-C        Or    naloxone (NARCAN) injection 0.4 mg  0.4 mg Intramuscular Q2 Min PRN McCrone, Alyssa, PA-C        ondansetron (ZOFRAN ODT) ODT tab 4 mg  4 mg Oral Q6H PRN McCrone, Alyssa, PA-C        Or    ondansetron (ZOFRAN) injection 4 mg  4 mg Intravenous Q6H PRN McCrone, Alyssa, PA-C   4 mg at 08/08/24 0452    oxyCODONE (ROXICODONE) tablet 5 mg  5 mg Oral Q4H PRN McCrone, Alyssa, PA-C   5 mg at 08/09/24 2019    Or    oxyCODONE (ROXICODONE) tablet 10 mg  10 mg Oral Q4H PRN FadumoeAlyssa PA-C        prochlorperazine (COMPAZINE) injection 5 mg  5 mg Intravenous Q6H PRN McCroneAlyssa PA-DIAMOND        Or    prochlorperazine (COMPAZINE) tablet  5 mg  5 mg Oral Q6H PRN Alyssa Morales PA-C           Cardiographics:    Telemetry monitoring demonstrates rate controlled afib with rates in the 50-80's per my personal review.    Imaging:  Results for orders placed or performed during the hospital encounter of 08/07/24   XR Chest Port 1 View    Impression    IMPRESSION:     Endotracheal tube tip is 4 cm above the yelena. Gastric drainage tube tip is in the stomach body. Mediastinal and bilateral pleural drains. Right internal jugular approach Shoshone-Meera catheter with tip in the region of the right pulmonary artery. Multiple   median sternotomy wires and mediastinal clips.    Lung volumes are low with left greater than right bibasilar atelectasis. Small left pleural effusion. No visible right pleural effusion or pneumothorax.    Nonenlarged cardiac silhouette.   XR Chest Port 1 View    Impression    IMPRESSION: Interval extubation and removal of nasogastric tube. Right IJ Shoshone-Meera catheter, mediastinal drain, sternal wires, mediastinal surgical clips and left chest tube redemonstrated. Trace left pleural effusion. Left basilar atelectasis has   improved. No right pleural effusion. No new focal airspace opacity, pneumothorax or pulmonary vascular congestion.       Labs, personally reviewed.  Hemoglobin   Date Value Ref Range Status   08/08/2024 8.9 (L) 13.3 - 17.7 g/dL Final   08/07/2024 10.4 (L) 13.3 - 17.7 g/dL Final   08/07/2024 10.4 (L) 13.3 - 17.7 g/dL Final     Hemoglobin POCT   Date Value Ref Range Status   08/07/2024 10.5 (L) 13.3 - 17.7 g/dL Final   08/07/2024 10.7 (L) 13.3 - 17.7 g/dL Final   08/07/2024 10.2 (L) 13.3 - 17.7 g/dL Final     WBC Count   Date Value Ref Range Status   08/08/2024 10.8 4.0 - 11.0 10e3/uL Final   08/07/2024 17.4 (H) 4.0 - 11.0 10e3/uL Final   08/07/2024 17.7 (H) 4.0 - 11.0 10e3/uL Final     Platelet Count   Date Value Ref Range Status   08/11/2024 168 150 - 450 10e3/uL Final   08/08/2024 171 150 - 450 10e3/uL Final   08/07/2024  194 150 - 450 10e3/uL Final     Creatinine   Date Value Ref Range Status   08/08/2024 1.16 0.67 - 1.17 mg/dL Final   08/07/2024 0.94 0.67 - 1.17 mg/dL Final   08/01/2024 1.12 0.67 - 1.17 mg/dL Final     Potassium   Date Value Ref Range Status   08/11/2024 4.1 3.4 - 5.3 mmol/L Final   08/10/2024 3.9 3.4 - 5.3 mmol/L Final   08/09/2024 4.3 3.4 - 5.3 mmol/L Final   02/23/2022 4.2 3.5 - 5.0 mmol/L Final   09/08/2021 4.6 3.5 - 5.0 mmol/L Final   01/13/2021 4.5 3.5 - 5.0 mmol/L Final     Potassium POCT   Date Value Ref Range Status   08/07/2024 4.2 3.4 - 5.3 mmol/L Final   08/07/2024 4.5 3.4 - 5.3 mmol/L Final   08/07/2024 5.1 3.4 - 5.3 mmol/L Final     Magnesium   Date Value Ref Range Status   08/11/2024 1.9 1.7 - 2.3 mg/dL Final   08/10/2024 2.1 1.7 - 2.3 mg/dL Final   08/09/2024 2.4 (H) 1.7 - 2.3 mg/dL Final          I/O:  I/O last 3 completed shifts:  In: 490 [P.O.:240; I.V.:250]  Out: 1775 [Urine:1775]       Physical Exam:    General: Patient seen up in chair. NAD. Conversant. Pleasant.   HEENT: TAHIR, no sclera icterus, moist mucosa  CV: RRR on monitor. 2+ peripheral pulses in all extremities. Mild edema.   Pulm: Non-labored effort on RA. Incision C/D/I.  Abd: Soft, NT, ND  : Voiding  Ext: Mild pedal edema, SCDs in place, warm, distal pulses intact  Neuro: CNs grossly intact.       ASSESSMENT/PLAN:    Enrike Sotomayor is a 83 year old male with a history of CAD who is s/p CAB x 3.    Active Problems:    Coronary artery disease involving native coronary artery of native heart, unspecified whether angina present        NEURO:   - Scheduled Tylenol/lidocaine patches and PRN Tylenol/oxycodone for pain    CV:   - Pre-op EF 45-50%  - Normotensive  - Metoprolol 50 mg PO BID with parameters  - ASA 162mg  - Atorvastatin 80 mg daily  - Chest tubes and TPW's removed without immediate complications  - Amiodarone taper dosing  - Afib clinic appt scheduled  - Consult EP for rhythm- apprec reqs    PULM:   - Extubated POD#0  -  Maintaining oxygen saturations RA, home CPAP overnight  - Encourage pulmonary toilet    FEN/GI:  - Continue electrolyte replacement protocol  - Diet: Cardiac, ADAT   - Bowel regimen    RENAL:  - Adequate UOP/hr. Continue to monitor closely.  - Cr 1.16  - Damon removed POD#2  - Diuresis with Lasix 40 mg IV BID    HEME:  - Acute blood loss anemia post-op.   - Hgb stable, no bleeding concerns. Hep SQ, ASA    ID:  - Diana op ppx complete, afebrile . No concerns for infection    ENDO:   - Transition to SSI    PPx:   - DVT: SCDs, SQ heparin TID, ambulation   - GI: Protonix 40mg PO daily    DISPO:   - Transfer to general telemetry when bed available  - Medically Ready for Discharge: anticipate in am if rhythm stable, shower today     Patient discussed with Dr Emily Morales PA-C  [facility] Cardiothoracic Surgery  Vocera or pager [phone]

## 2024-08-12 ENCOUNTER — APPOINTMENT (OUTPATIENT)
Dept: OCCUPATIONAL THERAPY | Facility: HOSPITAL | Age: 84
DRG: 235 | End: 2024-08-12
Attending: THORACIC SURGERY (CARDIOTHORACIC VASCULAR SURGERY)
Payer: COMMERCIAL

## 2024-08-12 ENCOUNTER — APPOINTMENT (OUTPATIENT)
Dept: PHYSICAL THERAPY | Facility: HOSPITAL | Age: 84
DRG: 235 | End: 2024-08-12
Attending: PHYSICIAN ASSISTANT
Payer: COMMERCIAL

## 2024-08-12 LAB
CA-I BLD-MCNC: 4.5 MG/DL (ref 4.4–5.2)
GLUCOSE BLDC GLUCOMTR-MCNC: 100 MG/DL (ref 70–99)
GLUCOSE BLDC GLUCOMTR-MCNC: 139 MG/DL (ref 70–99)
GLUCOSE BLDC GLUCOMTR-MCNC: 95 MG/DL (ref 70–99)
GLUCOSE BLDC GLUCOMTR-MCNC: 99 MG/DL (ref 70–99)
MAGNESIUM SERPL-MCNC: 1.9 MG/DL (ref 1.7–2.3)
PHOSPHATE SERPL-MCNC: 3.2 MG/DL (ref 2.5–4.5)
POTASSIUM SERPL-SCNC: 4.2 MMOL/L (ref 3.4–5.3)

## 2024-08-12 PROCEDURE — 250N000013 HC RX MED GY IP 250 OP 250 PS 637: Performed by: SURGERY

## 2024-08-12 PROCEDURE — 82330 ASSAY OF CALCIUM: CPT | Performed by: PHYSICIAN ASSISTANT

## 2024-08-12 PROCEDURE — 97162 PT EVAL MOD COMPLEX 30 MIN: CPT | Mod: GP

## 2024-08-12 PROCEDURE — 97535 SELF CARE MNGMENT TRAINING: CPT | Mod: GO

## 2024-08-12 PROCEDURE — 250N000013 HC RX MED GY IP 250 OP 250 PS 637: Performed by: PHYSICIAN ASSISTANT

## 2024-08-12 PROCEDURE — 97530 THERAPEUTIC ACTIVITIES: CPT | Mod: GP

## 2024-08-12 PROCEDURE — 120N000013 HC R&B IMCU

## 2024-08-12 PROCEDURE — 999N000157 HC STATISTIC RCP TIME EA 10 MIN

## 2024-08-12 PROCEDURE — 36415 COLL VENOUS BLD VENIPUNCTURE: CPT | Performed by: PHYSICIAN ASSISTANT

## 2024-08-12 PROCEDURE — 250N000013 HC RX MED GY IP 250 OP 250 PS 637: Performed by: THORACIC SURGERY (CARDIOTHORACIC VASCULAR SURGERY)

## 2024-08-12 PROCEDURE — 84100 ASSAY OF PHOSPHORUS: CPT | Performed by: THORACIC SURGERY (CARDIOTHORACIC VASCULAR SURGERY)

## 2024-08-12 PROCEDURE — 97116 GAIT TRAINING THERAPY: CPT | Mod: GP

## 2024-08-12 PROCEDURE — 83735 ASSAY OF MAGNESIUM: CPT | Performed by: THORACIC SURGERY (CARDIOTHORACIC VASCULAR SURGERY)

## 2024-08-12 PROCEDURE — 84132 ASSAY OF SERUM POTASSIUM: CPT | Performed by: THORACIC SURGERY (CARDIOTHORACIC VASCULAR SURGERY)

## 2024-08-12 PROCEDURE — 97110 THERAPEUTIC EXERCISES: CPT | Mod: GO

## 2024-08-12 PROCEDURE — 250N000011 HC RX IP 250 OP 636: Performed by: PHYSICIAN ASSISTANT

## 2024-08-12 RX ORDER — MAGNESIUM OXIDE 400 MG/1
400 TABLET ORAL EVERY 4 HOURS
Status: COMPLETED | OUTPATIENT
Start: 2024-08-12 | End: 2024-08-12

## 2024-08-12 RX ORDER — FUROSEMIDE 40 MG
40 TABLET ORAL
Status: DISCONTINUED | OUTPATIENT
Start: 2024-08-12 | End: 2024-08-13 | Stop reason: HOSPADM

## 2024-08-12 RX ADMIN — PANTOPRAZOLE SODIUM 40 MG: 40 TABLET, DELAYED RELEASE ORAL at 08:27

## 2024-08-12 RX ADMIN — AMIODARONE HYDROCHLORIDE 400 MG: 200 TABLET ORAL at 20:19

## 2024-08-12 RX ADMIN — APIXABAN 5 MG: 5 TABLET, FILM COATED ORAL at 08:27

## 2024-08-12 RX ADMIN — ATORVASTATIN CALCIUM 80 MG: 40 TABLET, FILM COATED ORAL at 20:19

## 2024-08-12 RX ADMIN — MAGNESIUM OXIDE TAB 400 MG (241.3 MG ELEMENTAL MG) 400 MG: 400 (241.3 MG) TAB at 12:15

## 2024-08-12 RX ADMIN — METOPROLOL TARTRATE 50 MG: 25 TABLET, FILM COATED ORAL at 08:27

## 2024-08-12 RX ADMIN — MAGNESIUM OXIDE TAB 400 MG (241.3 MG ELEMENTAL MG) 400 MG: 400 (241.3 MG) TAB at 08:27

## 2024-08-12 RX ADMIN — FUROSEMIDE 40 MG: 10 INJECTION, SOLUTION INTRAMUSCULAR; INTRAVENOUS at 08:27

## 2024-08-12 RX ADMIN — FUROSEMIDE 40 MG: 40 TABLET ORAL at 17:25

## 2024-08-12 RX ADMIN — APIXABAN 5 MG: 5 TABLET, FILM COATED ORAL at 20:19

## 2024-08-12 RX ADMIN — AMIODARONE HYDROCHLORIDE 400 MG: 200 TABLET ORAL at 08:27

## 2024-08-12 RX ADMIN — ASPIRIN 81 MG CHEWABLE TABLET 81 MG: 81 TABLET CHEWABLE at 08:27

## 2024-08-12 ASSESSMENT — ACTIVITIES OF DAILY LIVING (ADL)
ADLS_ACUITY_SCORE: 35
ADLS_ACUITY_SCORE: 35
ADLS_ACUITY_SCORE: 36
ADLS_ACUITY_SCORE: 36
ADLS_ACUITY_SCORE: 35
ADLS_ACUITY_SCORE: 35
ADLS_ACUITY_SCORE: 37
ADLS_ACUITY_SCORE: 36
ADLS_ACUITY_SCORE: 36
ADLS_ACUITY_SCORE: 37
ADLS_ACUITY_SCORE: 36
ADLS_ACUITY_SCORE: 35
ADLS_ACUITY_SCORE: 36
ADLS_ACUITY_SCORE: 36
ADLS_ACUITY_SCORE: 35
ADLS_ACUITY_SCORE: 36
ADLS_ACUITY_SCORE: 35
ADLS_ACUITY_SCORE: 36
ADLS_ACUITY_SCORE: 35

## 2024-08-12 NOTE — INTERIM SUMMARY
[facility] Pre-Admission Screen    Referral Source:  [facility] ICU EAST -[phone]  Admit date to referring facility: 8/7/2024    Physical Medicine and Rehab Consult Completed: No    Rehab Diagnosis:    09 Cardiac - CAD s/p 3V CABG 8/7/2024     Justification for Acute Inpatient Rehabilitation  Enrike Sotomayor is a 83 year old male with a history of CAD, right CEA in 2012, HTN and HLD who is s/p CAB x 3 on 8/7/24. Post op course c/b post op Afib. He is now medically stable and ready for transfer to acute inpatient rehabilitation.     The patient requires transfer to acute inpatient rehabilitation for intensive therapies not available in a lesser level of care including PT/OT, ongoing medical management at least three days per week, and rehabilitative nursing cares. At baseline Louis is independent with mobility. He manages the housework and yardcare at baseline. Currently he requires Ax1 for safe mobility. Needing up to modA for STS transfers. Patient requires an intensive inpatient rehab program to address the following acute impairments:impaired activity tolerance, impaired balance, impaired strength, impaired weight shifting, and pain. Louis is an ideal ARC candidate based on his PLOF, motivation/participation.     Current Active Medical Management Needs/Risks for Clinical Complications  The patient requires the high level of rehabilitation physician supervision that accompanies the provision of intensive rehabilitation therapy.  The patient needs the services of the rehabilitation physician to assess the patient medically and functionally and to modify the course of treatment as needed to maximize the patient's capacity to benefit from the rehabilitation process. He requires physician oversight 3x/week to manage the following:   Cardiovascular: Continue metoprolol, ASA, atorvastatin. Afib clinic appointment scheduled. Continue to monitor for stable vital signs w/  progressive activity.   Bowels: At risk for constipation in the setting of reduced mobility. Continue regimen.   Renal: CTM Cr via BMP. Diuresis daily, changed to PO lasix on 8/12.   Pain: Patient will need ongoing assessment and adjustment of pain medications for optimal participation in therapies.   High Fall Risk: At risk for falls with injury in the setting of impaired strength and balance. Continue intensive PT/OT to maximize independence and safety with functional mobility.     Past Medical/Surgical History  Surgery in the past 100 days: Yes  Additional relevant past medical history: CAD, right CEA in 2012, HTN and HLD    Level of Functioning Prior to Admission:    LIVING ENVIRONMENT  People in Home: spouse  Current Living Arrangements: house  Home Accessibility: stairs to enter home, stairs within home  Number of Stairs, Main Entrance: 2  Stair Railings, Main Entrance: none  Number of Stairs, Within Home, Primary: greater than 10 stairs  Stair Railings, Within Home, Primary: railings safe and in good condition  Transportation Anticipated: family or friend will provide  Living Environment Comments: Walk in shower on upper level, has bedrooms available to stay on main level, 2 steps down into living room space    SELF-CARE  Usual Activity Tolerance: good  Regular Exercise: Yes  Activity/Exercise Type: other (see comments) (golf)  Equipment Currently Used at Home: none (owns 4WW)  Activity/Exercise/Self-Care Comment: Typically ind with all ADLs and IADLs - does most yardwork and house projects    Level of Function: GG Scale (Section GG Functional Ability and Goals; CMS's MCKENZIE Version 3.0 Manual effective 10.1.2019):  PT Current Function Goals for Rehab   Bed Rolling 3 Partial/moderate assistance 6 Independent   Supine to Sit 3 Partial/moderate assistance 6 Independent   Sit to Stand 3 Partial/moderate assistance 6 Independent   Transfer 3 Partial/moderate assistance 6 Independent   Ambulation 4 Supervision or  touching assistance 6 Independent   Stairs 4 Supervision or touching assistance 6 Independent     OT Current Function Goals for Rehab   Feeding 5 Setup or clean-up assistance 6 Independent   Grooming Not completed 6 Independent   Bathing Not completed 6 Independent   Upper Body Dressing Not completed 6 Independent   Lower Body Dressing 3 Partial/moderate assistance 6 Independent   Toileting 3 Partial/moderate assistance 6 Independent   Toilet Transfer 3 Partial/moderate assistance 6 Independent   Tub/Shower Transfer 88 Not attempted due to safety 6 Independent   Cognition Not Assessed Not applicable     SLP Current Function Goals for Rehab   Swallow Not Assessed Not applicable   Communication Not Assessed Not applicable       Current Diet:  0-Thin and 7-Regular; low saturated fat    Summary Statement:  Louis currently has intensive physical therapy and occupational therapy needs. He is motivated and making gains. Needing variable A for functional transfers CGA to modA depending on surface height. Needing maxA for LB dressing. Most recently needing Ino for commode transfers and modA for toileting tasks.     Expected Therapies and Services Required During Inpatient Rehab Admission  Intensity of Therapy: Patient requires intensive therapies not available in a lesser level of care. Patient is motivated, making gains, and can tolerate 3 hours of therapy a day.  Physical Therapy: 90 minutes per day, 6 days a week for 7 days  Occupational Therapy: 90 minutes per day, 6 days a week for 7 days  Speech and Language Therapy: Not indicated at this time.   Rehabilitation Nursing Needs: Patient requires 24 hour Rehab Nursing to manage vitals, medication education, positioning, carryover of new rehab techniques, care coordination, pain management, post-surgical incision care to promote healing and prevent infection, and provide safe environment for patient at falls risk.    Precautions/restrictions/special needs:   Precautions:  fall precautions and Sternal precautions   Restrictions: N/A   Special Needs:  N/A    Expected Level of Improvement: Anticipate with intensive therapies, close medical management, and rehabilitative nursing care the patient will improve strength, balance, tolerance to activity, safety to ensure Mod I with basic mobility and ADL performance to allow return home with family assistance.   Expected Length of time to achieve: 7 days    Anticipated Discharge Needs:  Anticipated Discharge Destination: Home  Anticipated Discharge Support: Family member  24/7 support available : Unknown  Identified caregiver(s):  Spouse  Anticipated Discharge Needs: Outpatient Cardiac/Pulmonary Rehab    Identified challenges/barriers:  N/A    Liaison signature/date/time:     Physician statement of review and agreement:  I have reviewed and am in agreement of the need for IRF stay to address above functional and medical needs. In addition to above statements address, Patient requires intensive active and ongoing therapeutic intervention and multiple therapies; Patient requires medical supervision; Expected to actively participate in the intensive rehab program; Sufficiently stable to actively participate; Expectation for measurable improvement in functional capacity or adaption to impairments.    MD signature/date/time:

## 2024-08-12 NOTE — PROGRESS NOTES
Care Management Follow Up    Length of Stay (days): 5    Expected Discharge Date: 08/12/2024    Anticipated Discharge Plan:   AR vs TCU    Transportation: TBD    PT Recommendations:  eval and recs pending  OT Recommendations:  Transitional Care Facility     Barriers to Discharge: placement    Prior Living Situation:  Patient reports he lives in his house with spouse. He is independent with ADLs/IADLs, ambulates without devices and only service in the home are housekeeping. Spouse is primary family contact and willing to transport at discharge.      Patient/Spokesperson Updated: Yes. Who? Patient and spouse  8/12    Additional Information:  CT Surgery following post CABGx3 on 8/7.      8/12/24:  Met with patient and spouse. PT arriving to eval; recs pending.  Both state agreement for AR referral and referral to [facility].  Referrals faxed.          Ana Luisa Le RN

## 2024-08-12 NOTE — PLAN OF CARE
Goal Outcome Evaluation:       [facility] - ICU    RN Progress Note:            Pertinent Assessments:      Please refer to flowsheet rows for full assessment     Pt Alert and Oriented x4. LS Clear, Continues to be in and out of Afib and Sinus Keanu. Had Large BM today. Up to chair and commode. Still needs 2 assist to get out of bed and the chair complaining of leg weakness and clumsiness.            Key Events - This Shift:       Shower today and continues to work with cardiac rehab.                 Barriers to Discharge / Downgrade:     Will need consult to Social Work for TCU placement.      Wife at bedside and involved with POC.

## 2024-08-12 NOTE — PLAN OF CARE
Problem: Cardiovascular Surgery  Goal: Blood Glucose Level Within Targeted Range  Outcome: Progressing     Problem: Restraint, Nonviolent  Goal: Absence of Harm or Injury  Outcome: Met  Intervention: Protect Skin and Joint Integrity  Recent Flowsheet Documentation  Taken 8/11/2024 2030 by Masood Caruso RN  Body Position: supine     Problem: Cardiovascular Surgery  Goal: Acceptable Pain Control  Outcome: Adequate for Care Transition     Problem: Cardiovascular Surgery  Goal: Effective Urinary Elimination  Outcome: Adequate for Care Transition   Goal Outcome Evaluation:       [facility] - ICU    RN Progress Note:            Pertinent Assessments:      Please refer to flowsheet rows for full assessment     A&Ox4. Sinus Bradycardia w/frequent PVC's throughout night, HR 49-65. Evening Metoprolol Held. VS otherwise stable. Voiding to urinal. Adequate UOP, clear yellow. 2 Assist w/gait belt for transfers. Unsteady gait.            Key Events - This Shift:       Metoprolol Held   400mg dose Amiodarone                Barriers to Discharge / Downgrade:

## 2024-08-12 NOTE — CONSULTS
8/12 Test claim for Eliquis- covered medication $47 for 30 days supply.   Thank you for allowing me to help with your patient  Millicent Atkinson [facility]  Pharmacy Discharge Liaison [facility]

## 2024-08-12 NOTE — PROGRESS NOTES
"CVTS Daily Progress Note   POD#5 s/p CAB x3  Attending: Dr Aparicio  LOS: 5    SUBJECTIVE/INTERVAL EVENTS:    No acute events overnight. Converted to NSR last evening. Waiting for OT/PT reqs for discharge. Patient progressing well. Maintaining oxygen saturations on RA. Normotensive. Up to chair. Pain well controlled. +BM / +flatus. Tolerating diet. UOP adequate. Hgb stable. Patient denies new chest pain, shortness of breath, abdominal pain, calf pain, nausea. Patient has no questions today.     OBJECTIVE:  Temp:  [98.8  F (37.1  C)-99.2  F (37.3  C)] 98.8  F (37.1  C)  Pulse:  [49-82] 65  Resp:  [18] 18  BP: ()/(49-82) 136/61  SpO2:  [92 %-97 %] 96 %  Vitals:    08/08/24 0615 08/09/24 0629 08/10/24 0500 08/11/24 0600   Weight: 89.7 kg (197 lb 11.2 oz) 92.4 kg (203 lb 9.6 oz) 93.1 kg (205 lb 4 oz) 92.6 kg (204 lb 3.2 oz)    08/12/24 0600   Weight: 91.2 kg (201 lb)       Clinically Significant Risk Factors              # Hypoalbuminemia: Lowest albumin = 3.3 g/dL at 8/7/2024 12:38 PM, will monitor as appropriate            # Acute Hypoxic Respiratory Failure: Documented O2 saturation < 90%. Continue supplemental oxygen as needed  # Acute Hypercapnic Respiratory Failure: based on blood gas results.  Continue supplemental oxygen and ventilatory support as indicated.         # Obesity: Estimated body mass index is 30.56 kg/m  as calculated from the following:    Height as of 8/1/24: 1.727 m (5' 8\").    Weight as of this encounter: 91.2 kg (201 lb).      # Asthma: noted on problem list  # History of CABG: noted on surgical history        Current Medications:    Scheduled Meds:  Current Facility-Administered Medications   Medication Dose Route Frequency Provider Last Rate Last Admin    amiodarone (PACERONE) tablet 400 mg  400 mg Oral BID Ольга Aparicio MD   400 mg at 08/11/24 2026    Followed by    [START ON 8/14/2024] amiodarone (PACERONE) tablet 200 mg  200 mg Oral BID Ольга Aparicio MD        Followed by "    [START ON 8/16/2024] amiodarone (PACERONE) tablet 200 mg  200 mg Oral Daily Ольга Aparicio MD        apixaban ANTICOAGULANT (ELIQUIS) tablet 5 mg  5 mg Oral BID AndreeroneEdelmirana, PA-C   5 mg at 08/11/24 2026    aspirin (ASA) chewable tablet 81 mg  81 mg Oral Daily Ольга Aparicio MD        atorvastatin (LIPITOR) tablet 80 mg  80 mg Oral Daily Andreerone, Alyssa, PA-C   80 mg at 08/11/24 2026    furosemide (LASIX) injection 40 mg  40 mg Intravenous BID McCrone, Alyssa, PA-C   40 mg at 08/11/24 1747    insulin aspart (NovoLOG) injection (RAPID ACTING)  1-7 Units Subcutaneous TID AC Andreerone Alyssa, PA-C   1 Units at 08/09/24 1220    insulin aspart (NovoLOG) injection (RAPID ACTING)  1-5 Units Subcutaneous At Bedtime Edelmira Moralesna, PA-C        Lidocaine (LIDOCARE) 4 % Patch 1-2 patch  1-2 patch Transdermal Q24H McCrone, Alyssa, PA-C   2 patch at 08/11/24 1746    magnesium oxide (MAG-OX) tablet 400 mg  400 mg Oral Q4H Masood Arciniega MD        metoprolol tartrate (LOPRESSOR) tablet 50 mg  50 mg Oral BID McCrone, Alyssa, PA-C   50 mg at 08/11/24 0808    pantoprazole (PROTONIX) EC tablet 40 mg  40 mg Oral Daily Andreeronprince, Alyssa, PA-C   40 mg at 08/11/24 0807    polyethylene glycol (MIRALAX) Packet 17 g  17 g Oral Daily McCrone, Alyssa, PA-C   17 g at 08/11/24 0808    senna-docusate (SENOKOT-S/PERICOLACE) 8.6-50 MG per tablet 1 tablet  1 tablet Oral BID McCrone, Alyssa, PA-C   1 tablet at 08/11/24 0808     Continuous Infusions:  Current Facility-Administered Medications   Medication Dose Route Frequency Provider Last Rate Last Admin     PRN Meds:.  Current Facility-Administered Medications   Medication Dose Route Frequency Provider Last Rate Last Admin    acetaminophen (TYLENOL) tablet 650 mg  650 mg Oral Q4H PRN Alyssa Morales PA-C        bisacodyl (DULCOLAX) suppository 10 mg  10 mg Rectal Daily PRN Alyssa Morales PA-C        calcium gluconate 1 g in 50 mL in sodium chloride intermittent infusion  1 g Intravenous Once  PRN McCrone, Alyssa, PA-C   1 g at 08/10/24 0624    calcium gluconate 2 g in  mL intermittent infusion  2 g Intravenous Once PRN McCrone, Alyssa, PA-C        calcium gluconate 3 g in sodium chloride 0.9 % 100 mL intermittent infusion  3 g Intravenous Once PRN McCrone, Alyssa, PA-C        glucose gel 15-30 g  15-30 g Oral Q15 Min PRN McCrone, Alyssa, PA-C        Or    dextrose 50 % injection 25-50 mL  25-50 mL Intravenous Q15 Min PRN McCrone, Alyssa, PA-C        Or    glucagon injection 1 mg  1 mg Subcutaneous Q15 Min PRN McCrone, Alyssa, PA-C        hydrALAZINE (APRESOLINE) injection 10 mg  10 mg Intravenous Q30 Min PRN McCrone, Alyssa, PA-C   10 mg at 08/09/24 0510    lactated ringers BOLUS 250 mL  250 mL Intravenous Q15 Min PRN McCrone, Alyssa, PA-C        magnesium hydroxide (MILK OF MAGNESIA) suspension 30 mL  30 mL Oral Daily PRN McCrone, Alyssa, PA-C        naloxone (NARCAN) injection 0.2 mg  0.2 mg Intravenous Q2 Min PRN McCrone, Alyssa, PA-C        Or    naloxone (NARCAN) injection 0.4 mg  0.4 mg Intravenous Q2 Min PRN McCrone, Alyssa, PA-C        Or    naloxone (NARCAN) injection 0.2 mg  0.2 mg Intramuscular Q2 Min PRN McCrone, Alyssa, PA-C        Or    naloxone (NARCAN) injection 0.4 mg  0.4 mg Intramuscular Q2 Min PRN McCrone, Alyssa, PA-C        ondansetron (ZOFRAN ODT) ODT tab 4 mg  4 mg Oral Q6H PRN McCrone, Alyssa, PA-C        Or    ondansetron (ZOFRAN) injection 4 mg  4 mg Intravenous Q6H PRN McCrone, Alyssa, PA-C   4 mg at 08/08/24 0452    oxyCODONE (ROXICODONE) tablet 5 mg  5 mg Oral Q4H PRN McCrone, Alyssa, PA-C   5 mg at 08/09/24 2019    Or    oxyCODONE (ROXICODONE) tablet 10 mg  10 mg Oral Q4H PRN McCrone, Alyssa, PA-C        prochlorperazine (COMPAZINE) injection 5 mg  5 mg Intravenous Q6H PRN Alyssa Morales PA-C        Or    prochlorperazine (COMPAZINE) tablet 5 mg  5 mg Oral Q6H PRN Alyssa Morales PA-C           Cardiographics:    Telemetry monitoring demonstrates NSR with rates in the 60's per my personal  review.    Imaging:  Results for orders placed or performed during the hospital encounter of 08/07/24   XR Chest Port 1 View    Impression    IMPRESSION:     Endotracheal tube tip is 4 cm above the yelena. Gastric drainage tube tip is in the stomach body. Mediastinal and bilateral pleural drains. Right internal jugular approach Hays-Meera catheter with tip in the region of the right pulmonary artery. Multiple   median sternotomy wires and mediastinal clips.    Lung volumes are low with left greater than right bibasilar atelectasis. Small left pleural effusion. No visible right pleural effusion or pneumothorax.    Nonenlarged cardiac silhouette.   XR Chest Port 1 View    Impression    IMPRESSION: Interval extubation and removal of nasogastric tube. Right IJ Hays-Meera catheter, mediastinal drain, sternal wires, mediastinal surgical clips and left chest tube redemonstrated. Trace left pleural effusion. Left basilar atelectasis has   improved. No right pleural effusion. No new focal airspace opacity, pneumothorax or pulmonary vascular congestion.       Labs, personally reviewed.  Hemoglobin   Date Value Ref Range Status   08/08/2024 8.9 (L) 13.3 - 17.7 g/dL Final   08/07/2024 10.4 (L) 13.3 - 17.7 g/dL Final   08/07/2024 10.4 (L) 13.3 - 17.7 g/dL Final     Hemoglobin POCT   Date Value Ref Range Status   08/07/2024 10.5 (L) 13.3 - 17.7 g/dL Final   08/07/2024 10.7 (L) 13.3 - 17.7 g/dL Final   08/07/2024 10.2 (L) 13.3 - 17.7 g/dL Final     WBC Count   Date Value Ref Range Status   08/08/2024 10.8 4.0 - 11.0 10e3/uL Final   08/07/2024 17.4 (H) 4.0 - 11.0 10e3/uL Final   08/07/2024 17.7 (H) 4.0 - 11.0 10e3/uL Final     Platelet Count   Date Value Ref Range Status   08/11/2024 168 150 - 450 10e3/uL Final   08/08/2024 171 150 - 450 10e3/uL Final   08/07/2024 194 150 - 450 10e3/uL Final     Creatinine   Date Value Ref Range Status   08/08/2024 1.16 0.67 - 1.17 mg/dL Final   08/07/2024 0.94 0.67 - 1.17 mg/dL Final   08/01/2024  1.12 0.67 - 1.17 mg/dL Final     Potassium   Date Value Ref Range Status   08/12/2024 4.2 3.4 - 5.3 mmol/L Final   08/11/2024 4.1 3.4 - 5.3 mmol/L Final   08/10/2024 3.9 3.4 - 5.3 mmol/L Final   02/23/2022 4.2 3.5 - 5.0 mmol/L Final   09/08/2021 4.6 3.5 - 5.0 mmol/L Final   01/13/2021 4.5 3.5 - 5.0 mmol/L Final     Potassium POCT   Date Value Ref Range Status   08/07/2024 4.2 3.4 - 5.3 mmol/L Final   08/07/2024 4.5 3.4 - 5.3 mmol/L Final   08/07/2024 5.1 3.4 - 5.3 mmol/L Final     Magnesium   Date Value Ref Range Status   08/12/2024 1.9 1.7 - 2.3 mg/dL Final   08/11/2024 1.9 1.7 - 2.3 mg/dL Final   08/10/2024 2.1 1.7 - 2.3 mg/dL Final          I/O:  I/O last 3 completed shifts:  In: 660 [P.O.:660]  Out: 2725 [Urine:2725]       Physical Exam:    General: Patient seen up in chair. NAD. Conversant. Pleasant.   HEENT: TAHIR, no sclera icterus, moist mucosa  CV: RRR on monitor. 2+ peripheral pulses in all extremities. Mild edema.   Pulm: Non-labored effort on RA. Incision C/D/I.  Abd: Soft, NT, ND  : Voiding  Ext: Mild pedal edema, SCDs in place, warm, distal pulses intact  Neuro: CNs grossly intact.       ASSESSMENT/PLAN:    Enrike Sotomayor is a 83 year old male with a history of CAD who is s/p CAB x 3.    Active Problems:    Coronary artery disease involving native coronary artery of native heart, unspecified whether angina present        NEURO:   - Scheduled Tylenol/lidocaine patches and PRN Tylenol/oxycodone for pain    CV:   - Pre-op EF 45-50%  - Normotensive  - Metoprolol 50 mg PO BID with parameters  - ASA 162mg  - Atorvastatin 80 mg daily  - Chest tubes and TPW's removed without immediate complications  - Amiodarone taper dosing  - Afib clinic appt scheduled  - Consult EP for rhythm- apprec reqs    PULM:   - Extubated POD#0  - Maintaining oxygen saturations RA, home CPAP overnight  - Encourage pulmonary toilet    FEN/GI:  - Continue electrolyte replacement protocol  - Diet: Cardiac, ADAT   - Bowel  regimen    RENAL:  - Adequate UOP/hr. Continue to monitor closely.  - Cr 1.16  - Damon removed POD#2  - Diuresis with Lasix 40 mg IV BID    HEME:  - Acute blood loss anemia post-op.   - Hgb stable, no bleeding concerns. Hep SQ, ASA    ID:  - Diana op ppx complete, afebrile . No concerns for infection    ENDO:   - Transition to SSI    PPx:   - DVT: SCDs, SQ heparin TID, ambulation   - GI: Protonix 40mg PO daily    DISPO:   - Transfer to general telemetry when bed available  - Medically Ready for Discharge today.   - Waiting for OT/PT reqs and approval for TCU placement in [place]     Patient discussed with Dr Alessandra Morales PA-C  [facility] Cardiothoracic Surgery  [facility] or pager [phone]

## 2024-08-12 NOTE — PROGRESS NOTES
08/12/24 0970   Appointment Info   Signing Clinician's Name / Credentials (PT) Daysi Corona DPT   Living Environment   People in Home spouse   Current Living Arrangements house   Home Accessibility stairs to enter home;stairs within home   Number of Stairs, Main Entrance 2   Stair Railings, Main Entrance none   Number of Stairs, Within Home, Primary greater than 10 stairs   Stair Railings, Within Home, Primary railings safe and in good condition   Transportation Anticipated family or friend will provide   Self-Care   Usual Activity Tolerance good   Current Activity Tolerance moderate   Equipment Currently Used at Home none  (owns 4WW)   General Information   Onset of Illness/Injury or Date of Surgery 08/07/24   Referring Physician Alyssa Morales PA-C   Patient/Family Therapy Goals Statement (PT) go to TCU or ARU   Pertinent History of Current Problem (include personal factors and/or comorbidities that impact the POC) POD#5 s/p CAB x3   Existing Precautions/Restrictions (S)  sternal   Strength (Manual Muscle Testing)   Strength (Manual Muscle Testing) Deficits observed during functional mobility   Bed Mobility   Comment, (Bed Mobility) Pt in recliner when PT arrived.   Transfers   Transfers sit-stand transfer   Sit-Stand Transfer   Sit-Stand [place] (Transfers) minimum assist (75% patient effort)   Assistive Device (Sit-Stand Transfers) walker, 4-wheeled   Gait/Stairs (Locomotion)   [place] Level (Gait) contact guard   Assistive Device (Gait) walker, 4-wheeled   Distance in Feet (Gait) 20'   Pattern (Gait) step-through   Deviations/Abnormal Patterns (Gait) gait speed decreased   Clinical Impression   Criteria for Skilled Therapeutic Intervention Yes, treatment indicated   PT Diagnosis (PT) Impaired functional mobility   Influenced by the following impairments Weakness, post-op, balance deficits   Functional limitations due to impairments Impaired strength, transfers, gait   Clinical Presentation (PT  Evaluation Complexity) stable   Clinical Presentation Rationale Presents as diagnosed   Clinical Decision Making (Complexity) moderate complexity   Planned Therapy Interventions (PT) balance training;bed mobility training;gait training;home exercise program;stair training;strengthening;transfer training   Risk & Benefits of therapy have been explained patient;spouse/significant other   PT Total Evaluation Time   PT Eval, Moderate Complexity Minutes (52966) 10   Physical Therapy Goals   PT Frequency Daily   PT Predicted Duration/Target Date for Goal Attainment 08/19/24   PT Goals Transfers;Bed Mobility;PT Goal 1   PT: Bed Mobility Supervision/stand-by assist;Supine to/from sit;Within precautions   PT: Transfers Sit to/from stand;Bed to/from chair;Assistive device;Within precautions;Modified independent   PT: Goal 1 Pt will demonstrate indpendence with LE strength Ex and standing balance ex to improve overall functional mobility.   PT Discharge Planning   PT Plan progress sit <> stand, standing ex   PT Discharge Recommendation (DC Rec) Acute Rehab Center-Motivated patient will benefit from intensive, interdisciplinary therapy.  Anticipate will be able to tolerate 3 hours of therapy per day   PT Rationale for DC Rec Pt cont to req Ax1 with all mobility. Motivated to return to indpendence with mobility.   PT Brief overview of current status Amb 245' with 4WW and CGA. CGA/min A with sit <> stand.   PT Equipment Needed at Discharge walker, rolling  (4WW- pt owns)   Total Session Time   Total Session Time (sum of timed and untimed services) 10       Daysi Corona, PT, DPT  8/12/2024     no distress/well-nourished/well-groomed/well-developed

## 2024-08-13 ENCOUNTER — APPOINTMENT (OUTPATIENT)
Dept: PHYSICAL THERAPY | Facility: HOSPITAL | Age: 84
DRG: 235 | End: 2024-08-13
Attending: THORACIC SURGERY (CARDIOTHORACIC VASCULAR SURGERY)
Payer: COMMERCIAL

## 2024-08-13 ENCOUNTER — HOSPITAL ENCOUNTER (INPATIENT)
Facility: CLINIC | Age: 84
LOS: 7 days | Discharge: HOME | DRG: 949 | End: 2024-08-20
Attending: PHYSICAL MEDICINE & REHABILITATION | Admitting: PHYSICAL MEDICINE & REHABILITATION
Payer: COMMERCIAL

## 2024-08-13 VITALS
OXYGEN SATURATION: 96 % | RESPIRATION RATE: 19 BRPM | WEIGHT: 199.6 LBS | TEMPERATURE: 98.7 F | HEART RATE: 52 BPM | DIASTOLIC BLOOD PRESSURE: 53 MMHG | BODY MASS INDEX: 30.35 KG/M2 | SYSTOLIC BLOOD PRESSURE: 106 MMHG

## 2024-08-13 DIAGNOSIS — Z95.1 S/P CABG (CORONARY ARTERY BYPASS GRAFT): ICD-10-CM

## 2024-08-13 PROBLEM — I25.10 CORONARY ARTERIOSCLEROSIS: Status: ACTIVE | Noted: 2024-08-13

## 2024-08-13 LAB
CA-I BLD-MCNC: 4.6 MG/DL (ref 4.4–5.2)
GLUCOSE BLDC GLUCOMTR-MCNC: 88 MG/DL (ref 70–99)
GLUCOSE BLDC GLUCOMTR-MCNC: 93 MG/DL (ref 70–99)
MAGNESIUM SERPL-MCNC: 2 MG/DL (ref 1.7–2.3)
PHOSPHATE SERPL-MCNC: 3.4 MG/DL (ref 2.5–4.5)
POTASSIUM SERPL-SCNC: 4.6 MMOL/L (ref 3.4–5.3)

## 2024-08-13 PROCEDURE — 250N000013 HC RX MED GY IP 250 OP 250 PS 637: Performed by: PHYSICIAN ASSISTANT

## 2024-08-13 PROCEDURE — 250N000013 HC RX MED GY IP 250 OP 250 PS 637: Performed by: THORACIC SURGERY (CARDIOTHORACIC VASCULAR SURGERY)

## 2024-08-13 PROCEDURE — 83735 ASSAY OF MAGNESIUM: CPT | Performed by: SURGERY

## 2024-08-13 PROCEDURE — 84100 ASSAY OF PHOSPHORUS: CPT | Performed by: SURGERY

## 2024-08-13 PROCEDURE — 82330 ASSAY OF CALCIUM: CPT | Performed by: PHYSICIAN ASSISTANT

## 2024-08-13 PROCEDURE — 36415 COLL VENOUS BLD VENIPUNCTURE: CPT | Performed by: PHYSICIAN ASSISTANT

## 2024-08-13 PROCEDURE — 99222 1ST HOSP IP/OBS MODERATE 55: CPT | Mod: AI | Performed by: PHYSICAL MEDICINE & REHABILITATION

## 2024-08-13 PROCEDURE — 128N000003 HC R&B REHAB

## 2024-08-13 PROCEDURE — 250N000013 HC RX MED GY IP 250 OP 250 PS 637

## 2024-08-13 PROCEDURE — 97530 THERAPEUTIC ACTIVITIES: CPT | Mod: GP

## 2024-08-13 PROCEDURE — 84132 ASSAY OF SERUM POTASSIUM: CPT | Performed by: SURGERY

## 2024-08-13 PROCEDURE — 999N000157 HC STATISTIC RCP TIME EA 10 MIN

## 2024-08-13 PROCEDURE — 97110 THERAPEUTIC EXERCISES: CPT | Mod: GP

## 2024-08-13 RX ORDER — LANOLIN ALCOHOL/MO/W.PET/CERES
3 CREAM (GRAM) TOPICAL
Status: DISCONTINUED | OUTPATIENT
Start: 2024-08-13 | End: 2024-08-20 | Stop reason: HOSPADM

## 2024-08-13 RX ORDER — FLUTICASONE PROPIONATE 50 MCG
1 SPRAY, SUSPENSION (ML) NASAL DAILY
Status: DISCONTINUED | OUTPATIENT
Start: 2024-08-13 | End: 2024-08-14

## 2024-08-13 RX ORDER — MAGNESIUM OXIDE 400 MG/1
400 TABLET ORAL EVERY 4 HOURS
DISCHARGE
Start: 2024-08-13 | End: 2024-08-13

## 2024-08-13 RX ORDER — MAGNESIUM OXIDE 400 MG/1
400 TABLET ORAL EVERY 4 HOURS
Status: COMPLETED | OUTPATIENT
Start: 2024-08-13 | End: 2024-08-13

## 2024-08-13 RX ORDER — ACETAMINOPHEN 325 MG/1
975 TABLET ORAL EVERY 8 HOURS PRN
Status: DISCONTINUED | OUTPATIENT
Start: 2024-08-13 | End: 2024-08-20 | Stop reason: HOSPADM

## 2024-08-13 RX ORDER — ASPIRIN 81 MG/1
81 TABLET, CHEWABLE ORAL DAILY
Status: DISCONTINUED | OUTPATIENT
Start: 2024-08-14 | End: 2024-08-20 | Stop reason: HOSPADM

## 2024-08-13 RX ORDER — METOPROLOL TARTRATE 50 MG
50 TABLET ORAL 2 TIMES DAILY
Status: DISCONTINUED | OUTPATIENT
Start: 2024-08-13 | End: 2024-08-20 | Stop reason: HOSPADM

## 2024-08-13 RX ORDER — PANTOPRAZOLE SODIUM 40 MG/1
40 TABLET, DELAYED RELEASE ORAL DAILY
Status: DISCONTINUED | OUTPATIENT
Start: 2024-08-14 | End: 2024-08-20 | Stop reason: HOSPADM

## 2024-08-13 RX ORDER — ASPIRIN 81 MG/1
81 TABLET, CHEWABLE ORAL DAILY
Status: ON HOLD | DISCHARGE
Start: 2024-08-13 | End: 2024-08-19

## 2024-08-13 RX ORDER — AMIODARONE HYDROCHLORIDE 200 MG/1
200 TABLET ORAL DAILY
Status: DISCONTINUED | OUTPATIENT
Start: 2024-08-16 | End: 2024-08-20 | Stop reason: HOSPADM

## 2024-08-13 RX ORDER — AMIODARONE HYDROCHLORIDE 200 MG/1
400 TABLET ORAL 2 TIMES DAILY
Status: COMPLETED | OUTPATIENT
Start: 2024-08-13 | End: 2024-08-13

## 2024-08-13 RX ORDER — AMOXICILLIN 250 MG
1 CAPSULE ORAL 2 TIMES DAILY
Status: DISCONTINUED | OUTPATIENT
Start: 2024-08-13 | End: 2024-08-20 | Stop reason: HOSPADM

## 2024-08-13 RX ORDER — METOPROLOL TARTRATE 50 MG
50 TABLET ORAL 2 TIMES DAILY
Status: ON HOLD | DISCHARGE
Start: 2024-08-13 | End: 2024-08-19

## 2024-08-13 RX ORDER — AMOXICILLIN 250 MG
1 CAPSULE ORAL 2 TIMES DAILY
Status: ON HOLD | DISCHARGE
Start: 2024-08-13 | End: 2024-08-19

## 2024-08-13 RX ORDER — ATORVASTATIN CALCIUM 80 MG/1
80 TABLET, FILM COATED ORAL DAILY
Status: DISCONTINUED | OUTPATIENT
Start: 2024-08-13 | End: 2024-08-20 | Stop reason: HOSPADM

## 2024-08-13 RX ORDER — PANTOPRAZOLE SODIUM 40 MG/1
40 TABLET, DELAYED RELEASE ORAL DAILY
Status: ON HOLD | DISCHARGE
Start: 2024-08-13 | End: 2024-08-19

## 2024-08-13 RX ORDER — AMIODARONE HYDROCHLORIDE 200 MG/1
TABLET ORAL
Status: ON HOLD | DISCHARGE
Start: 2024-08-13 | End: 2024-08-19

## 2024-08-13 RX ORDER — AMIODARONE HYDROCHLORIDE 200 MG/1
200 TABLET ORAL 2 TIMES DAILY
Status: COMPLETED | OUTPATIENT
Start: 2024-08-14 | End: 2024-08-15

## 2024-08-13 RX ADMIN — MAGNESIUM OXIDE TAB 400 MG (241.3 MG ELEMENTAL MG) 400 MG: 400 (241.3 MG) TAB at 11:43

## 2024-08-13 RX ADMIN — FLUTICASONE PROPIONATE 1 SPRAY: 50 SPRAY, METERED NASAL at 17:15

## 2024-08-13 RX ADMIN — AMIODARONE HYDROCHLORIDE 400 MG: 200 TABLET ORAL at 08:12

## 2024-08-13 RX ADMIN — METOPROLOL TARTRATE 50 MG: 50 TABLET, FILM COATED ORAL at 19:46

## 2024-08-13 RX ADMIN — PANTOPRAZOLE SODIUM 40 MG: 40 TABLET, DELAYED RELEASE ORAL at 08:12

## 2024-08-13 RX ADMIN — MAGNESIUM OXIDE TAB 400 MG (241.3 MG ELEMENTAL MG) 400 MG: 400 (241.3 MG) TAB at 06:30

## 2024-08-13 RX ADMIN — APIXABAN 5 MG: 5 TABLET, FILM COATED ORAL at 08:14

## 2024-08-13 RX ADMIN — APIXABAN 5 MG: 5 TABLET, FILM COATED ORAL at 20:32

## 2024-08-13 RX ADMIN — ASPIRIN 81 MG CHEWABLE TABLET 81 MG: 81 TABLET CHEWABLE at 08:12

## 2024-08-13 RX ADMIN — METOPROLOL TARTRATE 50 MG: 25 TABLET, FILM COATED ORAL at 08:12

## 2024-08-13 RX ADMIN — FUROSEMIDE 40 MG: 40 TABLET ORAL at 08:12

## 2024-08-13 RX ADMIN — SENNOSIDES AND DOCUSATE SODIUM 1 TABLET: 50; 8.6 TABLET ORAL at 19:46

## 2024-08-13 RX ADMIN — ATORVASTATIN CALCIUM 80 MG: 80 TABLET, FILM COATED ORAL at 19:46

## 2024-08-13 RX ADMIN — AMIODARONE HYDROCHLORIDE 400 MG: 200 TABLET ORAL at 19:46

## 2024-08-13 ASSESSMENT — ACTIVITIES OF DAILY LIVING (ADL)
ADLS_ACUITY_SCORE: 39
ADLS_ACUITY_SCORE: 37
ADLS_ACUITY_SCORE: 37
ADLS_ACUITY_SCORE: 35
ADLS_ACUITY_SCORE: 37
ADLS_ACUITY_SCORE: 37
ADLS_ACUITY_SCORE: 27
ADLS_ACUITY_SCORE: 27
ADLS_ACUITY_SCORE: 37
ADLS_ACUITY_SCORE: 37
ADLS_ACUITY_SCORE: 27
ADLS_ACUITY_SCORE: 37
ADLS_ACUITY_SCORE: 27
ADLS_ACUITY_SCORE: 27
ADLS_ACUITY_SCORE: 35
ADLS_ACUITY_SCORE: 37
ADLS_ACUITY_SCORE: 27
ADLS_ACUITY_SCORE: 36
ADLS_ACUITY_SCORE: 39
ADLS_ACUITY_SCORE: 35
ADLS_ACUITY_SCORE: 35
ADLS_ACUITY_SCORE: 36
ADLS_ACUITY_SCORE: 39

## 2024-08-13 NOTE — PLAN OF CARE
Cardiac Rehab Discharge Summary    Reason for therapy discharge:    Discharged to acute rehabilitation facility.    Progress towards therapy goal(s). See goals on Care Plan in Epic electronic health record for goal details.  Goals partially met.  Barriers to achieving goals:   discharge from facility.    Therapy recommendation(s):    Continued therapy is recommended.  Rationale/Recommendations:  @U.    Evelin CARLSON, OTR/L, CLT 8/13/2024 , 11:58 AM      Goal Outcome Evaluation:

## 2024-08-13 NOTE — PLAN OF CARE
Physical Therapy Discharge Summary    Reason for therapy discharge:    Discharged to acute rehabilitation facility.    Progress towards therapy goal(s). See goals on Care Plan in [facility] electronic health record for goal details.  Goals partially met.  Barriers to achieving goals:   discharge from facility.    Therapy recommendation(s):    Recommend continued therapies to improve overall strength, balance and mobility.       Daysi Corona, PT, DPT  8/13/2024

## 2024-08-13 NOTE — PLAN OF CARE
[facility] - ICU    RN Progress Note:    Pt is alert, oriented x 4, and able to make needs known. Vital signs stable. Over NOC, CPAP utilization on home settings. Cardiac rhythm is sinus micaela (50 bpm) - sinus rhythm with occasional PACs. Surgical incisions are clean, dry, and well approximated with liquid bandage. Site care, per Unit routine. Pt denies nausea. + flatus/+ BM. Pt denies pain/discomfort. Bellow the knee sequential compression devices in place, bilaterally. Will continue to monitor. Jd Mo RN           Pertinent Assessments:      Please refer to flowsheet rows for full assessment     Vital signs.            Key Events - This Shift:       No overnight events.                 Barriers to Discharge / Downgrade:     None.

## 2024-08-13 NOTE — PROGRESS NOTES
Patient discharged to Acute Rehab, Report given via phone to Shabnam PLUMMER. Transported via wheelchair transport. Sent with a bag of belongings and second bag with Cpap. Patients wife will bring home walker.

## 2024-08-13 NOTE — H&P
[facility]   Acute Rehabilitation Unit  Admission History and Physical    CHIEF COMPLAINT   Dyspnea on exertion      HISTORY OF PRESENT ILLNESS  Enrike Sotomayor is a 83 year old male with a history of CAD, right CEA in 2012, HTN and HLD who is s/p CAB x 3 on 8/7/24. He was extubated on POD#0 and weaned from pressors. Has no complaints of chest pain or shortness of breath. Chest tubes removed. Post op course c/b post op Afib. He is at preop weight therefore no further diuresis needed. On sternal precautions.     During acute hospitalization, patient was seen and evaluated by PT, OT who collectively recommended that patient would benefit from ongoing therapies in the acute inpatient rehabilitation setting, and patient was admitted on 8/13/2024.      In review of recent therapy notes:  PT: moderate assistance for sit to stand and for transfer  OT: moderate assistance for toilet and lower body transfer       Upon arrival to the rehab unit, patient was seen at the bedside accompanied by his wife. He is doing well after surgery. He is breathing comfortably on room air. Denies incision or chest pain. He thinks the most challenging activities are transfers and standing because of the upper extremities weight bearing restrictions. He states his balance had been deteriorating recently in the context of Charcot-Carla-Tooth and he was relying on his upper extremities a lot for transfers prior to the surgery. Having regular bowel movements.     PAST MEDICAL HISTORY   Reviewed and updated in Epic.  Past Medical History:   Diagnosis Date    Aortic valve sclerosis     Arteriosclerosis of right carotid artery     Bilateral cataracts     CAD (coronary artery disease)     Charcot's joint of foot     Charcot-Carla-Tooth disease     Malignant Hyperthermia precautions per anesthesia    Chronic kidney disease     Chronic rhinitis     Chronic vasomotor rhinitis     Constipation by delayed colonic  transit     Decreased hearing of both ears     Grade I diastolic dysfunction     Heart murmur     HLD (hyperlipidemia)     HTN (hypertension)     Lyme disease     Malignant hyperthermia     Precautions risk per anesthesia cervantes to Charcot Carla tooth disease    Nonsenile cataract Mar 2023    Obesity     SHAINA on CPAP     Prostatic hypertrophy     Psychophysiological insomnia     Retinal arterial branch occlusion     Squamous cell carcinoma of skin of face     TIA (transient ischemic attack)     Visual field cut     Right       SURGICAL HISTORY  Reviewed and updated in Epic.  Past Surgical History:   Procedure Laterality Date    carotid endartectomy Right     CATARACT IOL, RT/LT  Mar 2023    CORONARY ARTERY BYPASS GRAFT, WITH ENDOSCOPIC VESSEL PROCUREMENT N/A 8/7/2024    Procedure: CORONARY ARTERY BYPASS GRAFT TIMES THREE, LEFT INTERNAL MAMMARY ARTERY HARVEST, LEFT LEG ENDOSCOPIC VESSEL PROCUREMENT;  Surgeon: Ольга Aparicio MD;  Location: [facility] OR    CV CORONARY ANGIOGRAM N/A 06/24/2024    Procedure: Coronary Angiogram;  Surgeon: Oscar Whittington MD;  Location: [facility] CATH LAB CV    CV LEFT HEART CATH N/A 06/24/2024    Procedure: Left Heart Catheterization;  Surgeon: Oscar Whittington MD;  Location: [facility] CATH LAB CV    KNEE SURGERY Left     REPLACEMENT TOTAL KNEE Right     TONSILLECTOMY & ADENOIDECTOMY      TRANSESOPHAGEAL ECHOCARDIOGRAM INTRAOPERATIVE N/A 8/7/2024    Procedure: ANESTHESIA TRANSESOPHAGEAL ECHOCARDIOGRAM, EPI-AORTIC ULTRASOUND;  Surgeon: Ольга Aparicio MD;  Location: [facility] OR       SOCIAL HISTORY  Reviewed and updated in [facility].    People in Home: spouse  Current Living Arrangements: house  Home Accessibility: stairs to enter home, stairs within home  Number of Stairs, Main Entrance: 2  Stair Railings, Main Entrance: none  Number of Stairs, Within Home, Primary: greater than 10 stairs  Stair Railings, Within Home, Primary: railings safe and in good  condition  Transportation Anticipated: family or friend will provide  Living Environment Comments: Walk in shower on upper level, has bedrooms available to stay on main level, 2 steps down into living room space       Per chart review: Enrike Sotomayor  reports that he has quit smoking. His smoking use included cigarettes. He has a 5 pack-year smoking history. He has never used smokeless tobacco. He reports current alcohol use of about 6.0 standard drinks of alcohol per week. He reports that he does not use drugs.    PRIOR FUNCTIONAL HISTORY   Usual Activity Tolerance: good  Regular Exercise: Yes  Activity/Exercise Type: other (see comments) (golf)  Equipment Currently Used at Home: none (owns 4WW)  Activity/Exercise/Self-Care Comment: Typically ind with all ADLs and IADLs - does most yardwork and house projects  He was driving       Social History     Socioeconomic History    Marital status:      Spouse name: Not on file    Number of children: Not on file    Years of education: Not on file    Highest education level: Not on file   Occupational History    Not on file   Tobacco Use    Smoking status: Former     Current packs/day: 1.00     Average packs/day: 1 pack/day for 5.0 years (5.0 ttl pk-yrs)     Types: Cigarettes    Smokeless tobacco: Never   Substance and Sexual Activity    Alcohol use: Yes     Alcohol/week: 6.0 standard drinks of alcohol     Types: 6 Standard drinks or equivalent per week    Drug use: Never    Sexual activity: Not Currently     Partners: Female     Birth control/protection: Male Surgical   Other Topics Concern    Not on file   Social History Narrative    Not on file     Social Determinants of Health     Financial Resource Strain: Not on File (2/16/2021)    Received from [facility]    Financial Resource Strain     Financial Resource Strain: 0   Food Insecurity: Not on File (2/16/2021)    Received from [facility]    Food Insecurity     Food: 0   Transportation Needs: Not on File  "(2021)    Received from [facility]    Transportation Needs     Transportation: 0   Physical Activity: Not on File (2021)    Received from [facility]    Physical Activity     Physical Activity: 0   Stress: Not on File (2021)    Received from [facility]    Stress     Stress: 0   Social Connections: Not on File (2021)    Received from [facility]    Social Connections     Social Connections and Isolation: 0   Interpersonal Safety: Not on file   Housing Stability: Not on File (2021)    Received from [facility]    Housing Stability     [place]ousin         FAMILY HISTORY  Reviewed and updated in Epic.  Family History   Problem Relation Age of Onset    Cancer Mother     Hypertension Father     Glaucoma No family hx of     Macular Degeneration No family hx of        MEDICATIONS  Scheduled meds  Medications Prior to Admission   Medication Sig Dispense Refill Last Dose    atorvastatin (LIPITOR) 80 MG tablet Take 80 mg by mouth daily       fluticasone (FLONASE) 50 MCG/ACT nasal spray Spray 1 spray into each nostril every other night before bed. Uses on even days.       sildenafil (VIAGRA) 100 MG tablet Take 100 mg by mouth daily as needed       [DISCONTINUED] aspirin 81 MG EC tablet Take 81 mg by mouth every evening       [DISCONTINUED] diltiazem ER (DILT-XR) 240 MG 24 hr ER beaded capsule Take 1 capsule by mouth daily       [DISCONTINUED] losartan (COZAAR) 100 MG tablet Take 1 tablet by mouth every morning          ALLERGIES     Allergies   Allergen Reactions    Pneumovax  [Pneumococcal Polysaccharide Vaccine]      Other reaction(s): fever,swollen, red arm       REVIEW OF SYSTEMS  A 10 point ROS was performed and negative unless otherwise noted in HPI.     PHYSICAL EXAM  VITAL SIGNS:  There were no vitals taken for this visit.  BMI:  Estimated body mass index is 30.35 kg/m  as calculated from the following:    Height as of 24: 1.727 m (5' 8\").    Weight as of an earlier encounter on " 8/13/24: 90.5 kg (199 lb 9.6 oz).     Gen: No acute distress, cooperative.  CV: Regular rate, no murmurs.  Respiratory: CTAB, no wheezing, crackles or rhonchi. Breathing comfortably on room air.  Abd: Bowel sounds present. Soft, non-distended, non-tender to palpation in four quadrants.  Extremities: Calves warm, soft, non-tender bilaterally. High arch feet   Skin: Vertical sternum incision is open to air and healing well with no surrounding erythema or secretions   Neuromuscular: Speech fluent & comprehensible.    Cranial Nerves: face symmetrical, EOMI    Sensory: Normal to light touch in bilateral upper and lower extremities     Motor: 5/5 throughout except for bilateral ankle dorsiflexion that is 3/5 bilateral (chronic)   Reflexes: +1 bilateral reflexes for patella and Achilles      LABS  8/13: K wnl 4.6, Magnesium 2.0, Phosphorus wnl 3.4,   8/13: FS am 93  8/8: Hgb 8.9        IMPRESSION/PLAN:  Enrike Sotomayor is a 83 year old male with a history of CAD, right carotid endarterectomy in 2012, Charcot-Carla-Tooth, HTN and HLD who is s/p CABG x 3 on 8/7/24. He was extubated on POD#0 and weaned from pressors. Has no complaints of chest pain or shortness of breath. Chest tubes removed. Post op course c/b post op Afib. He is at preop weight therefore no further diuresis needed. On sternal precautions.     Admission to acute inpatient rehab: 8/13/2024   Impairment group code: 09 Cardiac - CAD s/p 3V CABG 8/7/2024     1. PT, OT 60 minutes of each on a daily basis, 6x a week,  in addition to rehab nursing and close management of physiatrist.    2. Impairment of ADL's:  OT for 60 min daily, 6x a week to work on upper and lower body self care, dressing, toileting, bathing, energy conservation techniques with use of ADs as needed.  3. Impairment of mobility:   PT for 60 min daily, 6x a week  to work on gait exercises, strengthening, endurance buildup, transfers with use of walker as needed.  4. Rehab RN to administer  medication, patient education on medication taking, VS monitoring, bowel regimen, glucose monitoring and wound care/surgical wound dressing changes and monitoring.      Medical Conditions    CABG x 3 with Dr. Aparicio 8/7/2024        Ischemic cardiomyopathy with left ventricular ejection fraction of 45-50%  Left internal mammary artery to the left anterior descending artery, reversed saphenous venous graft to an obtuse marginal artery branch and the right posterior descending artery) 8/7/2024. He denies angina.  - sternum precautions for 8 weeks post op  - aspirin 81 mg daily   - PT/OT    New-onset likely postoperative atrial fibrillation first noted 8/9/2024. He no longer has symptoms and and ventricular rates are controlled.  - amiodarone. Start taking on 8/13/2024: 400 mg BID x1 day, then 200 mg BID for 2 days, then 200 mg daily for 21 days  - apixaban 5 mg BID    Hypertension  - continue metoprolol tartrate 50 mg BID  - resume pta losartan when able     Hyperlipidemia. Last LDL 99 mg/dL.  - atorvastatin 80 mg daily     Obstructive sleep apnea on home CPAP    Obesity with body mass index 31.05 kg/m     # Bowel:   - PRN bowel meds available       FEN: regular with thin liquids   DVT Prophylaxis: on apixaban for Afib  GI Prophylaxis: pantoprazole   Code: Full   Disposition: TBD, to discuss at interdiscipinary rounds   ELOS/Discharge Date:  1 week.  Rehab prognosis:  Good   Follow up Appointments on Discharge: Outpatient PCP. Outpatient PM&R. Outpatient therapies.   Follow with CV Surgery as scheduled.9/10 at 11:00 am              Follow up with atrial fibrillation clinic as scheduled. 9/4/24 at 8:30 am              Follow-up with cardiology as scheduled with Dr Corona 10/2 at 7:50 am    Artur Moulton MD  PGY-4 PM&R  [facility]

## 2024-08-13 NOTE — PLAN OF CARE
Problem: Adult Inpatient Plan of Care  Goal: Plan of Care Review  Description: The Plan of Care Review/Shift note should be completed every shift.  The Outcome Evaluation is a brief statement about your assessment that the patient is improving, declining, or no change.  This information will be displayed automatically on your shift  note.  Outcome: Progressing   Goal Outcome Evaluation:    Alert and oriented x4. Pt has been sinus bradycardiac with HR 50s and intermittent A-fib, BP stable, asymptomatic. No complaints of any chest pain. BG stable. Hold Metoprolol and Senna. Wait for TCU placement.     Kayode Frances RN

## 2024-08-13 NOTE — DISCHARGE SUMMARY
Cardiovascular Surgery Discharge Summary    Primary Care Physician:  Paul Anderson  Discharge Provider: Alyssa Morales PA-C  Admission Date: 8/7/2024  Admission Diagnoses: Dyspnea on exertion [R06.09]  Precordial pain [R07.2]  Abnormal computed tomography angiography of heart [R93.1]  Discharge Date: 8/13/2024  Disposition: ARU   Condition at Discharge: Good  Code Status: Full Code     Principal Diagnosis:   <principal problem not specified> s/p Triple vessel coronary artery bypass grafting;   -  Left internal mammary artery to the left anterior descending coronary artery  -  Separate reversed saphenous vein grafts to obtuse marginal and right posterior descending coronary arteries.    Discharge Diagnoses:    Active Problems:      Patient Active Problem List   Diagnosis    CMT (Charcot-Carla-Tooth disease)    Dyspnea on exertion    Precordial pain    Bruit of left carotid artery    Asthma    Coronary artery disease involving native coronary artery of native heart, unspecified whether angina present         Consult/s: Dietary, critical care medicine, cardiology    Surgery: 8/7/24  CAB x 3 with Dr. Aparicio   Median sternotomy   Take down of the left internal mammary artery    Endoscopic greater saphenous vein procurement from the left lower extremity    Epiaortic ultrasound of the ascending aorta    Placement on central cardiopulmonary bypass    Triple vessel coronary artery bypass grafting;   -  Left internal mammary artery to the left anterior descending coronary artery  -  Separate reversed saphenous vein grafts to obtuse marginal and right posterior descending coronary arteries.    Placement of temporary atrial and ventricular pacing wires    FINDINGS AT OPERATION: His ascending aorta was normal in size and free of any plaque seen on epiaortic ultrasound.  His pulmonary artery pressures were normal.  His left internal mammary artery was a 2.5  mm in diameter conduit with excellent flow.  The reversed saphenous  vein graft measured 3.5 to 4.5 mm in diameter and was of fair to good quality.  The right posterior descending coronary artery was a 1.5 mm in diameter target vessel, a good vessel for bypass grafting.  The medial branch of the obtuse marginal was a 1.5 mm in diameter target vessel, a fair to good vessel for bypass grafting.  The left anterior descending coronary artery in the interventricular groove was a 2 mm in diameter target vessel, an excellent vessel for bypass grafting.  His overal heart function improved after coming off cardiopulmonary bypass.      Discharge Medications:      Review of your medicines        START taking        Dose / Directions   amiodarone 200 MG tablet  Commonly known as: PACERONE  Used for: S/P CABG (coronary artery bypass graft)  Notes to patient: Check pulse before taking, Hold if HR <60      Start taking on: August 13, 2024  Take 2 tablets (400 mg) by mouth 2 times daily for 1 day, THEN 1 tablet (200 mg) 2 times daily for 2 days, THEN 1 tablet (200 mg) daily for 21 days.  Refills: 0     apixaban ANTICOAGULANT 5 MG tablet  Commonly known as: ELIQUIS  Indication: Atrial Fibrillation Not Caused By A Heart Valve Problem  Used for: S/P CABG (coronary artery bypass graft)      Dose: 5 mg  Take 1 tablet (5 mg) by mouth 2 times daily  Refills: 0     aspirin 81 MG chewable tablet  Commonly known as: ASA  Used for: S/P CABG (coronary artery bypass graft)  Replaces: aspirin 81 MG EC tablet      Dose: 81 mg  Take 1 tablet (81 mg) by mouth daily  Refills: 0     metoprolol tartrate 50 MG tablet  Commonly known as: LOPRESSOR  Used for: S/P CABG (coronary artery bypass graft)  Notes to patient: Check heart rate and BP, hold if HR <60 and Systolic(top number) of BP < 110      Dose: 50 mg  Take 1 tablet (50 mg) by mouth 2 times daily  Refills: 0     pantoprazole 40 MG EC tablet  Commonly known as: PROTONIX  Used for: S/P CABG (coronary artery bypass graft)      Dose: 40 mg  Take 1 tablet (40 mg) by  mouth daily  Refills: 0     senna-docusate 8.6-50 MG tablet  Commonly known as: SENOKOT-S/PERICOLACE  Used for: S/P CABG (coronary artery bypass graft)      Dose: 1 tablet  Take 1 tablet by mouth 2 times daily  Refills: 0            CONTINUE these medicines which have NOT CHANGED        Dose / Directions   atorvastatin 80 MG tablet  Commonly known as: LIPITOR  Notes to patient: For cholesterol      Dose: 80 mg  Take 80 mg by mouth daily  Refills: 0     fluticasone 50 MCG/ACT nasal spray  Commonly known as: FLONASE      Spray 1 spray into each nostril every other night before bed. Uses on even days.  Refills: 0     sildenafil 100 MG tablet  Commonly known as: VIAGRA      Dose: 100 mg  Take 100 mg by mouth daily as needed  Refills: 0            STOP taking      aspirin 81 MG EC tablet  Replaced by: aspirin 81 MG chewable tablet        diltiazem  MG 24 hr ER beaded capsule  Commonly known as: DILT-XR        losartan 100 MG tablet  Commonly known as: COZAAR                 Discharge Instructions:    Follow up appointment with Primary Care Physician: Paul Anderson within 7 days of discharge from ARU.  Follow up appointment with Specialist:    Follow with CV Surgery as scheduled.9/10 at 11:00 am   Follow up with atrial fibrillation clinic as scheduled. 9/4/24 at 8:30 am   Follow-up with cardiology as scheduled with Dr Corona 10/2 at 7:50 am    Diet: Cardiac    Activity/Restrictions: As tolerated with sternal precautions in mind (see below). No driving for 4 weeks or while on pain medication.     - Shower and wash your incisions daily with soap and water. No tub baths/hot tubs for 4 weeks. An antibacterial soap such as Dial or Safeguard is recommended.    - Check your incisions every day. If you notice any redness, drainage, or anything unusual, please call the surgeons office.    - No driving for 4 weeks after surgery or while on pain medication     - Do not lift anything more than 10 pounds for 8 weeks after  "surgery. After 8 weeks, advance lifting gradually as tolerated.    - You may have watery drainage from your chest tube site for 2-3 weeks after surgery. Your may cover with a Band-Aid to protect your clothing. Remove the Band-Aid every day and wash the site.    - If you have a leg lesion, you may have swelling for 2-3 months. Elevate your leg any time you are not walking.    - If you feel any \"popping\" or \"clicking\" sensations in your chest, your arms are out too far or you are putting too much weight into arm movements. Do not reach over your head or out to the side to pull something. Do not do any arm exercises or use any exercise equipment that involves arm movement. If you feel your sternum moving, call the surgeon's office.    - Increase your daily activity as explained by Cardiac Rehab. You are encouraged to enroll in an Outpatient Cardiac Rehab Program.    - No active sports using your upper arms for 3 months. This includes fishing, hunting, bowling, swimming, tennis or golf.    - No physical activity such as cutting the grass, raking, vacuuming, changing sheets on your bed, snow shoveling, or using a  for 3 months.    - Use incentive spirometer 6-8 times per day for 2 weeks.       Hospital Summary:   Enrike Sotomayor is a 83 year old male who was admitted to [facility] on 8/7/2024 following coronary angiogram demonstrating 3 v disease and dyspnea on exertion and chest pain.He was referred to CV surgery for evaluation for possible coronary artery revascularization.     Patient was deemed a candidate for coronary artery bypass surgery, and was taken to the operating room on 8/7/24 where patient underwent 3 vessel coronary artery bypass and endoscopic vein harvest from the left leg. Surgery was uneventful and patient was brought to the ICU post-operatively. He was extubated on POD#0 and weaned from pressors. Patient was awake and alert, afebrile, and with stable vitals. Insulin drip was " "discontinued and he was transitioned to a sliding scale. He was transferred to general telemetry status on POD#2 where patient has had return of bowel function, is maintaining oxygen saturations on room air, had his chest tubes removed, and has no complaints of chest pain or shortness of breath. On 08/13/24, patient was stable enough to be discharged to ARU.    Of note,He is at preop weight therefore no further diuresis needed.     He also had post op a-fib with RVR and bradycardia intermittently that resolved after amiodarone administration. He is being discharged on amiodarone taper dosing, anti-coagulation(Eliquis), and has appropriate follow-up in atrial fibrillation clinic. He is discharged in NSR.      Vital signs:  Temp: 98.7  F (37.1  C) Temp src: Oral BP: 106/53 Pulse: 52   Resp: 19 SpO2: 96 % O2 Device: None (Room air) Oxygen Delivery: 2 LPM   Weight: 90.5 kg (199 lb 9.6 oz)  Estimated body mass index is 30.35 kg/m  as calculated from the following:    Height as of 8/1/24: 1.727 m (5' 8\").    Weight as of this encounter: 90.5 kg (199 lb 9.6 oz).      Physical Exam:    Pertinent exam findings on day of discharge include:  Gen: Seen up in chair. NAD. Pleasant and conversant.   CV: RRR on monitor. No edema.  Pulm: Non-labored breathing on room air.  Abd: Soft, non-tender, non-distended  Neuro: CNs grossly intact  Inc: C/D/I      Alyssa Morales PA-C  [facility] Cardiothoracic Surgery  Vocera or pager [phone]      "

## 2024-08-13 NOTE — PHARMACY-ADMISSION MEDICATION HISTORY
Please see Admission Medication History completed on 8/7/24 by Will Feliz, PharmD, under previous encounters at [facility]  for information regarding prior to admission medications. Please see the discharge summary on 8/13/24 by TAY Dimas, for changes made to the PTA medications during the hospital stay. Please see eMAR for documentation of last med dose dates/times.

## 2024-08-13 NOTE — PLAN OF CARE
Goal Outcome Evaluation:      Plan of Care Reviewed With: patient, spouse    Overall Patient Progress: no change    Outcome Evaluation: Patient admitted to unit this shift.    Orientation: A/Ox4  Bowel: LBM 8/13 per nurse-to-nurse report  Bladder: Continent of bladder using toilet in bathroom  Pain: Denies pain  Ambulation/Transfers: Ax1 for transfer, then CGA with walker for ambulation  Diet/ Liquids: Tolerating diet with good appetite  Skin: Sternal incision and chest tube sites BASSAM, LLE incision x3 BASSAM  Bed alarm on for safety, call light within reach. Continue with POC.

## 2024-08-13 NOTE — PROGRESS NOTES
Care Management Discharge Note    Discharge Date: 08/13/2024       Discharge Disposition: Acute Rehab    Discharge Services: None    Discharge DME: None    Discharge Transportation: [facility] wheelchair ride    Private pay costs discussed: transportation costs    Does the patient's insurance plan have a 3 day qualifying hospital stay waiver?  No    Education Provided on the Discharge Plan:  per care team    Persons Notified of Discharge Plans: patient, spouse, [facility]  Patient/Family in Agreement with the Plan: yes    Handoff Referral Completed: Yes    Additional Information:  Patient discharging to [facility].    Ana Luisa Le RN

## 2024-08-14 ENCOUNTER — APPOINTMENT (OUTPATIENT)
Dept: PHYSICAL THERAPY | Facility: CLINIC | Age: 84
DRG: 949 | End: 2024-08-14
Attending: PHYSICAL MEDICINE & REHABILITATION
Payer: COMMERCIAL

## 2024-08-14 ENCOUNTER — APPOINTMENT (OUTPATIENT)
Dept: OCCUPATIONAL THERAPY | Facility: CLINIC | Age: 84
DRG: 949 | End: 2024-08-14
Attending: PHYSICAL MEDICINE & REHABILITATION
Payer: COMMERCIAL

## 2024-08-14 PROCEDURE — 97116 GAIT TRAINING THERAPY: CPT | Mod: GP

## 2024-08-14 PROCEDURE — 97535 SELF CARE MNGMENT TRAINING: CPT | Mod: GO | Performed by: OCCUPATIONAL THERAPIST

## 2024-08-14 PROCEDURE — 99232 SBSQ HOSP IP/OBS MODERATE 35: CPT | Performed by: PHYSICAL MEDICINE & REHABILITATION

## 2024-08-14 PROCEDURE — 97162 PT EVAL MOD COMPLEX 30 MIN: CPT | Mod: GP

## 2024-08-14 PROCEDURE — 97750 PHYSICAL PERFORMANCE TEST: CPT | Mod: GP

## 2024-08-14 PROCEDURE — 97530 THERAPEUTIC ACTIVITIES: CPT | Mod: GP

## 2024-08-14 PROCEDURE — 250N000013 HC RX MED GY IP 250 OP 250 PS 637

## 2024-08-14 PROCEDURE — 97166 OT EVAL MOD COMPLEX 45 MIN: CPT | Mod: GO | Performed by: OCCUPATIONAL THERAPIST

## 2024-08-14 PROCEDURE — 97530 THERAPEUTIC ACTIVITIES: CPT | Mod: GO | Performed by: OCCUPATIONAL THERAPIST

## 2024-08-14 PROCEDURE — 128N000003 HC R&B REHAB

## 2024-08-14 RX ORDER — FLUTICASONE PROPIONATE 50 MCG
1 SPRAY, SUSPENSION (ML) NASAL AT BEDTIME
Status: DISCONTINUED | OUTPATIENT
Start: 2024-08-14 | End: 2024-08-20 | Stop reason: HOSPADM

## 2024-08-14 RX ADMIN — SENNOSIDES AND DOCUSATE SODIUM 1 TABLET: 50; 8.6 TABLET ORAL at 20:14

## 2024-08-14 RX ADMIN — AMIODARONE HYDROCHLORIDE 200 MG: 200 TABLET ORAL at 07:35

## 2024-08-14 RX ADMIN — ASPIRIN 81 MG CHEWABLE TABLET 81 MG: 81 TABLET CHEWABLE at 07:35

## 2024-08-14 RX ADMIN — ATORVASTATIN CALCIUM 80 MG: 80 TABLET, FILM COATED ORAL at 20:14

## 2024-08-14 RX ADMIN — METOPROLOL TARTRATE 50 MG: 50 TABLET, FILM COATED ORAL at 07:35

## 2024-08-14 RX ADMIN — AMIODARONE HYDROCHLORIDE 200 MG: 200 TABLET ORAL at 20:14

## 2024-08-14 RX ADMIN — METOPROLOL TARTRATE 50 MG: 50 TABLET, FILM COATED ORAL at 20:14

## 2024-08-14 RX ADMIN — APIXABAN 5 MG: 5 TABLET, FILM COATED ORAL at 07:35

## 2024-08-14 RX ADMIN — SENNOSIDES AND DOCUSATE SODIUM 1 TABLET: 50; 8.6 TABLET ORAL at 07:35

## 2024-08-14 RX ADMIN — APIXABAN 5 MG: 5 TABLET, FILM COATED ORAL at 20:14

## 2024-08-14 RX ADMIN — PANTOPRAZOLE SODIUM 40 MG: 40 TABLET, DELAYED RELEASE ORAL at 07:35

## 2024-08-14 ASSESSMENT — ACTIVITIES OF DAILY LIVING (ADL)
PREVIOUS_RESPONSIBILITIES: MEAL PREP;YARDWORK;MEDICATION MANAGEMENT;FINANCES;DRIVING
ADLS_ACUITY_SCORE: 27
IADLS,_PREVIOUS_FUNCTIONAL_LEVEL: INDEPENDENT
ADLS_ACUITY_SCORE: 27
IADLS,_PREVIOUS_FUNCTIONAL_LEVEL: INDEPENDENT
ADLS_ACUITY_SCORE: 27
BADLS,_PREVIOUS_FUNCTIONAL_LEVEL: INDEPENDENT
ADLS_ACUITY_SCORE: 27
BADLS,_PREVIOUS_FUNCTIONAL_LEVEL: INDEPENDENT
ADLS_ACUITY_SCORE: 27

## 2024-08-14 NOTE — PROGRESS NOTES
Discharge Planner Post-Acute Rehab OT:     Discharge Plan: Home with assistance and OP cardiac rehab    Precautions: falls, sternal     Current Status:  ADLs:  Mobility: Min A with 4WW   Grooming: TBA  Dressing: Supervision with footwear seated with figure 4 technique  Bathing: TBA  Toileting: Min A with 4WW  IADLs: IND prior, supportive spouse  Vision/Cognition: Pt Ox3. Corrective lenses    Assessment: Pt would benefit from skilled OT services d/t recent decline in safety and IND with ADLs/IADLs and functional mobility d/t CABG x 3 resulting in impaired balance, impaired strength, sternal precautions, and impaired standing tolerance. Anticipate a 7 day LOS with OP cardiac rehab.     Other Barriers to Discharge (DME, Family Training, etc): Family training prior to discharge.

## 2024-08-14 NOTE — PROGRESS NOTES
"  [facility]   Acute Rehabilitation Unit  Daily progress note    INTERVAL HISTORY  Enrike Sotomayor was seen and examined at bedside.  No acute events overnight.  Denies chest pain, shortness of breath, no fever or chills.  Reviewed therapy goals and recovery timeline.  Patient motivated to work with therapy.       Functionally, evals today        MEDICATIONS  Scheduled meds  Current Facility-Administered Medications   Medication Dose Route Frequency Provider Last Rate Last Admin    amiodarone (PACERONE) tablet 200 mg  200 mg Oral BID Artur Moulton MD   200 mg at 08/14/24 0735    Followed by    [START ON 8/16/2024] amiodarone (PACERONE) tablet 200 mg  200 mg Oral Daily Artur Moulton MD        apixaban ANTICOAGULANT (ELIQUIS) tablet 5 mg  5 mg Oral BID Artur Moulton MD   5 mg at 08/14/24 0735    aspirin (ASA) chewable tablet 81 mg  81 mg Oral Daily Artur Moulton MD   81 mg at 08/14/24 0735    atorvastatin (LIPITOR) tablet 80 mg  80 mg Oral Daily Artur Moulton MD   80 mg at 08/13/24 1946    fluticasone (FLONASE) 50 MCG/ACT spray 1 spray  1 spray Both Nostrils At Bedtime Edilberto Garcia DO        metoprolol tartrate (LOPRESSOR) tablet 50 mg  50 mg Oral BID Artur Moulton MD   50 mg at 08/14/24 0735    pantoprazole (PROTONIX) EC tablet 40 mg  40 mg Oral Daily Artur Moulton MD   40 mg at 08/14/24 0735    senna-docusate (SENOKOT-S/PERICOLACE) 8.6-50 MG per tablet 1 tablet  1 tablet Oral BID Artur Moulton MD   1 tablet at 08/14/24 0735       PRN meds:  Current Facility-Administered Medications   Medication Dose Route Frequency Provider Last Rate Last Admin    acetaminophen (TYLENOL) tablet 975 mg  975 mg Oral Q8H PRN Artur Moulton MD        melatonin tablet 3 mg  3 mg Oral At Bedtime PRN Artur Moulton MD             PHYSICAL EXAM  BP (!) 145/58   Pulse 79   Temp 98.7  F (37.1  C) (Oral)   Resp 20   Ht 1.727 m (5' 8\")   Wt 92.7 kg (204 lb 6.4 oz)   SpO2 95%  "  BMI 31.08 kg/m    Gen: NAD, up in chair  HEENT: NCAT, EOMI  Cardio: 2+ radial pulse, well perfused  Pulm: on RA, symmetrical chest rise  Abd: soft, nontender  Ext: no edema, no calf tenderness  Neuro/MSK: aaox3  Sensory: Normal to light touch in bilateral upper and lower extremities                Motor: 5/5 throughout except for bilateral ankle dorsiflexion that is 3/5 bilateral (chronic)    LABS    Lab Results   Component Value Date    WBC 10.8 08/08/2024     Lab Results   Component Value Date    RBC 2.89 08/08/2024     Lab Results   Component Value Date    HGB 8.9 08/08/2024     Lab Results   Component Value Date    HCT 26.6 08/08/2024     Lab Results   Component Value Date    MCV 92 08/08/2024     Lab Results   Component Value Date    MCH 30.8 08/08/2024     Lab Results   Component Value Date    MCHC 33.5 08/08/2024     Lab Results   Component Value Date    RDW 12.4 08/08/2024     Lab Results   Component Value Date     08/11/2024     Last Comprehensive Metabolic Panel:  Lab Results   Component Value Date     08/08/2024    POTASSIUM 4.6 08/13/2024    CHLORIDE 107 08/08/2024    CO2 20 (L) 08/08/2024    ANIONGAP 14 08/08/2024    GLC 88 08/13/2024    BUN 22.2 08/08/2024    CR 1.16 08/08/2024    GFRESTIMATED 63 08/08/2024    LEONA 7.8 (L) 08/08/2024           ASSESSMENT AND PLAN    andrea Sotomayor is a 83 year old male with a history of CAD, right carotid endarterectomy in 2012, Charcot-Carla-Tooth, HTN and HLD who is s/p CABG x 3 on 8/7/24. He was extubated on POD#0 and weaned from pressors. Has no complaints of chest pain or shortness of breath. Chest tubes removed. Post op course c/b post op Afib. He is at preop weight therefore no further diuresis needed. On sternal precautions.      Admission to acute inpatient rehab: 8/13/2024   Impairment group code: 09 Cardiac - CAD s/p 3V CABG 8/7/2024      1. PT, OT 60 minutes of each on a daily basis, 6x a week,  in addition to rehab nursing and close  management of physiatrist.    2. Impairment of ADL's:  OT for 60 min daily, 6x a week to work on upper and lower body self care, dressing, toileting, bathing, energy conservation techniques with use of ADs as needed.  3. Impairment of mobility:   PT for 60 min daily, 6x a week  to work on gait exercises, strengthening, endurance buildup, transfers with use of walker as needed.  4. Rehab RN to administer medication, patient education on medication taking, VS monitoring, bowel regimen, glucose monitoring and wound care/surgical wound dressing changes and monitoring.       Medical Conditions     CABG x 3 with Dr. Aparicio 8/7/2024        Ischemic cardiomyopathy with left ventricular ejection fraction of 45-50%  Left internal mammary artery to the left anterior descending artery, reversed saphenous venous graft to an obtuse marginal artery branch and the right posterior descending artery) 8/7/2024. He denies angina.  - sternum precautions for 8 weeks post op  - aspirin 81 mg daily   - PT/OT     New-onset likely postoperative atrial fibrillation first noted 8/9/2024. He no longer has symptoms and and ventricular rates are controlled.  - amiodarone. Start taking on 8/13/2024: 400 mg BID x1 day, then 200 mg BID for 2 days, then 200 mg daily for 21 days  - apixaban 5 mg BID     Hypertension  - continue metoprolol tartrate 50 mg BID  - resume pta losartan when able      Hyperlipidemia. Last LDL 99 mg/dL.  - atorvastatin 80 mg daily      Obstructive sleep apnea on home CPAP     Obesity with body mass index 31.05 kg/m      # Bowel:   - PRN bowel meds available        FEN: regular with thin liquids   DVT Prophylaxis: on apixaban for Afib  GI Prophylaxis: pantoprazole   Code: Full   Disposition: TBD, to discuss at interdiscipinary rounds   ELOS/Discharge Date:  1 week.  Rehab prognosis:  Good   Follow up Appointments on Discharge: Outpatient PCP. Outpatient PM&R. Outpatient therapies.   Follow with CV Surgery as scheduled.9/10 at  11:00 am              Follow up with atrial fibrillation clinic as scheduled. 9/4/24 at 8:30 am              Follow-up with cardiology as scheduled with Dr Corona 10/2 at 7:50 am      Edilberto Garcia,   Physical Medicine & Rehabilitation    I spent a total of 35 minutes face to face and coordinating care of Enrike Sotomayor.  Over 50% of my time on the unit was spent counseling the patient and /or coordinating care regarding s/p CABG.

## 2024-08-14 NOTE — PLAN OF CARE
Goal Outcome Evaluation:      Plan of Care Reviewed With: patient    Overall Patient Progress: no change    Outcome Evaluation: No change in patient progress.    Care from [id number]:    Orientation: A/Ox4  Bowel: Continent of bowel using toilet in bathroom   Bladder: Continent of bladder using toilet in bathroom  Pain: Denies pain  Ambulation/Transfers: Ax1 for transfer, then CGA with walker for ambulation  Diet/ Liquids: Tolerating diet with good appetite  Skin: Sternal incision and chest tube sites BASSAM, LLE incision x3 BASSAM  Bed alarm on for safety, call light within reach. Continue with POC.

## 2024-08-14 NOTE — PROGRESS NOTES
08/14/24 0815   Appointment Info   Signing Clinician's Name / Credentials (PT) Aida Juarez DPT   Rehab Comments (PT) +10   Living Environment   People in Home spouse   Current Living Arrangements house   Home Accessibility stairs to enter home;stairs within home   Number of Stairs, Within Home, Primary greater than 10 stairs  (7x2)   Transportation Anticipated family or friend will provide   Living Environment Comments 2 BENNETT without rail, with work bench to have 1 UE placment and other UE on door frame. Pt primarily uses walk in shower, small lip to enter, owns shower chair but does not fit inside shower. 7 steps x2 with landing between, one railing. Pt lives with spouse.   Self-Care   Usual Activity Tolerance good   Current Activity Tolerance moderate   Fall history within last six months yes   Number of times patient has fallen within last six months 2   Activity/Exercise/Self-Care Comment Pt endorses recent balance issues which he attributes to CMT. Pt has FWW but previously did not use in home or community. Recent falls when stepping into garage while carrying objects.   Previous Level of Function/Home Environm   Bathing, Previous Functional Level independent   Grooming, Previous Functional Level independent   Dressing, Previous Functional Level independent   Eating/Feeding, Previous Functional Level independent   Toileting, Previous Functional Level independent   BADLs, Previous Functional Level independent   IADLs, Previous Functional Level independent   Bed Mobility, Previous Functional Level independent   Transfers, Previous Functional Level independent   Household Ambulation, Previous Functional Level independent   Stairs, Previous Functional Level independent   Community Ambulation, Previous Functional Level independent   General Information   Onset of Illness/Injury or Date of Surgery 08/07/24   Referring Physician Artur Moulton MD   Patient/Family Therapy Goals Statement (PT) return home  "  Pertinent History of Current Problem (include personal factors and/or comorbidities that impact the POC) Per EMR: \"83 year old male with a history of CAD, right CEA in 2012, HTN and HLD who is s/p CAB x 3 on 8/7/24. He was extubated on POD#0 and weaned from pressors. Has no complaints of chest pain or shortness of breath. Chest tubes removed. Post op course c/b post op Afib\"   Existing Precautions/Restrictions sternal   Cognition   Affect/Mental Status (Cognition) WFL   Pain Assessment   Patient Currently in Pain No   Integumentary/Edema   Integumentary/Edema Comments sternal incision, closed and healing   Posture    Posture Kyphosis;Protracted shoulders;Forward head position   Range of Motion (ROM)   ROM Comment limited ankle DF ROM B (L more limited than R)   Strength (Manual Muscle Testing)   Strength Comments LE strength symmetrical B, 4+/5, however functional strength impairments noted in STS. impaired DF strength B.   Balance   Balance Comments Pt able to stand breifly without UE support and take a step, however in dynamic balance pt benefits from UE support w/ FWW. Sitting balance intact   Sensory Examination   Sensory Perception Comments pt endorses some sneory impairments in sharp/dull ,temperature, vibration distal to knee per his recent CMT check-ups   Coordination   Coordination no deficits were identified   Muscle Tone   Muscle Tone no deficits were identified   Clinical Impression   Criteria for Skilled Therapeutic Intervention Yes, treatment indicated   PT Diagnosis (PT) Impaired force production, dynamic balance deficit   Influenced by the following impairments LE deconditioning, impaired dynamic and static standing balance, decreased endurance, ankle ROM limitations, sternal precautions   Functional limitations due to impairments transfers, gait, stairs   Clinical Presentation (PT Evaluation Complexity) evolving   Clinical Presentation Rationale functional impairments, requries close monitoring of " vitals   Clinical Decision Making (Complexity) moderate complexity   Planned Therapy Interventions (PT) balance training;bed mobility training;gait training;groups;home exercise program;neuromuscular re-education;patient/family education;orthotic fitting/training;postural re-education;ROM (range of motion);stair training;strengthening;stretching;transfer training;risk factor education;progressive activity/exercise;home program guidelines   Risk & Benefits of therapy have been explained evaluation/treatment results reviewed;care plan/treatment goals reviewed;risks/benefits reviewed;current/potential barriers reviewed;participants voiced agreement with care plan;participants included;patient   Clinical Impression Comments Pt presents s/p CABG x3 , with impairments including LE strength, impaired dynamic and static standing balance, decreased endurance, ankle ROM limitations, in presence of sternal precautions. Pt will benefit from ~7 days for progressing towards Marc level for household mobility.   PT Total Evaluation Time   PT Eval, Moderate Complexity Minutes (33087) 40   Physical Therapy Goals   PT Frequency 6x/week   PT Predicted Duration/Target Date for Goal Attainment 08/20/24   PT Goals Transfers;Gait;Stairs;Bed Mobility;Aerobic Activity   PT: Bed Mobility Independent;Within precautions   PT: Transfers Modified independent;Sit to/from stand;Bed to/from chair;Within precautions   PT: Gait Modified independent;150 feet   PT: Stairs Modified independent;7 stairs;Rail on left   PT: Perform aerobic activity with stable cardiovascular response continuous activity;15 minutes;ambulation;NuStep;treadmill   Interventions   Interventions Quick Adds Therapeutic Activity   Therapeutic Activity   Therapeutic Activities: dynamic activities to improve functional performance Minutes (76945) 15   Treatment Detail/Skilled Intervention Review of sternal precautions. Oriented pt to unit. Educated pt on phases of cardiac rehab, OP  therapy follow up (CR Vs OP PT). Pt inquired if he could utilize  his personal 4WW  on unit, encoraged having thearpist assess first   PT Discharge Planning   PT Plan [facility]LE VILLATORO   Total Session Time   Timed Code Treatment Minutes 15   Total Session Time (sum of timed and untimed services) 55   PT - Acute Rehab Center Time   Individual Time (minutes) - PT 55  (40 mod eval, 15TA)   Group Time (minutes) - PT 0   Concurrent Time (minutes) - PT 0   Co-Treatment Time (minutes) - PT 0   ARC Total Session Time (minutes) - PT 55   ARC Daily Total Session Time   PT ARC Daily Total Session Time 55   ARC Daily Rehab Total Minutes 55

## 2024-08-14 NOTE — PROGRESS NOTES
08/14/24 1004   Appointment Info   Signing Clinician's Name / Credentials (OT) Anita Nguyễn, OTR/L   Living Environment   People in Home spouse   Current Living Arrangements house   Home Accessibility stairs to enter home;stairs within home   Number of Stairs, Main Entrance 2   Stair Railings, Main Entrance none   Number of Stairs, Within Home, Primary greater than 10 stairs  (7x2 railing on Left side, and then railing on R side)   Transportation Anticipated family or friend will provide   Living Environment Comments OT: Pt lives in [place] with spouse with 2 BENNETT without rail, with work bench to have 1 UE placment and other UE on door frame. Pt primarily uses walk in shower, small lip to enter, owns shower chair but does not fit inside shower. 7 steps x2 with landing between, one railing.   Self-Care   Usual Activity Tolerance good   Current Activity Tolerance fair   Equipment Currently Used at Home raised toilet seat;shower chair;walker, rolling   Fall history within last six months yes   Number of times patient has fallen within last six months 2   Activity/Exercise/Self-Care Comment OT: Pt was IND with ADLs/IADLs and functional mobility. Pt endorses recent balance issues which he attributes to CMT. Pt has FWW but previously did not use in home or community. Recent falls when stepping into garage while carrying objects. Enjoys golfing 1 day a week. Takes St[employer] classes with spouse, and is apart of a book club   Instrumental Activities of Daily Living (IADL)   Previous Responsibilities meal prep;yardwork;medication management;finances;driving   Post-Acute Assessment Only   Post-Acute Functional Assessment See below   Previous Level of Function/Home Environm   Bathing, Previous Functional Level independent   Grooming, Previous Functional Level independent   Dressing, Previous Functional Level independent   Eating/Feeding, Previous Functional Level independent   Toileting, Previous Functional Level independent  "  BADLs, Previous Functional Level independent   IADLs, Previous Functional Level independent   Bed Mobility, Previous Functional Level independent   Transfers, Previous Functional Level independent   Household Ambulation, Previous Functional Level independent   Stairs, Previous Functional Level independent   Community Ambulation, Previous Functional Level independent   General Information   Onset of Illness/Injury or Date of Surgery 08/07/24   Referring Physician Dr. Garcia   Patient/Family Therapy Goal Statement (OT) OT: To be IND with ADLs and transfers   Additional Occupational Profile Info/Pertinent History of Current Problem OT: Per chart review, pt \"is a 83 year old male with a history of CAD, right CEA in 2012, HTN and HLD who is s/p CAB x 3 on 8/7/24. He was extubated on POD#0 and weaned from pressors. Has no complaints of chest pain or shortness of breath. Chest tubes removed. Post op course c/b post op Afib. He is at preop weight therefore no further diuresis needed. On sternal precautions.\"   Existing Precautions/Restrictions fall;sternal   Limitations/Impairments hearing  (bilateral hearing aids)   Heart Disease Risk Factors High blood pressure;Medical history;Age;Gender;Family history   Cognitive Status Examination   Orientation Status orientation to person, place and time   Visual Perception   Impact of Vision Impairment on Function (Vision) OT: corrective lenses:bifocals   Sensory   Sensory Comments OT: light touch intact with BUEs   Posture   Posture forward head position;kyphosis   Range of Motion Comprehensive   Comment, General Range of Motion OT: 90* with sh. flex and abd d/t sternal precautions. All other planes WFL bilaterally   Strength Comprehensive (MMT)   Comment, General Manual Muscle Testing (MMT) Assessment OT: 4/5 with elb. flex/ext, wrist flex/ext and    Hand Strength   Left hand  (pounds) 36   Right hand  (pounds) 41   Clinical Impression   Criteria for Skilled " Therapeutic Interventions Met (OT) Yes, treatment indicated   OT Diagnosis OT: Decreased safety and IND with ADLs/IADLs   Influenced by the following impairments OT: Impaired balance, impaired strength, sternal precautions, and impaired standing tolerance   OT Problem List-Impairments impacting ADL problems related to;activity tolerance impaired;balance;coordination;inability to direct their own care;mobility;motor control;strength;post-surgical precautions;postural control;pain;other (see comments)   Assessment of Occupational Performance 3-5 Performance Deficits   Identified Performance Deficits OT: Performance deficits include: dressing, toileting, bathing, G/H tasks, and light meal prep   Planned Therapy Interventions (OT) ADL retraining;IADL retraining;balance training;bed mobility training;groups;motor coordination training;strengthening;transfer training;home program guidelines;progressive activity/exercise;risk factor education;other (see comments)   Clinical Decision Making Complexity (OT) detailed assessment/moderate complexity   Risk & Benefits of therapy have been explained evaluation/treatment results reviewed;care plan/treatment goals reviewed;risks/benefits reviewed;current/potential barriers reviewed;participants voiced agreement with care plan;participants included;patient;spouse/significant other   Clinical Impression Comments OT: Pt would benefit from skilled OT services d/t recent decline in safety and IND with ADLs/IADLs and functional mobility d/t CABG x 3 resulting in impaired balance, impaired strength, sternal precautions, and impaired standing tolerance. Anticipate a 7 day LOS with OP cardiac rehab.   OT Total Evaluation Time   OT Eval, Moderate Complexity Minutes (23990) 25   OT Goals   Therapy Frequency (OT) 6 times/week   OT Predicted Duration/Target Date for Goal Attainment 08/20/24   OT Goals Hygiene/Grooming;Upper Body Dressing;Lower Body Dressing;Upper Body Bathing;Lower Body  Bathing;Toilet Transfer/Toileting;Meal Preparation;OT Goal 1;OT Goal 2   OT: Hygiene/Grooming modified independent;using adaptive equipment;within precautions;while standing   OT: Upper Body Dressing Modified independent;within precautions   OT: Lower Body Dressing Modified independent;within precautions;using adaptive equipment   OT: Upper Body Bathing Supervision/stand-by assist;using adaptive equipment;within precautions   OT: Lower Body Bathing Supervision/stand-by assist;using adaptive equipment;with precautions   OT: Toilet Transfer/Toileting Modified independent;toilet transfer;cleaning and garment management;using adaptive equipment;within precautions   OT: Meal Preparation Modified independent;with simple meal preparation;using adaptive equipment;ambulatory level;within precautions   OT: Goal 1 OT: Pt will demo SBA with shower transfer utilizing appropriate AE/DME needs.   OT: Goal 2 OT: Pt will safely complete BUE HEP within sternal precautions I'tly to increase IND and safety with ADLs and functional mobility.   Self-Care/Home Management   Self-Care/Home Mgmt/ADL, Compensatory, Meal Prep Minutes (54683) 20   Treatment Detail/Skilled Intervention OT: educated spouse and pt on role of OT and goals for OT POC. Completed toileting tasks with use of 4WW. Modified height of 4WW for proper posture.   OT Discharge Planning   OT Plan OT: GG ADLs   Total Session Time   Timed Code Treatment Minutes 20   Total Session Time (sum of timed and untimed services) 45   Post Acute Settings Only   What unit is patient on? Acute Rehab   OT - Acute Rehab Center Time   Individual Time (minutes) - OT 45   Group Time (minutes) - OT 0   Concurrent Time (minutes) - OT 0   Co-Treatment Time (minutes) - OT 0   ARC Total Session Time (minutes) - OT 45   ARC Daily Total Session Time   OT ARC Daily Total Session Time 45   ARC Daily Rehab Total Minutes 45   Toilet Hygiene   Describe performance OT: Min A with 4WW   Toilet Transfer    Describe performance OT: Min A with 4WW   Chair/bed-to-chair Transfer   Describe performance OT: Min A with 4WW   Sit to Stand   Assistance Needed Adaptive equipment;Supervision;Set-up / clean-up   Physical Assistance Level Less than 25%   Comment OT: min A with 4WW   Sitting to Standing CARE Score 3   Locomotion   Locomotion Comment OT: CGA with functional mobility wiht 4WW in BR ~10ft 2x

## 2024-08-14 NOTE — PLAN OF CARE
Discharge Planner Post-Acute Rehab PT:     Discharge Plan: Home with OP CR vs OP PT    Precautions: Sternal, falls    Current Status:  Bed Mobility: IND  Transfer: CGA-modA pending surface height  Gait: CGA w/ 4WW  Stairs: CGA with B rails 8x6'' steps  Balance: intact seated balance, standing requires CGA w/ 4WW  Outcome Measures:  ALMEIDA/56 on   6MWT: 595' on     Assessment:  Pt presents s/p CABG x3 , with impairments including LE strength, impaired dynamic and static standing balance, decreased endurance, ankle ROM limitations, in presence of sternal precautions. Pt will benefit from ~7 days for progressing towards Marc level for household mobility.     Other Barriers to Discharge (DME, Family Training, etc):   DME- pt owns FWW and 4WW (plans to have one on each floor of home)  Family training- pending progress, goal for Marc

## 2024-08-14 NOTE — PHARMACY-MEDICATION REGIMEN REVIEW
Pharmacy Medication Regimen Review  Enrike Sotomayor is a 83 year old male who is currently in the Acute Rehab Unit for rehabilitation s/p CABG x3v on 8/7/24 .    Assessment: Upon review of the medications and patient chart the following irregularities were found: pt has historically been prescribed aspirin 81mg for CAD. However, intermittent paroxysmal a.fib was noted postoperatively and, thus, apixaban 5mg BID was started. It is unclear if pt needs both antiplatelet and anticoagulant therapy.    Plan:   Continue currently ordered medications.  Consider follow-up with outpatient cardiology to determine duration of concomitant aspirin   and apixaban therapy.    Attending provider will be sent this note for review.  If there are any emergent issues noted above, pharmacist will contact provider directly by phone.      Pharmacy will periodically review the resident's medication regimen for any PRN medications not administered in > 72 hours and discontinue them. The pharmacist will discuss gradual dose reductions of psychopharmacologic medications with interdisciplinary team on a regular basis.    Please contact pharmacy if the above does not answer specific medication questions/concerns.  Background:  A pharmacist has reviewed all medications and pertinent medical history today.  Medications were reviewed for appropriate use and any irregularities found are listed with recommendations.      Current Facility-Administered Medications:     acetaminophen (TYLENOL) tablet 975 mg, 975 mg, Oral, Q8H PRN, Artur Moulton MD    [COMPLETED] amiodarone (PACERONE) tablet 400 mg, 400 mg, Oral, BID, 400 mg at 08/13/24 1946 **FOLLOWED BY** amiodarone (PACERONE) tablet 200 mg, 200 mg, Oral, BID, 200 mg at 08/14/24 0735 **FOLLOWED BY** [START ON 8/16/2024] amiodarone (PACERONE) tablet 200 mg, 200 mg, Oral, Daily, Artur Moulton MD    apixaban ANTICOAGULANT (ELIQUIS) tablet 5 mg, 5 mg, Oral, BID, Artur Moulton MD, 5 mg at 08/14/24  0735    aspirin (ASA) chewable tablet 81 mg, 81 mg, Oral, Daily, Artur Moulton MD, 81 mg at 08/14/24 0735    atorvastatin (LIPITOR) tablet 80 mg, 80 mg, Oral, Daily, Artur Moulton MD, 80 mg at 08/13/24 1946    fluticasone (FLONASE) 50 MCG/ACT spray 1 spray, 1 spray, Both Nostrils, At Bedtime, Edilberto aGrcia DO    melatonin tablet 3 mg, 3 mg, Oral, At Bedtime PRN, Artur Moulton MD    metoprolol tartrate (LOPRESSOR) tablet 50 mg, 50 mg, Oral, BID, Artur Moulton MD, 50 mg at 08/14/24 0735    pantoprazole (PROTONIX) EC tablet 40 mg, 40 mg, Oral, Daily, Artur Moulton MD, 40 mg at 08/14/24 0735    senna-docusate (SENOKOT-S/PERICOLACE) 8.6-50 MG per tablet 1 tablet, 1 tablet, Oral, BID, Artur Moulton MD, 1 tablet at 08/14/24 0735  No current outpatient prescriptions on file.   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  Amaris Sharma, PharmD PGY1 resident

## 2024-08-14 NOTE — CONSULTS
"Social Work: Initial Assessment with Discharge Plan    Patient Name: Enrike Sotomayor  : 1940  Age: 83 year old  MRN: [id number]  Completed assessment with: Chart review and interview with patient.   Admitted to ARU: 2024    Presenting Information   Date of SW assessment: 2024  Health Care Directive: Copy in Chart  Primary Health Care Agent: Patient/self   Secondary Health Care Agent: Pt spouse (Tori)  Living Situation: Lives with spouse in house with 2 BENNETT and flight of stairs (10+ stairs) inside of home. Walk in shower on upper level. Has bedrooms available to stay on main level and 2 steps down onto living room space.  Previous Functional Status: Independent with all I/ADLs prior to admit. Pt does most of the yardwork and house projects at his home. Enjoys golf.   DME available: Friend provided pt with 4WW  See therapy evaluation for more information   Patient and family understanding of hospitalization: Appropriate and Pleasant.  Cultural/Language/Spiritual Considerations: Pt is an 83 year old , english-speaking male who identifies as Atheist.     Physical Health  Reason for admission: Per [facility] PAS, \"Rehab Diagnosis:    09 Cardiac - CAD s/p 3V CABG 2024      Justification for Acute Inpatient Rehabilitation  Enrike Sotomayor is a 83 year old male with a history of CAD, right CEA in , HTN and HLD who is s/p CAB x 3 on 24. Post op course c/b post op Afib. He is now medically stable and ready for transfer to acute inpatient rehabilitation. \"    Provider Information   Primary Care Physician:Paul Anderson with [facility] will schedule PCP apt at discharge.   : None reported    Mental Health/Chemical Dependency:   Diagnosis: None reported  Alcohol/Tobacco/Narcotis: Reports that he has quit smoking. Hx of smoking cigarettes and has 5 pack-year smoking history. Has about 6 drinks of alochol per week.   Support/Services in Place: None reported  Services " Needed/Recommended: Dez and Health Psychology support while on ARU available.   Sexuality/Intimacy: Not discussed     Support System  Marital Status:   Family support: Pt spouse (Tori) is pt's primary support. Pt also has a son (Wilmar) who lives in [place], [place] and daughter (Shanice) who lives in [place]. Pt has two grandsons who are young adults of college age. Tori is retired and worked as a .   Other support available: Pt mentioned having friends who have had health troubles in the past who pt has been able to talk about his experience with.   Areas of fulfillment/emilia: Handseeing Information Resources  Current in home services: None reported  Previous services: Pt had OP therapy when pt got his knee replaced in the past. No other previous services mentioned.   Financial/Employment/Education  Employment Status: Retired -  in training and worked at [employer]  Income Source: Social Security  Education: Not discussed  Financial Concerns:  None reported  Insurance: MEDICA/[employer]     Discharge Plan     Patient and family discharge goal: To be determined, pending progress  Provided Education on discharge plan: Evaluations and discharge recommendations pending.   Patient agreeable to discharge plan:  Pending further discussion. Evaluations and discharge recommendations pending.   Provided education and attained signature for Medicare IM and IRF Patient Rights and Privacy Information provided to patient : Yes  Provided patient with [place] [facility] [place] Resources: N/A  Barriers to discharge: None noted at time of assessment.     Discharge Recommendations   Disposition: Home  Transportation Needs: Family to provide  Name of Transportation Company and Phone: N/A    Additional comments   Discharge TBD, ELOS 7 days. Evals and discharge needs pending. SW will remain available and continue to follow as needs arise.     SW will continue to remain available for patient and  "family support, discharge planning, and access to resources.    ------------------------------------------------------------------------------------------------------------    JD Pain Assessment    Pain Effect on Sleep  Over the past 5 days, how much of the time has pain made it hard for you to sleep at night?\"    1. Rarely or not at all    Pain Interference with Therapy Activities  \"Over the past 5 days, how often have you limited your participation in rehabilitation therapy sessions due to pain?\"  1. Rarely or not at all    Pain Interference with Day-to-Day Activities  \"Over the past 5 days, how often have you limited your day-to-day activities (excluding rehabilitation therapy sessions) because of pain?\"  2. Occasionally  -------------------------------------------------------------------------------------------------------------      Brina Holt [facility]    Available via [employer]  [facility]-Ac[facility], Acute Inpatient Rehab Unit   [address] [place] [address], [address] [place] [address]  Phone: [phone]  Fax: [phone]          "

## 2024-08-14 NOTE — PROGRESS NOTES
08/14/24 1445   Signing Clinician's Name / Credentials   Signing clinician's name / credentials Aida Juarez DPT   Padgett Balance Scale (ELLE RADFORD, JESIKA S, RAFAEL LUA, JANKI CLIFFORD: MEASURING BALANCE IN THE ELDERLY: VALIDATION OF AN INSTRUMENT. CAN. J. PUB. HEALTH, JULY/AUGUST SUPPLEMENT 2:S7-11, 1992.)   Sit To Stand 1   Standing Unsupported 3   Sitting Unsupported 4   Stand to Sit 1   Transfers 1   Standing with Eyes Closed 0   Standing Unsupported, Feet Together 0   Reach Forward With Outstretched Arm 1   Retrieve Object From Floor 0   Turning to Look Behind 1   Turn 360 Degrees 0   Placing Alternate Foot on Stool (4-6 inches) 0   Unsupported Tandem Stand (Demonstrate to Subject) 0   One Leg Stand 1   Total Score (A score of 45 or less has been correlated with an increased risk of falls)   Total Score (out of 56) 13     Padgett Balance Scale (BBS) Cutoff Scores: A score of < 45 /56 indicates an increased risk for falls.     The BBS is a measure of static and dynamic standing balance that has been validated in community dwelling elderly individuals and individuals who have Parkinson's Disease, MS, and those who are s/p CVA and TBI. The test is administered without an assistive device. Scores from the Padgett are used to determine the probability of falling based on the patient's previous history of falls and their test performance.     Minimal Detectable Change = 6.5   & Minimal Detectable Change (Parkinson's Disease) = 5 according to Pradeep & Charlesey 2008    Assessment : Pt demonstrates falls risk ,recommend use of AD and assist for all mobility.           6 Minute Walk Test  Setup:   - 100 meter walk way setup in hallway between PT and OT gyms designated by orange cones at each end and distance included in turns. Seats setup at end of walkway for rest break prn.      Distance Ambulated: 595'     Total Rest Time During 6MWT: ~30 seconds standing break    Vitals -  Max HR: 84  Max Desaturation: 93%    Assessment: Pt  demonstrates endurance below community dwelling adults.     Age Related Norms in Community Dwelling Adults:  Age  Male   Female  60-69 yrs 572 m (1864 ft) 538 m (1753 ft)  70-79 yrs 527 m (1718 ft) 471 m (1535 ft)  80-89 yrs 417 m (1359 ft) 392 m (1278 ft)    (Pradeep et al, 2002; n = 96; community dwelling elderly people with independent function who were nonsmokers with no history of dizziness; > 61 yo and did not use assistive devices, Community-dwelling Elderly)    Chronic Heart Failure:  Average distance walked: 310-427 meters (0875-6237 ft)depending on the severity of heart disease  Ranging from 502-743 meters (1476-3364 ft) depending on age, sex, height, weight, predicted heart rate max  Average distance of 238-388 meters for subjects with COPD  (Ramon et al, 2009, Chronic Heart Failure)     COPD:  Average distance walked: 380 m, range 160-600 m (1238 ft, range 521-1956 ft)  Distance < 200 m is predictive of hospitalization or mortality  Significant decline in 6MWD demonstrated in healthy adults as age increases  Geographic variations also noted in 6MWD  (Papa et al, 2007; Chuck et al, 1997, COPD)

## 2024-08-15 ENCOUNTER — APPOINTMENT (OUTPATIENT)
Dept: PHYSICAL THERAPY | Facility: CLINIC | Age: 84
DRG: 949 | End: 2024-08-15
Attending: PHYSICAL MEDICINE & REHABILITATION
Payer: COMMERCIAL

## 2024-08-15 ENCOUNTER — APPOINTMENT (OUTPATIENT)
Dept: OCCUPATIONAL THERAPY | Facility: CLINIC | Age: 84
DRG: 949 | End: 2024-08-15
Attending: PHYSICAL MEDICINE & REHABILITATION
Payer: COMMERCIAL

## 2024-08-15 LAB
ANION GAP SERPL CALCULATED.3IONS-SCNC: 8 MMOL/L (ref 7–15)
BUN SERPL-MCNC: 32.3 MG/DL (ref 8–23)
CALCIUM SERPL-MCNC: 8.3 MG/DL (ref 8.8–10.4)
CHLORIDE SERPL-SCNC: 103 MMOL/L (ref 98–107)
CREAT SERPL-MCNC: 1.41 MG/DL (ref 0.67–1.17)
EGFRCR SERPLBLD CKD-EPI 2021: 50 ML/MIN/1.73M2
ERYTHROCYTE [DISTWIDTH] IN BLOOD BY AUTOMATED COUNT: 12.4 % (ref 10–15)
GLUCOSE SERPL-MCNC: 102 MG/DL (ref 70–99)
HCO3 SERPL-SCNC: 23 MMOL/L (ref 22–29)
HCT VFR BLD AUTO: 28.6 % (ref 40–53)
HGB BLD-MCNC: 9.5 G/DL (ref 13.3–17.7)
MAGNESIUM SERPL-MCNC: 2.1 MG/DL (ref 1.7–2.3)
MCH RBC QN AUTO: 31.1 PG (ref 26.5–33)
MCHC RBC AUTO-ENTMCNC: 33.2 G/DL (ref 31.5–36.5)
MCV RBC AUTO: 94 FL (ref 78–100)
PLATELET # BLD AUTO: 302 10E3/UL (ref 150–450)
POTASSIUM SERPL-SCNC: 4.4 MMOL/L (ref 3.4–5.3)
RBC # BLD AUTO: 3.05 10E6/UL (ref 4.4–5.9)
SODIUM SERPL-SCNC: 134 MMOL/L (ref 135–145)
WBC # BLD AUTO: 11.5 10E3/UL (ref 4–11)

## 2024-08-15 PROCEDURE — 97116 GAIT TRAINING THERAPY: CPT | Mod: GP

## 2024-08-15 PROCEDURE — 5A09357 ASSISTANCE WITH RESPIRATORY VENTILATION, LESS THAN 24 CONSECUTIVE HOURS, CONTINUOUS POSITIVE AIRWAY PRESSURE: ICD-10-PCS | Performed by: PHYSICAL MEDICINE & REHABILITATION

## 2024-08-15 PROCEDURE — 97110 THERAPEUTIC EXERCISES: CPT | Mod: GP

## 2024-08-15 PROCEDURE — 85027 COMPLETE CBC AUTOMATED: CPT

## 2024-08-15 PROCEDURE — 83735 ASSAY OF MAGNESIUM: CPT

## 2024-08-15 PROCEDURE — 99233 SBSQ HOSP IP/OBS HIGH 50: CPT | Mod: GC | Performed by: PHYSICAL MEDICINE & REHABILITATION

## 2024-08-15 PROCEDURE — 80048 BASIC METABOLIC PNL TOTAL CA: CPT

## 2024-08-15 PROCEDURE — 97535 SELF CARE MNGMENT TRAINING: CPT | Mod: GO | Performed by: OCCUPATIONAL THERAPIST

## 2024-08-15 PROCEDURE — 97530 THERAPEUTIC ACTIVITIES: CPT | Mod: GP

## 2024-08-15 PROCEDURE — 36415 COLL VENOUS BLD VENIPUNCTURE: CPT

## 2024-08-15 PROCEDURE — 128N000003 HC R&B REHAB

## 2024-08-15 PROCEDURE — 250N000013 HC RX MED GY IP 250 OP 250 PS 637

## 2024-08-15 PROCEDURE — 97535 SELF CARE MNGMENT TRAINING: CPT | Mod: GO

## 2024-08-15 RX ADMIN — METOPROLOL TARTRATE 50 MG: 50 TABLET, FILM COATED ORAL at 21:01

## 2024-08-15 RX ADMIN — APIXABAN 5 MG: 5 TABLET, FILM COATED ORAL at 21:01

## 2024-08-15 RX ADMIN — ASPIRIN 81 MG CHEWABLE TABLET 81 MG: 81 TABLET CHEWABLE at 08:04

## 2024-08-15 RX ADMIN — ATORVASTATIN CALCIUM 80 MG: 80 TABLET, FILM COATED ORAL at 21:01

## 2024-08-15 RX ADMIN — Medication 1 SPRAY: at 21:02

## 2024-08-15 RX ADMIN — AMIODARONE HYDROCHLORIDE 200 MG: 200 TABLET ORAL at 08:04

## 2024-08-15 RX ADMIN — PANTOPRAZOLE SODIUM 40 MG: 40 TABLET, DELAYED RELEASE ORAL at 08:05

## 2024-08-15 RX ADMIN — APIXABAN 5 MG: 5 TABLET, FILM COATED ORAL at 08:05

## 2024-08-15 RX ADMIN — AMIODARONE HYDROCHLORIDE 200 MG: 200 TABLET ORAL at 21:01

## 2024-08-15 RX ADMIN — SENNOSIDES AND DOCUSATE SODIUM 1 TABLET: 50; 8.6 TABLET ORAL at 08:05

## 2024-08-15 ASSESSMENT — ACTIVITIES OF DAILY LIVING (ADL)
ADLS_ACUITY_SCORE: 27

## 2024-08-15 NOTE — PROGRESS NOTES
Individualized Overall Plan Of Care (IOPOC)      Rehab diagnosis/Impairment Group Code: 09 cardiac - cad s/p 3v cabg 8/7/2024  Coronary arteriosclerosis     Expected functional outcome: reach a level of mod I     Clinical Impression Comments: debility post CABG    Mobility: Pt presents s/p CABG x3 , with impairments including LE strength, impaired dynamic and static standing balance, decreased endurance, ankle ROM limitations, in presence of sternal precautions. Pt will benefit from ~7 days for progressing towards Marc level for household mobility.    ADL: OT: Pt would benefit from skilled OT services d/t recent decline in safety and IND with ADLs/IADLs and functional mobility d/t CABG x 3 resulting in impaired balance, impaired strength, sternal precautions, and impaired standing tolerance. Anticipate a 7 day LOS with OP cardiac rehab.    Communication/Cognition/Swallow:  Orders Placed This Encounter      Combination Diet Regular Diet; 2 gm NA Diet; Thin Liquids (level 0)        Intensity of therapy:   PT 90 minutes, 6x/week, for 7 days   OT 90 minutes, 6 times/week, for 7 days         Education wound care  Neuropsychology Testing: No        Medical Prognosis: good     Physician summary statement: debility post CABG, goal is to reach a level of mod I     Discharge destination: prior home  Discharge rehabilitation needs: outpatient, PT, OT, and Cardiac      Estimated length of stay: 7 days       Rehabilitation Physician Edilberto Garcia DO

## 2024-08-15 NOTE — PLAN OF CARE
Goal Outcome Evaluation:      Plan of Care Reviewed With: patient    Overall Patient Progress: improvingOverall Patient Progress: improving  Patient took a shower in OT this am.   BP slightly low this am 95/48, metoprolol held per parameter.  107/53 upon recheck later. Patient has been asymptomatic.  Patient will need eliquis education prior to discharge.

## 2024-08-15 NOTE — PROGRESS NOTES
Discharge Planner Post-Acute Rehab OT:      Discharge Plan: Home with assistance and OP cardiac rehab     Precautions: falls, sternal      Current Status:  ADLs:  Mobility: Min A with 4WW   Grooming: set-up seated  Dressing: UBD: SBA. LBD: min A with 4WW  Bathing: Min A with shower transfer with 4WW and tub bench. Assistance to wash/rinse/dry 2/10 body parts  Toileting: Min A with 4WW  IADLs: IND prior, supportive spouse  Vision/Cognition: Pt Ox3. Corrective lenses     Assessment: Full shower and ADLs completed. Pt fatigued and SOB following shower and required extended rest break to recover. Vitals stable throughout session.      Pm OT session: reviewed sternal precautions, educ /trained pt on shower transfers to simulate home  setup and discussion of recommended AE for home shower , provided pt/wife w/ measurement sheet to record ht of surfaces at home and car pertinent progression of treatment and discharge planning.     Other Barriers to Discharge (DME, Family Training, etc): Family training prior to discharge. Pt plans to use small shower at home, recommend adjustable ht flat seated shower chair.

## 2024-08-15 NOTE — PLAN OF CARE
Discharge Planner Post-Acute Rehab PT:     Discharge Plan: Home with OP PT    Precautions: Sternal, falls    Current Status:  Bed Mobility: IND  Transfer: CGA-modA pending surface height  Gait: CGA w/ 4WW  Stairs: CGA with B rails 12x6'' steps  Balance: intact seated balance, standing requires CGA w/ 4WW  Outcome Measures:  ALMEIDA/56 on   6MWT: 595' on     Assessment:  Pt engaged in cardiovascular endurance training and LE strength focus for improved transfers. Pt compensating well with light touch on 4WW during transfers/gait.     Other Barriers to Discharge (DME, Family Training, etc):   DME- pt owns FWW and 4WW (plans to have one on each floor of home)  Family training- pending progress, goal for Marc

## 2024-08-15 NOTE — CARE CONFERENCE
Acute Rehab Care Conference/Team Rounds      Type: Team Rounds    Present: Dr. Garcia, Resident Artur Haney, Dr. High Neuropsychologist, Aida Juarez PT, Anita Nguyễn OT, Deana Worthington , Alexandra Mcgovern RD, Gaby Monge RN, and Enrike Светлана Patient.      Discharge Barriers/Treatment/Education    Rehab Diagnosis: post CABG    Active Medical Co-morbidities/Prognosis:     Patient Active Problem List   Diagnosis    CMT (Charcot-Carla-Tooth disease)    Dyspnea on exertion    Precordial pain    Bruit of left carotid artery    Asthma    Coronary artery disease involving native coronary artery of native heart, unspecified whether angina present    Coronary arteriosclerosis        Safety: fall precautions     Pain: PO intake     Medications, Skin, Tubes/Lines:  no IV     Swallowing/Nutrition:  Orders Placed This Encounter      Combination Diet Regular Diet; 2 gm NA Diet; Thin Liquids (level 0)       Bowel/Bladder:  continent     Psychosocial: Lives in a house with spouse, Tori. Pt does most of the yardwork and house projects at his home. Enjoys golf. Pt also has a son (Wilmar) who lives in [place], [place] and daughter (Shanice) who lives in [place]. Pt has two grandsons who are young adults of college age. Tori is retired and worked as a . Retired, former .    ADLs/IADLs: Pt early in ARU stay. Pt limited by sternal precautions, impaired balance, and deconditioning. Pt requires min A with transfers and mobility with 4WW. Pt requires min A with LBD tasks with 4WW, and SBA with UBD tasks seated. Pt requires min A with toileting with 4WW. Goal for mod IND with ADLs prior to discharge, spouse able to assist with IADLs. No follow up OT indicated upon discharge.     Mobility: Pt early in rehab stay, presenting with impairments in LE strength, and decreased endurance, new sternal precautions, in presence of balance impairments which pt endorses are likely chronic. Currently pt  demonstrates bed mobility SBA, transfers CGA-modA pending surface height w/ FWW, gait with CGA w/ 4WW, stairs with B rails CGA.  Goals for SBA-Marc for household mobility, with OP PT at discharge. Pt has all necessary DME from PT standpoint (both FWW and 4WW).     Cognition/Language: intact     Community Re-Entry: OP therapies recommended at discharge, community mobility with 4WW and assist.     Transportation: car transfer with assist, anticipate family to provide transportation.     Decision maker: self    Plan of Care and goals reviewed and updated.    Discharge Plan/Recommendations    Fall Precautions: continue    Patient/Family input to goals: yes     Estimated length of stay: 7 days     Overall plan for the patient: reach a level of mod I       Utilization Review and Continued Stay Justification    Medical Necessity Criteria:    For any criteria that is not met, please document reason and plan for discharge, transfer, or modification of plan of care to address.    Requires intensive rehabilitation program to treat functional deficits?: Yes    Requires 3x per week or greater involvement of rehabilitation physician to oversee rehabilitation program?: Yes    Requires rehabilitation nursing interventions?: Yes    Patient is making functional progress?: Yes    There is a potential for additional functional progress? Yes    Patient is participating in therapy 3 hours per day a minimum of 5 days per week or 15 hours per week in 7 day period?:Yes    Has discharge needs that require coordinated discharge planning approach?:Yes      Barriers/Concerns related to meeting medical necessity criteria:  none    Team Plan to Address Concern:  As needed      Final Physician Sign off    Statement of Approval: Edilberto Garcia, DO      Patient Goals  Social Work Goals: Confirm discharge recommendations with therapy, coordinate safe discharge plan and remain available to support and assist as needed.    OT Predicted  Duration/Target Date for Goal Attainment: 08/20/24  Therapy Frequency (OT): 6 times/week  OT: Hygiene/Grooming: modified independent, using adaptive equipment, within precautions, while standing  OT: Upper Body Dressing: Modified independent, within precautions  OT: Lower Body Dressing: Modified independent, within precautions, using adaptive equipment  OT: Upper Body Bathing: Supervision/stand-by assist, using adaptive equipment, within precautions  OT: Lower Body Bathing: Supervision/stand-by assist, using adaptive equipment, with precautions  OT: Toilet Transfer/Toileting: Modified independent, toilet transfer, cleaning and garment management, using adaptive equipment, within precautions  OT: Meal Preparation: Modified independent, with simple meal preparation, using adaptive equipment, ambulatory level, within precautions  OT: Goal 1: OT: Pt will demo SBA with shower transfer utilizing appropriate AE/DME needs.  OT: Goal 2: OT: Pt will safely complete BUE HEP within sternal precautions I'tly to increase IND and safety with ADLs and functional mobility.       PT Predicted Duration/Target Date for Goal Attainment: 08/20/24  PT Frequency: 6x/week  PT: Bed Mobility: Independent, Within precautions  PT: Transfers: Modified independent, Sit to/from stand, Bed to/from chair, Within precautions  PT: Gait: Modified independent, 150 feet  PT: Stairs: Modified independent, 7 stairs, Rail on left  PT: Perform aerobic activity with stable cardiovascular response: continuous activity, 15 minutes, ambulation, NuStep, treadmill       Nursing Goals  Bowel and Bladder care  Fall prevention   Medication Education  Skin Care protection

## 2024-08-15 NOTE — PROGRESS NOTES
[facility]   Acute Rehabilitation Unit  Daily progress note    INTERVAL HISTORY    Patient seen seated in the chair accompanied by his wife. Participating well in therapies. Had a soft BP this am yet denies dizziness or lightheadedness. Kidney function worse today, encouraged to drink more fluids. Will repeat creat/BUN on 8/17.     ROS: 10 point ROS neg other than the symptoms noted above in the HPI.    Functionally  ADLs:  Mobility: Min A with 4WW   Grooming: set-up seated  Dressing: UBD: SBA. LBD: min A with 4WW  Bathing: Min A with shower transfer with 4WW and tub bench. Assistance to wash/rinse/dry 2/10 body parts  Toileting: Min A with 4WW  IADLs: IND prior, supportive spouse  Vision/Cognition: Pt Ox3. Corrective lenses      MEDICATIONS  Scheduled meds  Current Facility-Administered Medications   Medication Dose Route Frequency Provider Last Rate Last Admin    amiodarone (PACERONE) tablet 200 mg  200 mg Oral BID Artur Moulton MD   200 mg at 08/15/24 0804    Followed by    [START ON 8/16/2024] amiodarone (PACERONE) tablet 200 mg  200 mg Oral Daily Artur Moulton MD        apixaban ANTICOAGULANT (ELIQUIS) tablet 5 mg  5 mg Oral BID Artur Moulton MD   5 mg at 08/15/24 0805    aspirin (ASA) chewable tablet 81 mg  81 mg Oral Daily Artur Moulton MD   81 mg at 08/15/24 0804    atorvastatin (LIPITOR) tablet 80 mg  80 mg Oral Daily Artur Moulton MD   80 mg at 08/14/24 2014    fluticasone (FLONASE) 50 MCG/ACT spray 1 spray  1 spray Both Nostrils At Bedtime Edilberto Garcia DO        metoprolol tartrate (LOPRESSOR) tablet 50 mg  50 mg Oral BID Artur Moulton MD   50 mg at 08/14/24 2014    pantoprazole (PROTONIX) EC tablet 40 mg  40 mg Oral Daily Artur Moulton MD   40 mg at 08/15/24 0805    senna-docusate (SENOKOT-S/PERICOLACE) 8.6-50 MG per tablet 1 tablet  1 tablet Oral BID Artur Moulton MD   1 tablet at 08/15/24 0805       PRN meds:  Current Facility-Administered  "Medications   Medication Dose Route Frequency Provider Last Rate Last Admin    acetaminophen (TYLENOL) tablet 975 mg  975 mg Oral Q8H PRN Artur Moulton MD        melatonin tablet 3 mg  3 mg Oral At Bedtime PRN Artur Moulton MD             PHYSICAL EXAM  BP 95/48 (BP Location: Left arm, Patient Position: Sitting)   Pulse 74   Temp 98.3  F (36.8  C) (Oral)   Resp 18   Ht 1.727 m (5' 8\")   Wt 92.7 kg (204 lb 6.4 oz)   SpO2 96%   BMI 31.08 kg/m    Gen: NAD, up in chair  HEENT: NCAT, EOMI  Cardio: well perfused  Pulm: on RA, symmetrical chest rise  Abd: soft, nontender  Ext: no edema, no calf tenderness  Neuro/MSK: aaox3  Sensory: Normal to light touch in bilateral upper and lower extremities                Motor: moving upper and lower extremities with no difficulty     LABS    Lab Results   Component Value Date    WBC 10.8 08/08/2024     Lab Results   Component Value Date    RBC 2.89 08/08/2024     Lab Results   Component Value Date    HGB 8.9 08/08/2024     Lab Results   Component Value Date    HCT 26.6 08/08/2024     Lab Results   Component Value Date    MCV 92 08/08/2024     Lab Results   Component Value Date    MCH 30.8 08/08/2024     Lab Results   Component Value Date    MCHC 33.5 08/08/2024     Lab Results   Component Value Date    RDW 12.4 08/08/2024     Lab Results   Component Value Date     08/11/2024     Last Comprehensive Metabolic Panel:  Lab Results   Component Value Date     (L) 08/15/2024    POTASSIUM 4.4 08/15/2024    CHLORIDE 103 08/15/2024    CO2 23 08/15/2024    ANIONGAP 8 08/15/2024     (H) 08/15/2024    BUN 32.3 (H) 08/15/2024    CR 1.41 (H) 08/15/2024    GFRESTIMATED 50 (L) 08/15/2024    LEONA 8.3 (L) 08/15/2024           ASSESSMENT AND PLAN    Enrike Sotomayor is a 83 year old male with a history of CAD, right carotid endarterectomy in 2012, Charcot-Carla-Tooth, HTN and HLD who is s/p CABG x 3 on 8/7/24. He was extubated on POD#0 and weaned from pressors. Has no " complaints of chest pain or shortness of breath. Chest tubes removed. Post op course c/b post op Afib. He is at preop weight therefore no further diuresis needed. On sternal precautions.      Admission to acute inpatient rehab: 8/13/2024   Impairment group code: 09 Cardiac - CAD s/p 3V CABG 8/7/2024      Medical Conditions     CABG x 3 with Dr. Aparicio 8/7/2024        Ischemic cardiomyopathy with left ventricular ejection fraction of 45-50%  Left internal mammary artery to the left anterior descending artery, reversed saphenous venous graft to an obtuse marginal artery branch and the right posterior descending artery) 8/7/2024. He denies angina.  - sternum precautions for 8 weeks post op  - aspirin 81 mg daily   - PT/OT     New-onset likely postoperative atrial fibrillation first noted 8/9/2024. He no longer has symptoms and and ventricular rates are controlled.  - amiodarone. Start taking on 8/13/2024: 400 mg BID x1 day, then 200 mg BID for 2 days, then 200 mg daily for 21 days  - apixaban 5 mg BID     Hypertension  - continue metoprolol tartrate 50 mg BID  - resume pta losartan when able     4. GREG  - creat 1.41 on 8/15 (from 1.16 on 8/8). BUN also increased during this period from 22 to 32  - encouraged to keep hydrated and drink water every 2-3 hours  - repeat BMP 8/17  - daily weight      5.   Hyperlipidemia. Last LDL 99 mg/dL.  - atorvastatin 80 mg daily      6.   Obstructive sleep apnea on home CPAP     7.    Obesity with body mass index 31.05 kg/m      # Bowel:   - PRN bowel meds available        FEN: regular with thin liquids   DVT Prophylaxis: on apixaban for Afib  GI Prophylaxis: pantoprazole   Code: Full   Disposition: TBD, to discuss at interdiscipinary rounds   ELOS/Discharge Date:  1 week.  Rehab prognosis:  Good   Follow up Appointments on Discharge: Outpatient PCP. Outpatient PM&R. Outpatient therapies.   Follow with CV Surgery as scheduled.9/10 at 11:00 am  Follow up with atrial fibrillation clinic  as scheduled. 9/4/24 at 8:30 am  Follow-up with cardiology as scheduled with Dr Corona 10/2 at 7:50 am      Artur Moulton MD  PGY-4 PM&R  [facility]    Patient seen and discussed with Dr. Garcia.

## 2024-08-15 NOTE — PLAN OF CARE
Goal Outcome Evaluation:         Pt alert and oriented X4, able to make needs known. No c/o chest pain, SOB, N/V. Takes pills whole with thin liquids, no difficulty swallowing noted. Continent of B&B. Last BM 8/14. Will continue with POC.          Patient's most recent vital signs are:     Vital signs:  BP: 118/59  Temp: 98.3  HR: 74  RR: 16  SpO2: 94 %

## 2024-08-15 NOTE — PLAN OF CARE
"Goal Outcome Evaluation:         VS: Blood pressure 118/59, pulse 74, temperature 98.3  F (36.8  C), temperature source Oral, resp. rate 16, height 1.727 m (5' 8\"), weight 92.7 kg (204 lb 6.4 oz), SpO2 94%.    O2: RA   Output: Continent of bowel and bladder   Last BM: 8/14/24   Activity: Asst 1 w/ gait+walker   Skin: Sternal incision(open to air), old chest tube site, groin incision, calf incision   Pain: denies   CMS: A&O   Dressing: N/a   Diet: Reg/thin/whole   LDA: N/a   Equipment: Call light, personal belongings   Plan: Cont'd poc   Additional Info: Pt slept through the night, no acute changes ON.                     "

## 2024-08-16 ENCOUNTER — APPOINTMENT (OUTPATIENT)
Dept: ULTRASOUND IMAGING | Facility: CLINIC | Age: 84
End: 2024-08-16
Attending: PHYSICAL MEDICINE & REHABILITATION
Payer: COMMERCIAL

## 2024-08-16 ENCOUNTER — APPOINTMENT (OUTPATIENT)
Dept: PHYSICAL THERAPY | Facility: CLINIC | Age: 84
DRG: 949 | End: 2024-08-16
Attending: PHYSICAL MEDICINE & REHABILITATION
Payer: COMMERCIAL

## 2024-08-16 ENCOUNTER — APPOINTMENT (OUTPATIENT)
Dept: OCCUPATIONAL THERAPY | Facility: CLINIC | Age: 84
DRG: 949 | End: 2024-08-16
Attending: PHYSICAL MEDICINE & REHABILITATION
Payer: COMMERCIAL

## 2024-08-16 PROCEDURE — 97535 SELF CARE MNGMENT TRAINING: CPT | Mod: GO

## 2024-08-16 PROCEDURE — 97530 THERAPEUTIC ACTIVITIES: CPT | Mod: GO

## 2024-08-16 PROCEDURE — 99232 SBSQ HOSP IP/OBS MODERATE 35: CPT | Performed by: PHYSICAL MEDICINE & REHABILITATION

## 2024-08-16 PROCEDURE — 97112 NEUROMUSCULAR REEDUCATION: CPT | Mod: GP

## 2024-08-16 PROCEDURE — 93971 EXTREMITY STUDY: CPT | Mod: 26 | Performed by: RADIOLOGY

## 2024-08-16 PROCEDURE — 128N000003 HC R&B REHAB

## 2024-08-16 PROCEDURE — 97530 THERAPEUTIC ACTIVITIES: CPT | Mod: GP

## 2024-08-16 PROCEDURE — 97110 THERAPEUTIC EXERCISES: CPT | Mod: GO

## 2024-08-16 PROCEDURE — 97116 GAIT TRAINING THERAPY: CPT | Mod: GP

## 2024-08-16 PROCEDURE — 93971 EXTREMITY STUDY: CPT | Mod: LT

## 2024-08-16 PROCEDURE — 250N000013 HC RX MED GY IP 250 OP 250 PS 637

## 2024-08-16 RX ADMIN — ATORVASTATIN CALCIUM 80 MG: 80 TABLET, FILM COATED ORAL at 20:14

## 2024-08-16 RX ADMIN — ASPIRIN 81 MG CHEWABLE TABLET 81 MG: 81 TABLET CHEWABLE at 09:08

## 2024-08-16 RX ADMIN — AMIODARONE HYDROCHLORIDE 200 MG: 200 TABLET ORAL at 09:09

## 2024-08-16 RX ADMIN — APIXABAN 5 MG: 5 TABLET, FILM COATED ORAL at 09:08

## 2024-08-16 RX ADMIN — METOPROLOL TARTRATE 50 MG: 50 TABLET, FILM COATED ORAL at 09:09

## 2024-08-16 RX ADMIN — APIXABAN 5 MG: 5 TABLET, FILM COATED ORAL at 20:15

## 2024-08-16 RX ADMIN — PANTOPRAZOLE SODIUM 40 MG: 40 TABLET, DELAYED RELEASE ORAL at 09:09

## 2024-08-16 RX ADMIN — SENNOSIDES AND DOCUSATE SODIUM 1 TABLET: 50; 8.6 TABLET ORAL at 20:15

## 2024-08-16 ASSESSMENT — ACTIVITIES OF DAILY LIVING (ADL)
ADLS_ACUITY_SCORE: 35
ADLS_ACUITY_SCORE: 27
ADLS_ACUITY_SCORE: 35
ADLS_ACUITY_SCORE: 27
ADLS_ACUITY_SCORE: 35
ADLS_ACUITY_SCORE: 27
ADLS_ACUITY_SCORE: 27
ADLS_ACUITY_SCORE: 35
ADLS_ACUITY_SCORE: 35

## 2024-08-16 NOTE — PLAN OF CARE
Goal Outcome Evaluation:      Plan of Care Reviewed With: patient    Overall Patient Progress: improvingOverall Patient Progress: improving    VS: VSS   O2: 95% AT RA   Output: SEE I AND O FLOW SHEET   Last BM: 8/15/24   Activity: ASSIST OF 1 WITH WALKER   Skin: SURGICAL INCISION TO STERNUM, LEFT GROIN AND LLE   Pain: DENIES   CMS: INTACT   Dressing: NONE   Diet: REGULAR/ THIN   LDA: NONE   Equipment: T BELT, WALKER AND WHEEL CHAIR   Plan: WE WILL CONTINUE WITH CURRENT PLAN OF CARE   Additional Info: PT HAS PENDING US RESULT OF THE LLE, WE WILL F/U

## 2024-08-16 NOTE — PROGRESS NOTES
Discharge Planner Post-Acute Rehab OT:      Discharge Plan: Home with assistance and OP cardiac rehab     Precautions: falls, sternal      Current Status:  ADLs:  Mobility: Min A with 4WW   Grooming: set-up seated  Dressing: UBD: SBA. LBD: min A with 4WW  Bathing: Min A with shower transfer with 4WW and tub bench. Assistance to wash/rinse/dry 2/10 body parts  Toileting: Min A with 4WW  IADLs: IND prior, supportive spouse  Vision/Cognition: Pt Ox3. Corrective lenses     Assessment: Focus of session on endurance and strengthening tasks as well as mobility, patient motivated good family support. Provided handout on shower chair options.    Other Barriers to Discharge (DME, Family Training, etc): Family training prior to discharge. Pt plans to use small shower at home, recommend adjustable ht flat seated shower chair.

## 2024-08-16 NOTE — PLAN OF CARE
Goal Outcome Evaluation:      Plan of Care Reviewed With: patient    Overall Patient Progress: improving    Outcome Evaluation:   Pt is alert and oriented x4, uses the call light to make his needs known. Denies sob, chest pains, N/V, dizziness or lightheadedness. /498, HR 64. On regular diet, thin liquids and takes pills well with water. Continent of BB, last BM today 8/15. mid sternal incisions CDI. Uses CPAP at night. Fall and sternal precautions maintained. Bed alarm on and call light is placed within reach.

## 2024-08-16 NOTE — PLAN OF CARE
Discharge Planner Post-Acute Rehab PT:     Discharge Plan: Home with OP PT    Precautions: Sternal, falls    Current Status:  Bed Mobility: IND  Transfer: CGA-Ino pending surface height  Gait: CGA-close SBA w/ 4WW  Stairs: CGA with B rails 12x6'' steps  Balance: intact seated balance, standing requires CGA w/ 4WW  Outcome Measures:  ALMEIDA/56 on   6MWT: 595' on     Assessment:  AM session focused on dynamic balance. PM amb outdoors with 4WW CGA, indoor amb close SBA on straight path but requires CGA in turning/backing up etc.     Other Barriers to Discharge (DME, Family Training, etc):   DME- pt owns FWW and 4WW (plans to have one on each floor of home)  Family training- pt spouse present in session, formal training not yet performed.

## 2024-08-16 NOTE — PLAN OF CARE
Goal Outcome Evaluation:      Plan of Care Reviewed With: patient    Overall Patient Progress: no changeOverall Patient Progress: no change    Alert and oriented x4. No complaints of pain, sob, dizziness, fever or any weakness.  Bradycardic at baseline without symptoms.  Transfers as A1 walker in the room. Independent in bed mobility.  Continent of bowel and bladder. LBM 8/15. Uses the urinal for voiding with staff to empty.   Pt reported a bump on left groin area underneath procedure site. No pain, bruising around it. Note left for MD to assess further.   Appears asleep during safety checks. Bed alarm on. Call light in reach.   No acute issues overnight. Continue poc.

## 2024-08-17 ENCOUNTER — APPOINTMENT (OUTPATIENT)
Dept: OCCUPATIONAL THERAPY | Facility: CLINIC | Age: 84
DRG: 949 | End: 2024-08-17
Attending: PHYSICAL MEDICINE & REHABILITATION
Payer: COMMERCIAL

## 2024-08-17 ENCOUNTER — APPOINTMENT (OUTPATIENT)
Dept: PHYSICAL THERAPY | Facility: CLINIC | Age: 84
DRG: 949 | End: 2024-08-17
Attending: PHYSICAL MEDICINE & REHABILITATION
Payer: COMMERCIAL

## 2024-08-17 LAB
ANION GAP SERPL CALCULATED.3IONS-SCNC: 9 MMOL/L (ref 7–15)
BASOPHILS # BLD AUTO: 0 10E3/UL (ref 0–0.2)
BASOPHILS NFR BLD AUTO: 0 %
BUN SERPL-MCNC: 33.6 MG/DL (ref 8–23)
CALCIUM SERPL-MCNC: 8.2 MG/DL (ref 8.8–10.4)
CHLORIDE SERPL-SCNC: 104 MMOL/L (ref 98–107)
CREAT SERPL-MCNC: 1.64 MG/DL (ref 0.67–1.17)
EGFRCR SERPLBLD CKD-EPI 2021: 42 ML/MIN/1.73M2
EOSINOPHIL # BLD AUTO: 0.3 10E3/UL (ref 0–0.7)
EOSINOPHIL NFR BLD AUTO: 4 %
ERYTHROCYTE [DISTWIDTH] IN BLOOD BY AUTOMATED COUNT: 12.5 % (ref 10–15)
GLUCOSE SERPL-MCNC: 116 MG/DL (ref 70–99)
HCO3 SERPL-SCNC: 23 MMOL/L (ref 22–29)
HCT VFR BLD AUTO: 27.8 % (ref 40–53)
HGB BLD-MCNC: 9.3 G/DL (ref 13.3–17.7)
IMM GRANULOCYTES # BLD: 0.1 10E3/UL
IMM GRANULOCYTES NFR BLD: 1 %
LYMPHOCYTES # BLD AUTO: 1.1 10E3/UL (ref 0.8–5.3)
LYMPHOCYTES NFR BLD AUTO: 12 %
MCH RBC QN AUTO: 31.2 PG (ref 26.5–33)
MCHC RBC AUTO-ENTMCNC: 33.5 G/DL (ref 31.5–36.5)
MCV RBC AUTO: 93 FL (ref 78–100)
MONOCYTES # BLD AUTO: 0.4 10E3/UL (ref 0–1.3)
MONOCYTES NFR BLD AUTO: 4 %
NEUTROPHILS # BLD AUTO: 7.1 10E3/UL (ref 1.6–8.3)
NEUTROPHILS NFR BLD AUTO: 79 %
NRBC # BLD AUTO: 0 10E3/UL
NRBC BLD AUTO-RTO: 0 /100
PLATELET # BLD AUTO: 316 10E3/UL (ref 150–450)
POTASSIUM SERPL-SCNC: 4.3 MMOL/L (ref 3.4–5.3)
RBC # BLD AUTO: 2.98 10E6/UL (ref 4.4–5.9)
SODIUM SERPL-SCNC: 136 MMOL/L (ref 135–145)
WBC # BLD AUTO: 9 10E3/UL (ref 4–11)

## 2024-08-17 PROCEDURE — 128N000003 HC R&B REHAB

## 2024-08-17 PROCEDURE — 36415 COLL VENOUS BLD VENIPUNCTURE: CPT | Performed by: PHYSICAL MEDICINE & REHABILITATION

## 2024-08-17 PROCEDURE — 97530 THERAPEUTIC ACTIVITIES: CPT | Mod: GO

## 2024-08-17 PROCEDURE — 250N000013 HC RX MED GY IP 250 OP 250 PS 637

## 2024-08-17 PROCEDURE — 97150 GROUP THERAPEUTIC PROCEDURES: CPT | Mod: GP

## 2024-08-17 PROCEDURE — 97530 THERAPEUTIC ACTIVITIES: CPT | Mod: GP

## 2024-08-17 PROCEDURE — 97110 THERAPEUTIC EXERCISES: CPT | Mod: GO

## 2024-08-17 PROCEDURE — 80048 BASIC METABOLIC PNL TOTAL CA: CPT | Performed by: PHYSICAL MEDICINE & REHABILITATION

## 2024-08-17 PROCEDURE — 258N000003 HC RX IP 258 OP 636: Performed by: PHYSICAL MEDICINE & REHABILITATION

## 2024-08-17 PROCEDURE — 99233 SBSQ HOSP IP/OBS HIGH 50: CPT | Performed by: PHYSICAL MEDICINE & REHABILITATION

## 2024-08-17 PROCEDURE — 97535 SELF CARE MNGMENT TRAINING: CPT | Mod: GO

## 2024-08-17 PROCEDURE — 85025 COMPLETE CBC W/AUTO DIFF WBC: CPT | Performed by: PHYSICAL MEDICINE & REHABILITATION

## 2024-08-17 RX ORDER — SODIUM CHLORIDE 9 MG/ML
INJECTION, SOLUTION INTRAVENOUS
Status: COMPLETED
Start: 2024-08-17 | End: 2024-08-17

## 2024-08-17 RX ADMIN — AMIODARONE HYDROCHLORIDE 200 MG: 200 TABLET ORAL at 08:28

## 2024-08-17 RX ADMIN — SODIUM CHLORIDE 500 ML: 9 INJECTION, SOLUTION INTRAVENOUS at 18:35

## 2024-08-17 RX ADMIN — ATORVASTATIN CALCIUM 80 MG: 80 TABLET, FILM COATED ORAL at 20:42

## 2024-08-17 RX ADMIN — Medication 500 ML: at 18:35

## 2024-08-17 RX ADMIN — PANTOPRAZOLE SODIUM 40 MG: 40 TABLET, DELAYED RELEASE ORAL at 08:29

## 2024-08-17 RX ADMIN — SENNOSIDES AND DOCUSATE SODIUM 1 TABLET: 50; 8.6 TABLET ORAL at 08:28

## 2024-08-17 RX ADMIN — METOPROLOL TARTRATE 50 MG: 50 TABLET, FILM COATED ORAL at 08:29

## 2024-08-17 RX ADMIN — APIXABAN 5 MG: 5 TABLET, FILM COATED ORAL at 08:28

## 2024-08-17 RX ADMIN — ASPIRIN 81 MG CHEWABLE TABLET 81 MG: 81 TABLET CHEWABLE at 08:28

## 2024-08-17 RX ADMIN — APIXABAN 5 MG: 5 TABLET, FILM COATED ORAL at 20:43

## 2024-08-17 NOTE — PLAN OF CARE
Pt attended Falls Prevention class today with group of 4 patients. Pt selected for class due to documented gait deficit and falls risk. Class includes education in falls risks, how to decrease that risk through behavior and home modifications and energy conservation; and instruction in available equipment designed to increase home safety. Pt was able to verbalize understanding of materials and participated appropriately in the discussion and problem-solving segments of the class.   Pt was instructed in foundational information, strategies, equipment and environmental considerations for fall prevention; pt will be quizzed later in order to demonstrate comprehension of material.     Spencer Evans, PT  8/17/2024

## 2024-08-17 NOTE — PLAN OF CARE
Goal Outcome Evaluation:      Plan of Care Reviewed With: patient    Overall Patient Progress: improvingOverall Patient Progress: improving  VS: VSS   O2: 96% AT RA   Output: SEE I AND O FLOW SHEET   Last BM: 8/17/24   Activity: ASSIST OF 1 WITH WALKER   Skin: SURGICAL INCISION TO STERNUM, LEFT GROIN AND LLE   Pain: DENIES   CMS: INTACT   Dressing: NONE   Diet: REGULAR/ THIN   LDA: NONE   Equipment: T BELT, WALKER AND WHEEL CHAIR   Plan: WE WILL CONTINUE WITH CURRENT PLAN OF CARE   Additional Info:

## 2024-08-17 NOTE — PROGRESS NOTES
[facility]   Acute Rehabilitation Unit  Daily progress note    INTERVAL HISTORY    Patient seen seated in the chair accompanied by daughter.  No acute events overnight.  US of LLE negative for DVT.  Will add compression stockings.  Labs show dehydration.  Will run IV fluids this afternoon.  Denies chest pain, shortness of breath, no fever or chills.     Functionally  OT:  Current Status:  ADLs:  Mobility: Min A with 4WW   Grooming: set-up seated  Dressing: UBD: SBA. LBD: min A with 4WW  Bathing: Min A with shower transfer with 4WW and tub bench. Assistance to wash/rinse/dry 2/10 body parts  Toileting: Min A with 4WW  IADLs: IND prior, supportive spouse  Vision/Cognition: Pt Ox3. Corrective lenses     ROS: 10 point ROS neg other than the symptoms noted above in the HPI.        MEDICATIONS  Scheduled meds  Current Facility-Administered Medications   Medication Dose Route Frequency Provider Last Rate Last Admin    amiodarone (PACERONE) tablet 200 mg  200 mg Oral Daily Artur Moulton MD   200 mg at 08/17/24 0828    apixaban ANTICOAGULANT (ELIQUIS) tablet 5 mg  5 mg Oral BID Artur Moulton MD   5 mg at 08/17/24 0828    aspirin (ASA) chewable tablet 81 mg  81 mg Oral Daily Artur Moulton MD   81 mg at 08/17/24 0828    atorvastatin (LIPITOR) tablet 80 mg  80 mg Oral Daily Artur Moulton MD   80 mg at 08/16/24 2014    fluticasone (FLONASE) 50 MCG/ACT spray 1 spray  1 spray Both Nostrils At Bedtime Edilberto Garcia DO   1 spray at 08/15/24 2102    metoprolol tartrate (LOPRESSOR) tablet 50 mg  50 mg Oral BID Artur Moulton MD   50 mg at 08/17/24 0829    pantoprazole (PROTONIX) EC tablet 40 mg  40 mg Oral Daily Artur Moutlon MD   40 mg at 08/17/24 0829    senna-docusate (SENOKOT-S/PERICOLACE) 8.6-50 MG per tablet 1 tablet  1 tablet Oral BID Artur Moulton MD   1 tablet at 08/17/24 0828       PRN meds:  Current Facility-Administered Medications   Medication Dose Route Frequency  "Provider Last Rate Last Admin    acetaminophen (TYLENOL) tablet 975 mg  975 mg Oral Q8H PRN Artur Moulton MD        melatonin tablet 3 mg  3 mg Oral At Bedtime PRN Artur Moulton MD             PHYSICAL EXAM  /56 (BP Location: Left arm)   Pulse 62   Temp 98.7  F (37.1  C) (Oral)   Resp 16   Ht 1.727 m (5' 8\")   Wt 91.5 kg (201 lb 11.5 oz)   SpO2 96%   BMI 30.67 kg/m    Gen: NAD, up in chair  HEENT: NCAT, EOMI  Cardio: well perfused  Pulm: on RA, symmetrical chest rise  Abd: soft, nontender  Ext: no edema, no calf tenderness, there is mild edema in LLE there is indurated mass in L groin no pain or warmth   Neuro/MSK: aaox3  Sensory: Normal to light touch in bilateral upper and lower extremities                Motor: moving upper and lower extremities with no difficulty     LABS    Lab Results   Component Value Date    WBC 9.0 08/17/2024     Lab Results   Component Value Date    RBC 2.98 08/17/2024     Lab Results   Component Value Date    HGB 9.3 08/17/2024     Lab Results   Component Value Date    HCT 27.8 08/17/2024     Lab Results   Component Value Date    MCV 93 08/17/2024     Lab Results   Component Value Date    MCH 31.2 08/17/2024     Lab Results   Component Value Date    MCHC 33.5 08/17/2024     Lab Results   Component Value Date    RDW 12.5 08/17/2024     Lab Results   Component Value Date     08/17/2024           Last Comprehensive Metabolic Panel:  Lab Results   Component Value Date     08/17/2024    POTASSIUM 4.3 08/17/2024    CHLORIDE 104 08/17/2024    CO2 23 08/17/2024    ANIONGAP 9 08/17/2024     (H) 08/17/2024    BUN 33.6 (H) 08/17/2024    CR 1.64 (H) 08/17/2024    GFRESTIMATED 42 (L) 08/17/2024    LEONA 8.2 (L) 08/17/2024           ASSESSMENT AND PLAN    Enrike Sotomayor is a 83 year old male with a history of CAD, right carotid endarterectomy in 2012, Charcot-Carla-Tooth, HTN and HLD who is s/p CABG x 3 on 8/7/24. He was extubated on POD#0 and weaned from pressors. " Has no complaints of chest pain or shortness of breath. Chest tubes removed. Post op course c/b post op Afib. He is at preop weight therefore no further diuresis needed. On sternal precautions.      Admission to acute inpatient rehab: 8/13/2024   Impairment group code: 09 Cardiac - CAD s/p 3V CABG 8/7/2024      Medical Conditions     CABG x 3 with Dr. Aparicio 8/7/2024        Ischemic cardiomyopathy with left ventricular ejection fraction of 45-50%  Left internal mammary artery to the left anterior descending artery, reversed saphenous venous graft to an obtuse marginal artery branch and the right posterior descending artery) 8/7/2024. He denies angina.  - sternum precautions for 8 weeks post op  - aspirin 81 mg daily   - PT/OT  --Left groin mass, will get US to r/o DVT     New-onset likely postoperative atrial fibrillation first noted 8/9/2024. He no longer has symptoms and and ventricular rates are controlled.  - amiodarone. Start taking on 8/13/2024: 400 mg BID x1 day, then 200 mg BID for 2 days, then 200 mg daily for 21 days  - apixaban 5 mg BID     Hypertension  - continue metoprolol tartrate 50 mg BID  - resume pta losartan when able     4. GREG  - creat 1.41 on 8/15 (from 1.16 on 8/8). BUN also increased during this period from 22 to 32  - encouraged to keep hydrated and drink water every 2-3 hours  - repeat BMP 8/17  - daily weight   -will run IV fluids this afternoon 8/17     5.   Hyperlipidemia. Last LDL 99 mg/dL.  - atorvastatin 80 mg daily      6.   Obstructive sleep apnea on home CPAP     7.    Obesity with body mass index 31.05 kg/m      # Bowel:   - PRN bowel meds available        FEN: regular with thin liquids   DVT Prophylaxis: on apixaban for Afib  GI Prophylaxis: pantoprazole   Code: Full   Disposition: TBD, to discuss at interdiscipinary rounds   ELOS/Discharge Date:  1 week.  Rehab prognosis:  Good   Follow up Appointments on Discharge: Outpatient PCP. Outpatient PM&R. Outpatient therapies.    Follow with CV Surgery as scheduled.9/10 at 11:00 am  Follow up with atrial fibrillation clinic as scheduled. 9/4/24 at 8:30 am  Follow-up with cardiology as scheduled with Dr Corona 10/2 at 7:50 am      Edilberto Garcia, [facility]    I spent a total of 25 minutes face to face and coordinating care of Enrike Sotomayor. Over 50% of my time on the unit was spent counseling the patient and /or coordinating care regarding post CABG.

## 2024-08-17 NOTE — PROGRESS NOTES
[facility]   Acute Rehabilitation Unit  Daily progress note    INTERVAL HISTORY    Patient seen seated in the chair accompanied by his wife.  No acute events overnight.  Denies chest pain, shortness of breath, no fever or chills.  Will get US of LLE to r/o DVT.  Will get AM labs to review renal function.     Functionally  PT:  Bed Mobility: IND  Transfer: CGA-Ino pending surface height  Gait: CGA-close SBA w/ 4WW  Stairs: CGA with B rails 12x6'' steps  Balance: intact seated balance, standing requires CGA w/ 4WW  Outcome Measures:  ALMEIDA/56 on   6MWT: 595' on      ROS: 10 point ROS neg other than the symptoms noted above in the HPI.        MEDICATIONS  Scheduled meds  Current Facility-Administered Medications   Medication Dose Route Frequency Provider Last Rate Last Admin    amiodarone (PACERONE) tablet 200 mg  200 mg Oral Daily Artru Moulton MD   200 mg at 24 0909    apixaban ANTICOAGULANT (ELIQUIS) tablet 5 mg  5 mg Oral BID Artur Moulton MD   5 mg at 24 0908    aspirin (ASA) chewable tablet 81 mg  81 mg Oral Daily Artur Moulton MD   81 mg at 24 09    atorvastatin (LIPITOR) tablet 80 mg  80 mg Oral Daily Artur Moulton MD   80 mg at 08/15/24 210    fluticasone (FLONASE) 50 MCG/ACT spray 1 spray  1 spray Both Nostrils At Bedtime Edilberto Garcia DO   1 spray at 08/15/24 2102    metoprolol tartrate (LOPRESSOR) tablet 50 mg  50 mg Oral BID Artur Moulton MD   50 mg at 24 09    pantoprazole (PROTONIX) EC tablet 40 mg  40 mg Oral Daily Artur Moulton MD   40 mg at 24 0909    senna-docusate (SENOKOT-S/PERICOLACE) 8.6-50 MG per tablet 1 tablet  1 tablet Oral BID Artur Moulton MD   1 tablet at 08/15/24 0805       PRN meds:  Current Facility-Administered Medications   Medication Dose Route Frequency Provider Last Rate Last Admin    acetaminophen (TYLENOL) tablet 975 mg  975 mg Oral Q8H PRN Artur Moulton MD         "melatonin tablet 3 mg  3 mg Oral At Bedtime PRN Artur Moulton MD             PHYSICAL EXAM  /56 (BP Location: Left arm)   Pulse 52   Temp 98.5  F (36.9  C) (Oral)   Resp 20   Ht 1.727 m (5' 8\")   Wt 91.5 kg (201 lb 11.5 oz)   SpO2 94%   BMI 30.67 kg/m    Gen: NAD, up in chair  HEENT: NCAT, EOMI  Cardio: well perfused  Pulm: on RA, symmetrical chest rise  Abd: soft, nontender  Ext: no edema, no calf tenderness, there is mild edema in LLE there is indurated mass in L groin no pain or warmth   Neuro/MSK: aaox3  Sensory: Normal to light touch in bilateral upper and lower extremities                Motor: moving upper and lower extremities with no difficulty     LABS    Lab Results   Component Value Date    WBC 10.8 08/08/2024     Lab Results   Component Value Date    RBC 2.89 08/08/2024     Lab Results   Component Value Date    HGB 8.9 08/08/2024     Lab Results   Component Value Date    HCT 26.6 08/08/2024     Lab Results   Component Value Date    MCV 92 08/08/2024     Lab Results   Component Value Date    MCH 30.8 08/08/2024     Lab Results   Component Value Date    MCHC 33.5 08/08/2024     Lab Results   Component Value Date    RDW 12.4 08/08/2024     Lab Results   Component Value Date     08/11/2024     Last Comprehensive Metabolic Panel:  Lab Results   Component Value Date     (L) 08/15/2024    POTASSIUM 4.4 08/15/2024    CHLORIDE 103 08/15/2024    CO2 23 08/15/2024    ANIONGAP 8 08/15/2024     (H) 08/15/2024    BUN 32.3 (H) 08/15/2024    CR 1.41 (H) 08/15/2024    GFRESTIMATED 50 (L) 08/15/2024    LEONA 8.3 (L) 08/15/2024           ASSESSMENT AND PLAN    Enrike Sotomayor is a 83 year old male with a history of CAD, right carotid endarterectomy in 2012, Charcot-Carla-Tooth, HTN and HLD who is s/p CABG x 3 on 8/7/24. He was extubated on POD#0 and weaned from pressors. Has no complaints of chest pain or shortness of breath. Chest tubes removed. Post op course c/b post op Afib. He is at " preop weight therefore no further diuresis needed. On sternal precautions.      Admission to acute inpatient rehab: 8/13/2024   Impairment group code: 09 Cardiac - CAD s/p 3V CABG 8/7/2024      Medical Conditions     CABG x 3 with Dr. Aparicio 8/7/2024        Ischemic cardiomyopathy with left ventricular ejection fraction of 45-50%  Left internal mammary artery to the left anterior descending artery, reversed saphenous venous graft to an obtuse marginal artery branch and the right posterior descending artery) 8/7/2024. He denies angina.  - sternum precautions for 8 weeks post op  - aspirin 81 mg daily   - PT/OT  --Left groin mass, will get US to r/o DVT     New-onset likely postoperative atrial fibrillation first noted 8/9/2024. He no longer has symptoms and and ventricular rates are controlled.  - amiodarone. Start taking on 8/13/2024: 400 mg BID x1 day, then 200 mg BID for 2 days, then 200 mg daily for 21 days  - apixaban 5 mg BID     Hypertension  - continue metoprolol tartrate 50 mg BID  - resume pta losartan when able     4. GREG  - creat 1.41 on 8/15 (from 1.16 on 8/8). BUN also increased during this period from 22 to 32  - encouraged to keep hydrated and drink water every 2-3 hours  - repeat BMP 8/17  - daily weight      5.   Hyperlipidemia. Last LDL 99 mg/dL.  - atorvastatin 80 mg daily      6.   Obstructive sleep apnea on home CPAP     7.    Obesity with body mass index 31.05 kg/m      # Bowel:   - PRN bowel meds available        FEN: regular with thin liquids   DVT Prophylaxis: on apixaban for Afib  GI Prophylaxis: pantoprazole   Code: Full   Disposition: TBD, to discuss at interdiscipinary rounds   ELOS/Discharge Date:  1 week.  Rehab prognosis:  Good   Follow up Appointments on Discharge: Outpatient PCP. Outpatient PM&R. Outpatient therapies.   Follow with CV Surgery as scheduled.9/10 at 11:00 am  Follow up with atrial fibrillation clinic as scheduled. 9/4/24 at 8:30 am  Follow-up with cardiology as  scheduled with Dr Corona 10/2 at 7:50 am      Edilberto Garcia,   [facility]    I spent a total of 35 minutes face to face and coordinating care of Enrike Sotomayor. Over 50% of my time on the unit was spent counseling the patient and /or coordinating care regarding post CABG.

## 2024-08-17 NOTE — PROGRESS NOTES
Discharge Planner Post-Acute Rehab OT:      Discharge Plan: Home with assistance and OP cardiac rehab     Precautions: falls, sternal      Current Status:  ADLs:  Mobility: cga-Min A with 4WW   Grooming: set-up seated  Dressing: UBD: SBA. LBD: min A with 4WW  Bathing: Min A with shower transfer with 4WW and tub bench. Assistance to wash/rinse/dry 2/10 body parts  Toileting: Min A with 4WW  IADLs: IND prior, supportive spouse  Vision/Cognition: Pt Ox3. Corrective lenses     Assessment: Focus of session on repeated transfers from various surfaces and ht 's of surfaces from 17in to 21 in . Overall pt improving w/ strength and endurance since admit.     Other Barriers to Discharge (DME, Family Training, etc): Family training prior to discharge. Pt plans to use small shower at home, recommend adjustable ht flat seated shower chair.

## 2024-08-17 NOTE — PLAN OF CARE
Goal Outcome Evaluation:      Plan of Care Reviewed With: patient    Overall Patient Progress: no change    Outcome Evaluation: Pt. alert oriented, Denies pain SOB and chest pain. CPAP at night. Continent of both.Fall and sternal precaution maintained. Safety measures in place, call light within reach. Will continue with current POC.

## 2024-08-17 NOTE — PLAN OF CARE
Discharge Planner Post-Acute Rehab PT:     Discharge Plan: Home with OP PT    Precautions: Sternal, falls    Current Status:  Bed Mobility: IND  Transfer: CGA-Ino pending surface height  Gait: CGA-close SBA w/ 4WW  Stairs: CGA with B rails 12x6'' steps  Balance: intact seated balance, standing requires CGA w/ 4WW  Outcome Measures:  ALMEIDA/56 on   6MWT: 595' on     Assessment:  continues to progress well with sba to cga for all mobility; except occasional min A from low height chairs;  continue pt and family training as needed for home.      Other Barriers to Discharge (DME, Family Training, etc):   DME- pt owns FWW and 4WW (plans to have one on each floor of home)  Family training- pt spouse present in session, formal training not yet performed.

## 2024-08-18 ENCOUNTER — APPOINTMENT (OUTPATIENT)
Dept: PHYSICAL THERAPY | Facility: CLINIC | Age: 84
DRG: 949 | End: 2024-08-18
Attending: PHYSICAL MEDICINE & REHABILITATION
Payer: COMMERCIAL

## 2024-08-18 ENCOUNTER — APPOINTMENT (OUTPATIENT)
Dept: OCCUPATIONAL THERAPY | Facility: CLINIC | Age: 84
DRG: 949 | End: 2024-08-18
Attending: PHYSICAL MEDICINE & REHABILITATION
Payer: COMMERCIAL

## 2024-08-18 PROCEDURE — 97530 THERAPEUTIC ACTIVITIES: CPT | Mod: GO

## 2024-08-18 PROCEDURE — 128N000003 HC R&B REHAB

## 2024-08-18 PROCEDURE — 250N000013 HC RX MED GY IP 250 OP 250 PS 637

## 2024-08-18 PROCEDURE — 99232 SBSQ HOSP IP/OBS MODERATE 35: CPT | Performed by: PHYSICAL MEDICINE & REHABILITATION

## 2024-08-18 PROCEDURE — 97530 THERAPEUTIC ACTIVITIES: CPT | Mod: GP

## 2024-08-18 PROCEDURE — 97535 SELF CARE MNGMENT TRAINING: CPT | Mod: GO

## 2024-08-18 RX ADMIN — ASPIRIN 81 MG CHEWABLE TABLET 81 MG: 81 TABLET CHEWABLE at 08:38

## 2024-08-18 RX ADMIN — APIXABAN 5 MG: 5 TABLET, FILM COATED ORAL at 08:38

## 2024-08-18 RX ADMIN — SENNOSIDES AND DOCUSATE SODIUM 1 TABLET: 50; 8.6 TABLET ORAL at 20:22

## 2024-08-18 RX ADMIN — AMIODARONE HYDROCHLORIDE 200 MG: 200 TABLET ORAL at 08:38

## 2024-08-18 RX ADMIN — PANTOPRAZOLE SODIUM 40 MG: 40 TABLET, DELAYED RELEASE ORAL at 08:38

## 2024-08-18 RX ADMIN — ATORVASTATIN CALCIUM 80 MG: 80 TABLET, FILM COATED ORAL at 20:22

## 2024-08-18 RX ADMIN — SENNOSIDES AND DOCUSATE SODIUM 1 TABLET: 50; 8.6 TABLET ORAL at 08:39

## 2024-08-18 RX ADMIN — METOPROLOL TARTRATE 50 MG: 50 TABLET, FILM COATED ORAL at 08:38

## 2024-08-18 RX ADMIN — APIXABAN 5 MG: 5 TABLET, FILM COATED ORAL at 20:22

## 2024-08-18 NOTE — PROGRESS NOTES
Discharge Planner Post-Acute Rehab OT:      Discharge Plan: Home with assistance and OP cardiac rehab     Precautions: falls, sternal      Current Status:  ADLs:  Mobility: CGA- Min A with 4WW   Grooming: set-up seated  Dressing: UBD: indep. LBD: mod indep with 4WW  Bathing: Min A with shower transfer with 4WW and tub bench. Assistance to wash/rinse/dry 2/10 body parts  Toileting: Mod indep with 4WW  IADLs: IND prior, supportive spouse  Vision/Cognition: Pt Ox3. Corrective lenses     Assessment: Focus of 2 sessions on adls, strengthening and functional mobility tasks. 8/19 consider moving to mod indep in room depending on progress. Did well today with therapist in the distance in room.    Other Barriers to Discharge (DME, Family Training, etc): Family training prior to discharge. Pt plans to use small shower at home, recommend adjustable ht flat seated shower chair.

## 2024-08-18 NOTE — PROGRESS NOTES
[facility]   Acute Rehabilitation Unit  Daily progress note    INTERVAL HISTORY    Patient seen completing therapy with wife at side.  No acute events overnight.  Denies chest pain, shortness of breath, no fever or chills.  IV fluid ran overnight.  Will get updated labs in AM, plan on discharge this week.    Functionally  PT:  Bed Mobility: IND  Transfer: CGA-Ino pending surface height  Gait: CGA-close SBA w/ 4WW  Stairs: CGA with B rails 12x6'' steps  Balance: intact seated balance, standing requires CGA w/ 4WW  Outcome Measures:  ALMEIDA/56 on   6MWT: 595' on      ROS: 10 point ROS neg other than the symptoms noted above in the HPI.        MEDICATIONS  Scheduled meds  Current Facility-Administered Medications   Medication Dose Route Frequency Provider Last Rate Last Admin    amiodarone (PACERONE) tablet 200 mg  200 mg Oral Daily Artur Moulton MD   200 mg at 24 0838    apixaban ANTICOAGULANT (ELIQUIS) tablet 5 mg  5 mg Oral BID Artur Moulton MD   5 mg at 24 0838    aspirin (ASA) chewable tablet 81 mg  81 mg Oral Daily Artur Moulton MD   81 mg at 24 0838    atorvastatin (LIPITOR) tablet 80 mg  80 mg Oral Daily Artur Moulton MD   80 mg at 24    fluticasone (FLONASE) 50 MCG/ACT spray 1 spray  1 spray Both Nostrils At Bedtime Edilberto Garcia DO   1 spray at 08/15/24 2102    metoprolol tartrate (LOPRESSOR) tablet 50 mg  50 mg Oral BID Artur Moulton MD   50 mg at 24 0838    pantoprazole (PROTONIX) EC tablet 40 mg  40 mg Oral Daily Artur Moulton MD   40 mg at 24 0838    senna-docusate (SENOKOT-S/PERICOLACE) 8.6-50 MG per tablet 1 tablet  1 tablet Oral BID Artur Moulton MD   1 tablet at 24 0839    sodium chloride (PF) 0.9% PF flush 3 mL  3 mL Intracatheter Q8H Edilberto Garcia DO           PRN meds:  Current Facility-Administered Medications   Medication Dose Route Frequency Provider Last Rate Last Admin  "   acetaminophen (TYLENOL) tablet 975 mg  975 mg Oral Q8H PRN Artur Moulton MD        melatonin tablet 3 mg  3 mg Oral At Bedtime PRN Artur Moulton MD             PHYSICAL EXAM  /54 (BP Location: Right arm)   Pulse 64   Temp 98.4  F (36.9  C) (Oral)   Resp 16   Ht 1.727 m (5' 8\")   Wt 91.5 kg (201 lb 11.5 oz)   SpO2 94%   BMI 30.67 kg/m    Gen: NAD, up with therapy  HEENT: NCAT, EOMI  Cardio: well perfused  Pulm: on RA, symmetrical chest rise  Abd: soft, nontender  Ext: no edema, no calf tenderness, there is mild edema in LLE there is indurated mass in L groin no pain or warmth   Neuro/MSK: aaox3  Sensory: Normal to light touch in bilateral upper and lower extremities                Motor: moving upper and lower extremities with no difficulty     LABS    Lab Results   Component Value Date    WBC 9.0 08/17/2024     Lab Results   Component Value Date    RBC 2.98 08/17/2024     Lab Results   Component Value Date    HGB 9.3 08/17/2024     Lab Results   Component Value Date    HCT 27.8 08/17/2024     Lab Results   Component Value Date    MCV 93 08/17/2024     Lab Results   Component Value Date    MCH 31.2 08/17/2024     Lab Results   Component Value Date    MCHC 33.5 08/17/2024     Lab Results   Component Value Date    RDW 12.5 08/17/2024     Lab Results   Component Value Date     08/17/2024           Last Comprehensive Metabolic Panel:  Lab Results   Component Value Date     08/17/2024    POTASSIUM 4.3 08/17/2024    CHLORIDE 104 08/17/2024    CO2 23 08/17/2024    ANIONGAP 9 08/17/2024     (H) 08/17/2024    BUN 33.6 (H) 08/17/2024    CR 1.64 (H) 08/17/2024    GFRESTIMATED 42 (L) 08/17/2024    LEONA 8.2 (L) 08/17/2024           ASSESSMENT AND PLAN    Enrike Sotomayor is a 83 year old male with a history of CAD, right carotid endarterectomy in 2012, Charcot-Carla-Tooth, HTN and HLD who is s/p CABG x 3 on 8/7/24. He was extubated on POD#0 and weaned from pressors. Has no complaints of chest " pain or shortness of breath. Chest tubes removed. Post op course c/b post op Afib. He is at preop weight therefore no further diuresis needed. On sternal precautions.      Admission to acute inpatient rehab: 8/13/2024   Impairment group code: 09 Cardiac - CAD s/p 3V CABG 8/7/2024      Medical Conditions     CABG x 3 with Dr. Aparicio 8/7/2024        Ischemic cardiomyopathy with left ventricular ejection fraction of 45-50%  Left internal mammary artery to the left anterior descending artery, reversed saphenous venous graft to an obtuse marginal artery branch and the right posterior descending artery) 8/7/2024. He denies angina.  - sternum precautions for 8 weeks post op  - aspirin 81 mg daily   - PT/OT  --Left groin mass, will get US to r/o DVT     New-onset likely postoperative atrial fibrillation first noted 8/9/2024. He no longer has symptoms and and ventricular rates are controlled.  - amiodarone. Start taking on 8/13/2024: 400 mg BID x1 day, then 200 mg BID for 2 days, then 200 mg daily for 21 days  - apixaban 5 mg BID     Hypertension  - continue metoprolol tartrate 50 mg BID  - resume pta losartan when able     4. GREG  - creat 1.41 on 8/15 (from 1.16 on 8/8). BUN also increased during this period from 22 to 32  - encouraged to keep hydrated and drink water every 2-3 hours  - repeat BMP 8/17  - daily weight   -will run IV fluids this afternoon 8/17  -get updated labs tomorrow AM     5.   Hyperlipidemia. Last LDL 99 mg/dL.  - atorvastatin 80 mg daily      6.   Obstructive sleep apnea on home CPAP     7.    Obesity with body mass index 31.05 kg/m      # Bowel:   - PRN bowel meds available        FEN: regular with thin liquids   DVT Prophylaxis: on apixaban for Afib  GI Prophylaxis: pantoprazole   Code: Full   Disposition: TBD, to discuss at interdiscipinary rounds   ELOS/Discharge Date:  1 week.  Rehab prognosis:  Good   Follow up Appointments on Discharge: Outpatient PCP. Outpatient PM&R. Outpatient therapies.    Follow with CV Surgery as scheduled.9/10 at 11:00 am  Follow up with atrial fibrillation clinic as scheduled. 9/4/24 at 8:30 am  Follow-up with cardiology as scheduled with Dr Corona 10/2 at 7:50 am      Edilberto Garcia, [facility]    I spent a total of 15 minutes face to face and coordinating care of Enrike Sotomayor. Over 50% of my time on the unit was spent counseling the patient and /or coordinating care regarding post CABG.

## 2024-08-18 NOTE — PLAN OF CARE
Goal Outcome Evaluation:      Plan of Care Reviewed With: patient    Overall Patient Progress: improvingOverall Patient Progress: improving    Outcome Evaluation: SURGICAL INCISION IS HEALING/ SCABBING, HE CALLS FOR ASSISTANCE USING THE CALL LIGHT APPROPRIATELY. HIS SAFETY AWARENESS IS INTACT    VS: VSS   O2: 94% AT RA   Output: SEE I AND O FLOW SHEET   Last BM: 8/18/24, TWICE SO FAR   Activity: ASSIST OF 1 WITH WALKER   Skin: SURGICAL INCISION TO STERNUM, LEFT GROIN AND LLE   Pain: DENIES   CMS: INTACT   Dressing: NONE   Diet: REGULAR/ THIN   LDA: PIV IN THE RIGHT FOREARM   Equipment: T BELT, WALKER AND WHEEL CHAIR   Plan: WE WILL CONTINUE WITH CURRENT PLAN OF CARE

## 2024-08-18 NOTE — PLAN OF CARE
FOCUS/GOAL  Medical management    ASSESSMENT, INTERVENTIONS AND CONTINUING PLAN FOR GOAL:  Pt is alert and oriented. No complaints of pain. Assist of 1 with walker. Continent of bladder using urinal with staff emptying. Appeared to be sleeping on and off.

## 2024-08-18 NOTE — PLAN OF CARE
Goal Outcome Evaluation:      Plan of Care Reviewed With: patient    Overall Patient Progress: no change    Outcome Evaluation: Pt. alert orientedx4. Denied pain SOB and chest pain. A1 walker for transfer.  Regular thin, with good appetite. Continent of both bowel and bladder LBM today, refused Senna at bedtime,stated had 3 BMs today. With IV bolus order, for hydration, PIV R inserted, IV bolus done. Scheduled Metropolol not given order parameters out of range, bradycardic. Sternal incision CDI, open to air. CPAP on. Safety measures in place. Call light within reach. Will continue the current POC.

## 2024-08-18 NOTE — PLAN OF CARE
Goal Outcome Evaluation:                    Discharge Planner Post-Acute Rehab PT:      Discharge Plan: Home with OP PT     Precautions: Sternal, falls     Current Status:  Bed Mobility: IND  Transfer: CGA-Ino pending surface height  Gait: CGA-close SBA w/ 4WW  Stairs: CGA with B rails 12x6'' steps  Balance: intact seated balance, standing requires CGA w/ 4WW  Outcome Measures:  ALMEIDA/56 on   6MWT: 595' on      Assessment:  Time spent discussing different appropriate home exercise/activity scenarios (walking, recumbent bike, preparing breakfast, etc)  Wife present for stair training and feels comfortable assisting.  Assessed for Mod I in room-required two reminders for safe hand placement sit<>stand, otherwise appears appropriate-please do final assessment/determination.      Other Barriers to Discharge (DME, Family Training, etc):   DME- pt owns FWW and 4WW (plans to have one on each floor of home)  Family training- pt spouse present in session, formal training not yet performed.

## 2024-08-19 ENCOUNTER — APPOINTMENT (OUTPATIENT)
Dept: OCCUPATIONAL THERAPY | Facility: CLINIC | Age: 84
DRG: 949 | End: 2024-08-19
Attending: PHYSICAL MEDICINE & REHABILITATION
Payer: COMMERCIAL

## 2024-08-19 ENCOUNTER — TELEPHONE (OUTPATIENT)
Dept: PHYSICAL MEDICINE AND REHAB | Facility: CLINIC | Age: 84
End: 2024-08-19
Payer: COMMERCIAL

## 2024-08-19 ENCOUNTER — APPOINTMENT (OUTPATIENT)
Dept: PHYSICAL THERAPY | Facility: CLINIC | Age: 84
DRG: 949 | End: 2024-08-19
Attending: PHYSICAL MEDICINE & REHABILITATION
Payer: COMMERCIAL

## 2024-08-19 LAB
ANION GAP SERPL CALCULATED.3IONS-SCNC: 8 MMOL/L (ref 7–15)
BUN SERPL-MCNC: 25.9 MG/DL (ref 8–23)
CALCIUM SERPL-MCNC: 8.5 MG/DL (ref 8.8–10.4)
CHLORIDE SERPL-SCNC: 104 MMOL/L (ref 98–107)
CREAT SERPL-MCNC: 1.46 MG/DL (ref 0.67–1.17)
EGFRCR SERPLBLD CKD-EPI 2021: 48 ML/MIN/1.73M2
ERYTHROCYTE [DISTWIDTH] IN BLOOD BY AUTOMATED COUNT: 12.5 % (ref 10–15)
GLUCOSE SERPL-MCNC: 99 MG/DL (ref 70–99)
HCO3 SERPL-SCNC: 23 MMOL/L (ref 22–29)
HCT VFR BLD AUTO: 26 % (ref 40–53)
HGB BLD-MCNC: 8.5 G/DL (ref 13.3–17.7)
MAGNESIUM SERPL-MCNC: 2.2 MG/DL (ref 1.7–2.3)
MCH RBC QN AUTO: 30.8 PG (ref 26.5–33)
MCHC RBC AUTO-ENTMCNC: 32.7 G/DL (ref 31.5–36.5)
MCV RBC AUTO: 94 FL (ref 78–100)
PLATELET # BLD AUTO: 312 10E3/UL (ref 150–450)
POTASSIUM SERPL-SCNC: 4.6 MMOL/L (ref 3.4–5.3)
RBC # BLD AUTO: 2.76 10E6/UL (ref 4.4–5.9)
SODIUM SERPL-SCNC: 135 MMOL/L (ref 135–145)
WBC # BLD AUTO: 8.3 10E3/UL (ref 4–11)

## 2024-08-19 PROCEDURE — 128N000003 HC R&B REHAB

## 2024-08-19 PROCEDURE — 97530 THERAPEUTIC ACTIVITIES: CPT | Mod: GP

## 2024-08-19 PROCEDURE — 99232 SBSQ HOSP IP/OBS MODERATE 35: CPT | Performed by: PHYSICAL MEDICINE & REHABILITATION

## 2024-08-19 PROCEDURE — 97110 THERAPEUTIC EXERCISES: CPT | Mod: GP

## 2024-08-19 PROCEDURE — 82565 ASSAY OF CREATININE: CPT

## 2024-08-19 PROCEDURE — 36415 COLL VENOUS BLD VENIPUNCTURE: CPT

## 2024-08-19 PROCEDURE — 97535 SELF CARE MNGMENT TRAINING: CPT | Mod: GO

## 2024-08-19 PROCEDURE — 82374 ASSAY BLOOD CARBON DIOXIDE: CPT

## 2024-08-19 PROCEDURE — 85027 COMPLETE CBC AUTOMATED: CPT

## 2024-08-19 PROCEDURE — 250N000013 HC RX MED GY IP 250 OP 250 PS 637

## 2024-08-19 PROCEDURE — 83735 ASSAY OF MAGNESIUM: CPT

## 2024-08-19 RX ORDER — ATORVASTATIN CALCIUM 80 MG/1
80 TABLET, FILM COATED ORAL DAILY
Qty: 30 TABLET | Refills: 0 | Status: SHIPPED | OUTPATIENT
Start: 2024-08-19 | End: 2024-10-02

## 2024-08-19 RX ORDER — ASPIRIN 81 MG/1
81 TABLET, CHEWABLE ORAL DAILY
Qty: 30 TABLET | Refills: 0 | Status: SHIPPED | OUTPATIENT
Start: 2024-08-19 | End: 2024-09-18

## 2024-08-19 RX ORDER — AMIODARONE HYDROCHLORIDE 200 MG/1
200 TABLET ORAL DAILY
Qty: 17 TABLET | Refills: 0 | Status: SHIPPED | OUTPATIENT
Start: 2024-08-20 | End: 2024-09-04

## 2024-08-19 RX ORDER — PANTOPRAZOLE SODIUM 40 MG/1
40 TABLET, DELAYED RELEASE ORAL DAILY
Qty: 30 TABLET | Refills: 0 | Status: SHIPPED | OUTPATIENT
Start: 2024-08-20 | End: 2024-09-19

## 2024-08-19 RX ORDER — METOPROLOL TARTRATE 50 MG
50 TABLET ORAL 2 TIMES DAILY
Qty: 60 TABLET | Refills: 0 | Status: SHIPPED | OUTPATIENT
Start: 2024-08-19 | End: 2024-09-04

## 2024-08-19 RX ADMIN — APIXABAN 5 MG: 5 TABLET, FILM COATED ORAL at 20:09

## 2024-08-19 RX ADMIN — METOPROLOL TARTRATE 50 MG: 50 TABLET, FILM COATED ORAL at 08:15

## 2024-08-19 RX ADMIN — ATORVASTATIN CALCIUM 80 MG: 80 TABLET, FILM COATED ORAL at 20:09

## 2024-08-19 RX ADMIN — ASPIRIN 81 MG CHEWABLE TABLET 81 MG: 81 TABLET CHEWABLE at 08:16

## 2024-08-19 RX ADMIN — AMIODARONE HYDROCHLORIDE 200 MG: 200 TABLET ORAL at 08:16

## 2024-08-19 RX ADMIN — PANTOPRAZOLE SODIUM 40 MG: 40 TABLET, DELAYED RELEASE ORAL at 08:16

## 2024-08-19 RX ADMIN — METOPROLOL TARTRATE 50 MG: 50 TABLET, FILM COATED ORAL at 20:09

## 2024-08-19 RX ADMIN — APIXABAN 5 MG: 5 TABLET, FILM COATED ORAL at 08:15

## 2024-08-19 ASSESSMENT — ACTIVITIES OF DAILY LIVING (ADL)
ADLS_ACUITY_SCORE: 35
ADLS_ACUITY_SCORE: 35
ADLS_ACUITY_SCORE: 34
ADLS_ACUITY_SCORE: 35
ADLS_ACUITY_SCORE: 34
ADLS_ACUITY_SCORE: 35
ADLS_ACUITY_SCORE: 34
ADLS_ACUITY_SCORE: 35
ADLS_ACUITY_SCORE: 34
ADLS_ACUITY_SCORE: 35
ADLS_ACUITY_SCORE: 34

## 2024-08-19 NOTE — PROGRESS NOTES
"Pt discharging home tomorrow, 8/20, with OP physical therapy and support from his wife. [place] [facility] [place] resources given. SW met with pt and his wife to complete IRF and IMM. Pt's wife will call their daughter to confirm a discharge time for tomorrow, she is hoping her daughter will be there once they arrive home for extra support. SW provided a targeted discharge time of 11AM.     IRF-JD Pain Assessment    Pain Effect on Sleep  \"Over the past 5 days, how much of the time has pain made it hard for you to sleep at night?\"    0. Does not apply - I have not had any pain or hurting in the past 5 days    KENNEDY Church  Post Acute Float   [facility]    Phone: [phone]  Fax: [phone]    "

## 2024-08-19 NOTE — PLAN OF CARE
FOCUS/GOAL  Skin integrity, Psychosocial needs, Safety management, Prevention of secondary complications, and Adjustment to lifestyle change    ASSESSMENT, INTERVENTIONS AND CONTINUING PLAN FOR GOAL:    Goal Outcome Evaluation:      Plan of Care Reviewed With: patient    Overall Patient Progress: no changeOverall Patient Progress: no change    Outcome Evaluation: see progress note    Pt Aox4  Calls appropriately  Needs in reach and safety measures in place.   Cont POC    Mobility: upgraded to Mod I with FWW  Bowel/Bladder: cont of both, LBM 8/18  Skin: dernal and groin incision, BASSAM  O2: RA  Diet: R/T/W  Psychosocial: appropriate to situation  Pain: denies  Tubes/Lines/Drains: R PIV  Misc:

## 2024-08-19 NOTE — PLAN OF CARE
Pt is A/O x 4, A1 with walker. R thin whole, cont of B/B, LBM 8/18. Hematoma to L groin area. R PIV, pt can make needs known.

## 2024-08-19 NOTE — PLAN OF CARE
Physical Therapy Discharge Summary    Reason for therapy discharge:    Discharged to home with outpatient therapy.    Progress towards therapy goal(s). See goals on Care Plan in [facility] electronic health record for goal details.  Goals met    Therapy recommendation(s):    Continued therapy is recommended.  Rationale/Recommendations:  Recommend OP Cardiac rehab for progressing functional endurance.      Precautions: Sternal, falls     Current Status:  Bed Mobility: IND  Transfer: Marc w/ 4WW  Gait: Marc w/ 4WW  Stairs: SBA single rail, 12x6'' steps  Balance: intact seated balance, standing requires CGA w/ 4WW  Outcome Measures:  ALMEIDA/56 on   6MWT: 595' on

## 2024-08-19 NOTE — TELEPHONE ENCOUNTER
[facility] Call Center    Phone Message    May a detailed message be left on voicemail: yes     Reason for Call: Other: Hospital Calling  to set up hospital follow up.  He was seen in the hospital by Dr. Garcia for Cardiac reasons.  Please call him back to schedule.      Action Taken: Message routed to:  [facility] PMR    Travel Screening: Not Applicable     Date of Service:

## 2024-08-19 NOTE — DISCHARGE INSTRUCTIONS
Follow up Appointments on Discharge:     PCP  You are scheduled to see Dr. Anderson on August 26 2024 at 10:00 am.    Address  [address] [address]  Phone   [phone]    Fax  [phone]    PM & R  You are scheduled to see *** on *** at ***. You will be called to schedule this appointment.    Phone   [phone]     Follow with CV Surgery as scheduled  You are scheduled for CV surgery follow up on September 10 2024 at 10:45 am.    Address  [address] [facility] [address]  Phone   [phone]     Follow up with atrial fibrillation clinic as scheduled.   You are scheduled to see Sofy Lane NP on September 4 2024 at 8:15 am.    Address  [address] [place] [address]  Phone   [phone]     Follow-up with cardiology as scheduled.  You are scheduled to see Dr. Corona on October 2 2024 at 7:35 am.    Address  [address] [place] [address]   Phone   [phone]

## 2024-08-19 NOTE — PLAN OF CARE
Goal Outcome Evaluation:      Plan of Care Reviewed With: patient, spouse    Overall Patient Progress: improvingOverall Patient Progress: improving    Alert and oriented x4, denies headache or dizziness, denies chest pain of sob. Using call light appropriately, c/o being cold, warm blanket provided with good effect.

## 2024-08-19 NOTE — PROGRESS NOTES
[facility]   Acute Rehabilitation Unit  Daily progress note    INTERVAL HISTORY    Patient seen this AM.  No acute events overnight.  Denies chest pain, shortness of breath, no fever or chills.  Reviewed discharge goals.  Labs show improved kidney function.     Functionally  OT:  ADLs:  Mobility: CGA- Min A with 4WW   Grooming: set-up seated  Dressing: UBD: indep. LBD: mod indep with 4WW  Bathing: Min A with shower transfer with 4WW and tub bench. Assistance to wash/rinse/dry 2/10 body parts  Toileting: Mod indep with 4WW  IADLs: IND prior, supportive spouse  Vision/Cognition: Pt Ox3. Corrective lenses     ROS: 10 point ROS neg other than the symptoms noted above in the HPI.        MEDICATIONS  Scheduled meds  Current Facility-Administered Medications   Medication Dose Route Frequency Provider Last Rate Last Admin    amiodarone (PACERONE) tablet 200 mg  200 mg Oral Daily Artur Moulton MD   200 mg at 08/19/24 0816    apixaban ANTICOAGULANT (ELIQUIS) tablet 5 mg  5 mg Oral BID Artur Moulton MD   5 mg at 08/19/24 0815    aspirin (ASA) chewable tablet 81 mg  81 mg Oral Daily Artur Moulton MD   81 mg at 08/19/24 0816    atorvastatin (LIPITOR) tablet 80 mg  80 mg Oral Daily Artur Moulton MD   80 mg at 08/18/24 2022    fluticasone (FLONASE) 50 MCG/ACT spray 1 spray  1 spray Both Nostrils At Bedtime Edilberto Garcia DO   1 spray at 08/15/24 2102    metoprolol tartrate (LOPRESSOR) tablet 50 mg  50 mg Oral BID Artur Moulton MD   50 mg at 08/19/24 0815    pantoprazole (PROTONIX) EC tablet 40 mg  40 mg Oral Daily Artur Moulton MD   40 mg at 08/19/24 0816    senna-docusate (SENOKOT-S/PERICOLACE) 8.6-50 MG per tablet 1 tablet  1 tablet Oral BID Artur Moulton MD   1 tablet at 08/18/24 2022    sodium chloride (PF) 0.9% PF flush 3 mL  3 mL Intracatheter Q8H Edilberto Garcia DO   3 mL at 08/19/24 0816       PRN meds:  Current Facility-Administered Medications   Medication  "Dose Route Frequency Provider Last Rate Last Admin    acetaminophen (TYLENOL) tablet 975 mg  975 mg Oral Q8H PRN Artur Moulton MD        melatonin tablet 3 mg  3 mg Oral At Bedtime PRN Artur Moulton MD             PHYSICAL EXAM  /75 (BP Location: Left arm)   Pulse 84   Temp 98.3  F (36.8  C) (Oral)   Resp 18   Ht 1.727 m (5' 8\")   Wt 91.5 kg (201 lb 11.5 oz)   SpO2 94%   BMI 30.67 kg/m    Gen: NAD, up in chair  HEENT: NCAT, EOMI  Cardio: well perfused  Pulm: on RA, symmetrical chest rise  Abd: soft, nontender  Ext: no edema, no calf tenderness, there is mild edema in LLE - improving  Neuro/MSK: aaox3  Sensory: Normal to light touch in bilateral upper and lower extremities                Motor: moving upper and lower extremities with no difficulty     LABS    Lab Results   Component Value Date    WBC 9.0 08/17/2024     Lab Results   Component Value Date    RBC 2.98 08/17/2024     Lab Results   Component Value Date    HGB 9.3 08/17/2024     Lab Results   Component Value Date    HCT 27.8 08/17/2024     Lab Results   Component Value Date    MCV 93 08/17/2024     Lab Results   Component Value Date    MCH 31.2 08/17/2024     Lab Results   Component Value Date    MCHC 33.5 08/17/2024     Lab Results   Component Value Date    RDW 12.5 08/17/2024     Lab Results   Component Value Date     08/17/2024           Last Comprehensive Metabolic Panel:  Lab Results   Component Value Date     08/19/2024    POTASSIUM 4.6 08/19/2024    CHLORIDE 104 08/19/2024    CO2 23 08/19/2024    ANIONGAP 8 08/19/2024    GLC 99 08/19/2024    BUN 25.9 (H) 08/19/2024    CR 1.46 (H) 08/19/2024    GFRESTIMATED 48 (L) 08/19/2024    LEONA 8.5 (L) 08/19/2024           ASSESSMENT AND PLAN    Enrike Sotomayor is a 83 year old male with a history of CAD, right carotid endarterectomy in 2012, Charcot-Carla-Tooth, HTN and HLD who is s/p CABG x 3 on 8/7/24. He was extubated on POD#0 and weaned from pressors. Has no complaints of chest " pain or shortness of breath. Chest tubes removed. Post op course c/b post op Afib. He is at preop weight therefore no further diuresis needed. On sternal precautions.      Admission to acute inpatient rehab: 8/13/2024   Impairment group code: 09 Cardiac - CAD s/p 3V CABG 8/7/2024      Medical Conditions     CABG x 3 with Dr. Aparicio 8/7/2024        Ischemic cardiomyopathy with left ventricular ejection fraction of 45-50%  Left internal mammary artery to the left anterior descending artery, reversed saphenous venous graft to an obtuse marginal artery branch and the right posterior descending artery) 8/7/2024. He denies angina.  - sternum precautions for 8 weeks post op  - aspirin 81 mg daily   - PT/OT  --Left groin mass, will get US to r/o DVT     New-onset likely postoperative atrial fibrillation first noted 8/9/2024. He no longer has symptoms and and ventricular rates are controlled.  - amiodarone. Start taking on 8/13/2024: 400 mg BID x1 day, then 200 mg BID for 2 days, then 200 mg daily for 21 days  - apixaban 5 mg BID     Hypertension  - continue metoprolol tartrate 50 mg BID  - resume pta losartan when able     4. GREG - improving   - creat 1.41 on 8/15 (from 1.16 on 8/8). BUN also increased during this period from 22 to 32  - encouraged to keep hydrated and drink water every 2-3 hours  - repeat BMP 8/17  - daily weight   -will run IV fluids this afternoon 8/17  -get updated labs show improvement     5.   Hyperlipidemia. Last LDL 99 mg/dL.  - atorvastatin 80 mg daily      6.   Obstructive sleep apnea on home CPAP     7.    Obesity with body mass index 31.05 kg/m      # Bowel:   - PRN bowel meds available        FEN: regular with thin liquids   DVT Prophylaxis: on apixaban for Afib  GI Prophylaxis: pantoprazole   Code: Full   Disposition: home with outpatient therapy  ELOS/Discharge Date:  1 week.  Rehab prognosis:  Good   Follow up Appointments on Discharge: Outpatient PCP. Outpatient PM&R. Outpatient therapies.    Follow with CV Surgery as scheduled.9/10 at 11:00 am  Follow up with atrial fibrillation clinic as scheduled. 9/4/24 at 8:30 am  Follow-up with cardiology as scheduled with Dr Corona 10/2 at 7:50 am      Edilberto Garcia, [facility]    I spent a total of 25 minutes face to face and coordinating care of Enrike Sotomayor. Over 50% of my time on the unit was spent counseling the patient and /or coordinating care regarding post CABG.

## 2024-08-19 NOTE — PLAN OF CARE
Occupational Therapy Discharge Summary    Reason for therapy discharge:    Planned discharge to home with OP Cardiac Rehab on 8/19/2024.    Progress towards therapy goal(s). See goals on Care Plan in [facility] electronic health record for goal details.  Goals met    ADLs:  Mobility: Mod I with 4ww  Grooming: Mod I  Dressing: Mod I  Bathing: Min A washing/drying back, SBA-supervision transfer and completion of washing other body parts  Toileting: Mod I with 4WW  IADLs: IND prior, supportive spouse  Vision/Cognition: Pt Ox3. Corrective lenses       Therapy recommendation(s):    Continued therapy is recommended.  Rationale/Recommendations:  OP Cardiac Rehab to increase cardiopulmonary strengthening following ARU stay. Recommend supervision with showers/shower transfer and assist with washing/drying back to ensure adherence to sternal precautions. Shower chair has been obtained for pt shower. Pt spouse planning to  automatic BP cuff, compression socks, and transfer gait belt for home use.

## 2024-08-19 NOTE — PLAN OF CARE
FOCUS/GOAL  Medical management    ASSESSMENT, INTERVENTIONS AND CONTINUING PLAN FOR GOAL:  Pt is alert and oriented. No complaints of pain. Assist of 1 with walker. Continent of bladder using urinal independently with staff emptying. PIV in place. Appeared to be sleeping on and off tonight.

## 2024-08-20 VITALS
HEART RATE: 58 BPM | DIASTOLIC BLOOD PRESSURE: 57 MMHG | OXYGEN SATURATION: 94 % | WEIGHT: 201.72 LBS | HEIGHT: 68 IN | BODY MASS INDEX: 30.57 KG/M2 | SYSTOLIC BLOOD PRESSURE: 137 MMHG | TEMPERATURE: 98.4 F | RESPIRATION RATE: 16 BRPM

## 2024-08-20 PROCEDURE — 99239 HOSP IP/OBS DSCHRG MGMT >30: CPT | Performed by: PHYSICAL MEDICINE & REHABILITATION

## 2024-08-20 PROCEDURE — 250N000013 HC RX MED GY IP 250 OP 250 PS 637

## 2024-08-20 RX ADMIN — APIXABAN 5 MG: 5 TABLET, FILM COATED ORAL at 08:18

## 2024-08-20 RX ADMIN — PANTOPRAZOLE SODIUM 40 MG: 40 TABLET, DELAYED RELEASE ORAL at 07:48

## 2024-08-20 RX ADMIN — ASPIRIN 81 MG CHEWABLE TABLET 81 MG: 81 TABLET CHEWABLE at 07:47

## 2024-08-20 ASSESSMENT — ACTIVITIES OF DAILY LIVING (ADL)
ADLS_ACUITY_SCORE: 34

## 2024-08-20 NOTE — DISCHARGE SUMMARY
[facility]   Acute Rehabilitation Unit  Discharge summary     Date of Admission: 2024  Date of Discharge: 24  Disposition: home   Primary Care Physician: Paul Anderson  Attending physician: Edilberto Garcia,         discharge diagnosis    S/p CABG   Impaired functional mobility  Impaired ADLs  Impaired cognition     Patient Active Problem List   Diagnosis    CMT (Charcot-Carla-Tooth disease)    Dyspnea on exertion    Precordial pain    Bruit of left carotid artery    Asthma    Coronary artery disease involving native coronary artery of native heart, unspecified whether angina present    Coronary arteriosclerosis         brief summary  Enrike Sotomayor is a 83 year old male with a history of CAD, right CEA in , HTN and HLD who is s/p CAB x 3 on 24. He was extubated on POD#0 and weaned from pressors. Has no complaints of chest pain or shortness of breath. Chest tubes removed. Post op course c/b post op Afib. He is at preop weight therefore no further diuresis needed. On sternal precautions.      During acute hospitalization, patient was seen and evaluated by PT, OT who collectively recommended that patient would benefit from ongoing therapies in the acute inpatient rehabilitation setting, and patient was admitted on 2024.         rehabilitaiton course    PT:  Precautions: Sternal, falls     Current Status:  Bed Mobility: IND  Transfer: Marc w/ 4WW  Gait: Marc w/ 4WW  Stairs: SBA single rail, 12x6'' steps  Balance: intact seated balance, standing requires CGA w/ 4WW  Outcome Measures:  ALMEIDA/56 on   6MWT: 595' on       OT:  ADLs:  Mobility: Mod I with 4ww  Grooming: Mod I  Dressing: Mod I  Bathing: Min A washing/drying back, SBA-supervision transfer and completion of washing other body parts  Toileting: Mod I with 4WW  IADLs: IND prior, supportive spouse  Vision/Cognition: Pt Ox3. Corrective lenses        mEDICAL COURSE    CABG x 3 with  Dr. Aparicio 8/7/2024        Ischemic cardiomyopathy with left ventricular ejection fraction of 45-50%  Left internal mammary artery to the left anterior descending artery, reversed saphenous venous graft to an obtuse marginal artery branch and the right posterior descending artery) 8/7/2024. He denies angina.  - sternum precautions for 8 weeks post op  - aspirin 81 mg daily   - PT/OT  --Left groin mass, will get US to r/o DVT     New-onset likely postoperative atrial fibrillation first noted 8/9/2024. He no longer has symptoms and and ventricular rates are controlled.  - amiodarone. Start taking on 8/13/2024: 400 mg BID x1 day, then 200 mg BID for 2 days, then 200 mg daily for 21 days  - apixaban 5 mg BID     Hypertension  - continue metoprolol tartrate 50 mg BID  - resume pta losartan when able      4. GREG - improving   - creat 1.41 on 8/15 (from 1.16 on 8/8). BUN also increased during this period from 22 to 32  - encouraged to keep hydrated and drink water every 2-3 hours  - repeat BMP 8/17  - daily weight   -will run IV fluids this afternoon 8/17  -get updated labs show improvement     5.   Hyperlipidemia. Last LDL 99 mg/dL.  - atorvastatin 80 mg daily      6.   Obstructive sleep apnea on home CPAP     7.    Obesity with body mass index 31.05 kg/m      # Bowel:   - PRN bowel meds available      dISCHARGE MEDICATIONS  Current Discharge Medication List        CONTINUE these medications which have CHANGED    Details   amiodarone (PACERONE) 200 MG tablet Take 1 tablet (200 mg) by mouth daily for 17 days  Qty: 17 tablet, Refills: 0    Associated Diagnoses: S/P CABG (coronary artery bypass graft)      apixaban ANTICOAGULANT (ELIQUIS) 5 MG tablet Take 1 tablet (5 mg) by mouth 2 times daily for 30 days  Qty: 60 tablet, Refills: 0    Associated Diagnoses: S/P CABG (coronary artery bypass graft)      aspirin (ASA) 81 MG chewable tablet Take 1 tablet (81 mg) by mouth daily for 30 days  Qty: 30 tablet, Refills: 0     Associated Diagnoses: S/P CABG (coronary artery bypass graft)      atorvastatin (LIPITOR) 80 MG tablet Take 1 tablet (80 mg) by mouth daily for 30 days  Qty: 30 tablet, Refills: 0    Associated Diagnoses: S/P CABG (coronary artery bypass graft)      metoprolol tartrate (LOPRESSOR) 50 MG tablet Take 1 tablet (50 mg) by mouth 2 times daily for 30 days  Qty: 60 tablet, Refills: 0    Associated Diagnoses: S/P CABG (coronary artery bypass graft)      pantoprazole (PROTONIX) 40 MG EC tablet Take 1 tablet (40 mg) by mouth daily for 30 days  Qty: 30 tablet, Refills: 0    Associated Diagnoses: S/P CABG (coronary artery bypass graft)           CONTINUE these medications which have NOT CHANGED    Details   fluticasone (FLONASE) 50 MCG/ACT nasal spray Spray 1 spray into each nostril every other night before bed. Uses on even days.      sildenafil (VIAGRA) 100 MG tablet Take 100 mg by mouth daily as needed           STOP taking these medications       senna-docusate (SENOKOT-S/PERICOLACE) 8.6-50 MG tablet Comments:   Reason for Stopping:                 DISCHARGE INSTRUCTIONS AND FOLLOW UP  Discharge Procedure Orders   Cardiac Rehab  Referral   Standing Status: Future   Referral Priority: Routine Referral Type: Rehab Therapy Cardiac Therapy   Number of Visits Requested: 1     Occupational Therapy  Referral   Standing Status: Future   Referral Priority: Routine Referral Type: Occupational Therapy   Number of Visits Requested: 1     Reason for your hospital stay   Order Comments: Post CABG     Activity   Order Comments: Your activity upon discharge: activity as tolerated and no driving     Order Specific Question Answer Comments   Is discharge order? Yes      Adult [facility] Follow-up and recommended labs and tests   Order Comments: Follow up with primary care provider, Paul Anderson, within 7 days for hospital follow- up.  The following labs/tests are recommended: BMP for renal function.      Appointments on  [place] and/or [facility] (with [facility] or [facility] provider or service). Call [phone] if you haven't heard regarding these appointments within 7 days of discharge.     Diet   Order Comments: Follow this diet upon discharge: Orders Placed This Encounter      Room Service      Combination Diet Regular Diet; 2 gm NA Diet; Thin Liquids (level 0)     Order Specific Question Answer Comments   Is discharge order? Yes           physical examination    Most recent Vital Signs:   Vitals:    08/19/24 1547 08/19/24 2006 08/20/24 0730 08/20/24 0750   BP: 125/57 125/50 137/57    BP Location: Left arm Left arm Right arm    Patient Position:  Sitting     Cuff Size:  Adult Regular     Pulse: 66 60 58 58   Resp: 16  16    Temp: 98.3  F (36.8  C)  98.4  F (36.9  C)    TempSrc: Oral  Oral    SpO2: 94% 94% 94%    Weight:       Height:           Gen: NAD, up in chair  HEENT: NCAT, EOMI  Cardio: well perfused  Pulm: on RA, symmetrical chest rise  Abd: soft, nontender  Ext: no edema, no calf tenderness, there is mild edema in LLE - improving  Neuro/MSK: aaox3  Sensory: Normal to light touch in bilateral upper and lower extremities                Motor: moving upper and lower extremities with no difficulty           Discharge summary was forwarded to Paul Anderson (PCP) at the time of discharge, so as to bridge from hospital to outpatient care.     It was our pleasure to care for Enrike Sotomayor during this hospitalization. Please do not hesitate to contact me should there be questions regarding the hospital course or discharge plan.        Edilberto aGrcia DO  Physical Medicine & Rehabilitation      I, Edilberto Garcia DO, saw and evaluated this patient prior to discharge. 35 minutes spent in discharge, including >50% in counseling and coordination of care, medication review and plan of care recommended on follow up.

## 2024-08-20 NOTE — PLAN OF CARE
Goal Outcome Evaluation:      Plan of Care Reviewed With: patient    Overall Patient Progress: improvingOverall Patient Progress: improving    Patient had no issues overnight. Denied chest pain, sob, fever, numbness or tingling. Generalized weakness although Mod I with the walker in the room in transfers. Continent of both B&B, uses the urinal for voiding and calls for staff to empty at night. On track for discharge today.

## 2024-08-20 NOTE — PLAN OF CARE
Goal Outcome Evaluation:      Plan of Care Reviewed With: patient  Overall Patient Progress: improving    Outcome Evaluation: Pt is alert and oriented  x 4, denies pain. Mod I with Fww in room. continent of bowel and bladder, LBM 8/18 .Pt refused scheduled senna and Flonase. Call light within reach and  no new concerns.

## 2024-08-23 ENCOUNTER — TELEPHONE (OUTPATIENT)
Dept: CARDIOLOGY | Facility: CLINIC | Age: 84
End: 2024-08-23
Payer: COMMERCIAL

## 2024-08-23 DIAGNOSIS — Z95.1 S/P CABG (CORONARY ARTERY BYPASS GRAFT): Primary | ICD-10-CM

## 2024-08-23 RX ORDER — FUROSEMIDE 40 MG
40 TABLET ORAL 2 TIMES DAILY
Qty: 10 TABLET | Refills: 0 | Status: SHIPPED | OUTPATIENT
Start: 2024-08-23 | End: 2024-09-04

## 2024-08-23 RX ORDER — POTASSIUM CHLORIDE 1500 MG/1
20 TABLET, EXTENDED RELEASE ORAL 2 TIMES DAILY
Qty: 10 TABLET | Refills: 0 | Status: SHIPPED | OUTPATIENT
Start: 2024-08-23 | End: 2024-09-04

## 2024-08-23 NOTE — TELEPHONE ENCOUNTER
Pts current weight is 204  8/22 200 lbs  8/21 196    Pt states he feels like he is slightly more short of breath than he has been recently. Pt sees his PCP at 10 AM on Monday 8/26.    PLAN: recheck BMP and chest xray at [facility] on Monday 8/26. Pt instructed to have BMP drawn before or after his chest x-ray.    -------------------------------------------------------------------------------------------------------  Per CV JAMIE:  Litzy Harper PA-C O'Brien, Jessica, RN    His pre-op weight is 204 so not sure what our goal is. Also with post-op GREG. Given SOB, I'll send Lasix BIDx5 days +K to his pharmacy. Also will order BMP and CXR which he can do before or after rehab on the 29th worst case, but would be nice to get sooner if he can.

## 2024-08-23 NOTE — TELEPHONE ENCOUNTER
[facility] Call Center    Phone Message    May a detailed message be left on voicemail: yes     Reason for Call: Symptoms or Concerns     If patient has red-flag symptoms, warm transfer to triage line    Current symptom or concern: 8 lb weight gain post bypass surgery with Alessandra    Symptoms have been present for:  2 week(s)    Has patient previously been seen for this? No        Action Taken: Other: cardio    Travel Screening: Not Applicable     Date of Service:

## 2024-08-26 ENCOUNTER — TRANSFERRED RECORDS (OUTPATIENT)
Dept: HEALTH INFORMATION MANAGEMENT | Facility: CLINIC | Age: 84
End: 2024-08-26

## 2024-08-26 ENCOUNTER — HOSPITAL ENCOUNTER (OUTPATIENT)
Dept: RADIOLOGY | Facility: HOSPITAL | Age: 84
Discharge: HOME | End: 2024-08-26
Attending: PHYSICIAN ASSISTANT
Payer: COMMERCIAL

## 2024-08-26 ENCOUNTER — LAB (OUTPATIENT)
Dept: LAB | Facility: HOSPITAL | Age: 84
End: 2024-08-26
Attending: PHYSICIAN ASSISTANT
Payer: COMMERCIAL

## 2024-08-26 ENCOUNTER — LAB REQUISITION (OUTPATIENT)
Dept: LAB | Facility: CLINIC | Age: 84
End: 2024-08-26
Payer: COMMERCIAL

## 2024-08-26 DIAGNOSIS — Z95.1 S/P CABG (CORONARY ARTERY BYPASS GRAFT): ICD-10-CM

## 2024-08-26 DIAGNOSIS — R31.9 HEMATURIA, UNSPECIFIED: ICD-10-CM

## 2024-08-26 LAB
ANION GAP SERPL CALCULATED.3IONS-SCNC: 11 MMOL/L (ref 7–15)
BUN SERPL-MCNC: 31.1 MG/DL (ref 8–23)
CALCIUM SERPL-MCNC: 8.5 MG/DL (ref 8.8–10.4)
CHLORIDE SERPL-SCNC: 100 MMOL/L (ref 98–107)
CREAT SERPL-MCNC: 1.64 MG/DL (ref 0.67–1.17)
EGFRCR SERPLBLD CKD-EPI 2021: 42 ML/MIN/1.73M2
GLUCOSE SERPL-MCNC: 98 MG/DL (ref 70–99)
HCO3 SERPL-SCNC: 26 MMOL/L (ref 22–29)
POTASSIUM SERPL-SCNC: 4.6 MMOL/L (ref 3.4–5.3)
SODIUM SERPL-SCNC: 137 MMOL/L (ref 135–145)

## 2024-08-26 PROCEDURE — 87086 URINE CULTURE/COLONY COUNT: CPT | Mod: ORL | Performed by: STUDENT IN AN ORGANIZED HEALTH CARE EDUCATION/TRAINING PROGRAM

## 2024-08-26 PROCEDURE — 80048 BASIC METABOLIC PNL TOTAL CA: CPT

## 2024-08-26 PROCEDURE — 71046 X-RAY EXAM CHEST 2 VIEWS: CPT

## 2024-08-26 PROCEDURE — 36415 COLL VENOUS BLD VENIPUNCTURE: CPT

## 2024-08-27 ENCOUNTER — TELEPHONE (OUTPATIENT)
Dept: CARDIOLOGY | Facility: CLINIC | Age: 84
End: 2024-08-27
Payer: COMMERCIAL

## 2024-08-27 LAB — BACTERIA UR CULT: NO GROWTH

## 2024-08-27 NOTE — TELEPHONE ENCOUNTER
Small to moderate left pleural effusion noted on recent CXR. Scheduled pt for thoracentesis 8/29. Appt instructions and directions provided to patient. All questions addressed.

## 2024-08-29 ENCOUNTER — HOSPITAL ENCOUNTER (OUTPATIENT)
Dept: ULTRASOUND IMAGING | Facility: HOSPITAL | Age: 84
Discharge: HOME | End: 2024-08-29
Attending: PHYSICIAN ASSISTANT | Admitting: STUDENT IN AN ORGANIZED HEALTH CARE EDUCATION/TRAINING PROGRAM
Payer: COMMERCIAL

## 2024-08-29 DIAGNOSIS — J90 PLEURAL EFFUSION: ICD-10-CM

## 2024-08-29 LAB — HOLD SPECIMEN: NORMAL

## 2024-08-29 PROCEDURE — 272N000042 US THORACENTESIS

## 2024-08-30 ENCOUNTER — TELEPHONE (OUTPATIENT)
Dept: CARDIOLOGY | Facility: CLINIC | Age: 84
End: 2024-08-30
Payer: COMMERCIAL

## 2024-08-30 NOTE — TELEPHONE ENCOUNTER
Called pt and spoke to pt and spouse. Pt states he feels much better after thoracentesis on 8/29 (1L off left side). Advised the pt he must continue coughing/deep breathing and using his IS frequently in order to prevent a recurrent pleural effusion. Pt verbalized understanding and all questions were addressed.

## 2024-09-03 PROBLEM — Z95.1 S/P CABG (CORONARY ARTERY BYPASS GRAFT): Status: ACTIVE | Noted: 2024-09-03

## 2024-09-03 PROBLEM — I97.89 POSTOPERATIVE ATRIAL FIBRILLATION (H): Status: ACTIVE | Noted: 2024-09-03

## 2024-09-03 PROBLEM — I48.91 POSTOPERATIVE ATRIAL FIBRILLATION (H): Status: ACTIVE | Noted: 2024-09-03

## 2024-09-03 NOTE — PROGRESS NOTES
Thank you, Alyssa Atkins PA-C, for asking the [facility] team to see Mr. Enrike Sotomayor to evaluate post op AF.    Assessment/Recommendations     Assessment/Plan:    Diagnoses and all orders for this visit:  Postoperative atrial fibrillation (H)  Dx/date: Postop atrial fibrillation following CABG x 3 surgery, AF first noted on 8/9/2024 with controlled ventricular rate of 74 bpm per twelve-lead ECG.  CABG x 3 surgery took place on 8/7/2024 Dr Aparicio.   Sx: asymptomatic  Amiodarone 200 mg daily until 9/6/24 + metoprolol tartrate 50 mg BID  Home ECG monitoring: Apple Watch, patient forgot to bring his phone with today so unable to review history  we discussed the ongoing importance of lifestyle modification (maintaining a healthy weight, sleep apnea diagnosis and management, alcohol avoidance) as part of a long term strategy for atrial fibrillation management      [id number] score of 3 due to age over 75, CAD, and on Eliquis. No missed doses x3 weeks, no bleeding issues reported.     S/P CABG (coronary artery bypass graft)  -CABG x3 L NEHAL to LAD, SVG to OM and R posterior descending artery 8/7/24 Dr Aparicio    SHAINA on CPAP  -consistent use of CPAP      PLAN:   Continue Amiodarone 200 mg daily up until 9/6 then discontinue, allow for 3-week washout then complete Zio patch monitoring x 14 days  Hooked up to Zio patch monitor around 9/28/24  Continue metoprolol tartrate 50 mg twice daily per HR parameters given to him by CV surgery, hold dose if HR less than 60 bpm  Continue Eliquis twice daily for stroke prevention  Follow up with me in clinic 11/4/24, review monitor results     History of Present Illness/Subjective     Enrike Sotomayor is a very pleasant 83 year old male who comes in today for EP consult post op AF, referred by CV surgery team    PMH: ZULUAGA, CAD s/p CABG x3 L NEHAL to LAD, SVG to OM and R posterior descending artery 8/7/24, post atrial fibrillation post CABG 8/9/24, asthma    Arrhythmia  hx  Dx/date: Postop atrial fibrillation following CABG x 3 surgery, AF first noted on 8/9/2024 with controlled ventricular rate of 74 bpm per twelve-lead ECG.  CABG x 3 surgery took place on 8/7/2024 Dr Aparicio.   Sx: asymptomatic  Prior AAD, AV samuel blocking agents: Amiodarone, metoprolol tartrate  OAC: Eliquis  Procedures  DCCV: no  Ablation: no    Louis presents with his wife today for follow-up regarding his postop atrial fibrillation.  He has been maintaining normal sinus rhythm on his current dosing of amiodarone 200 mg daily in addition to metoprolol tartrate 50 mg twice daily per heart rate parameters given to him by CV surgery, he does hold his dose of metoprolol tartrate if his heart rate is less than 60 bpm.  He has not experienced any atrial fibrillation per interval ECG monitoring by way of Apple Watch since 8/23.  He did forget to bring his phone with him today so I am not able to review his EKG history on his iPhone, he does keep a written log of his ventricular rates which have been ranging between 58 and 85 bpm.  He has been maintaining normal sinus rhythm since 8/24.  He denies any recent chest pain or palpitations, shortness of breath on exertion at rest, dizziness or near syncope.  He has been experiencing some mild fatigue since his CABG surgery.  He is due to follow-up with a CV surgery team within the next week.  He remains on Eliquis twice daily for stroke prevention, denies missed doses x 3 weeks or any significant bleeding episodes.  He is scheduled to start cardiac rehab next week.  Sternal incision is clean dry and intact.  Blood pressure remains normotensive at 112/58.  He remains consistent with use of CPAP for management of his SHAINA, averages 6 to 7 hours of use per night.     Cardiographics (reviewed):  EKG  8/10/24 AF 81 bpm  8/9/24 AF 74 bpm   6/24/24 NSR 63 bpm QT/QTC: 396/405 ms    TTE 8/1/24  1. The left ventricle is normal in size. Left ventricular systolic performance  is mildly  reduced. The ejection fraction is estimated to be 45-50%.  2. There is moderate mid to distal anterior hypokinesis along with a small  region of apical hypokinesis.  3. There is very mild aortic stenosis.  4. Normal right ventricular size and systolic performance.    Stress ECHO 3/27/24  1. Non-diagnostic exercise stress echocardiogram due to a combination of poor  image quality and the inability to achieve target heart rate. Visually  estimated post exercise left ventricular ejection fraction appears unchanged  at 55-60%.  2. Non-diagnostic exercise stress electrocardiogram due to the inability to  achieve target heart rate. No ST segment changes observed with the achieved  workload.  3. Functional capacity is impaired. The patient exercised for 3:00 minutes on  the Justino protocol, achieving 4.6 METs and 80% the age-predicted maximum heart  rate. The patient reported dyspnea with exercise but did not experience chest  paiin.     Problem List:  Patient Active Problem List   Diagnosis    CMT (Charcot-Carla-Tooth disease)    Dyspnea on exertion    Precordial pain    Bruit of left carotid artery    Asthma    Coronary artery disease involving native coronary artery of native heart, unspecified whether angina present    Coronary arteriosclerosis    Postoperative atrial fibrillation (H)    S/P CABG (coronary artery bypass graft)     Revi  e  Physical Examination Review of Systems   w [facility]  There were no vitals taken for this visit.  There is no height or weight on file to calculate BMI.  Wt Readings from Last 3 Encounters:   08/16/24 91.5 kg (201 lb 11.5 oz)   08/13/24 90.5 kg (199 lb 9.6 oz)   08/01/24 89.8 kg (198 lb 1 oz)     General Appearance:   Alert, well-appearing and in no acute distress.   HEENT: Atraumatic, normocephalic.  No scleral icterus, normal conjunctivae; mucous membranes pink and moist.     Chest: Chest symmetric, spine straight.   Lungs:   Respirations unlabored: Lungs are clear to auscultation.    Cardiovascular:   Normal first and second heart sounds with no murmurs, rubs, or gallops.  Regular, regular, bradycardic.   Normal JVD, no edema, compression stockings in place.        Extremities: No cyanosis or clubbing   Musculoskeletal: Moves all extremities   Skin: Warm, dry, intact.  Sternal incision clean dry and intact   Neurologic: Mood and affect are appropriate, alert and oriented to person, place, time, and situation     ROS: 10 point ROS neg other than the symptoms noted above in the HPI.     Medical History  Surgical History Family History Social History     Past Medical History:   Diagnosis Date    Aortic valve sclerosis     Arteriosclerosis of right carotid artery     Bilateral cataracts     CAD (coronary artery disease)     Charcot's joint of foot     Charcot-Carla-Tooth disease     Malignant Hyperthermia precautions per anesthesia    Chronic kidney disease     Chronic rhinitis     Chronic vasomotor rhinitis     Constipation by delayed colonic transit     Decreased hearing of both ears     Grade I diastolic dysfunction     Heart murmur     HLD (hyperlipidemia)     HTN (hypertension)     Lyme disease     Malignant hyperthermia     Precautions risk per anesthesia cervantes to Charcot Carla tooth disease    Nonsenile cataract Mar 2023    Obesity     SHAINA on CPAP     Prostatic hypertrophy     Psychophysiological insomnia     Retinal arterial branch occlusion     Squamous cell carcinoma of skin of face     TIA (transient ischemic attack)     Visual field cut     Right    Past Surgical History:   Procedure Laterality Date    carotid endartectomy Right     CATARACT IOL, RT/LT  Mar 2023    CORONARY ARTERY BYPASS GRAFT, WITH ENDOSCOPIC VESSEL PROCUREMENT N/A 8/7/2024    Procedure: CORONARY ARTERY BYPASS GRAFT TIMES THREE, LEFT INTERNAL MAMMARY ARTERY HARVEST, LEFT LEG ENDOSCOPIC VESSEL PROCUREMENT;  Surgeon: Ольга Aparicio MD;  [place] [facility] [place]    CV CORONARY ANGIOGRAM N/A 06/24/2024    Procedure:  Coronary Angiogram;  Surgeon: Oscar Whittington MD;  Location: [facility] CATH LAB CV    CV LEFT HEART CATH N/A 06/24/2024    Procedure: Left Heart Catheterization;  Surgeon: Oscar Whittington MD;  Location: [facility] CATH LAB CV    KNEE SURGERY Left     REPLACEMENT TOTAL KNEE Right     TONSILLECTOMY & ADENOIDECTOMY      TRANSESOPHAGEAL ECHOCARDIOGRAM INTRAOPERATIVE N/A 8/7/2024    Procedure: ANESTHESIA TRANSESOPHAGEAL ECHOCARDIOGRAM, EPI-AORTIC ULTRASOUND;  Surgeon: Ольга Aparicio MD;  Location: [facility] Main OR    Family History   Problem Relation Age of Onset    Cancer Mother     Hypertension Father     Glaucoma No family hx of     Macular Degeneration No family hx of     History   Smoking Status    Former    Types: Cigarettes   Smokeless Tobacco    Never     Social History    Substance and Sexual Activity      Alcohol use: Yes        Alcohol/week: 6.0 standard drinks of alcohol        Types: 6 Standard drinks or equivalent per week       Medications  Allergies     Current Outpatient Medications   Medication Sig Dispense Refill    amiodarone (PACERONE) 200 MG tablet Take 1 tablet (200 mg) by mouth daily for 17 days 17 tablet 0    apixaban ANTICOAGULANT (ELIQUIS) 5 MG tablet Take 1 tablet (5 mg) by mouth 2 times daily for 30 days 60 tablet 0    aspirin (ASA) 81 MG chewable tablet Take 1 tablet (81 mg) by mouth daily for 30 days 30 tablet 0    atorvastatin (LIPITOR) 80 MG tablet Take 1 tablet (80 mg) by mouth daily for 30 days 30 tablet 0    fluticasone (FLONASE) 50 MCG/ACT nasal spray Spray 1 spray into each nostril every other night before bed. Uses on even days.      furosemide (LASIX) 40 MG tablet Take 1 tablet (40 mg) by mouth 2 times daily. Call if still weight up/leg swelling after 5 days 10 tablet 0    metoprolol tartrate (LOPRESSOR) 50 MG tablet Take 1 tablet (50 mg) by mouth 2 times daily for 30 days 60 tablet 0    pantoprazole (PROTONIX) 40 MG EC tablet Take 1 tablet (40 mg) by mouth daily for 30  "days 30 tablet 0    potassium chloride ER (K-TAB) 20 MEQ CR tablet Take 1 tablet (20 mEq) by mouth 2 times daily. 10 tablet 0    sildenafil (VIAGRA) 100 MG tablet Take 100 mg by mouth daily as needed        Allergies   Allergen Reactions    Pneumovax  [Pneumococcal Polysaccharide Vaccine]      Other reaction(s): fever,swollen, red arm      Medical, surgical, family, social history, and medications were all reviewed and updated as necessary.   Lab Results    Chemistry/lipid CBC Cardiac Enzymes/BNP/TSH/INR   Recent Labs   Lab Test 06/24/24  0841   CHOL 160   HDL 49   LDL 99   TRIG 61     Recent Labs   Lab Test 06/24/24  0841 06/19/24  1019 05/24/23  1607   LDL 99 92 83     Recent Labs   Lab Test 08/26/24  1142      POTASSIUM 4.6   CHLORIDE 100   CO2 26   GLC 98   BUN 31.1*   CR 1.64*   GFRESTIMATED 42*   LEONA 8.5*     Recent Labs   Lab Test 08/26/24  1142 08/19/24  0653 08/17/24  0837   CR 1.64* 1.46* 1.64*     Recent Labs   Lab Test 08/01/24  0821   A1C 5.4          Recent Labs   Lab Test 08/19/24  0653   WBC 8.3   HGB 8.5*   HCT 26.0*   MCV 94        Recent Labs   Lab Test 08/19/24  0653 08/17/24  0837 08/15/24  0635   HGB 8.5* 9.3* 9.5*    No results for input(s): \"TROPONINI\" in the last 23100 hours.  No results for input(s): \"BNP\", \"NTBNPI\", \"NTBNP\" in the last 87135 hours.  Recent Labs   Lab Test 05/09/23  1651   TSH 2.59     Recent Labs   Lab Test 08/07/24  1238 08/07/24  1123 08/01/24  0821   INR 1.58* 1.79* 1.24*          Total Time- 31 minutes spent on date of encounter doing chart review, history and exam, documentation and further activities as noted above.  This note has been dictated using voice recognition software. Any grammatical, typographical, or context distortions are unintentional and inherent to the software.    Sofy Lane [facility]  [phone]                       "

## 2024-09-04 ENCOUNTER — OFFICE VISIT (OUTPATIENT)
Dept: CARDIOLOGY | Facility: CLINIC | Age: 84
End: 2024-09-04
Payer: COMMERCIAL

## 2024-09-04 ENCOUNTER — ORDERS ONLY (AUTO-RELEASED) (OUTPATIENT)
Dept: CARDIOLOGY | Facility: CLINIC | Age: 84
End: 2024-09-04
Payer: COMMERCIAL

## 2024-09-04 VITALS
RESPIRATION RATE: 20 BRPM | HEIGHT: 66 IN | WEIGHT: 196.5 LBS | DIASTOLIC BLOOD PRESSURE: 58 MMHG | SYSTOLIC BLOOD PRESSURE: 112 MMHG | OXYGEN SATURATION: 97 % | BODY MASS INDEX: 31.58 KG/M2 | HEART RATE: 59 BPM

## 2024-09-04 DIAGNOSIS — I48.91 POSTOPERATIVE ATRIAL FIBRILLATION (H): ICD-10-CM

## 2024-09-04 DIAGNOSIS — I97.89 POSTOPERATIVE ATRIAL FIBRILLATION (H): ICD-10-CM

## 2024-09-04 DIAGNOSIS — I48.91 POSTOPERATIVE ATRIAL FIBRILLATION (H): Primary | ICD-10-CM

## 2024-09-04 DIAGNOSIS — Z95.1 S/P CABG (CORONARY ARTERY BYPASS GRAFT): ICD-10-CM

## 2024-09-04 DIAGNOSIS — I97.89 POSTOPERATIVE ATRIAL FIBRILLATION (H): Primary | ICD-10-CM

## 2024-09-04 PROCEDURE — 99204 OFFICE O/P NEW MOD 45 MIN: CPT | Performed by: NURSE PRACTITIONER

## 2024-09-04 PROCEDURE — G2211 COMPLEX E/M VISIT ADD ON: HCPCS | Performed by: NURSE PRACTITIONER

## 2024-09-04 RX ORDER — METOPROLOL TARTRATE 50 MG
50 TABLET ORAL 2 TIMES DAILY
Qty: 180 TABLET | Refills: 1 | Status: SHIPPED | OUTPATIENT
Start: 2024-09-04 | End: 2024-09-11

## 2024-09-04 NOTE — PATIENT INSTRUCTIONS
Enrike Sotomayor,    It was a pleasure to see you today at the [facility].     My recommendations after this visit include:    Continue Amiodarone 200 mg daily up until 9/6 then discontinue, allow for 3-week washout then complete Zio patch monitoring x 14 days   Hooked up to Zio patch monitor around 9/28/24, Zio patch monitor will be mailed out to you so set aside until hook up date approaches  Continue metoprolol tartrate 50 mg twice daily per outlined heart rate parameters   Continue interval EKG monitoring by Apple Watch, bring your phone with to next appointment so I can take a look at your EKG history   Continue Eliquis every 12 hours for stroke prevention  Follow up with me in clinic around 11/4/24, review monitor results at that visit  Follow up with CV surgery team as scheduled    Sofy Lane CNP  [facility], Electrophysiology  [phone]  EP nurses [phone]

## 2024-09-04 NOTE — LETTER
9/4/2024    Paul Anderson MD  [facility] [address]  [place] [address]    RE: Enrike Heathrisa       Dear Colleague,     I had the pleasure of seeing Enrike Sotomayor in the [facility].    Thank you, Alyssa Atkins PA-C, for asking the [facility] team to see . Enrike Sotomayor to evaluate post op AF.    Assessment/Recommendations     Assessment/Plan:    Diagnoses and all orders for this visit:  Postoperative atrial fibrillation (H)  Dx/date: Postop atrial fibrillation following CABG x 3 surgery, AF first noted on 8/9/2024 with controlled ventricular rate of 74 bpm per twelve-lead ECG.  CABG x 3 surgery took place on 8/7/2024 Dr Aparicio.   Sx: asymptomatic  Amiodarone 200 mg daily until 9/6/24 + metoprolol tartrate 50 mg BID  Home ECG monitoring: Apple Watch, patient forgot to bring his phone with today so unable to review history  we discussed the ongoing importance of lifestyle modification (maintaining a healthy weight, sleep apnea diagnosis and management, alcohol avoidance) as part of a long term strategy for atrial fibrillation management      [id number] score of 3 due to age over 75, CAD, and on Eliquis. No missed doses x3 weeks, no bleeding issues reported.     S/P CABG (coronary artery bypass graft)  -CABG x3 L NEHAL to LAD, SVG to OM and R posterior descending artery 8/7/24 Dr Aparicio    SHAINA on CPAP    PLAN:   Continue Amiodarone 200 mg daily up until 9/6 then discontinue, allow for 3-week washout then complete Zio patch monitoring x 14 days  Hooked up to Zio patch monitor around 9/28/24  Continue metoprolol tartrate 50 mg twice daily per HR parameters given to him by CV surgery, hold dose if HR less than 60 bpm  Continue Eliquis twice daily for stroke prevention  Follow up with me in clinic 11/4/24, review monitor results     History of Present Illness/Subjective     Enrike Sotomayor is a very pleasant 83 year old male who comes in  today for EP consult post op AF, referred by CV surgery team    PMH: ZULUAGA, CAD s/p CABG x3 L NEHAL to LAD, SVG to OM and R posterior descending artery 8/7/24, post atrial fibrillation post CABG 8/9/24, asthma    Arrhythmia hx  Dx/date: Postop atrial fibrillation following CABG x 3 surgery, AF first noted on 8/9/2024 with controlled ventricular rate of 74 bpm per twelve-lead ECG.  CABG x 3 surgery took place on 8/7/2024 Dr Aparicio.   Sx: asymptomatic  Prior AAD, AV samuel blocking agents: Amiodarone, metoprolol tartrate  OAC: Eliquis  Procedures  DCCV: no  Ablation: no    Louis presents with his wife today for follow-up regarding his postop atrial fibrillation.  He has been maintaining normal sinus rhythm on his current dosing of amiodarone 200 mg daily in addition to metoprolol tartrate 50 mg twice daily per heart rate parameters given to him by CV surgery, he does hold his dose of metoprolol tartrate if his heart rate is less than 60 bpm.  He has not experienced any atrial fibrillation per interval ECG monitoring by way of Apple Watch since 8/23.  He did forget to bring his phone with him today so I am not able to review his EKG history on his iPhone, he does keep a written log of his ventricular rates which have been ranging between 58 and 85 bpm.  He has been maintaining normal sinus rhythm since 8/24.  He denies any recent chest pain or palpitations, shortness of breath on exertion at rest, dizziness or near syncope.  He has been experiencing some mild fatigue since his CABG surgery.  He is due to follow-up with a CV surgery team within the next week.  He remains on Eliquis twice daily for stroke prevention, denies missed doses x 3 weeks or any significant bleeding episodes.  He is scheduled to start cardiac rehab next week.  Sternal incision is clean dry and intact.  Blood pressure remains normotensive at 112/58.  He remains consistent with use of CPAP for management of his SHAINA, averages 6 to 7 hours of use per  night.     Cardiographics (reviewed):  EKG  8/10/24 AF 81 bpm  8/9/24 AF 74 bpm   6/24/24 NSR 63 bpm QT/QTC: 396/405 ms    TTE 8/1/24  1. The left ventricle is normal in size. Left ventricular systolic performance  is mildly reduced. The ejection fraction is estimated to be 45-50%.  2. There is moderate mid to distal anterior hypokinesis along with a small  region of apical hypokinesis.  3. There is very mild aortic stenosis.  4. Normal right ventricular size and systolic performance.    Stress ECHO 3/27/24  1. Non-diagnostic exercise stress echocardiogram due to a combination of poor  image quality and the inability to achieve target heart rate. Visually  estimated post exercise left ventricular ejection fraction appears unchanged  at 55-60%.  2. Non-diagnostic exercise stress electrocardiogram due to the inability to  achieve target heart rate. No ST segment changes observed with the achieved  workload.  3. Functional capacity is impaired. The patient exercised for 3:00 minutes on  the Justino protocol, achieving 4.6 METs and 80% the age-predicted maximum heart  rate. The patient reported dyspnea with exercise but did not experience chest  paiin.     Problem List:  Patient Active Problem List   Diagnosis     CMT (Charcot-Carla-Tooth disease)     Dyspnea on exertion     Precordial pain     Bruit of left carotid artery     Asthma     Coronary artery disease involving native coronary artery of native heart, unspecified whether angina present     Coronary arteriosclerosis     Postoperative atrial fibrillation (H)     S/P CABG (coronary artery bypass graft)     Revi  e  Physical Examination Review of Systems   [facility]  There were no vitals taken for this visit.  There is no height or weight on file to calculate BMI.  Wt Readings from Last 3 Encounters:   08/16/24 91.5 kg (201 lb 11.5 oz)   08/13/24 90.5 kg (199 lb 9.6 oz)   08/01/24 89.8 kg (198 lb 1 oz)     General Appearance:   Alert, well-appearing and in no acute  distress.   HEENT: Atraumatic, normocephalic.  No scleral icterus, normal conjunctivae; mucous membranes pink and moist.     Chest: Chest symmetric, spine straight.   Lungs:   Respirations unlabored: Lungs are clear to auscultation.   Cardiovascular:   Normal first and second heart sounds with no murmurs, rubs, or gallops.  Regular, regular, bradycardic.   Normal JVD, no edema, compression stockings in place.        Extremities: No cyanosis or clubbing   Musculoskeletal: Moves all extremities   Skin: Warm, dry, intact.  Sternal incision clean dry and intact   Neurologic: Mood and affect are appropriate, alert and oriented to person, place, time, and situation     ROS: 10 point ROS neg other than the symptoms noted above in the HPI.     Medical History  Surgical History Family History Social History     Past Medical History:   Diagnosis Date     Aortic valve sclerosis      Arteriosclerosis of right carotid artery      Bilateral cataracts      CAD (coronary artery disease)      Charcot's joint of foot      Charcot-Carla-Tooth disease     Malignant Hyperthermia precautions per anesthesia     Chronic kidney disease      Chronic rhinitis      Chronic vasomotor rhinitis      Constipation by delayed colonic transit      Decreased hearing of both ears      Grade I diastolic dysfunction      Heart murmur      HLD (hyperlipidemia)      HTN (hypertension)      Lyme disease      Malignant hyperthermia     Precautions risk per anesthesia cervantes to Charcot Carla tooth disease     Nonsenile cataract Mar 2023     Obesity      SHAINA on CPAP      Prostatic hypertrophy      Psychophysiological insomnia      Retinal arterial branch occlusion      Squamous cell carcinoma of skin of face      TIA (transient ischemic attack)      Visual field cut     Right    Past Surgical History:   Procedure Laterality Date     carotid endartectomy Right      CATARACT IOL, RT/LT  Mar 2023     CORONARY ARTERY BYPASS GRAFT, WITH ENDOSCOPIC VESSEL PROCUREMENT  N/A 8/7/2024    Procedure: CORONARY ARTERY BYPASS GRAFT TIMES THREE, LEFT INTERNAL MAMMARY ARTERY HARVEST, LEFT LEG ENDOSCOPIC VESSEL PROCUREMENT;  Surgeon: Ольга Aparicio MD;  Location: [facility] OR     CV CORONARY ANGIOGRAM N/A 06/24/2024    Procedure: Coronary Angiogram;  Surgeon: Oscar Whittington MD;  Location: [facility] CATH LAB CV     CV LEFT HEART CATH N/A 06/24/2024    Procedure: Left Heart Catheterization;  Surgeon: Oscar Whittington MD;  Location: [facility] CV     KNEE SURGERY Left      REPLACEMENT TOTAL KNEE Right      TONSILLECTOMY & ADENOIDECTOMY       TRANSESOPHAGEAL ECHOCARDIOGRAM INTRAOPERATIVE N/A 8/7/2024    Procedure: ANESTHESIA TRANSESOPHAGEAL ECHOCARDIOGRAM, EPI-AORTIC ULTRASOUND;  Surgeon: Ольга Aparicio MD;  [place] [facility] [place]    Family History   Problem Relation Age of Onset     Cancer Mother      Hypertension Father      Glaucoma No family hx of      Macular Degeneration No family hx of     History   Smoking Status     Former     Types: Cigarettes   Smokeless Tobacco     Never     Social History    Substance and Sexual Activity      Alcohol use: Yes        Alcohol/week: 6.0 standard drinks of alcohol        Types: 6 Standard drinks or equivalent per week       Medications  Allergies     Current Outpatient Medications   Medication Sig Dispense Refill     amiodarone (PACERONE) 200 MG tablet Take 1 tablet (200 mg) by mouth daily for 17 days 17 tablet 0     apixaban ANTICOAGULANT (ELIQUIS) 5 MG tablet Take 1 tablet (5 mg) by mouth 2 times daily for 30 days 60 tablet 0     aspirin (ASA) 81 MG chewable tablet Take 1 tablet (81 mg) by mouth daily for 30 days 30 tablet 0     atorvastatin (LIPITOR) 80 MG tablet Take 1 tablet (80 mg) by mouth daily for 30 days 30 tablet 0     fluticasone (FLONASE) 50 MCG/ACT nasal spray Spray 1 spray into each nostril every other night before bed. Uses on even days.       furosemide (LASIX) 40 MG tablet Take 1 tablet (40 mg) by mouth 2  "times daily. Call if still weight up/leg swelling after 5 days 10 tablet 0     metoprolol tartrate (LOPRESSOR) 50 MG tablet Take 1 tablet (50 mg) by mouth 2 times daily for 30 days 60 tablet 0     pantoprazole (PROTONIX) 40 MG EC tablet Take 1 tablet (40 mg) by mouth daily for 30 days 30 tablet 0     potassium chloride ER (K-TAB) 20 MEQ CR tablet Take 1 tablet (20 mEq) by mouth 2 times daily. 10 tablet 0     sildenafil (VIAGRA) 100 MG tablet Take 100 mg by mouth daily as needed        Allergies   Allergen Reactions     Pneumovax  [Pneumococcal Polysaccharide Vaccine]      Other reaction(s): fever,swollen, red arm      Medical, surgical, family, social history, and medications were all reviewed and updated as necessary.   Lab Results    Chemistry/lipid CBC Cardiac Enzymes/BNP/TSH/INR   Recent Labs   Lab Test 06/24/24  0841   CHOL 160   HDL 49   LDL 99   TRIG 61     Recent Labs   Lab Test 06/24/24  0841 06/19/24  1019 05/24/23  1607   LDL 99 92 83     Recent Labs   Lab Test 08/26/24  1142      POTASSIUM 4.6   CHLORIDE 100   CO2 26   GLC 98   BUN 31.1*   CR 1.64*   GFRESTIMATED 42*   LEONA 8.5*     Recent Labs   Lab Test 08/26/24  1142 08/19/24  0653 08/17/24  0837   CR 1.64* 1.46* 1.64*     Recent Labs   Lab Test 08/01/24  0821   A1C 5.4          Recent Labs   Lab Test 08/19/24  0653   WBC 8.3   HGB 8.5*   HCT 26.0*   MCV 94        Recent Labs   Lab Test 08/19/24  0653 08/17/24  0837 08/15/24  0635   HGB 8.5* 9.3* 9.5*    No results for input(s): \"TROPONINI\" in the last 87076 hours.  No results for input(s): \"BNP\", \"NTBNPI\", \"NTBNP\" in the last 98129 hours.  Recent Labs   Lab Test 05/09/23  1651   TSH 2.59     Recent Labs   Lab Test 08/07/24  1238 08/07/24  1123 08/01/24  0821   INR 1.58* 1.79* 1.24*          Total Time- 31 minutes spent on date of encounter doing chart review, history and exam, documentation and further activities as noted above.  This note has been dictated using voice recognition " software. Any grammatical, typographical, or context distortions are unintentional and inherent to the software.    Sofy Lane CNP  [facility]  [phone]                         Thank you for allowing me to participate in the care of your patient.      Sincerely,     Sofy Lane NP     [facility]  cc:   No referring provider defined for this encounter.

## 2024-09-05 ENCOUNTER — MEDICAL CORRESPONDENCE (OUTPATIENT)
Dept: HEALTH INFORMATION MANAGEMENT | Facility: CLINIC | Age: 84
End: 2024-09-05

## 2024-09-10 ENCOUNTER — OFFICE VISIT (OUTPATIENT)
Dept: CARDIOLOGY | Facility: CLINIC | Age: 84
End: 2024-09-10
Payer: COMMERCIAL

## 2024-09-10 VITALS
DIASTOLIC BLOOD PRESSURE: 66 MMHG | HEART RATE: 66 BPM | WEIGHT: 195 LBS | SYSTOLIC BLOOD PRESSURE: 140 MMHG | RESPIRATION RATE: 16 BRPM | BODY MASS INDEX: 30.61 KG/M2 | HEIGHT: 67 IN

## 2024-09-10 DIAGNOSIS — N17.9 AKI (ACUTE KIDNEY INJURY) (H): ICD-10-CM

## 2024-09-10 DIAGNOSIS — I97.89 POSTOPERATIVE ATRIAL FIBRILLATION (H): ICD-10-CM

## 2024-09-10 DIAGNOSIS — I25.10 CORONARY ARTERY DISEASE INVOLVING NATIVE CORONARY ARTERY OF NATIVE HEART, UNSPECIFIED WHETHER ANGINA PRESENT: ICD-10-CM

## 2024-09-10 DIAGNOSIS — J90 PLEURAL EFFUSION: ICD-10-CM

## 2024-09-10 DIAGNOSIS — Z95.1 S/P CABG (CORONARY ARTERY BYPASS GRAFT): Primary | ICD-10-CM

## 2024-09-10 DIAGNOSIS — I48.91 POSTOPERATIVE ATRIAL FIBRILLATION (H): ICD-10-CM

## 2024-09-10 LAB
ANION GAP SERPL CALCULATED.3IONS-SCNC: 9 MMOL/L (ref 7–15)
BUN SERPL-MCNC: 21.8 MG/DL (ref 8–23)
CALCIUM SERPL-MCNC: 8.5 MG/DL (ref 8.8–10.4)
CHLORIDE SERPL-SCNC: 104 MMOL/L (ref 98–107)
CREAT SERPL-MCNC: 1.36 MG/DL (ref 0.67–1.17)
EGFRCR SERPLBLD CKD-EPI 2021: 52 ML/MIN/1.73M2
GLUCOSE SERPL-MCNC: 104 MG/DL (ref 70–99)
HCO3 SERPL-SCNC: 24 MMOL/L (ref 22–29)
POTASSIUM SERPL-SCNC: 4.4 MMOL/L (ref 3.4–5.3)
SODIUM SERPL-SCNC: 137 MMOL/L (ref 135–145)

## 2024-09-10 PROCEDURE — 80048 BASIC METABOLIC PNL TOTAL CA: CPT

## 2024-09-10 PROCEDURE — 36415 COLL VENOUS BLD VENIPUNCTURE: CPT

## 2024-09-10 PROCEDURE — 99024 POSTOP FOLLOW-UP VISIT: CPT

## 2024-09-10 NOTE — PROGRESS NOTES
CARDIOTHORACIC SURGERY FOLLOW-UP VISIT     Enrike Sotomayor   1940   [id number]      Reason for visit: Post operative clinic visit. Patient underwent CABG x3 with Dr. Aparicio on 8/7/2024.     HPI: Enrike Sotomayor is a 83 year old year old male seen in clinic for a routine follow-up appointment after surgery. Patient has past medical history as below. Hospital course was remarkable for postop afib w/ RVR and was discharged w/ amio taper, eliquis, and afib clinic f/u. Patient was discharged to ARU on 8/13/2024.    Patient has been doing fairly since discharge. He had a chest xray done on 8/26 d/t increased SOB which showed moderate left pleural effusion. He underwent US thoracentesis on 8/29 which drained 1L fluid and improved his symptoms. Patient did follow-up with primary care since leaving the hospital. Followed up w/ afib clinic as well and plan is for amio washout and subsequent zio patch monitor.    Today, he reports that incision is healing well. Denies fevers, peripheral edema. Appetite is improving and patient is voiding without difficulty. Weight has been stable. Pain management at this point with no medications. Patient has not  yet been attending cardiac rehab. Patient is on anti-coagulation w/ eliquis for afib as above.     PAST MEDICAL HISTORY:  Past Medical History:   Diagnosis Date    Aortic valve sclerosis     Arteriosclerosis of right carotid artery     Bilateral cataracts     CAD (coronary artery disease)     Charcot's joint of foot     Charcot-Carla-Tooth disease     Malignant Hyperthermia precautions per anesthesia    Chronic kidney disease     Chronic rhinitis     Chronic vasomotor rhinitis     Constipation by delayed colonic transit     Decreased hearing of both ears     Grade I diastolic dysfunction     Heart murmur     HLD (hyperlipidemia)     HTN (hypertension)     Lyme disease     Malignant hyperthermia     Precautions risk per anesthesia cervantes to Charcot Carla tooth disease    Nonsenile  cataract Mar 2023    Obesity     SHAINA on CPAP     Prostatic hypertrophy     Psychophysiological insomnia     Retinal arterial branch occlusion     Squamous cell carcinoma of skin of face     TIA (transient ischemic attack)     Visual field cut     Right       PAST SURGICAL HISTORY:  Past Surgical History:   Procedure Laterality Date    carotid endartectomy Right     CATARACT IOL, RT/LT  Mar 2023    CORONARY ARTERY BYPASS GRAFT, WITH ENDOSCOPIC VESSEL PROCUREMENT N/A 8/7/2024    Procedure: CORONARY ARTERY BYPASS GRAFT TIMES THREE, LEFT INTERNAL MAMMARY ARTERY HARVEST, LEFT LEG ENDOSCOPIC VESSEL PROCUREMENT;  Surgeon: Ольга Aparicio MD;  Location: [facility] [place]    CV CORONARY ANGIOGRAM N/A 06/24/2024    Procedure: Coronary Angiogram;  Surgeon: Oscar Whittington MD;  Location: [facility] CATH LAB CV    CV LEFT HEART CATH N/A 06/24/2024    Procedure: Left Heart Catheterization;  Surgeon: Oscar Whittington MD;  Location: [facility] CATH LAB CV    KNEE SURGERY Left     REPLACEMENT TOTAL KNEE Right     TONSILLECTOMY & ADENOIDECTOMY      TRANSESOPHAGEAL ECHOCARDIOGRAM INTRAOPERATIVE N/A 8/7/2024    Procedure: ANESTHESIA TRANSESOPHAGEAL ECHOCARDIOGRAM, EPI-AORTIC ULTRASOUND;  Surgeon: Ольга Aparicio MD;  Location: [facility] [place]       CURRENT MEDICATIONS:     Current Outpatient Medications:     apixaban ANTICOAGULANT (ELIQUIS) 5 MG tablet, Take 1 tablet (5 mg) by mouth 2 times daily., Disp: 60 tablet, Rfl: 11    aspirin (ASA) 81 MG chewable tablet, Take 1 tablet (81 mg) by mouth daily for 30 days, Disp: 30 tablet, Rfl: 0    atorvastatin (LIPITOR) 80 MG tablet, Take 1 tablet (80 mg) by mouth daily for 30 days, Disp: 30 tablet, Rfl: 0    fluticasone (FLONASE) 50 MCG/ACT nasal spray, Spray 1 spray into each nostril every other night before bed. Uses on even days., Disp: , Rfl:     metoprolol tartrate (LOPRESSOR) 50 MG tablet, Take 1 tablet (50 mg) by mouth 2 times daily., Disp: 180 tablet, Rfl: 1    pantoprazole  "(PROTONIX) 40 MG EC tablet, Take 1 tablet (40 mg) by mouth daily for 30 days, Disp: 30 tablet, Rfl: 0    UNABLE TO FIND, 1 Application by Nasal Instillation route at bedtime., Disp: , Rfl:     ALLERGIES:      Allergies   Allergen Reactions    Pneumovax  [Pneumococcal Polysaccharide Vaccine]      Other reaction(s): fever,swollen, red arm       ROS:  Gen: No fevers, weight loss/gain  CV: Denies chest pain, peripheral edema  Pulm: Denies SOB  GI/: Voiding without problems, appetite improving.     LABS:  None    IMAGING:  None    PHYSICAL EXAM:   BP (!) 140/66 (BP Location: Right arm, Patient Position: Sitting, Cuff Size: Adult Regular)   Pulse 66   Resp 16   Ht 1.702 m (5' 7\")   Wt 88.5 kg (195 lb)   BMI 30.54 kg/m    General: Alert and oriented, pleasant, no acute distress.  CV:  No peripheral edema.  Pulm: Easy work of breathing on room air.   GI: Soft, non-tender, and non-distended  Incision: Chest and leg incisions clean dry and intact without erythema, swelling or drainage  Neuro: CNs grossly intact.      ASSESSMENT/PLAN:  Enrike Sotomayor is a 83 year old year old male status post CABG x3 who returns to clinic for postop visit.     - Surgically doing well. Incisions are healing well with no signs of infection.  - Hemodynamics are stable although a bit hypertensive today. No medication changes until BP results as patient would prefer to restart losartan but GREG postop. Could consider restarting low dose PTA losartan pending creatinine today or switch to coreg if Cr still up.  - Follow up with your cardiologist as scheduled (10/2/2024 at 7:50am w/ Dr. Corona)  - Follow up w/ afib clinic as scheduled (11/4/2024 at 1:30pm w/ Sofy Lane NP)  - Start Cardiac Rehab and continue until completed.   - May start driving 9/4/2024 (4 weeks post-op) if not using narcotic pain medications.  - Continue strict sternal precautions until 10/2/2024. No lifting >10bs; may gradually increase at this point (8 weeks " post-op).   - No need for further follow-up with CV surgery unless concerns. Feel free to call our office with questions. [phone]         Shantel Wilkerson PA-C  [facility] Cardiothoracic Surgery  Pager: [phone]  September 10, 2024

## 2024-09-10 NOTE — PATIENT INSTRUCTIONS
- Surgically doing well. Incisions are healing well with no signs of infection.  - BP is a bit high today. No medication changes were made today as patient has been normotensive at recent visits. Patient will have his BP monitored and will call if SBP continues > 130.   - We checked your kidney function again today and will call w/ results.   - Follow up with your cardiologist as scheduled (10/2/2024 at 7:50am w/ Dr. Corona)  - Follow up w/ afib clinic as scheduled (11/4/2024 at 1:30pm w/ Sofy Lane NP)  - Start Cardiac Rehab and continue until completed.   - May start driving 9/4/2024 (4 weeks post-op) if not using narcotic pain medications.  - Continue strict sternal precautions until 10/2/2024. No lifting >10bs; may gradually increase at this point (8 weeks post-op).   - No need for further follow-up with CV surgery unless concerns. Feel free to call our office with questions. [phone]

## 2024-09-11 DIAGNOSIS — Z95.1 S/P CABG (CORONARY ARTERY BYPASS GRAFT): Primary | ICD-10-CM

## 2024-09-11 PROCEDURE — 99024 POSTOP FOLLOW-UP VISIT: CPT

## 2024-09-11 RX ORDER — CARVEDILOL 12.5 MG/1
12.5 TABLET ORAL 2 TIMES DAILY WITH MEALS
Qty: 60 TABLET | Refills: 0 | Status: SHIPPED | OUTPATIENT
Start: 2024-09-11 | End: 2024-10-02

## 2024-09-12 ENCOUNTER — HOSPITAL ENCOUNTER (OUTPATIENT)
Dept: CARDIAC REHAB | Facility: CLINIC | Age: 84
Discharge: HOME | End: 2024-09-12
Attending: PHYSICAL MEDICINE & REHABILITATION
Payer: COMMERCIAL

## 2024-09-12 DIAGNOSIS — Z95.1 S/P CABG (CORONARY ARTERY BYPASS GRAFT): ICD-10-CM

## 2024-09-12 PROCEDURE — 93798 PHYS/QHP OP CAR RHAB W/ECG: CPT

## 2024-09-12 PROCEDURE — 93797 PHYS/QHP OP CAR RHAB WO ECG: CPT | Mod: GA

## 2024-09-16 ENCOUNTER — HOSPITAL ENCOUNTER (OUTPATIENT)
Dept: CARDIAC REHAB | Facility: CLINIC | Age: 84
Discharge: HOME | End: 2024-09-16
Attending: PHYSICAL MEDICINE & REHABILITATION
Payer: COMMERCIAL

## 2024-09-16 PROCEDURE — 93798 PHYS/QHP OP CAR RHAB W/ECG: CPT

## 2024-09-19 ENCOUNTER — HOSPITAL ENCOUNTER (OUTPATIENT)
Dept: CARDIAC REHAB | Facility: CLINIC | Age: 84
Discharge: HOME | End: 2024-09-19
Attending: PHYSICAL MEDICINE & REHABILITATION
Payer: COMMERCIAL

## 2024-09-19 PROCEDURE — 93798 PHYS/QHP OP CAR RHAB W/ECG: CPT

## 2024-09-24 ENCOUNTER — HOSPITAL ENCOUNTER (OUTPATIENT)
Dept: CARDIAC REHAB | Facility: CLINIC | Age: 84
Discharge: HOME | End: 2024-09-24
Attending: PHYSICAL MEDICINE & REHABILITATION
Payer: COMMERCIAL

## 2024-09-24 PROCEDURE — 93798 PHYS/QHP OP CAR RHAB W/ECG: CPT

## 2024-09-25 ENCOUNTER — HOSPITAL ENCOUNTER (OUTPATIENT)
Dept: CARDIAC REHAB | Facility: CLINIC | Age: 84
Discharge: HOME | End: 2024-09-25
Attending: PHYSICAL MEDICINE & REHABILITATION
Payer: COMMERCIAL

## 2024-09-26 ENCOUNTER — THERAPY VISIT (OUTPATIENT)
Dept: OCCUPATIONAL THERAPY | Facility: REHABILITATION | Age: 84
End: 2024-09-26
Attending: PHYSICAL MEDICINE & REHABILITATION
Payer: COMMERCIAL

## 2024-09-26 DIAGNOSIS — Z74.09 IMPAIRED FUNCTIONAL MOBILITY AND ENDURANCE: Primary | ICD-10-CM

## 2024-09-26 DIAGNOSIS — Z78.9 IMPAIRED INSTRUMENTAL ACTIVITIES OF DAILY LIVING: ICD-10-CM

## 2024-09-26 DIAGNOSIS — Z95.1 S/P CABG (CORONARY ARTERY BYPASS GRAFT): ICD-10-CM

## 2024-09-26 PROCEDURE — 97535 SELF CARE MNGMENT TRAINING: CPT | Mod: GO | Performed by: OCCUPATIONAL THERAPIST

## 2024-09-26 PROCEDURE — 97165 OT EVAL LOW COMPLEX 30 MIN: CPT | Mod: GO | Performed by: OCCUPATIONAL THERAPIST

## 2024-09-26 NOTE — PROGRESS NOTES
"OCCUPATIONAL THERAPY EVALUATION  Type of Visit: Evaluation        Fall Risk Screen:  Fall screen completed by: OT  Have you fallen 2 or more times in the past year?: Yes  Have you fallen and had an injury in the past year?: No  Is patient a fall risk?: Yes  Fall screen comments: Patient is currently working with cardiac rehab and had  PT already to issue appropriate gait device.    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 He then had CABG on 8-7-24 and feels that he had a set back with strength and function. Per hospital notes, \"Enrike Sotomayor is a 83 year old male with a history of CAD, right CEA in 2012, HTN and HLD who is s/p CAB x 3 on 8/7/24. He was extubated on POD#0 and weaned from pressors. Has no complaints of chest pain or shortness of breath. Chest tubes removed. Post op course c/b post op Afib.\" He was in ARU for 7 days before going home. He is currently working with cardiac rehab.    Date of onset: 08/07/24    Relevant medical history:   Past Medical History:   Diagnosis Date    Aortic valve sclerosis     Arteriosclerosis of right carotid artery     Bilateral cataracts     CAD (coronary artery disease)     Charcot's joint of foot     Charcot-Carla-Tooth disease     Malignant Hyperthermia precautions per anesthesia    Chronic kidney disease     Chronic rhinitis     Chronic vasomotor rhinitis     Constipation by delayed colonic transit     Decreased hearing of both ears     Grade I diastolic dysfunction     Heart murmur     HLD (hyperlipidemia)     HTN (hypertension)     Lyme disease     Malignant hyperthermia     Precautions risk per anesthesia cervantes to Charcot Carla tooth disease    Nonsenile cataract Mar 2023    Obesity     SHAINA on CPAP     Prostatic hypertrophy     Psychophysiological insomnia     Retinal arterial branch occlusion     Squamous cell carcinoma of skin of face     TIA (transient ischemic attack)     Visual field cut     " Right     Dates & types of surgery: 8/7/2024 bypass surgery    Prior diagnostic imaging/testing results: MRI; CT scan; X-ray     Prior therapy history for the same diagnosis, illness or injury: Yes cardio rehab    Prior Level of Function  Transfers: Independent  Ambulation: Independent  ADL: Independent  IADL: Independent    Living Environment  Social support: With a significant other or spouse   Type of home: House   Stairs to enter the home: Yes 2 Is there a railing: No     Ramp: No   Stairs inside the home: Yes 14 Is there a railing: Yes     Help at home: None  Equipment owned: Straight Cane; Walker with wheels     Employment: No    Hobbies/Interests: golf plus others    Patient goals for therapy: lots    Pain assessment: Pain denied     Objective     Cognitive Status Examination  Orientation: Oriented to person, place and time   Level of Consciousness: Alert  Follows Commands and Answers Questions: 100% of the time  Personal Safety and Judgement: Intact  Memory: Intact, feels he he typical age related memory changes  Attention: No deficits identified  Organization/Problem Solving: No deficits identified  Executive Function: No deficits identified    VISUAL SKILLS  Visual Acuity: No deficits identified, Wears glasses, cataract surgery a year ago  Visual Field: Appears normal  Visual Attention: Appears normal  Oculomotor: NT    SENSATION: Occasional numbness and tingling in hands if in awkward posture for period of time. LE is WNL    RANGE OF MOTION: UE AROM WNL  STRENGTH:   Pain: - none + mild ++ moderate +++ severe  Strength Scale: 0-5/5 Left Right    Strength (lbs) 62# 67#   Lateral Pinch (lbs) 9# 9#   3 Point Pinch (lbs) 7.5# 8.5#        Hand Dominance: Left  MUSCLE TONE: WFL  COORDINATION: Left Hand, Nine Hole Peg Test (sec): 20.03 seconds  Right Hand, Nine Hole Peg Test (sec): 21.13 seconds    FUNCTIONAL MOBILITY  Assistive Device(s): Walker (four wheeled)  Ambulation: Independent    BED MOBILITY:  Independent    TRANSFERS: Independent    BATHING: Independent  Equipment: shower chair    UPPER BODY DRESSING: Independent  Equipment: none    LOWER BODY DRESSING: Independent  Equipment: none    TOILETING: Independent  Equipment: high rise toilet seat    GROOMING: Independent  Equipment: none    EATING/SELF FEEDING: Independent   Equipment: none    ACTIVITY TOLERANCE: fair    INSTRUMENTAL ACTIVITIES OF DAILY LIVING (IADL):   Meal Planning/Prep: Patient has not returned to PLOF due to fatigue  Home Management: Patient has not returned to PLOF with outdoor chores and home repairs  Financial Management: Patient is independent  Communication/Computer Use: Patient is independent  Community Mobility: Patient is independent  Care of Others: N/A    Assessment & Plan   CLINICAL IMPRESSIONS  Medical Diagnosis: CABG, CMT    Treatment Diagnosis: (P) decreased endurance, impaired IADL's    Impression/Assessment: Pt is a 83 year old male presenting to Occupational Therapy due to decreased endurance since cardiac surgery in August.  The following significant findings have been identified: Impaired activity tolerance.  These identified deficits interfere with their ability to perform self care tasks, recreational activities, household chores,  yard work, and meal planning and preparation as compared to previous level of function.     Clinical Decision Making (Complexity):  Assessment of Occupational Performance: 3-5 Performance Deficits  Occupational Performance Limitations: bathing/showering, functional mobility, home establishment and management, meal preparation and cleanup, and leisure activities  Clinical Decision Making (Complexity): Low complexity    PLAN OF CARE  Treatment Interventions:  Interventions: Self-Care/Home Management, Therapeutic Activity, Therapeutic Exercise    Long Term Goals   OT Goal 1  Goal Identifier: ECWS  Goal Description: Patient to state 3 strategies for energy conservation/work simplification and  fatigue management for improved independence with ADL/IADLs at home  Target Date: 12/19/24      Frequency of Treatment: once a week  Duration of Treatment: 12 weeks     Recommended Referrals to Other Professionals: no  Education Assessment: Learner/Method: Patient     Risks and benefits of evaluation/treatment have been explained.   Patient agrees with Plan of Care.     Evaluation Time:    OT Eval, Low Complexity Minutes (27042): 30       Signing Clinician: MANNY [facility]                                                                                   OUTPATIENT OCCUPATIONAL THERAPY      PLAN OF TREATMENT FOR OUTPATIENT REHABILITATION   Patient's Last Name, First Name, Enrike Metz YOB: 1940   Provider's Name   [facility]   Medical Record No.  [id number]     Onset Date: 08/07/24 Start of Care Date: 09/26/24     Medical Diagnosis:  CABG, CMT      OT Treatment Diagnosis:  (P) decreased endurance, impaired IADL's Plan of Treatment  Frequency/Duration:once a week/12 weeks    Certification date from 09/26/24   To 12/19/24        See note for plan of treatment details and functional goals     Rebekah King OT                         I CERTIFY THE NEED FOR THESE SERVICES FURNISHED UNDER        THIS PLAN OF TREATMENT AND WHILE UNDER MY CARE     (Physician attestation of this document indicates review and certification of the therapy plan).              Referring Provider:  Edilberto Garcia MD    Initial Assessment  See Epic Evaluation- 09/26/24

## 2024-09-27 PROCEDURE — 93248 EXT ECG>7D<15D REV&INTERPJ: CPT | Performed by: INTERNAL MEDICINE

## 2024-10-01 ENCOUNTER — HOSPITAL ENCOUNTER (OUTPATIENT)
Dept: CARDIAC REHAB | Facility: CLINIC | Age: 84
Discharge: HOME | End: 2024-10-01
Attending: PHYSICAL MEDICINE & REHABILITATION
Payer: COMMERCIAL

## 2024-10-01 PROCEDURE — 93798 PHYS/QHP OP CAR RHAB W/ECG: CPT

## 2024-10-02 ENCOUNTER — MYC MEDICAL ADVICE (OUTPATIENT)
Dept: CARDIOLOGY | Facility: CLINIC | Age: 84
End: 2024-10-02

## 2024-10-02 ENCOUNTER — OFFICE VISIT (OUTPATIENT)
Dept: CARDIOLOGY | Facility: CLINIC | Age: 84
End: 2024-10-02
Payer: COMMERCIAL

## 2024-10-02 VITALS
WEIGHT: 197.3 LBS | RESPIRATION RATE: 12 BRPM | DIASTOLIC BLOOD PRESSURE: 50 MMHG | SYSTOLIC BLOOD PRESSURE: 116 MMHG | HEART RATE: 68 BPM | BODY MASS INDEX: 30.97 KG/M2 | HEIGHT: 67 IN

## 2024-10-02 DIAGNOSIS — I25.10 CORONARY ARTERIOSCLEROSIS: Primary | ICD-10-CM

## 2024-10-02 DIAGNOSIS — I48.91 POSTOPERATIVE ATRIAL FIBRILLATION (H): ICD-10-CM

## 2024-10-02 DIAGNOSIS — G60.0 CMT (CHARCOT-MARIE-TOOTH DISEASE): ICD-10-CM

## 2024-10-02 DIAGNOSIS — I97.89 POSTOPERATIVE ATRIAL FIBRILLATION (H): ICD-10-CM

## 2024-10-02 DIAGNOSIS — Z95.1 S/P CABG (CORONARY ARTERY BYPASS GRAFT): ICD-10-CM

## 2024-10-02 DIAGNOSIS — I25.10 CORONARY ARTERY DISEASE INVOLVING NATIVE CORONARY ARTERY OF NATIVE HEART, UNSPECIFIED WHETHER ANGINA PRESENT: ICD-10-CM

## 2024-10-02 PROCEDURE — 99215 OFFICE O/P EST HI 40 MIN: CPT | Performed by: INTERNAL MEDICINE

## 2024-10-02 PROCEDURE — G2211 COMPLEX E/M VISIT ADD ON: HCPCS | Performed by: INTERNAL MEDICINE

## 2024-10-02 RX ORDER — ASPIRIN 81 MG/1
81 TABLET, CHEWABLE ORAL DAILY
COMMUNITY

## 2024-10-02 RX ORDER — ATORVASTATIN CALCIUM 80 MG/1
80 TABLET, FILM COATED ORAL DAILY
Qty: 90 TABLET | Refills: 0 | Status: SHIPPED | OUTPATIENT
Start: 2024-10-02

## 2024-10-02 RX ORDER — CARVEDILOL 12.5 MG/1
12.5 TABLET ORAL 2 TIMES DAILY WITH MEALS
Qty: 180 TABLET | Refills: 0 | Status: SHIPPED | OUTPATIENT
Start: 2024-10-02

## 2024-10-02 NOTE — LETTER
10/2/2024    Paul Anderson MD  [facility] [address]  [place] [address]    RE: Enrike Sotomayor       Dear Colleague,     I had the [facility] of [facility].      [facility]  [phone]          Assessment/Recommendations   Patient with recent bypass surgery and doing well from a recovery standpoint.  He is participating in cardiac rehab and walking at home.  Blood pressure is at goal, he takes an antiplatelet agent and is on high-dose statin.  He did have postoperative atrial fibrillation and a monitor at home did show 11 minutes of atrial fibrillation although relatively early on in his recovery.  He has an appointment for a follow-up visit in the A-fib clinic.  Hopefully can get off of anticoagulation and monitor his rhythm with his Apple Watch thereafter.    We will check a fasting lipid panel in about 1 month with a goal of an LDL well below 70.  He will continue in cardiac rehab until he is completed all the sessions.  Recommended that he follow-up with his primary care physician to monitor his renal function which is mildly abnormal.    Will plan on seeing him back in 1 year, but of course to be happy to see him sooner if questions or problems arise.    40 minutes spent with chart review, patient visit, and documentation.  The longitudinal plan of care for the diagnosis(es)/condition(s) as documented were addressed during this visit. 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        History of Present Illness/Subjective    Mr. Enrike oStomayor is a 83 year old male with presentation for worsening shortness of breath and evaluation showed severe coronary artery disease and he underwent bypass surgery by Dr. Ivette Aparicio.  Patient has been recovering well.  He is participating in rehab.  His shortness of  "breath is improving but not yet back to baseline.  He is having some difficulty with his CPAP apparatus but has an appointment with the sleep physician next week.    Denies orthopnea, paroxysmal nocturnal dyspnea, peripheral edema, palpitations, syncope or near syncopal episodes.    ECG: Personally reviewed.        Physical Examination Review of Systems   /50 (BP Location: Left arm, Patient Position: Sitting, Cuff Size: Adult Regular)   Pulse 68   Resp 12   Ht 1.689 m (5' 6.5\")   Wt 89.5 kg (197 lb 4.8 oz)   BMI 31.37 kg/m    Body mass index is 31.37 kg/m .  Wt Readings from Last 3 Encounters:   10/02/24 89.5 kg (197 lb 4.8 oz)   09/10/24 88.5 kg (195 lb)   09/04/24 89.1 kg (196 lb 8 oz)     General Appearance:   Alert, cooperative and in no acute distress.   ENT/Mouth: Pink/moist oral mucosa   EYES:  no scleral icterus, normal conjunctivae   Neck: JVP normal. No Hepatojugular reflux. Thyroid not visualized.   Chest/Lungs:   Lungs have slight decreased breath sounds at the left base, equal chest wall expansion.   Cardiovascular:   S1, S2 without murmur ,clicks or rubs. Brachial, radial  pulses are intact and symetric. No carotid bruits noted   Abdomen:  Nontender.    Extremities: No cyanosis, clubbing or edema   Skin: no xanthelasma, warm.    Neurologic: normal arm movement bilateral, no tremors     Psychiatric: Appropriate affect.      Enc Vitals  BP: 116/50  Pulse: 68  Resp: 12  Weight: 89.5 kg (197 lb 4.8 oz)  Height: 168.9 cm (5' 6.5\")                                           Medical History  Surgical History Family History Social History   Past Medical History:   Diagnosis Date     Aortic valve sclerosis      Arteriosclerosis of right carotid artery      Bilateral cataracts      CAD (coronary artery disease)      Charcot's joint of foot      Charcot-Carla-Tooth disease     Malignant Hyperthermia precautions per anesthesia     Chronic kidney disease      Chronic rhinitis      Chronic vasomotor " rhinitis      Constipation by delayed colonic transit      Decreased hearing of both ears      Grade I diastolic dysfunction      Heart murmur      HLD (hyperlipidemia)      HTN (hypertension)      Lyme disease      Malignant hyperthermia     Precautions risk per anesthesia cervantes to Charcot Carla tooth disease     Nonsenile cataract Mar 2023     Obesity      SHAINA on CPAP      Prostatic hypertrophy      Psychophysiological insomnia      Retinal arterial branch occlusion      Squamous cell carcinoma of skin of face      TIA (transient ischemic attack)      Visual field cut     Right    Past Surgical History:   Procedure Laterality Date     carotid endartectomy Right      CATARACT IOL, RT/LT  Mar 2023     CORONARY ARTERY BYPASS GRAFT, WITH ENDOSCOPIC VESSEL PROCUREMENT N/A 8/7/2024    Procedure: CORONARY ARTERY BYPASS GRAFT TIMES THREE, LEFT INTERNAL MAMMARY ARTERY HARVEST, LEFT LEG ENDOSCOPIC VESSEL PROCUREMENT;  Surgeon: Ольга Aparicio MD;  Location: [facility] OR     CV CORONARY ANGIOGRAM N/A 06/24/2024    Procedure: Coronary Angiogram;  Surgeon: Oscar Whittington MD;  Location: [facility] CATH LAB CV     CV LEFT HEART CATH N/A 06/24/2024    Procedure: Left Heart Catheterization;  Surgeon: Oscar Whittington MD;  Location: [facility] CATH LAB CV     KNEE SURGERY Left      REPLACEMENT TOTAL KNEE Right      TONSILLECTOMY & ADENOIDECTOMY       TRANSESOPHAGEAL ECHOCARDIOGRAM INTRAOPERATIVE N/A 8/7/2024    Procedure: ANESTHESIA TRANSESOPHAGEAL ECHOCARDIOGRAM, EPI-AORTIC ULTRASOUND;  Surgeon: Ольга Aparicio MD;  Location: [facility] OR    Family History   Problem Relation Age of Onset     Cancer Mother      Hypertension Father      Glaucoma No family hx of      Macular Degeneration No family hx of     Social History     Socioeconomic History     Marital status:      Spouse name: Not on file     Number of children: Not on file     Years of education: Not on file     Highest education level: Not on file    Occupational History     Not on file   Tobacco Use     Smoking status: Former     Current packs/day: 1.00     Average packs/day: 1 pack/day for 5.0 years (5.0 ttl pk-yrs)     Types: Cigarettes     Smokeless tobacco: Never   Vaping Use     Vaping status: Never Used   Substance and Sexual Activity     Alcohol use: Yes     Alcohol/week: 6.0 standard drinks of alcohol     Types: 6 Standard drinks or equivalent per week     Drug use: Never     Sexual activity: Not Currently     Partners: Female     Birth control/protection: Male Surgical   Other Topics Concern     Not on file   Social History Narrative     Not on file     Social Determinants of Health     Financial Resource Strain: Not on File (2021)    Received from [facility]    Financial Resource Strain      Financial Resource Strain: 0   Food Insecurity: Not on File (2021)    Received from [facility]    Food Insecurity      Food: 0   Transportation Needs: Not on File (2021)    Received from [facility]    Transportation Needs      Transportation: 0   Physical Activity: Not on File (2021)    Received from [facility]    Physical Activity      Physical Activity: 0   Stress: Not on File (2021)    Received from [facility]    Stress      Stress: 0   Social Connections: Not on File (2021)    Received from [facility]    Social Connections      Social Connections and Isolation: 0   Interpersonal Safety: Not on file   Housing Stability: Not on File (2021)    Received from [facility] [employer]    Housing Stability      Housin          Medications  Allergies   Current Outpatient Medications   Medication Sig Dispense Refill     apixaban ANTICOAGULANT (ELIQUIS) 5 MG tablet Take 1 tablet (5 mg) by mouth 2 times daily. 60 tablet 11     aspirin (ASA) 81 MG chewable tablet Take 81 mg by mouth daily.       atorvastatin (LIPITOR) 80 MG tablet Take 1 tablet (80 mg) by mouth daily. 90 tablet 0     carvedilol (COREG) 12.5 MG tablet Take 1 tablet  (12.5 mg) by mouth 2 times daily (with meals). 180 tablet 0     fluticasone (FLONASE) 50 MCG/ACT nasal spray Spray 1 spray into each nostril every other night before bed. Uses on even days.       NONFORMULARY CPaP as directed      Allergies   Allergen Reactions     Pneumovax  [Pneumococcal Polysaccharide Vaccine]      Other reaction(s): fever,swollen, red arm         Lab Results    Chemistry/lipid CBC Cardiac Enzymes/BNP/TSH/INR   Lab Results   Component Value Date    CHOL 160 06/24/2024    HDL 49 06/24/2024    TRIG 61 06/24/2024    BUN 21.8 09/10/2024     09/10/2024    CO2 24 09/10/2024    Lab Results   Component Value Date    WBC 8.3 08/19/2024    HGB 8.5 (L) 08/19/2024    HCT 26.0 (L) 08/19/2024    MCV 94 08/19/2024     08/19/2024    Lab Results   Component Value Date    TSH 2.59 05/09/2023    INR 1.58 (H) 08/07/2024                                                Thank you for allowing me to participate in the care of your patient.      Sincerely,     Justin Corona MD     [facility]  cc:   Ольга Aparicio MD  [address]  [place] [address]

## 2024-10-02 NOTE — PROGRESS NOTES
[facility]  [phone]          Assessment/Recommendations   Patient with recent bypass surgery and doing well from a recovery standpoint.  He is participating in cardiac rehab and walking at home.  Blood pressure is at goal, he takes an antiplatelet agent and is on high-dose statin.  He did have postoperative atrial fibrillation and a monitor at home did show 11 minutes of atrial fibrillation although relatively early on in his recovery.  He has an appointment for a follow-up visit in the A-fib clinic.  Hopefully can get off of anticoagulation and monitor his rhythm with his Apple Watch thereafter.    We will check a fasting lipid panel in about 1 month with a goal of an LDL well below 70.  He will continue in cardiac rehab until he is completed all the sessions.  Recommended that he follow-up with his primary care physician to monitor his renal function which is mildly abnormal.    Will plan on seeing him back in 1 year, but of course to be happy to see him sooner if questions or problems arise.    40 minutes spent with chart review, patient visit, and documentation.  The longitudinal plan of care for the diagnosis(es)/condition(s) as documented were addressed during this visit. 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        History of Present Illness/Subjective    Mr. Enrike Sotomayor is a 83 year old male with presentation for worsening shortness of breath and evaluation showed severe coronary artery disease and he underwent bypass surgery by Dr. Ivette Aparicio.  Patient has been recovering well.  He is participating in rehab.  His shortness of breath is improving but not yet back to baseline.  He is having some difficulty with his CPAP apparatus but has an appointment with the sleep physician next week.    Denies orthopnea, paroxysmal nocturnal dyspnea, peripheral edema,  "palpitations, syncope or near syncopal episodes.    ECG: Personally reviewed.        Physical Examination Review of Systems   /50 (BP Location: Left arm, Patient Position: Sitting, Cuff Size: Adult Regular)   Pulse 68   Resp 12   Ht 1.689 m (5' 6.5\")   Wt 89.5 kg (197 lb 4.8 oz)   BMI 31.37 kg/m    Body mass index is 31.37 kg/m .  Wt Readings from Last 3 Encounters:   10/02/24 89.5 kg (197 lb 4.8 oz)   09/10/24 88.5 kg (195 lb)   09/04/24 89.1 kg (196 lb 8 oz)     General Appearance:   Alert, cooperative and in no acute distress.   ENT/Mouth: Pink/moist oral mucosa   EYES:  no scleral icterus, normal conjunctivae   Neck: JVP normal. No Hepatojugular reflux. Thyroid not visualized.   Chest/Lungs:   Lungs have slight decreased breath sounds at the left base, equal chest wall expansion.   Cardiovascular:   S1, S2 without murmur ,clicks or rubs. Brachial, radial  pulses are intact and symetric. No carotid bruits noted   Abdomen:  Nontender.    Extremities: No cyanosis, clubbing or edema   Skin: no xanthelasma, warm.    Neurologic: normal arm movement bilateral, no tremors     Psychiatric: Appropriate affect.      Enc Vitals  BP: 116/50  Pulse: 68  Resp: 12  Weight: 89.5 kg (197 lb 4.8 oz)  Height: 168.9 cm (5' 6.5\")                                           Medical History  Surgical History Family History Social History   Past Medical History:   Diagnosis Date    Aortic valve sclerosis     Arteriosclerosis of right carotid artery     Bilateral cataracts     CAD (coronary artery disease)     Charcot's joint of foot     Charcot-Carla-Tooth disease     Malignant Hyperthermia precautions per anesthesia    Chronic kidney disease     Chronic rhinitis     Chronic vasomotor rhinitis     Constipation by delayed colonic transit     Decreased hearing of both ears     Grade I diastolic dysfunction     Heart murmur     HLD (hyperlipidemia)     HTN (hypertension)     Lyme disease     Malignant hyperthermia     Precautions " risk per anesthesia cervantes to Charcot Carla tooth disease    Nonsenile cataract Mar 2023    Obesity     SHAINA on CPAP     Prostatic hypertrophy     Psychophysiological insomnia     Retinal arterial branch occlusion     Squamous cell carcinoma of skin of face     TIA (transient ischemic attack)     Visual field cut     Right    Past Surgical History:   Procedure Laterality Date    carotid endartectomy Right     CATARACT IOL, RT/LT  Mar 2023    CORONARY ARTERY BYPASS GRAFT, WITH ENDOSCOPIC VESSEL PROCUREMENT N/A 8/7/2024    Procedure: CORONARY ARTERY BYPASS GRAFT TIMES THREE, LEFT INTERNAL MAMMARY ARTERY HARVEST, LEFT LEG ENDOSCOPIC VESSEL PROCUREMENT;  Surgeon: Ольга Aparicio MD;  Location: [facility] OR    CV CORONARY ANGIOGRAM N/A 06/24/2024    Procedure: Coronary Angiogram;  Surgeon: Oscar Whittington MD;  Location: [facility] CATH LAB CV    CV LEFT HEART CATH N/A 06/24/2024    Procedure: Left Heart Catheterization;  Surgeon: Oscar Whittington MD;  Location: [facility] CATH LAB CV    KNEE SURGERY Left     REPLACEMENT TOTAL KNEE Right     TONSILLECTOMY & ADENOIDECTOMY      TRANSESOPHAGEAL ECHOCARDIOGRAM INTRAOPERATIVE N/A 8/7/2024    Procedure: ANESTHESIA TRANSESOPHAGEAL ECHOCARDIOGRAM, EPI-AORTIC ULTRASOUND;  Surgeon: Ольга Aparicio MD;  Location: [facility] OR    Family History   Problem Relation Age of Onset    Cancer Mother     Hypertension Father     Glaucoma No family hx of     Macular Degeneration No family hx of     Social History     Socioeconomic History    Marital status:      Spouse name: Not on file    Number of children: Not on file    Years of education: Not on file    Highest education level: Not on file   Occupational History    Not on file   Tobacco Use    Smoking status: Former     Current packs/day: 1.00     Average packs/day: 1 pack/day for 5.0 years (5.0 ttl pk-yrs)     Types: Cigarettes    Smokeless tobacco: Never   Vaping Use    Vaping status: Never Used   Substance and  Sexual Activity    Alcohol use: Yes     Alcohol/week: 6.0 standard drinks of alcohol     Types: 6 Standard drinks or equivalent per week    Drug use: Never    Sexual activity: Not Currently     Partners: Female     Birth control/protection: Male Surgical   Other Topics Concern    Not on file   Social History Narrative    Not on file     Social Determinants of Health     Financial Resource Strain: Not on File (2021)    Received from [facility]    Financial Resource Strain     Financial Resource Strain: 0   Food Insecurity: Not on File (2021)    Received from [facility]    Food Insecurity     Food: 0   Transportation Needs: Not on File (2021)    Received from [facility]    Transportation Needs     Transportation: 0   Physical Activity: Not on File (2021)    Received from [facility]    Physical Activity     Physical Activity: 0   Stress: Not on File (2021)    Received from [facility]    Stress     Stress: 0   Social Connections: Not on File (2021)    Received from [facility]    Social Connections     Social Connections and Isolation: 0   Interpersonal Safety: Not on file   Housing Stability: Not on File (2021)    Received from [facility]    Housing Stability     Housin          Medications  Allergies   Current Outpatient Medications   Medication Sig Dispense Refill    apixaban ANTICOAGULANT (ELIQUIS) 5 MG tablet Take 1 tablet (5 mg) by mouth 2 times daily. 60 tablet 11    aspirin (ASA) 81 MG chewable tablet Take 81 mg by mouth daily.      atorvastatin (LIPITOR) 80 MG tablet Take 1 tablet (80 mg) by mouth daily. 90 tablet 0    carvedilol (COREG) 12.5 MG tablet Take 1 tablet (12.5 mg) by mouth 2 times daily (with meals). 180 tablet 0    fluticasone (FLONASE) 50 MCG/ACT nasal spray Spray 1 spray into each nostril every other night before bed. Uses on even days.      NONFORMULARY CPaP as directed      Allergies   Allergen Reactions    Pneumovax  [Pneumococcal  Polysaccharide Vaccine]      Other reaction(s): fever,swollen, red arm         Lab Results    Chemistry/lipid CBC Cardiac Enzymes/BNP/TSH/INR   Lab Results   Component Value Date    CHOL 160 06/24/2024    HDL 49 06/24/2024    TRIG 61 06/24/2024    BUN 21.8 09/10/2024     09/10/2024    CO2 24 09/10/2024    Lab Results   Component Value Date    WBC 8.3 08/19/2024    HGB 8.5 (L) 08/19/2024    HCT 26.0 (L) 08/19/2024    MCV 94 08/19/2024     08/19/2024    Lab Results   Component Value Date    TSH 2.59 05/09/2023    INR 1.58 (H) 08/07/2024

## 2024-10-02 NOTE — TELEPHONE ENCOUNTER
----- Message -----  From: Justin Corona MD  Sent: 10/2/2024   2:15 PM CDT  To: Brina Whitmore  Subject: FW: Vaccines                                     Would wait 3 more weeks and then space them out.

## 2024-10-03 ENCOUNTER — HOSPITAL ENCOUNTER (OUTPATIENT)
Dept: CARDIAC REHAB | Facility: CLINIC | Age: 84
Discharge: HOME | End: 2024-10-03
Attending: PHYSICAL MEDICINE & REHABILITATION
Payer: COMMERCIAL

## 2024-10-03 PROCEDURE — 93798 PHYS/QHP OP CAR RHAB W/ECG: CPT | Performed by: OCCUPATIONAL THERAPIST

## 2024-10-08 ENCOUNTER — HOSPITAL ENCOUNTER (OUTPATIENT)
Dept: CARDIAC REHAB | Facility: CLINIC | Age: 84
Discharge: HOME | End: 2024-10-08
Attending: PHYSICAL MEDICINE & REHABILITATION
Payer: COMMERCIAL

## 2024-10-08 PROCEDURE — 93798 PHYS/QHP OP CAR RHAB W/ECG: CPT

## 2024-10-11 ENCOUNTER — HOSPITAL ENCOUNTER (OUTPATIENT)
Dept: CARDIAC REHAB | Facility: CLINIC | Age: 84
Discharge: HOME | End: 2024-10-11
Attending: PHYSICAL MEDICINE & REHABILITATION
Payer: COMMERCIAL

## 2024-10-11 PROCEDURE — 93798 PHYS/QHP OP CAR RHAB W/ECG: CPT

## 2024-10-14 ENCOUNTER — HOSPITAL ENCOUNTER (OUTPATIENT)
Dept: CARDIAC REHAB | Facility: CLINIC | Age: 84
Discharge: HOME | End: 2024-10-14
Attending: PHYSICAL MEDICINE & REHABILITATION
Payer: COMMERCIAL

## 2024-10-14 PROCEDURE — 93798 PHYS/QHP OP CAR RHAB W/ECG: CPT

## 2024-10-17 ENCOUNTER — MYC MEDICAL ADVICE (OUTPATIENT)
Dept: CARDIOLOGY | Facility: CLINIC | Age: 84
End: 2024-10-17
Payer: COMMERCIAL

## 2024-10-18 ENCOUNTER — HOSPITAL ENCOUNTER (OUTPATIENT)
Dept: CARDIAC REHAB | Facility: CLINIC | Age: 84
Discharge: HOME | End: 2024-10-18
Attending: PHYSICAL MEDICINE & REHABILITATION
Payer: COMMERCIAL

## 2024-10-18 PROCEDURE — 93798 PHYS/QHP OP CAR RHAB W/ECG: CPT

## 2024-10-18 NOTE — TELEPHONE ENCOUNTER
"From: Justin Corona MD  Sent: 10/18/2024   7:25 AM CDT  To: Brina Whitmore  Subject: FW: Drinks                                       Brina,    Biggest concern is regarding A-fib.  It looks like he has an appointment very soon in a stable clinic and he should get their opinion on this.  Outside of the A-fib, I do not have a problem with small amounts of alcohol.    Thanks,    Justin  ----- Message -----  From: Enrike Sotomayor \"Louis\"  Sent: 10/17/2024   7:38 PM CDT  To: Justin Corona MD  Subject: Drinks                                           I have refrained from alcoholic beverages since my triple bypass surgery. It has now been 10 weeks since my surgery and I would like to have a \"wee dram\" of scotch or a beer on occasion.  What do you think?  "

## 2024-10-23 ENCOUNTER — HOSPITAL ENCOUNTER (OUTPATIENT)
Dept: CARDIAC REHAB | Facility: CLINIC | Age: 84
Discharge: HOME | End: 2024-10-23
Attending: PHYSICAL MEDICINE & REHABILITATION
Payer: COMMERCIAL

## 2024-10-23 PROCEDURE — 93798 PHYS/QHP OP CAR RHAB W/ECG: CPT

## 2024-10-25 ENCOUNTER — HOSPITAL ENCOUNTER (OUTPATIENT)
Dept: CARDIAC REHAB | Facility: CLINIC | Age: 84
Discharge: HOME | End: 2024-10-25
Attending: PHYSICAL MEDICINE & REHABILITATION
Payer: COMMERCIAL

## 2024-10-25 PROCEDURE — 93798 PHYS/QHP OP CAR RHAB W/ECG: CPT

## 2024-10-29 ENCOUNTER — HOSPITAL ENCOUNTER (OUTPATIENT)
Dept: CARDIAC REHAB | Facility: CLINIC | Age: 84
Discharge: HOME | End: 2024-10-29
Attending: PHYSICAL MEDICINE & REHABILITATION
Payer: COMMERCIAL

## 2024-10-29 PROCEDURE — 93798 PHYS/QHP OP CAR RHAB W/ECG: CPT

## 2024-11-01 ENCOUNTER — HOSPITAL ENCOUNTER (OUTPATIENT)
Dept: CARDIAC REHAB | Facility: CLINIC | Age: 84
Discharge: HOME | End: 2024-11-01
Attending: PHYSICAL MEDICINE & REHABILITATION
Payer: COMMERCIAL

## 2024-11-01 ENCOUNTER — LAB (OUTPATIENT)
Dept: CARDIOLOGY | Facility: CLINIC | Age: 84
End: 2024-11-01
Payer: COMMERCIAL

## 2024-11-01 DIAGNOSIS — I25.10 CORONARY ARTERY DISEASE INVOLVING NATIVE CORONARY ARTERY OF NATIVE HEART, UNSPECIFIED WHETHER ANGINA PRESENT: ICD-10-CM

## 2024-11-01 DIAGNOSIS — I97.89 POSTOPERATIVE ATRIAL FIBRILLATION (H): ICD-10-CM

## 2024-11-01 DIAGNOSIS — I25.10 CORONARY ARTERIOSCLEROSIS: ICD-10-CM

## 2024-11-01 DIAGNOSIS — G60.0 CMT (CHARCOT-MARIE-TOOTH DISEASE): ICD-10-CM

## 2024-11-01 DIAGNOSIS — I48.91 POSTOPERATIVE ATRIAL FIBRILLATION (H): ICD-10-CM

## 2024-11-01 DIAGNOSIS — Z95.1 S/P CABG (CORONARY ARTERY BYPASS GRAFT): ICD-10-CM

## 2024-11-01 LAB
ALBUMIN SERPL BCG-MCNC: 3.8 G/DL (ref 3.5–5.2)
ALP SERPL-CCNC: 266 U/L (ref 40–150)
ALT SERPL W P-5'-P-CCNC: 46 U/L (ref 0–70)
ANION GAP SERPL CALCULATED.3IONS-SCNC: 12 MMOL/L (ref 7–15)
AST SERPL W P-5'-P-CCNC: 46 U/L (ref 0–45)
BILIRUB SERPL-MCNC: 0.4 MG/DL
BUN SERPL-MCNC: 25.3 MG/DL (ref 8–23)
CALCIUM SERPL-MCNC: 9.3 MG/DL (ref 8.8–10.4)
CHLORIDE SERPL-SCNC: 104 MMOL/L (ref 98–107)
CHOLEST SERPL-MCNC: 104 MG/DL
CREAT SERPL-MCNC: 1.23 MG/DL (ref 0.67–1.17)
EGFRCR SERPLBLD CKD-EPI 2021: 59 ML/MIN/1.73M2
FASTING STATUS PATIENT QL REPORTED: YES
GLUCOSE SERPL-MCNC: 95 MG/DL (ref 70–99)
HCO3 SERPL-SCNC: 23 MMOL/L (ref 22–29)
HDLC SERPL-MCNC: 42 MG/DL
LDLC SERPL CALC-MCNC: 52 MG/DL
NONHDLC SERPL-MCNC: 62 MG/DL
POTASSIUM SERPL-SCNC: 4.8 MMOL/L (ref 3.4–5.3)
PROT SERPL-MCNC: 7.7 G/DL (ref 6.4–8.3)
SODIUM SERPL-SCNC: 139 MMOL/L (ref 135–145)
TRIGL SERPL-MCNC: 48 MG/DL

## 2024-11-01 PROCEDURE — 80061 LIPID PANEL: CPT

## 2024-11-01 PROCEDURE — 36415 COLL VENOUS BLD VENIPUNCTURE: CPT

## 2024-11-01 PROCEDURE — 93798 PHYS/QHP OP CAR RHAB W/ECG: CPT

## 2024-11-01 PROCEDURE — 80053 COMPREHEN METABOLIC PANEL: CPT

## 2024-11-03 PROBLEM — I48.0 PAROXYSMAL ATRIAL FIBRILLATION (H): Status: ACTIVE | Noted: 2024-09-03

## 2024-11-03 PROBLEM — G47.33 OSA ON CPAP: Status: ACTIVE | Noted: 2024-11-03

## 2024-11-04 ENCOUNTER — ORDERS ONLY (AUTO-RELEASED) (OUTPATIENT)
Dept: CARDIOLOGY | Facility: CLINIC | Age: 84
End: 2024-11-04

## 2024-11-04 ENCOUNTER — OFFICE VISIT (OUTPATIENT)
Dept: CARDIOLOGY | Facility: CLINIC | Age: 84
End: 2024-11-04
Payer: COMMERCIAL

## 2024-11-04 VITALS
DIASTOLIC BLOOD PRESSURE: 80 MMHG | SYSTOLIC BLOOD PRESSURE: 132 MMHG | HEART RATE: 58 BPM | WEIGHT: 198.31 LBS | BODY MASS INDEX: 31.53 KG/M2 | OXYGEN SATURATION: 98 %

## 2024-11-04 DIAGNOSIS — I48.0 PAROXYSMAL ATRIAL FIBRILLATION (H): Primary | ICD-10-CM

## 2024-11-04 DIAGNOSIS — I97.89 POSTOPERATIVE ATRIAL FIBRILLATION (H): ICD-10-CM

## 2024-11-04 DIAGNOSIS — I48.0 PAROXYSMAL ATRIAL FIBRILLATION (H): ICD-10-CM

## 2024-11-04 DIAGNOSIS — G47.33 OSA ON CPAP: ICD-10-CM

## 2024-11-04 DIAGNOSIS — Z95.1 S/P CABG (CORONARY ARTERY BYPASS GRAFT): ICD-10-CM

## 2024-11-04 DIAGNOSIS — I48.91 POSTOPERATIVE ATRIAL FIBRILLATION (H): ICD-10-CM

## 2024-11-04 PROCEDURE — 99214 OFFICE O/P EST MOD 30 MIN: CPT | Performed by: NURSE PRACTITIONER

## 2024-11-04 PROCEDURE — G2211 COMPLEX E/M VISIT ADD ON: HCPCS | Performed by: NURSE PRACTITIONER

## 2024-11-04 NOTE — TELEPHONE ENCOUNTER
KEANUB to schedule vascular consult with Deborah or Dr. Tyler. [phone]   Kathy Xie (Mother) - [phone]

## 2024-11-04 NOTE — PATIENT INSTRUCTIONS
Enrike Sotomayor,    It was a pleasure to see you today at the [facility].     My recommendations after this visit include:    --continue Carvedilol 12.5 mg every 12 hours  --continue Eliquis every 12 hours  --Zio monitor x14 days, AFTER 3 month amiodarone wash out to assess for any further Afib. Patient was found to have Afib while 4 days after discontinuing Amiodarone. Hook up AFTER 12/6/24 (3 months after amiodarone wash out)   --Continue interval EKG monitoring by Apple Watch  --Follow up with me around 1/20/25       Sofy Lane CNP  [facility], Electrophysiology  [phone]  EP nurses [phone]

## 2024-11-04 NOTE — LETTER
11/4/2024    Paul Anderson MD  [facility] [address]  [place] [address]    RE: Enrike Sotomayor       Dear Colleague,     I had the pleasure of seeing Enrike Sotomayor in the [facility].    Thank you, Dr. Corona, for asking the [facility] team to see Mr. Ernike Sotomayor to evaluate paroxysmal AF.    Assessment/Recommendations     Assessment/Plan:    Diagnoses and all orders for this visit:  Paroxysmal atrial fibrillation (H)   Postop atrial fibrillation following CABG x 3 surgery, AF first noted on 8/9/2024 with controlled ventricular rate of 74 bpm per twelve-lead ECG.  CABG x 3 surgery took place on 8/7/2024 Dr Aparicio.   Sx: asymptomatic  Amiodarone discontinued 9/6/24  Follow up Zio patch monitoring completed 9/10/2024 to 9/24/2024 (duration 13d 19h) showed 1% burden of AF ranging from  bpm (avg of 85 bpm), the longest lasting 11 mins 13 secs with an avg rate of 83 bpm. Predominant underlying rhythm was sinus rhythm, 46 to 91bpm, average 64bpm. No pauses, PVC burden 3.2%. 1 run of NSVT occurred lasting 5 beats with a max rate of 138 bpm (avg 109 bpm). Symptom triggers correlated with atrial fibrillation.       [id number] score of 3 due to age over 75, CAD, and on Eliquis. No missed doses x3 weeks, no bleeding issues reported.     --continue Carvedilol 12.5 mg BID  --continue Eliquis every 12 hours  --Zio monitor x14 days, AFTER 3 month amiodarone wash out to assess for any further Afib. Patient was found to have Afib while 4 days after discontinuing Amiodarone. Hook up AFTER 12/6/24 (3 months after amiodarone wash out)   --Continue interval EKG monitoring by Apple Watch  --Follow up with me around 1/20/25     SHAINA on CPAP  --consistent use of CPAP         History of Present Illness/Subjective     Enrike Sotomayor is a very pleasant 83 year old male who comes in today for EP follow up post op/paroxysmal AF    PMH: ZULUAGA, CAD s/p CABG x3  L NEHAL to LAD, SVG to OM and R posterior descending artery 8/7/24, post atrial fibrillation post CABG 8/9/24, asthma     Arrhythmia hx  Dx/date: Postop atrial fibrillation following CABG x 3 surgery, AF first noted on 8/9/2024 with controlled ventricular rate of 74 bpm per twelve-lead ECG.  CABG x 3 surgery took place on 8/7/2024 Dr Aparicio.   Sx: asymptomatic  Prior AAD, AV samuel blocking agents: Amiodarone, metoprolol tartrate  OAC: Eliquis  Procedures  DCCV: no  Ablation: no    Louis presents with his wife today for follow-up.  He continues to note weakness in both of his legs along with shortness of breath on exertion specifically with stairs.  He denies any recent palpitations, chest pain, dizziness or near syncope.  His last dose of amiodarone was taken on 9/6.  Plan was for him to complete a 3-week washout and then hook up to his Zio patch monitor.  Patient did receive his Zio patch monitor in the mail but hooked up to his monitor 4 days after his last dose of amiodarone so wash out was not completed prior to monitoring. Zio patch monitoring completed 9/10/2024 to 9/24/2024 (duration 13d 19h) showed 1% burden of AF ranging from  bpm (avg of 85 bpm), the longest lasting 11 mins 13 secs with an avg rate of 83 bpm. Predominant underlying rhythm was sinus rhythm, 46 to 91bpm, average 64bpm. No pauses, PVC burden 3.2%. 1 run of NSVT occurred lasting 5 beats with a max rate of 138 bpm (avg 109 bpm). Symptom triggers correlated with atrial fibrillation.  He performs interval ECG monitoring by way of Apple Watch, he has not noted any additional atrial fibrillation since his Zio patch monitoring was completed.  Per review of his Apple Watch history today, he has been maintaining normal sinus rhythm in the mid 50s to 60s with some PACs.  Per review of his telemetry documentation from cardiac rehab, telemetry shows normal sinus rhythm in the 50s and 60s as well.  He remains on Eliquis twice daily for stroke  prevention, has not missed any doses over the last 3 weeks and denies any bleeding episodes.  Blood pressure normotensive today at 132/80.  He does report consistent use of CPAP.          Cardiographics (reviewed):  EKG  8/10/24 AF 81 bpm  8/9/24 AF 74 bpm   6/24/24 NSR 63 bpm QT/QTC: 396/405 ms    11/1/24 Cardiac Rehab telemetry      Zio monitoring from 9/10/2024 to 9/24/2024 (duration 13d 19h).  Predominant underlying rhythm was sinus rhythm, 46 to 91bpm, average 64bpm.  Total atrial fibrillation burden was 1%, ranging from  bpm (avg of 85 bpm), the longest lasting 11 mins 13 secs with an avg rate of 83 bpm.   There were no pauses of greater than 3 seconds.  Rare supraventricular ectopic beats (1.4%).  Occasional premature ventricular contractions (3.2%). 1 run of NSVT occurred lasting 5 beats with a max rate of 138 bpm (avg 109 bpm).  Symptom triggers correlated with atrial fibrillation.     TTE 8/1/24  1. The left ventricle is normal in size. Left ventricular systolic performance  is mildly reduced. The ejection fraction is estimated to be 45-50%.  2. There is moderate mid to distal anterior hypokinesis along with a small  region of apical hypokinesis.  3. There is very mild aortic stenosis.  4. Normal right ventricular size and systolic performance.     Stress ECHO 3/27/24  1. Non-diagnostic exercise stress echocardiogram due to a combination of poor  image quality and the inability to achieve target heart rate. Visually  estimated post exercise left ventricular ejection fraction appears unchanged  at 55-60%.  2. Non-diagnostic exercise stress electrocardiogram due to the inability to  achieve target heart rate. No ST segment changes observed with the achieved  workload.  3. Functional capacity is impaired. The patient exercised for 3:00 minutes on  the Justino protocol, achieving 4.6 METs and 80% the age-predicted maximum heart  rate. The patient reported dyspnea with exercise but did not experience  chest  paiin.            Problem List:  Patient Active Problem List   Diagnosis     CMT (Charcot-Carla-Tooth disease)     Dyspnea on exertion     Precordial pain     Bruit of left carotid artery     Asthma     Coronary artery disease involving native coronary artery of native heart, unspecified whether angina present     Coronary arteriosclerosis     Paroxysmal atrial fibrillation (H)     S/P CABG (coronary artery bypass graft)     SHAINA on CPAP     Revi  e  Physical Examination Review of Systems   w [facility]  There were no vitals taken for this visit.  There is no height or weight on file to calculate BMI.  Wt Readings from Last 3 Encounters:   10/02/24 89.5 kg (197 lb 4.8 oz)   09/10/24 88.5 kg (195 lb)   09/04/24 89.1 kg (196 lb 8 oz)     General Appearance:   Alert, well-appearing and in no acute distress.   HEENT: Atraumatic, normocephalic.  No scleral icterus, normal conjunctivae; mucous membranes pink and moist.     Chest: Chest symmetric, spine straight.   Lungs:   Respirations unlabored: Lungs are clear to auscultation.   Cardiovascular:   Normal first and second heart sounds with no murmurs, rubs, or gallops.  Regular, regular.   Normal JVD, no edema.       Extremities: No cyanosis or clubbing   Musculoskeletal: Moves all extremities   Skin: Warm, dry, intact.    Neurologic: Mood and affect are appropriate, alert and oriented to person, place, time, and situation     ROS: 10 point ROS neg other than the symptoms noted above in the HPI.     Medical History  Surgical History Family History Social History     Past Medical History:   Diagnosis Date     Aortic valve sclerosis      Arteriosclerosis of right carotid artery      Bilateral cataracts      CAD (coronary artery disease)      Charcot's joint of foot      Charcot-Carla-Tooth disease     Malignant Hyperthermia precautions per anesthesia     Chronic kidney disease      Chronic rhinitis      Chronic vasomotor rhinitis      Constipation by delayed colonic  transit      Decreased hearing of both ears      Grade I diastolic dysfunction      Heart murmur      HLD (hyperlipidemia)      HTN (hypertension)      Lyme disease      Malignant hyperthermia     Precautions risk per anesthesia cervantes to Charcot Carla tooth disease     Nonsenile cataract Mar 2023     Obesity      SHAINA on CPAP      Prostatic hypertrophy      Psychophysiological insomnia      Retinal arterial branch occlusion      Squamous cell carcinoma of skin of face      TIA (transient ischemic attack)      Visual field cut     Right    Past Surgical History:   Procedure Laterality Date     carotid endartectomy Right      CATARACT IOL, RT/LT  Mar 2023     CORONARY ARTERY BYPASS GRAFT, WITH ENDOSCOPIC VESSEL PROCUREMENT N/A 8/7/2024    Procedure: CORONARY ARTERY BYPASS GRAFT TIMES THREE, LEFT INTERNAL MAMMARY ARTERY HARVEST, LEFT LEG ENDOSCOPIC VESSEL PROCUREMENT;  Surgeon: Ольга Aparicio MD;  Location: [facility] OR     CV CORONARY ANGIOGRAM N/A 06/24/2024    Procedure: Coronary Angiogram;  Surgeon: Oscar Whittington MD;  Location: [facility] CATH LAB CV     CV LEFT HEART CATH N/A 06/24/2024    Procedure: Left Heart Catheterization;  Surgeon: Oscar Whittington MD;  Location: [facility] CATH LAB CV     KNEE SURGERY Left      REPLACEMENT TOTAL KNEE Right      TONSILLECTOMY & ADENOIDECTOMY       TRANSESOPHAGEAL ECHOCARDIOGRAM INTRAOPERATIVE N/A 8/7/2024    Procedure: ANESTHESIA TRANSESOPHAGEAL ECHOCARDIOGRAM, EPI-AORTIC ULTRASOUND;  Surgeon: Ольга Aparicio MD;  Location: [facility] OR    Family History   Problem Relation Age of Onset     Cancer Mother      Hypertension Father      Glaucoma No family hx of      Macular Degeneration No family hx of     History   Smoking Status     Former     Types: Cigarettes   Smokeless Tobacco     Never     Social History    Substance and Sexual Activity      Alcohol use: Yes        Alcohol/week: 6.0 standard drinks of alcohol        Types: 6 Standard drinks or equivalent  "per week       Medications  Allergies     Current Outpatient Medications   Medication Sig Dispense Refill     apixaban ANTICOAGULANT (ELIQUIS) 5 MG tablet Take 1 tablet (5 mg) by mouth 2 times daily. 60 tablet 11     aspirin (ASA) 81 MG chewable tablet Take 81 mg by mouth daily.       atorvastatin (LIPITOR) 80 MG tablet Take 1 tablet (80 mg) by mouth daily. 90 tablet 0     carvedilol (COREG) 12.5 MG tablet Take 1 tablet (12.5 mg) by mouth 2 times daily (with meals). 180 tablet 0     fluticasone (FLONASE) 50 MCG/ACT nasal spray Spray 1 spray into each nostril every other night before bed. Uses on even days.       NONFORMULARY CPaP as directed        Allergies   Allergen Reactions     Pneumovax  [Pneumococcal Polysaccharide Vaccine]      Other reaction(s): fever,swollen, red arm      Medical, surgical, family, social history, and medications were all reviewed and updated as necessary.   Lab Results    Chemistry/lipid CBC Cardiac Enzymes/BNP/TSH/INR   Recent Labs   Lab Test 11/01/24  0843   CHOL 104   HDL 42   LDL 52   TRIG 48     Recent Labs   Lab Test 11/01/24  0843 06/24/24  0841 06/19/24  1019   LDL 52 99 92     Recent Labs   Lab Test 11/01/24  0843      POTASSIUM 4.8   CHLORIDE 104   CO2 23   GLC 95   BUN 25.3*   CR 1.23*   GFRESTIMATED 59*   LEONA 9.3     Recent Labs   Lab Test 11/01/24  0843 09/10/24  1208 08/26/24  1142   CR 1.23* 1.36* 1.64*     Recent Labs   Lab Test 08/01/24  0821   A1C 5.4          Recent Labs   Lab Test 08/19/24  0653   WBC 8.3   HGB 8.5*   HCT 26.0*   MCV 94        Recent Labs   Lab Test 08/19/24  0653 08/17/24  0837 08/15/24  0635   HGB 8.5* 9.3* 9.5*    No results for input(s): \"TROPONINI\" in the last 67133 hours.  No results for input(s): \"BNP\", \"NTBNPI\", \"NTBNP\" in the last 65631 hours.  Recent Labs   Lab Test 05/09/23  1651   TSH 2.59     Recent Labs   Lab Test 08/07/24  1238 08/07/24  1123 08/01/24  0821   INR 1.58* 1.79* 1.24*          Total Time- 39 minutes spent on " date of encounter doing chart review, history and exam, documentation and further activities as noted above.  This note has been dictated using voice recognition software. Any grammatical, typographical, or context distortions are unintentional and inherent to the software.    Sofy Lane CNP  [facility]  [phone]                         Thank you for allowing me to participate in the care of your patient.      Sincerely,     Sofy Lane NP     [facility]  cc:   Sofy Lane NP  [address] [place] [address] [address]

## 2024-11-04 NOTE — PROGRESS NOTES
Thank you, Dr. Corona, for asking the [facility] team to see . Enrike Sotomayor to evaluate paroxysmal AF.    Assessment/Recommendations     Assessment/Plan:    Diagnoses and all orders for this visit:  Paroxysmal atrial fibrillation (H)   Postop atrial fibrillation following CABG x 3 surgery, AF first noted on 8/9/2024 with controlled ventricular rate of 74 bpm per twelve-lead ECG.  CABG x 3 surgery took place on 8/7/2024 Dr Aparicio.   Sx: asymptomatic  Amiodarone discontinued 9/6/24  Follow up Zio patch monitoring completed 9/10/2024 to 9/24/2024 (duration 13d 19h) showed 1% burden of AF ranging from  bpm (avg of 85 bpm), the longest lasting 11 mins 13 secs with an avg rate of 83 bpm. Predominant underlying rhythm was sinus rhythm, 46 to 91bpm, average 64bpm. No pauses, PVC burden 3.2%. 1 run of NSVT occurred lasting 5 beats with a max rate of 138 bpm (avg 109 bpm). Symptom triggers correlated with atrial fibrillation.       [id number] score of 3 due to age over 75, CAD, and on Eliquis. No missed doses x3 weeks, no bleeding issues reported.     --continue Carvedilol 12.5 mg BID  --continue Eliquis every 12 hours  --Zio monitor x14 days, AFTER 3 month amiodarone wash out to assess for any further Afib. Patient was found to have Afib while 4 days after discontinuing Amiodarone. Hook up AFTER 12/6/24 (3 months after amiodarone wash out)   --Continue interval EKG monitoring by Apple Watch  --Follow up with me around 1/20/25     SHAINA on CPAP  --consistent use of CPAP         History of Present Illness/Subjective     Enrike Sotomayor is a very pleasant 83 year old male who comes in today for EP follow up post op/paroxysmal AF    PMH: ZULUAGA, CAD s/p CABG x3 L NEHAL to LAD, SVG to OM and R posterior descending artery 8/7/24, post atrial fibrillation post CABG 8/9/24, asthma     Arrhythmia hx  Dx/date: Postop atrial fibrillation following CABG x 3 surgery, AF first noted on 8/9/2024 with controlled  ventricular rate of 74 bpm per twelve-lead ECG.  CABG x 3 surgery took place on 8/7/2024 Dr Aparicio.   Sx: asymptomatic  Prior AAD, AV samuel blocking agents: Amiodarone, metoprolol tartrate  OAC: Eliquis  Procedures  DCCV: no  Ablation: no    Louis presents with his wife today for follow-up.  He continues to note weakness in both of his legs along with shortness of breath on exertion specifically with stairs.  He denies any recent palpitations, chest pain, dizziness or near syncope.  His last dose of amiodarone was taken on 9/6.  Plan was for him to complete a 3-week washout and then hook up to his Zio patch monitor.  Patient did receive his Zio patch monitor in the mail but hooked up to his monitor 4 days after his last dose of amiodarone so wash out was not completed prior to monitoring. Zio patch monitoring completed 9/10/2024 to 9/24/2024 (duration 13d 19h) showed 1% burden of AF ranging from  bpm (avg of 85 bpm), the longest lasting 11 mins 13 secs with an avg rate of 83 bpm. Predominant underlying rhythm was sinus rhythm, 46 to 91bpm, average 64bpm. No pauses, PVC burden 3.2%. 1 run of NSVT occurred lasting 5 beats with a max rate of 138 bpm (avg 109 bpm). Symptom triggers correlated with atrial fibrillation.  He performs interval ECG monitoring by way of Apple Watch, he has not noted any additional atrial fibrillation since his Zio patch monitoring was completed.  Per review of his Apple Watch history today, he has been maintaining normal sinus rhythm in the mid 50s to 60s with some PACs.  Per review of his telemetry documentation from cardiac rehab, telemetry shows normal sinus rhythm in the 50s and 60s as well.  He remains on Eliquis twice daily for stroke prevention, has not missed any doses over the last 3 weeks and denies any bleeding episodes.  Blood pressure normotensive today at 132/80.  He does report consistent use of CPAP.          Cardiographics (reviewed):  EKG  8/10/24 AF 81 bpm  8/9/24 AF  74 bpm   6/24/24 NSR 63 bpm QT/QTC: 396/405 ms    11/1/24 Cardiac Rehab telemetry      Zio monitoring from 9/10/2024 to 9/24/2024 (duration 13d 19h).  Predominant underlying rhythm was sinus rhythm, 46 to 91bpm, average 64bpm.  Total atrial fibrillation burden was 1%, ranging from  bpm (avg of 85 bpm), the longest lasting 11 mins 13 secs with an avg rate of 83 bpm.   There were no pauses of greater than 3 seconds.  Rare supraventricular ectopic beats (1.4%).  Occasional premature ventricular contractions (3.2%). 1 run of NSVT occurred lasting 5 beats with a max rate of 138 bpm (avg 109 bpm).  Symptom triggers correlated with atrial fibrillation.     TTE 8/1/24  1. The left ventricle is normal in size. Left ventricular systolic performance  is mildly reduced. The ejection fraction is estimated to be 45-50%.  2. There is moderate mid to distal anterior hypokinesis along with a small  region of apical hypokinesis.  3. There is very mild aortic stenosis.  4. Normal right ventricular size and systolic performance.     Stress ECHO 3/27/24  1. Non-diagnostic exercise stress echocardiogram due to a combination of poor  image quality and the inability to achieve target heart rate. Visually  estimated post exercise left ventricular ejection fraction appears unchanged  at 55-60%.  2. Non-diagnostic exercise stress electrocardiogram due to the inability to  achieve target heart rate. No ST segment changes observed with the achieved  workload.  3. Functional capacity is impaired. The patient exercised for 3:00 minutes on  the Justino protocol, achieving 4.6 METs and 80% the age-predicted maximum heart  rate. The patient reported dyspnea with exercise but did not experience chest  paiin.            Problem List:  Patient Active Problem List   Diagnosis    CMT (Charcot-Carla-Tooth disease)    Dyspnea on exertion    Precordial pain    Bruit of left carotid artery    Asthma    Coronary artery disease involving native coronary  artery of native heart, unspecified whether angina present    Coronary arteriosclerosis    Paroxysmal atrial fibrillation (H)    S/P CABG (coronary artery bypass graft)    SHAINA on CPAP     Revi  e  Physical Examination Review of Systems   w [facility]  There were no vitals taken for this visit.  There is no height or weight on file to calculate BMI.  Wt Readings from Last 3 Encounters:   10/02/24 89.5 kg (197 lb 4.8 oz)   09/10/24 88.5 kg (195 lb)   09/04/24 89.1 kg (196 lb 8 oz)     General Appearance:   Alert, well-appearing and in no acute distress.   HEENT: Atraumatic, normocephalic.  No scleral icterus, normal conjunctivae; mucous membranes pink and moist.     Chest: Chest symmetric, spine straight.   Lungs:   Respirations unlabored: Lungs are clear to auscultation.   Cardiovascular:   Normal first and second heart sounds with no murmurs, rubs, or gallops.  Regular, regular.   Normal JVD, no edema.       Extremities: No cyanosis or clubbing   Musculoskeletal: Moves all extremities   Skin: Warm, dry, intact.    Neurologic: Mood and affect are appropriate, alert and oriented to person, place, time, and situation     ROS: 10 point ROS neg other than the symptoms noted above in the HPI.     Medical History  Surgical History Family History Social History     Past Medical History:   Diagnosis Date    Aortic valve sclerosis     Arteriosclerosis of right carotid artery     Bilateral cataracts     CAD (coronary artery disease)     Charcot's joint of foot     Charcot-Carla-Tooth disease     Malignant Hyperthermia precautions per anesthesia    Chronic kidney disease     Chronic rhinitis     Chronic vasomotor rhinitis     Constipation by delayed colonic transit     Decreased hearing of both ears     Grade I diastolic dysfunction     Heart murmur     HLD (hyperlipidemia)     HTN (hypertension)     Lyme disease     Malignant hyperthermia     Precautions risk per anesthesia cervantes to Charcot Carla tooth disease    Nonsenile cataract  Mar 2023    Obesity     SHAINA on CPAP     Prostatic hypertrophy     Psychophysiological insomnia     Retinal arterial branch occlusion     Squamous cell carcinoma of skin of face     TIA (transient ischemic attack)     Visual field cut     Right    Past Surgical History:   Procedure Laterality Date    carotid endartectomy Right     CATARACT IOL, RT/LT  Mar 2023    CORONARY ARTERY BYPASS GRAFT, WITH ENDOSCOPIC VESSEL PROCUREMENT N/A 8/7/2024    Procedure: CORONARY ARTERY BYPASS GRAFT TIMES THREE, LEFT INTERNAL MAMMARY ARTERY HARVEST, LEFT LEG ENDOSCOPIC VESSEL PROCUREMENT;  Surgeon: Ольга Aparicio MD;  Location: [facility]    CV CORONARY ANGIOGRAM N/A 06/24/2024    Procedure: Coronary Angiogram;  Surgeon: Oscar Whittington MD;  Location: [facility] CATH LAB CV    CV LEFT HEART CATH N/A 06/24/2024    Procedure: Left Heart Catheterization;  Surgeon: Oscar Whittington MD;  Location: [facility] CATH LAB CV    KNEE SURGERY Left     REPLACEMENT TOTAL KNEE Right     TONSILLECTOMY & ADENOIDECTOMY      TRANSESOPHAGEAL ECHOCARDIOGRAM INTRAOPERATIVE N/A 8/7/2024    Procedure: ANESTHESIA TRANSESOPHAGEAL ECHOCARDIOGRAM, EPI-AORTIC ULTRASOUND;  Surgeon: Ольга Aparicio MD;  Location: [facility]    Family History   Problem Relation Age of Onset    Cancer Mother     Hypertension Father     Glaucoma No family hx of     Macular Degeneration No family hx of     History   Smoking Status    Former    Types: Cigarettes   Smokeless Tobacco    Never     Social History    Substance and Sexual Activity      Alcohol use: Yes        Alcohol/week: 6.0 standard drinks of alcohol        Types: 6 Standard drinks or equivalent per week       Medications  Allergies     Current Outpatient Medications   Medication Sig Dispense Refill    apixaban ANTICOAGULANT (ELIQUIS) 5 MG tablet Take 1 tablet (5 mg) by mouth 2 times daily. 60 tablet 11    aspirin (ASA) 81 MG chewable tablet Take 81 mg by mouth daily.      atorvastatin (LIPITOR) 80 MG  "tablet Take 1 tablet (80 mg) by mouth daily. 90 tablet 0    carvedilol (COREG) 12.5 MG tablet Take 1 tablet (12.5 mg) by mouth 2 times daily (with meals). 180 tablet 0    fluticasone (FLONASE) 50 MCG/ACT nasal spray Spray 1 spray into each nostril every other night before bed. Uses on even days.      NONFORMULARY CPaP as directed        Allergies   Allergen Reactions    Pneumovax  [Pneumococcal Polysaccharide Vaccine]      Other reaction(s): fever,swollen, red arm      Medical, surgical, family, social history, and medications were all reviewed and updated as necessary.   Lab Results    Chemistry/lipid CBC Cardiac Enzymes/BNP/TSH/INR   Recent Labs   Lab Test 11/01/24  0843   CHOL 104   HDL 42   LDL 52   TRIG 48     Recent Labs   Lab Test 11/01/24  0843 06/24/24  0841 06/19/24  1019   LDL 52 99 92     Recent Labs   Lab Test 11/01/24  0843      POTASSIUM 4.8   CHLORIDE 104   CO2 23   GLC 95   BUN 25.3*   CR 1.23*   GFRESTIMATED 59*   LEONA 9.3     Recent Labs   Lab Test 11/01/24  0843 09/10/24  1208 08/26/24  1142   CR 1.23* 1.36* 1.64*     Recent Labs   Lab Test 08/01/24  0821   A1C 5.4          Recent Labs   Lab Test 08/19/24  0653   WBC 8.3   HGB 8.5*   HCT 26.0*   MCV 94        Recent Labs   Lab Test 08/19/24  0653 08/17/24  0837 08/15/24  0635   HGB 8.5* 9.3* 9.5*    No results for input(s): \"TROPONINI\" in the last 79822 hours.  No results for input(s): \"BNP\", \"NTBNPI\", \"NTBNP\" in the last 63777 hours.  Recent Labs   Lab Test 05/09/23  1651   TSH 2.59     Recent Labs   Lab Test 08/07/24  1238 08/07/24  1123 08/01/24  0821   INR 1.58* 1.79* 1.24*          Total Time- 39 minutes spent on date of encounter doing chart review, history and exam, documentation and further activities as noted above.  This note has been dictated using voice recognition software. Any grammatical, typographical, or context distortions are unintentional and inherent to the software.    Sofy Lane [facility] " [facility]  [phone]

## 2024-11-05 ENCOUNTER — HOSPITAL ENCOUNTER (OUTPATIENT)
Dept: CARDIAC REHAB | Facility: CLINIC | Age: 84
Discharge: HOME | End: 2024-11-05
Attending: PHYSICAL MEDICINE & REHABILITATION
Payer: COMMERCIAL

## 2024-11-05 PROCEDURE — 93797 PHYS/QHP OP CAR RHAB WO ECG: CPT

## 2024-11-05 PROCEDURE — 93798 PHYS/QHP OP CAR RHAB W/ECG: CPT

## 2024-11-07 ENCOUNTER — HOSPITAL ENCOUNTER (OUTPATIENT)
Dept: CARDIAC REHAB | Facility: CLINIC | Age: 84
Discharge: HOME | End: 2024-11-07
Attending: PHYSICAL MEDICINE & REHABILITATION
Payer: COMMERCIAL

## 2024-11-07 PROCEDURE — 93798 PHYS/QHP OP CAR RHAB W/ECG: CPT

## 2024-11-11 ENCOUNTER — HOSPITAL ENCOUNTER (OUTPATIENT)
Dept: CARDIAC REHAB | Facility: CLINIC | Age: 84
Discharge: HOME | End: 2024-11-11
Attending: PHYSICAL MEDICINE & REHABILITATION
Payer: COMMERCIAL

## 2024-11-11 PROCEDURE — 93798 PHYS/QHP OP CAR RHAB W/ECG: CPT

## 2024-11-15 ENCOUNTER — HOSPITAL ENCOUNTER (OUTPATIENT)
Dept: CARDIAC REHAB | Facility: CLINIC | Age: 84
Discharge: HOME | End: 2024-11-15
Attending: PHYSICAL MEDICINE & REHABILITATION
Payer: COMMERCIAL

## 2024-11-15 PROCEDURE — 93798 PHYS/QHP OP CAR RHAB W/ECG: CPT

## 2024-11-19 ENCOUNTER — LAB REQUISITION (OUTPATIENT)
Dept: LAB | Facility: CLINIC | Age: 84
End: 2024-11-19
Payer: COMMERCIAL

## 2024-11-19 DIAGNOSIS — R74.8 ABNORMAL LEVELS OF OTHER SERUM ENZYMES: ICD-10-CM

## 2024-11-20 ENCOUNTER — HOSPITAL ENCOUNTER (OUTPATIENT)
Dept: CARDIAC REHAB | Facility: CLINIC | Age: 84
Discharge: HOME | End: 2024-11-20
Attending: PHYSICAL MEDICINE & REHABILITATION
Payer: COMMERCIAL

## 2024-11-20 LAB
ALBUMIN SERPL BCG-MCNC: 3.6 G/DL (ref 3.5–5.2)
ALP SERPL-CCNC: 172 U/L (ref 40–150)
ALT SERPL W P-5'-P-CCNC: 29 U/L (ref 0–70)
ANION GAP SERPL CALCULATED.3IONS-SCNC: 8 MMOL/L (ref 7–15)
AST SERPL W P-5'-P-CCNC: 35 U/L (ref 0–45)
BILIRUB SERPL-MCNC: 0.3 MG/DL
BUN SERPL-MCNC: 24.6 MG/DL (ref 8–23)
CALCIUM SERPL-MCNC: 9.1 MG/DL (ref 8.8–10.4)
CHLORIDE SERPL-SCNC: 107 MMOL/L (ref 98–107)
CREAT SERPL-MCNC: 1.16 MG/DL (ref 0.67–1.17)
EGFRCR SERPLBLD CKD-EPI 2021: 63 ML/MIN/1.73M2
GGT SERPL-CCNC: 154 U/L (ref 8–61)
GLUCOSE SERPL-MCNC: 87 MG/DL (ref 70–99)
HCO3 SERPL-SCNC: 23 MMOL/L (ref 22–29)
POTASSIUM SERPL-SCNC: 4.3 MMOL/L (ref 3.4–5.3)
PROT SERPL-MCNC: 7.2 G/DL (ref 6.4–8.3)
SODIUM SERPL-SCNC: 138 MMOL/L (ref 135–145)

## 2024-11-20 PROCEDURE — 93798 PHYS/QHP OP CAR RHAB W/ECG: CPT

## 2024-11-22 ENCOUNTER — HOSPITAL ENCOUNTER (OUTPATIENT)
Dept: CARDIAC REHAB | Facility: CLINIC | Age: 84
Discharge: HOME | End: 2024-11-22
Attending: PHYSICAL MEDICINE & REHABILITATION
Payer: COMMERCIAL

## 2024-11-22 PROCEDURE — 93798 PHYS/QHP OP CAR RHAB W/ECG: CPT

## 2024-11-25 ENCOUNTER — HOSPITAL ENCOUNTER (OUTPATIENT)
Dept: CARDIAC REHAB | Facility: CLINIC | Age: 84
Discharge: HOME | End: 2024-11-25
Attending: PHYSICAL MEDICINE & REHABILITATION
Payer: COMMERCIAL

## 2024-11-25 PROCEDURE — 93798 PHYS/QHP OP CAR RHAB W/ECG: CPT

## 2024-12-04 ENCOUNTER — HOSPITAL ENCOUNTER (OUTPATIENT)
Dept: CARDIAC REHAB | Facility: CLINIC | Age: 84
Discharge: HOME | End: 2024-12-04
Attending: PHYSICAL MEDICINE & REHABILITATION
Payer: COMMERCIAL

## 2024-12-04 PROCEDURE — 93798 PHYS/QHP OP CAR RHAB W/ECG: CPT

## 2024-12-06 ENCOUNTER — HOSPITAL ENCOUNTER (OUTPATIENT)
Dept: CARDIAC REHAB | Facility: CLINIC | Age: 84
Discharge: HOME | End: 2024-12-06
Attending: PHYSICAL MEDICINE & REHABILITATION
Payer: COMMERCIAL

## 2024-12-06 PROCEDURE — 93798 PHYS/QHP OP CAR RHAB W/ECG: CPT

## 2024-12-09 ENCOUNTER — HOSPITAL ENCOUNTER (OUTPATIENT)
Dept: CARDIAC REHAB | Facility: CLINIC | Age: 84
Discharge: HOME | End: 2024-12-09
Attending: PHYSICAL MEDICINE & REHABILITATION
Payer: COMMERCIAL

## 2024-12-09 PROCEDURE — 93798 PHYS/QHP OP CAR RHAB W/ECG: CPT

## 2024-12-10 ENCOUNTER — TELEPHONE (OUTPATIENT)
Dept: OCCUPATIONAL THERAPY | Facility: REHABILITATION | Age: 84
End: 2024-12-10
Payer: COMMERCIAL

## 2024-12-11 ENCOUNTER — HOSPITAL ENCOUNTER (OUTPATIENT)
Dept: CARDIAC REHAB | Facility: CLINIC | Age: 84
Discharge: HOME | End: 2024-12-11
Attending: PHYSICAL MEDICINE & REHABILITATION
Payer: COMMERCIAL

## 2024-12-11 PROCEDURE — 93798 PHYS/QHP OP CAR RHAB W/ECG: CPT

## 2024-12-16 ENCOUNTER — HOSPITAL ENCOUNTER (OUTPATIENT)
Dept: CARDIAC REHAB | Facility: CLINIC | Age: 84
Discharge: HOME | End: 2024-12-16
Attending: PHYSICAL MEDICINE & REHABILITATION
Payer: COMMERCIAL

## 2024-12-16 PROCEDURE — 93798 PHYS/QHP OP CAR RHAB W/ECG: CPT

## 2024-12-17 ENCOUNTER — LAB REQUISITION (OUTPATIENT)
Dept: LAB | Facility: CLINIC | Age: 84
End: 2024-12-17
Payer: COMMERCIAL

## 2024-12-17 DIAGNOSIS — R74.8 ABNORMAL LEVELS OF OTHER SERUM ENZYMES: ICD-10-CM

## 2024-12-18 ENCOUNTER — HOSPITAL ENCOUNTER (OUTPATIENT)
Dept: CARDIAC REHAB | Facility: CLINIC | Age: 84
Discharge: HOME | End: 2024-12-18
Attending: PHYSICAL MEDICINE & REHABILITATION
Payer: COMMERCIAL

## 2024-12-18 LAB
ALBUMIN SERPL BCG-MCNC: 3.8 G/DL (ref 3.5–5.2)
ALP SERPL-CCNC: 138 U/L (ref 40–150)
ALT SERPL W P-5'-P-CCNC: 30 U/L (ref 0–70)
ANION GAP SERPL CALCULATED.3IONS-SCNC: 11 MMOL/L (ref 7–15)
AST SERPL W P-5'-P-CCNC: 35 U/L (ref 0–45)
BILIRUB SERPL-MCNC: 0.4 MG/DL
BUN SERPL-MCNC: 26.8 MG/DL (ref 8–23)
CALCIUM SERPL-MCNC: 9.1 MG/DL (ref 8.8–10.4)
CHLORIDE SERPL-SCNC: 105 MMOL/L (ref 98–107)
CREAT SERPL-MCNC: 1.26 MG/DL (ref 0.67–1.17)
EGFRCR SERPLBLD CKD-EPI 2021: 57 ML/MIN/1.73M2
GGT SERPL-CCNC: 103 U/L (ref 8–61)
GLUCOSE SERPL-MCNC: 90 MG/DL (ref 70–99)
HCO3 SERPL-SCNC: 20 MMOL/L (ref 22–29)
POTASSIUM SERPL-SCNC: 4.2 MMOL/L (ref 3.4–5.3)
PROT SERPL-MCNC: 7.6 G/DL (ref 6.4–8.3)
SODIUM SERPL-SCNC: 136 MMOL/L (ref 135–145)

## 2024-12-18 PROCEDURE — 93798 PHYS/QHP OP CAR RHAB W/ECG: CPT

## 2024-12-23 ENCOUNTER — MYC MEDICAL ADVICE (OUTPATIENT)
Dept: CARDIOLOGY | Facility: CLINIC | Age: 84
End: 2024-12-23
Payer: COMMERCIAL

## 2024-12-23 DIAGNOSIS — Z95.1 S/P CABG (CORONARY ARTERY BYPASS GRAFT): ICD-10-CM

## 2024-12-23 RX ORDER — ATORVASTATIN CALCIUM 40 MG/1
40 TABLET, FILM COATED ORAL DAILY
Status: SHIPPED
Start: 2024-12-23

## 2024-12-23 NOTE — TELEPHONE ENCOUNTER
From: Justin Corona MD  Sent: 12/23/2024  12:37 PM CST  To: Brina Whitmore  Subject: FW: Statins                                      Can reduce to 40 mg for a couple weeks and see if he is better.  If not we could do a brief holiday as well.  He should keep us posted.    Thanks,    Justin

## 2024-12-26 LAB — CV ZIO PRELIM RESULTS: NORMAL

## 2024-12-30 ENCOUNTER — HOSPITAL ENCOUNTER (OUTPATIENT)
Dept: CARDIAC REHAB | Facility: CLINIC | Age: 84
Discharge: HOME | End: 2024-12-30
Attending: PHYSICAL MEDICINE & REHABILITATION
Payer: COMMERCIAL

## 2024-12-30 PROCEDURE — 93798 PHYS/QHP OP CAR RHAB W/ECG: CPT

## 2025-01-02 ENCOUNTER — THERAPY VISIT (OUTPATIENT)
Dept: OCCUPATIONAL THERAPY | Facility: REHABILITATION | Age: 85
End: 2025-01-02
Payer: COMMERCIAL

## 2025-01-02 DIAGNOSIS — Z74.09 IMPAIRED FUNCTIONAL MOBILITY AND ENDURANCE: Primary | ICD-10-CM

## 2025-01-02 DIAGNOSIS — Z95.1 S/P CABG (CORONARY ARTERY BYPASS GRAFT): ICD-10-CM

## 2025-01-02 DIAGNOSIS — Z78.9 IMPAIRED INSTRUMENTAL ACTIVITIES OF DAILY LIVING: ICD-10-CM

## 2025-01-02 NOTE — PROGRESS NOTES
[facility]                                                                                   OUTPATIENT OCCUPATIONAL THERAPY    PLAN OF TREATMENT FOR OUTPATIENT REHABILITATION   Patient's Last Name, First Name, Enrike Metz YOB: 1940   Provider's Name   [facility]   Medical Record No.  [id number]     Onset Date: 08/07/24 Start of Care Date: 09/26/24     Medical Diagnosis:  CABG, CMT      OT Treatment Diagnosis:  decreased endurance, impaired IADL's Plan of Treatment  Frequency/Duration:once a week/12 weeks    Certification date from 12/20/24   To 03/14/25        See note for plan of treatment details and functional goals     Rebekah King OT                         I CERTIFY THE NEED FOR THESE SERVICES FURNISHED UNDER        THIS PLAN OF TREATMENT AND WHILE UNDER MY CARE     (Physician attestation of this document indicates review and certification of the therapy plan).              Referring Provider:  Edilberto Garcia DO    Initial Assessment  See Epic Evaluation- 09/26/24          PLAN  Continue therapy per current plan of care.    Beginning/End Dates of Progress Note Reporting Period:  01/02/25 to 01/02/2025    Referring Provider:  Edilberto Garcia DO       01/02/25 0500   Appointment Info   Treating Provider Alka King OTR/L   Visits Used 2   Medical Diagnosis CABG, CMT   OT Tx Diagnosis decreased endurance, impaired IADL's   Precautions/Limitations sternal precautions including no lifting over 10# until 8 weeks post op   Progress Note/Certification   Start Of Care Date 09/26/24   Onset of Illness/Injury or Date of Surgery 08/07/24   Therapy Frequency once a week   Predicted Duration 12 weeks   Certification date from 12/20/24   Certification date to 03/14/25   KX Modifier Statement I certify the need for these services furnished under this plan of treatment and while under my care.  (Physician  co-signature of this document indicates review and certification of the therapy plan)   Progress Note Due Date 03/14/25   Progress Note Completed Date 01/02/25   OT Goal 1   Goal Identifier ECWS   Goal Description Patient to state 3 strategies for energy conservation/work simplification and fatigue management for improved independence with ADL/IADLs at home   Goal Progress patient had delay in following up in OT. Extend goal   Target Date 03/14/25   OT Goal 2   Goal Identifier  strength   Goal Description Patient to demonstrate independence with HEP for B UE strength and measure improved B  strength by at least 3#s for increased ADL/IADL independence (hanging onto objects during hygiene and grooming, cutting food, completing B UE tasks, etc.).   Target Date 03/14/25   OT Goal 3   Goal Identifier pinch strength   Goal Description Patient will demonstrate increased B hand pinch strength (both lateral and palmar) by #1-2 each for increased independence with ADL/IADLs such as opening containers, pull up pants, maintaining pinch to hold items, etc.   Target Date 03/14/25   Objective Measure 1   Objective Measure POG-right/left   Details 1-2-25: 58#/56#   Objective Measure 2   Objective Measure lateral pinch-right/pinch   Details 1-2-25: 10.5#/8.5#   Objective Measure 3   Objective Measure 3 point pinch-right/left   Details 1-2-25: 8#/6.5#   Self Care/Home Management   Self Care 1 ECWS   Skilled Intervention Instruct on fatigue management strategies for improved perceived fatigue during ADL and IADL function   Therapeutic Procedure/Exercise   Skilled Intervention Instruct on UE HEP to improve AROM/strength for increased ease and independence with ADL and IADL s   Therapeutic Procedure: strength, endurance, ROM, flexibillity minutes (49128) 40   Ther Proc 1 - Details Patient reports fatigue from trying to open containers and gives the example of not being able to open a small spice bottle and working on this for  "quite a while before resorting to going to the basement and using tools from [employer]. This was exhausting and he states that the hand weakness does impact his fatigue/energy level. He had previously been given isometric  ex's (an OT at [facility]) and has not kept these going. Reinstructed on isometric  exercises today keeping in mind the HEP should not flare his pain(arthritis) or totally fatigue his muscles. Patient instructed to add pinch strengthening exercises to HEP. He will use blue resistance foam block OR putty. Also progressed HEP to include small foam block or putty for finger abduction/adduction. Additioanlly, he was instructed on isometric thumb exercises including \"C\" for opposition and isometric MP flexion and extension. Patient to perform no more than once a day with caution to avoid overdoing it.   Education   Learner/Method Patient   Plan   Plan for next session continue ECWS handouts   Total Session Time   Timed Code Treatment Minutes 40   Total Treatment Time (sum of timed and untimed services) 40         "

## 2025-01-03 ENCOUNTER — HOSPITAL ENCOUNTER (OUTPATIENT)
Dept: CARDIAC REHAB | Facility: CLINIC | Age: 85
Discharge: HOME | End: 2025-01-03
Attending: PHYSICAL MEDICINE & REHABILITATION
Payer: COMMERCIAL

## 2025-01-03 PROCEDURE — 93798 PHYS/QHP OP CAR RHAB W/ECG: CPT

## 2025-01-06 ENCOUNTER — HOSPITAL ENCOUNTER (OUTPATIENT)
Dept: CARDIAC REHAB | Facility: CLINIC | Age: 85
Discharge: HOME | End: 2025-01-06
Attending: PHYSICAL MEDICINE & REHABILITATION
Payer: COMMERCIAL

## 2025-01-06 PROCEDURE — 93798 PHYS/QHP OP CAR RHAB W/ECG: CPT

## 2025-01-08 ENCOUNTER — HOSPITAL ENCOUNTER (OUTPATIENT)
Dept: CARDIAC REHAB | Facility: CLINIC | Age: 85
Discharge: HOME | End: 2025-01-08
Attending: PHYSICAL MEDICINE & REHABILITATION
Payer: COMMERCIAL

## 2025-01-08 PROCEDURE — 93798 PHYS/QHP OP CAR RHAB W/ECG: CPT

## 2025-01-09 ENCOUNTER — LAB REQUISITION (OUTPATIENT)
Dept: LAB | Facility: CLINIC | Age: 85
End: 2025-01-09
Payer: COMMERCIAL

## 2025-01-09 DIAGNOSIS — R79.89 OTHER SPECIFIED ABNORMAL FINDINGS OF BLOOD CHEMISTRY: ICD-10-CM

## 2025-01-09 LAB
ALBUMIN SERPL BCG-MCNC: 4.3 G/DL (ref 3.5–5.2)
ALP SERPL-CCNC: 47 U/L (ref 40–150)
ALT SERPL W P-5'-P-CCNC: 24 U/L (ref 0–70)
ANION GAP SERPL CALCULATED.3IONS-SCNC: 9 MMOL/L (ref 7–15)
AST SERPL W P-5'-P-CCNC: 24 U/L (ref 0–45)
BASOPHILS # BLD AUTO: 0 10E3/UL (ref 0–0.2)
BASOPHILS NFR BLD AUTO: 1 %
BILIRUB SERPL-MCNC: 0.5 MG/DL
BUN SERPL-MCNC: 13.5 MG/DL (ref 8–23)
CALCIUM SERPL-MCNC: 9.4 MG/DL (ref 8.8–10.4)
CHLORIDE SERPL-SCNC: 103 MMOL/L (ref 98–107)
CREAT SERPL-MCNC: 1.02 MG/DL (ref 0.67–1.17)
EGFRCR SERPLBLD CKD-EPI 2021: 73 ML/MIN/1.73M2
EOSINOPHIL # BLD AUTO: 0.3 10E3/UL (ref 0–0.7)
EOSINOPHIL NFR BLD AUTO: 5 %
ERYTHROCYTE [DISTWIDTH] IN BLOOD BY AUTOMATED COUNT: 14.7 % (ref 10–15)
FERRITIN SERPL-MCNC: 28 NG/ML (ref 31–409)
GLUCOSE SERPL-MCNC: 114 MG/DL (ref 70–99)
HBV SURFACE AB SERPL IA-ACNC: <3.5 M[IU]/ML
HBV SURFACE AB SERPL IA-ACNC: NONREACTIVE M[IU]/ML
HCO3 SERPL-SCNC: 28 MMOL/L (ref 22–29)
HCT VFR BLD AUTO: 38 % (ref 40–53)
HCV AB SERPL QL IA: NONREACTIVE
HGB BLD-MCNC: 12.1 G/DL (ref 13.3–17.7)
IMM GRANULOCYTES # BLD: 0 10E3/UL
IMM GRANULOCYTES NFR BLD: 0 %
INR PPP: 1.49 (ref 0.85–1.15)
IRON SERPL-MCNC: 81 UG/DL (ref 61–157)
LYMPHOCYTES # BLD AUTO: 1.2 10E3/UL (ref 0.8–5.3)
LYMPHOCYTES NFR BLD AUTO: 18 %
MCH RBC QN AUTO: 28.3 PG (ref 26.5–33)
MCHC RBC AUTO-ENTMCNC: 31.8 G/DL (ref 31.5–36.5)
MCV RBC AUTO: 89 FL (ref 78–100)
MONOCYTES # BLD AUTO: 0.6 10E3/UL (ref 0–1.3)
MONOCYTES NFR BLD AUTO: 9 %
NEUTROPHILS # BLD AUTO: 4.6 10E3/UL (ref 1.6–8.3)
NEUTROPHILS NFR BLD AUTO: 68 %
NRBC # BLD AUTO: 0 10E3/UL
NRBC BLD AUTO-RTO: 0 /100
PLATELET # BLD AUTO: 216 10E3/UL (ref 150–450)
POTASSIUM SERPL-SCNC: 4.6 MMOL/L (ref 3.4–5.3)
PROT SERPL-MCNC: 6.9 G/DL (ref 6.4–8.3)
RBC # BLD AUTO: 4.27 10E6/UL (ref 4.4–5.9)
SODIUM SERPL-SCNC: 140 MMOL/L (ref 135–145)
WBC # BLD AUTO: 6.8 10E3/UL (ref 4–11)

## 2025-01-09 PROCEDURE — 86706 HEP B SURFACE ANTIBODY: CPT | Mod: ORL | Performed by: STUDENT IN AN ORGANIZED HEALTH CARE EDUCATION/TRAINING PROGRAM

## 2025-01-09 PROCEDURE — 85610 PROTHROMBIN TIME: CPT | Mod: ORL | Performed by: STUDENT IN AN ORGANIZED HEALTH CARE EDUCATION/TRAINING PROGRAM

## 2025-01-09 PROCEDURE — 86803 HEPATITIS C AB TEST: CPT | Mod: ORL | Performed by: STUDENT IN AN ORGANIZED HEALTH CARE EDUCATION/TRAINING PROGRAM

## 2025-01-09 PROCEDURE — 82728 ASSAY OF FERRITIN: CPT | Mod: ORL | Performed by: STUDENT IN AN ORGANIZED HEALTH CARE EDUCATION/TRAINING PROGRAM

## 2025-01-09 PROCEDURE — 85025 COMPLETE CBC W/AUTO DIFF WBC: CPT | Mod: ORL | Performed by: STUDENT IN AN ORGANIZED HEALTH CARE EDUCATION/TRAINING PROGRAM

## 2025-01-09 PROCEDURE — 83540 ASSAY OF IRON: CPT | Mod: ORL | Performed by: STUDENT IN AN ORGANIZED HEALTH CARE EDUCATION/TRAINING PROGRAM

## 2025-01-09 PROCEDURE — 86038 ANTINUCLEAR ANTIBODIES: CPT | Mod: ORL | Performed by: STUDENT IN AN ORGANIZED HEALTH CARE EDUCATION/TRAINING PROGRAM

## 2025-01-09 PROCEDURE — 80053 COMPREHEN METABOLIC PANEL: CPT | Mod: ORL | Performed by: STUDENT IN AN ORGANIZED HEALTH CARE EDUCATION/TRAINING PROGRAM

## 2025-01-10 ENCOUNTER — HOSPITAL ENCOUNTER (OUTPATIENT)
Dept: CARDIAC REHAB | Facility: CLINIC | Age: 85
Discharge: HOME | End: 2025-01-10
Attending: PHYSICAL MEDICINE & REHABILITATION
Payer: COMMERCIAL

## 2025-01-10 LAB — ANA SER QL IF: NEGATIVE

## 2025-01-10 PROCEDURE — 93797 PHYS/QHP OP CAR RHAB WO ECG: CPT

## 2025-01-10 PROCEDURE — 93798 PHYS/QHP OP CAR RHAB W/ECG: CPT

## 2025-01-23 ENCOUNTER — MYC MEDICAL ADVICE (OUTPATIENT)
Dept: CARDIOLOGY | Facility: CLINIC | Age: 85
End: 2025-01-23
Payer: COMMERCIAL

## 2025-01-23 DIAGNOSIS — I25.10 CORONARY ARTERY DISEASE INVOLVING NATIVE CORONARY ARTERY OF NATIVE HEART, UNSPECIFIED WHETHER ANGINA PRESENT: Primary | ICD-10-CM

## 2025-01-27 RX ORDER — ROSUVASTATIN CALCIUM 10 MG/1
10 TABLET, COATED ORAL DAILY
Qty: 30 TABLET | Refills: 0 | Status: SHIPPED | OUTPATIENT
Start: 2025-01-27

## 2025-01-27 NOTE — TELEPHONE ENCOUNTER
From: Justin Corona MD  Sent: 1/27/2025   7:10 AM CST  To: Brinaalan Whitmore  Subject: FW: Statins                                      Brina,    Could you try 10 mg of rosuvastatin and see if the symptoms return?  If they return he should stop it and let us know.  If he tolerates it would recheck fasting lipid panel in 3 months.    Thanks,    Justin

## 2025-02-06 ENCOUNTER — THERAPY VISIT (OUTPATIENT)
Dept: OCCUPATIONAL THERAPY | Facility: REHABILITATION | Age: 85
End: 2025-02-06
Payer: COMMERCIAL

## 2025-02-06 DIAGNOSIS — Z95.1 S/P CABG (CORONARY ARTERY BYPASS GRAFT): ICD-10-CM

## 2025-02-06 DIAGNOSIS — G60.0 CMT (CHARCOT-MARIE-TOOTH DISEASE): ICD-10-CM

## 2025-02-06 DIAGNOSIS — Z78.9 IMPAIRED INSTRUMENTAL ACTIVITIES OF DAILY LIVING: ICD-10-CM

## 2025-02-06 DIAGNOSIS — Z74.09 IMPAIRED FUNCTIONAL MOBILITY AND ENDURANCE: Primary | ICD-10-CM

## 2025-02-12 ENCOUNTER — OFFICE VISIT (OUTPATIENT)
Dept: OPHTHALMOLOGY | Facility: CLINIC | Age: 85
End: 2025-02-12
Attending: OPHTHALMOLOGY
Payer: COMMERCIAL

## 2025-02-12 DIAGNOSIS — H40.003 GLAUCOMA SUSPECT OF BOTH EYES: ICD-10-CM

## 2025-02-12 DIAGNOSIS — H52.203 MYOPIA OF BOTH EYES WITH ASTIGMATISM AND PRESBYOPIA: ICD-10-CM

## 2025-02-12 DIAGNOSIS — H52.13 MYOPIA OF BOTH EYES WITH ASTIGMATISM AND PRESBYOPIA: ICD-10-CM

## 2025-02-12 DIAGNOSIS — H35.363 DEGENERATIVE DRUSEN OF BOTH EYES: Primary | ICD-10-CM

## 2025-02-12 DIAGNOSIS — H26.493 PCO (POSTERIOR CAPSULAR OPACIFICATION), BILATERAL: ICD-10-CM

## 2025-02-12 DIAGNOSIS — H52.4 MYOPIA OF BOTH EYES WITH ASTIGMATISM AND PRESBYOPIA: ICD-10-CM

## 2025-02-12 PROCEDURE — 92134 CPTRZ OPH DX IMG PST SGM RTA: CPT | Performed by: OPHTHALMOLOGY

## 2025-02-12 PROCEDURE — G0463 HOSPITAL OUTPT CLINIC VISIT: HCPCS | Performed by: OPHTHALMOLOGY

## 2025-02-12 PROCEDURE — 92015 DETERMINE REFRACTIVE STATE: CPT

## 2025-02-12 ASSESSMENT — REFRACTION_WEARINGRX
OD_AXIS: 007
OS_AXIS: 125
OS_ADD: +2.50
OD_SPHERE: -1.50
OD_ADD: +2.50
OD_CYLINDER: +0.75
OS_SPHERE: -0.75
OS_CYLINDER: +0.50

## 2025-02-12 ASSESSMENT — CONF VISUAL FIELD
OD_INFERIOR_NASAL_RESTRICTION: 0
OS_NORMAL: 1
OD_SUPERIOR_NASAL_RESTRICTION: 0
OS_INFERIOR_NASAL_RESTRICTION: 0
OS_SUPERIOR_TEMPORAL_RESTRICTION: 0
OD_SUPERIOR_TEMPORAL_RESTRICTION: 0
OD_NORMAL: 1
OS_INFERIOR_TEMPORAL_RESTRICTION: 0
OD_INFERIOR_TEMPORAL_RESTRICTION: 0
OS_SUPERIOR_NASAL_RESTRICTION: 0

## 2025-02-12 ASSESSMENT — REFRACTION_MANIFEST
OS_CYLINDER: +0.50
OD_CYLINDER: +1.00
OS_AXIS: 125
OS_ADD: +2.50
OD_SPHERE: -1.25
OD_ADD: +2.50
OS_SPHERE: -0.50
OD_AXIS: 007

## 2025-02-12 ASSESSMENT — VISUAL ACUITY
OS_CC+: -2
OS_CC: 20/20
OD_CC+: -2
OD_CC: 20/20
CORRECTION_TYPE: GLASSES
METHOD: SNELLEN - LINEAR

## 2025-02-12 ASSESSMENT — CUP TO DISC RATIO
OS_RATIO: 0.6
OD_RATIO: 0.6

## 2025-02-12 ASSESSMENT — TONOMETRY
OS_IOP_MMHG: 11
IOP_METHOD: TONOPEN
OD_IOP_MMHG: 12

## 2025-02-12 ASSESSMENT — EXTERNAL EXAM - RIGHT EYE: OD_EXAM: NORMAL

## 2025-02-12 ASSESSMENT — EXTERNAL EXAM - LEFT EYE: OS_EXAM: NORMAL

## 2025-02-12 ASSESSMENT — SLIT LAMP EXAM - LIDS: COMMENTS: 2+ DERMATOCHALASIS, 2+ MEIBOMIAN GLAND DYSFUNCTION

## 2025-02-12 NOTE — PROGRESS NOTES
HPI    Vision stable ou near and far.   Denies pain or discomfort.     Ocular meds: denies    Steroid injection right knee two weeks ago   Jazzy VALDERRAMA, February 12, 2025 9:44 AM        Last edited by Jazzy Nazario COA on 2/12/2025  9:54 AM.         Review of systems for the eyes was negative other than the pertinent positives/negatives listed in the HPI.      Assessment & Plan    HPI:  Enrike Sotomayor is a 84 year old male with history of HTN, ED, allergies, drusen both eyes presents for annual follow-up. Happy with glasses, no complaints.     POHx: Cataract extraction/intraocular lens, mild dry armd  PMHx:  HTN, ED, allergies  Current Medications:   Current Outpatient Medications   Medication Sig Dispense Refill    aspirin (ASA) 81 MG chewable tablet Take 81 mg by mouth daily.      carvedilol (COREG) 12.5 MG tablet TAKE 1 TABLET TWICE A DAY WITH MEALS 180 tablet 2    fluticasone (FLONASE) 50 MCG/ACT nasal spray Spray 1 spray into each nostril every other night before bed. Uses on even days.      NONFORMULARY CPaP as directed      rosuvastatin (CRESTOR) 10 MG tablet Take 1 tablet (10 mg) by mouth daily. 30 tablet 0     No current facility-administered medications for this visit.     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   OCT RNFL next visit in 1 year    (H26.493) PCO (posterior capsular opacification), bilateral  1-2+ PCO right eye, likely YAG/cap next visit      Return in about 1 year (around 2/12/2026) for Annual Visit-v/t/d/MRx, OCT RNFL, OCT Macula.      Ganesh Del Rio MD     Attending Physician Attestation:  Complete documentation of historical and exam elements from today's encounter can be found in the full encounter summary report (not reduplicated in this progress note).  I personally obtained the chief complaint(s) and history of present illness.  I confirmed and edited as necessary the review of systems, past medical/surgical history, family history, social history, and examination findings as documented by others; and I examined the patient myself.  I personally reviewed the relevant tests, images, and reports as documented above.  I formulated and edited as necessary the assessment and plan and discussed the findings and management plan with the patient and family. - Ganesh Del Rio MD

## 2025-02-20 DIAGNOSIS — I25.10 CORONARY ARTERY DISEASE INVOLVING NATIVE CORONARY ARTERY OF NATIVE HEART, UNSPECIFIED WHETHER ANGINA PRESENT: Primary | ICD-10-CM

## 2025-02-20 DIAGNOSIS — I25.10 CORONARY ARTERIOSCLEROSIS: ICD-10-CM

## 2025-02-20 RX ORDER — ROSUVASTATIN CALCIUM 10 MG/1
10 TABLET, COATED ORAL DAILY
Qty: 90 TABLET | Refills: 0 | Status: SHIPPED | OUTPATIENT
Start: 2025-02-20

## 2025-04-26 ENCOUNTER — HEALTH MAINTENANCE LETTER (OUTPATIENT)
Age: 85
End: 2025-04-26

## 2025-05-09 ENCOUNTER — TELEPHONE (OUTPATIENT)
Dept: CARDIOLOGY | Facility: CLINIC | Age: 85
End: 2025-05-09
Payer: COMMERCIAL

## 2025-05-09 NOTE — TELEPHONE ENCOUNTER
Spoke with patient, he explained that he met with his surgeon from [facility],  regarding R knee surgery.   He was told to reach out to Cardiology, requesting input.   Explained will update  and call back with response. Pt verbalized understanding.-jese

## 2025-05-09 NOTE — TELEPHONE ENCOUNTER
[facility] Call Center    Phone Message    May a detailed message be left on voicemail: yes     Reason for Call: Patient stated wanting to get a knee replacement surgery, would like recommendations. Please call patient to further discuss.       Action Taken: Other: Cardio     Travel Screening: Not Applicable     Date of Service:                   Thank you!  [facility]

## 2025-05-12 NOTE — TELEPHONE ENCOUNTER
Spoke with patient and updated him of 's recommendation. Pt verbalized understanding. Pt transferred to scheduling to set up appt.-jese  ______________  ----- Message -----  From: Justin Corona MD  Sent: 5/12/2025   7:25 AM CDT  To: Litzy Helm, RN    Litzy,    Not sure if there is a question here?  Do they want cardiac clearance for knee surgery?  If so I have not seen him since October 2024 so would likely need to see him again for cardiac clearance.    ThanksJustin  ----- Message -----  From: Litzy Helm RN  Sent: 5/9/2025   3:51 PM CDT  To: Justin Corona MD    ----- Message from Litzy Helm RN sent at 5/9/2025  3:51 PM CDT -----    Please see notes and advise.   Thank you.  -jese

## 2025-06-09 ENCOUNTER — TRANSCRIBE ORDERS (OUTPATIENT)
Dept: OTHER | Age: 85
End: 2025-06-09

## 2025-06-09 ENCOUNTER — MEDICAL CORRESPONDENCE (OUTPATIENT)
Dept: HEALTH INFORMATION MANAGEMENT | Facility: CLINIC | Age: 85
End: 2025-06-09
Payer: COMMERCIAL

## 2025-06-09 ENCOUNTER — TRANSFERRED RECORDS (OUTPATIENT)
Dept: HEALTH INFORMATION MANAGEMENT | Facility: CLINIC | Age: 85
End: 2025-06-09
Payer: COMMERCIAL

## 2025-06-09 DIAGNOSIS — K43.2 VENTRAL INCISIONAL HERNIA: Primary | ICD-10-CM

## 2025-06-13 ENCOUNTER — ORDERS ONLY (AUTO-RELEASED) (OUTPATIENT)
Dept: CARDIOLOGY | Facility: CLINIC | Age: 85
End: 2025-06-13

## 2025-06-13 DIAGNOSIS — I48.0 PAROXYSMAL ATRIAL FIBRILLATION (H): ICD-10-CM

## 2025-06-24 ENCOUNTER — PRE VISIT (OUTPATIENT)
Dept: NEUROLOGY | Facility: CLINIC | Age: 85
End: 2025-06-24
Payer: COMMERCIAL

## 2025-06-24 DIAGNOSIS — G60.0 CMT (CHARCOT-MARIE-TOOTH DISEASE): Primary | ICD-10-CM

## 2025-06-24 NOTE — TELEPHONE ENCOUNTER
RN attempted to reach patient to confirm his CMT clinic appt on 7/8 with Dr. Love, as well as complete the PVQ. Left a VM with a request for a call back.      CHYNA Arora RN Care Coordinator  Neurology/Neurosurgery/PM&R/Pain Management

## 2025-06-25 NOTE — TELEPHONE ENCOUNTER
CMT RETURN PATIENT  Do you have any questions/concerns or new issues you would like to address at your appt? 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 Feels his CMT symptoms are gradually worsening. Also has knee pain, is having knee replacement in September.   How has your mobility been since the last office visit? Worsening but denies falls. Walking slower to avoid falling.   -Have you had an increase in falls or any mobility/balance concerns? 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 No   Sensory Loss  - Do you have loss of feeling or numbness anywhere in your feet or legs? No sensory loss, does have pain in his toes   - If so, does the loss of feeling extend above your toes? N/a   - Does it extend above the ankle? N/a   - Can you identify the point where the sensation becomes normal or nearly normal? N/a   - Are these symptoms constant (present all the time), present most of the daytime, less than one-half of the daytime, or just occasionally (Daytime is defined as the time between getting up and going to bed)? N/a    Motor Symptoms- Legs  - Do you have weakness in your legs or feet? Yes- feels this is stable   - Do you ever trip over your toes/feet or turn or sprain your ankles? Occasionally   - Do your feet slap down on the ground when you walk? Yes   - Do you wear shoe inserts/insoles (below the ankle)? Yes   - Do you wear braces, splints or an equivalent type of orthotic that extends above your ankle? No, he is wearing a knee brace for his knee issues If so, how are these working for you? N/a, does not feel orthotics would benefit him   - Have the above ankle orthotics described above ever been prescribed or suggested by a healthcare professional? In the past has declined to meet with orthotics   - Have you ever had surgery on your feet or ankles? No   - If so, do you know if the surgery involved fusion of bones,  "a transfer of tendons, heel cord lengthening or lowering of the arch? N/a   - Do you use a cane, walking stick, or walker to help you walk most of the time outside the home? Uses walking stick if he plans to be standing for a long time If not, do you feel adaptive equipment would be beneficial? Feels his walking stick is helpful, does not think he needs any more support at this time   - Do you use a wheelchair most of the time because of weakness? No   Motor Symptoms- Arms  - Do you have difficulty with buttoning clothes (standard shirt buttons)? No, feels this is stable although he is not writing as well as he used to  - If yes, are the difficulties mild or severe (severe includes unable)? Mild   - Can you cut most food including meat and pizza with normal utensils? Yes   - Do you have difficulty with activities that require extending or flexing your arms, or activities using your upper arms? Does have difficulty  working with arms above his head          -Do you have any difficulty with gripping or pinching object? Yes          -Do you have any hand splints that you currently wear or have these ever been used in the past (if yes, please ask that they bring these to the appt)? No     We will have a  available in clinic. Would you be interested in discussing any of the following topics: no    - Initiating the disability process  -Transportation issues  -Financial concerns   - Other community resources  Are you registered with the [employer] ([employer])? Yes   To determine if you qualify for any CMT research studies, would you be interested in meeting with the research coordinator? Yes   \"We will contact you once the schedule has been completed to let you know what time you need to arrive. Disregard the automated appointment reminder call, I will call you directly to let you know what time to be here.\"    Recap of services needed: PT, research   Jing ROBLES RN, BSN  [facility] " Neurology

## 2025-07-01 ENCOUNTER — HOSPITAL ENCOUNTER (OUTPATIENT)
Dept: CT IMAGING | Facility: HOSPITAL | Age: 85
Discharge: HOME | End: 2025-07-01
Attending: SPECIALIST
Payer: COMMERCIAL

## 2025-07-01 DIAGNOSIS — K43.2 VENTRAL INCISIONAL HERNIA: ICD-10-CM

## 2025-07-01 PROCEDURE — 74176 CT ABD & PELVIS W/O CONTRAST: CPT

## 2025-07-03 ENCOUNTER — RESULTS FOLLOW-UP (OUTPATIENT)
Dept: SURGERY | Facility: CLINIC | Age: 85
End: 2025-07-03
Payer: COMMERCIAL

## 2025-07-03 NOTE — TELEPHONE ENCOUNTER
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 from his prior surgery here few months ago after discussing I think he has really 2 options wants to be stay conservative just watch it manage it for him it does not bother him at all he just sees it through his shirt so the other option be repaired I think lots located getting an overlap from like a robotic repair send that something that started inside would be very difficult to do secondary to the recent surgery worse location so I think we have to actually use anchoring mesh into the cartilage sure sternum and I did discuss that with him if be able repair I probably do it open secondary to the those issues and ED lab time of 3 weeks we discussed risks and he at this time point dizzy because she cannot watch it he does not want to do anything at this time point does not bother him at all and he said it the looks do not really make a difference to him too much so anyway we will follow him be available if he changes his mind.          Ken Recio MD  General Surgery [phone]  Vascular Surgery [phone]

## 2025-07-07 NOTE — PROGRESS NOTES
Enrike Sotomayor is a 84 year old individual with a diagnosis of CMT.    See pre-visit call for interval history.     0 1 2 3 4   Sensory symptoms None Below or at ankle bones Symptoms up to the distal half of the calf Symptoms up to the proximal half of the calf, including knee Symptoms above knee (above the top of the patella)   Motor symptoms - legs None  Trips, catches toes, slaps feet, shoe inserts Ankle support or stabilization needed most of the time for ambulation Walking aids (cane, walker) needed most of the time Wheelchair most of the time   Motor symptoms - arms None Mild difficulty with buttons Severe difficulty or unable to do buttons Unable to cut most foods Proximal weakness (affect movements involving the elbow and above)   Pin sensibility Normal Decreased below or at ankle bones Decreased up to the distal half of the calf Decreased up to the proximal half of the calf, including knee Decreased above knee (above the  top of the patella)   Vibration  Normal Reduced at great toe Reduced at ankle Reduced at knee (tibial tuberosity) Absent at knee and ankle   Strength - legs Normal 4+, 4, or 4- on foot dorsi- or plantarflexion </= 3 on foot dorsi- or plantarflexion </= 3 on foot dorsi- and plantarflexion Proximal weakness   Strength - arms Normal 4+, 4, or 4- on intrinsic hand muscles </= 3 on intrinsic hand muscles < 5 on wrist extensors Weak above elbow   Ulnar CMAP  >/= 6 mV 4-5.9 mV 2-3.9 mV 0.1-1.9 mV Absent   Median CMAP (if ulnar not done) >/= 4 mV 2.8-3.9 mV 1.2-2.7 mV 0.1-1.1 mV Absent   Radial SNAP >/= 15 microV 10-14.9 microV 5-9.9 microV 1-4.9 microV < 1 microV     CMTES: 7    CMTNSv2: 12    Examination      Mental state: Alert, appropriate, speech, language, and thought content normal.     Cranial nerves II-XII normal. Neck flexion and extension are 5/5    Sensory examination:     Right Left   Vibration (Rydell-Seiffer) TT4 TT4   Pin DC DC   Legend:   MM = medial malleolus, TT = tibial  tuberosity, K = patella, MCP = MCP joint  MF = mid-foot, DC = distal calf, MC = mid calf, PC = proximal calf      Motor examination:     Right Left   Shoulder abduction  5 5   Elbow extension 5 5   Elbow flexion 5 5   Wrist extension  5 5   Finger extension 5 5   FDI 4 5   APB 4 4-   Hip flexion 5 5   Knee flexion 5 5   Knee extension 5 5   Dorsiflexion 4 4   Plantar flexion 5 5   A=atrophy      Gait: Independent but camptocormic      Impression and recommendations:  Interval change: none on serial examination; however he does report gradual progression of weakness and function disability, of uncertain etiology. Will obtain some laboratory testing for treatable metabolic causes of weakness, without evidence of substantial CMT progression.   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    The longitudinal plan of care for CMT was addressed during this visit. Due to the added complexity in care, I will continue to support Enrike Sotomayor in the subsequent management of this condition and with the ongoing continuity of care of this condition.

## 2025-07-08 ENCOUNTER — OFFICE VISIT (OUTPATIENT)
Dept: NEUROLOGY | Facility: CLINIC | Age: 85
End: 2025-07-08
Payer: COMMERCIAL

## 2025-07-08 ENCOUNTER — THERAPY VISIT (OUTPATIENT)
Dept: PHYSICAL THERAPY | Facility: CLINIC | Age: 85
End: 2025-07-08
Payer: COMMERCIAL

## 2025-07-08 VITALS
HEART RATE: 48 BPM | SYSTOLIC BLOOD PRESSURE: 175 MMHG | HEIGHT: 67 IN | DIASTOLIC BLOOD PRESSURE: 80 MMHG | WEIGHT: 184 LBS | BODY MASS INDEX: 28.88 KG/M2

## 2025-07-08 DIAGNOSIS — G60.0 CMT (CHARCOT-MARIE-TOOTH DISEASE): Primary | ICD-10-CM

## 2025-07-08 DIAGNOSIS — R90.89 OTHER ABNORMAL FINDINGS ON DIAGNOSTIC IMAGING OF CENTRAL NERVOUS SYSTEM: ICD-10-CM

## 2025-07-08 LAB
CK SERPL-CCNC: 200 U/L (ref 39–308)
TSH SERPL DL<=0.005 MIU/L-ACNC: 2 UIU/ML (ref 0.3–4.2)

## 2025-07-08 PROCEDURE — 36415 COLL VENOUS BLD VENIPUNCTURE: CPT | Performed by: PSYCHIATRY & NEUROLOGY

## 2025-07-08 PROCEDURE — 97161 PT EVAL LOW COMPLEX 20 MIN: CPT | Mod: GP | Performed by: PHYSICAL THERAPIST

## 2025-07-08 PROCEDURE — 99214 OFFICE O/P EST MOD 30 MIN: CPT | Performed by: PSYCHIATRY & NEUROLOGY

## 2025-07-08 PROCEDURE — 3077F SYST BP >= 140 MM HG: CPT | Performed by: PSYCHIATRY & NEUROLOGY

## 2025-07-08 PROCEDURE — 82550 ASSAY OF CK (CPK): CPT | Performed by: PSYCHIATRY & NEUROLOGY

## 2025-07-08 PROCEDURE — 83921 ORGANIC ACID SINGLE QUANT: CPT | Performed by: PSYCHIATRY & NEUROLOGY

## 2025-07-08 PROCEDURE — 97110 THERAPEUTIC EXERCISES: CPT | Mod: GP | Performed by: PHYSICAL THERAPIST

## 2025-07-08 PROCEDURE — 82607 VITAMIN B-12: CPT | Performed by: PSYCHIATRY & NEUROLOGY

## 2025-07-08 PROCEDURE — 3079F DIAST BP 80-89 MM HG: CPT | Performed by: PSYCHIATRY & NEUROLOGY

## 2025-07-08 PROCEDURE — G2211 COMPLEX E/M VISIT ADD ON: HCPCS | Performed by: PSYCHIATRY & NEUROLOGY

## 2025-07-08 PROCEDURE — 84443 ASSAY THYROID STIM HORMONE: CPT | Performed by: PSYCHIATRY & NEUROLOGY

## 2025-07-08 NOTE — PROGRESS NOTES
Nerve Conduction Studies  Motor Sites      Latency Amplitude Neg. Amp Diff Segment Distance Velocity Neg. Dur Neg Area Diff Temperature Comment   Site (ms) Norm (mV) Norm (%)  cm m/s Norm (ms) (%) ( C)    Left Ulnar (ADM) Motor   Wrist 8.0  < 3.5 1.58  > 5.0  Wrist-ADM 8   7.5  31.7    Bel Elbow 15.7 - 0.87 - -45 Bel Elbow-Wrist 19.5 25  > 48 - - 31.9      Sensory Sites      Onset Lat Peak Lat Neg Peak - Start Amp Amp (P-P) Segment Distance Velocity Temperature Comment   Site ms (ms) ( V) Norm ( V)  cm m/s Norm ( C)    Left Radial Sensory   Forearm-Wrist 4.1 5.2 7  > 15 12 Forearm-Wrist 10 24 - 31.9            NCS Waveforms:    Motor       Sensory

## 2025-07-08 NOTE — NURSING NOTE
"Enrike Sotomayor's goals for this visit include:   Chief Complaint   Patient presents with    RECHECK      CMT1A return 1 year LVM 6/24 call at 2:30 p         He requests these members of his care team be copied on today's visit information: yes    PCP: Paul Anderson    Referring Provider:  Referred Self, MD  No address on file    BP (!) 175/80 (BP Location: Left arm, Patient Position: Sitting, Cuff Size: Adult Regular)   Pulse (!) 48   Ht 1.689 m (5' 6.5\")   Wt 83.5 kg (184 lb)   BMI 29.25 kg/m      Do you need any medication refills at today's visit? No  GEORGIE Nash, CMA ([facility])      "

## 2025-07-08 NOTE — PROGRESS NOTES
PHYSICAL THERAPY EVALUATION  Type of Visit: Evaluation and Treatment       Fall Risk Screen:  Have you fallen 2 or more times in the past year?: Yes  Have you fallen and had an injury in the past year?: Yes (right knee injury)  Timed Up and Go score (seconds): 8.72 with bilateral foot orthoses  Is patient receiving Physical Therapy Services?: Yes    Subjective         Presenting condition or subjective complaint:   patient reports that he had a misstep on the stairs in late March/early April that resulted in right knee injury; he has had 2 cortisone injections with little lasting relief and so right TKA is planned for September.  Date of onset:      Relevant medical history:   osteoarthritis  Dates & types of surgery:  right patellar tendon rupture with repair (); left TKA (); CABG x 3 (2024)      Living Environment  Social support:   lives with spouse  Type of home:   2 level home  Stairs to enter the home:       2 steps, no rail  Ramp:   none  Stairs inside the home:       15 steps, single rail  Help at home:  lawn service, cleaning service  Equipment owned:   bilateral foot orthoses; walking pole; 4WW; knee sleeve for right knee pain    Employment:    retired  Hobbies/Interests:  golf    Pain assessment: Pain present  Location: right knee/Ratin/10 at worst     Objective    NEURO CLINIC EVALUATION    Evaluation   Interview completed.     Range of motion: right knee lacks approx 20 degrees from full extension; ankle dorsiflexion to 0 degrees bilaterally with knees extended       Manual muscle testing: hip flex 5/5 bilaterally; knee ext NT on right (pain & recent injury), 5/5 on left; knee flex NT on right, 5/5 on left; ankle dorsiflexion 4-/5 bilaterally; ankle plantar flexion 3-/5 bilaterally     Gait: independent with bilateral foot orthoses; exhibits kyphoscoliosis with left lateral lean, increased base of support, mildly antalgic pattern with decreased weight bearing thru right L/E during stance  phase     10 MWT:  6.34 seconds with bilateral foot orthoses; gait speed = 0.95 m/sec      Treatment provided this date:   Therapeutic procedures, 25 minutes    Response to treatment/recommendations:   Reviewed results of PT exam with patient. Recommended using walking pole more often to assist with decreasing weight bearing thru right knee for pain management; discussed use of 4WW for community ambulation for increased gait efficiency & pain management. Patient verbalizes understanding of recommendations & is in agreement with this plan. Instructed in exercise program to prepare for right TKA in September, as well as address current deficits: thoracic extension stretch in supine over foam roller, seated chair push-ups, standing wall slides for right shoulder pain management, U/E theraband exercises for shoulder flexion, elbow flexion, elbow extension & scapular retraction. Demonstrated each exercise for patient & issued written instructions with illustrations. Patient verbalizes understanding of exercise program & is able to return demonstration of correct technique.     Goal attainment:  All goals met    Total Evaluation Time (Minutes): 30 min   Timed Code Treatment Minutes: 25 min   Total Treatment Time (sum of timed and untimed services): 55 min     Assessment & Plan   CLINICAL IMPRESSIONS  Medical Diagnosis: CMT    Treatment Diagnosis: Gait difficulty   Impression/Assessment: Patient is a 84 year old male referred for PT visit in CMT clinic. The following significant findings have been identified: Pain, Decreased ROM/flexibility, Decreased strength, Impaired balance, Inflammation, Impaired gait, and Impaired posture. These impairments interfere with their ability to perform recreational activities, household mobility, and community mobility as compared to previous level of function.     Clinical Decision Making (Complexity):  Clinical Presentation: Stable/Uncomplicated  Clinical Presentation Rationale: based on  medical and personal factors listed in PT evaluation  Clinical Decision Making (Complexity): Low complexity    PLAN OF CARE  Treatment Interventions:  Interventions: Therapeutic Exercise    Long Term Goals     PT Goal 1  Goal Description: Patient will demonstrate independence with HEP to assist with improving strength & flexibility in preparation for right TKA in September  Target Date: 07/08/25  Date Met: 07/08/25      Frequency of Treatment: 1 visit only  Duration of Treatment: 1 visit only    Education Assessment:   Learner/Method: Patient;Listening;Demonstration;Pictures/Video;No Barriers to Learning    Risks and benefits of evaluation/treatment have been explained.   Patient/Family/caregiver agrees with Plan of Care.     Evaluation Time:     PT Eval, Low Complexity Minutes (32093): 30     Signing Clinician: Fabiola Bolivar, PT        [facility]                                                                                   OUTPATIENT PHYSICAL THERAPY      PLAN OF TREATMENT FOR OUTPATIENT REHABILITATION   Patient's Last Name, First Name, Enrike Metz YOB: 1940   Provider's Name   [facility]   Medical Record No.  [id number]     Onset Date:    Start of Care Date: 07/08/25     Medical Diagnosis:  CMT      PT Treatment Diagnosis:  Gait difficulty Plan of Treatment  Frequency/Duration: 1 visit only/ 1 visit only    Certification date from 07/08/25 to 07/08/25         See note for plan of treatment details and functional goals     Fabiola Bolivar, PT                         I CERTIFY THE NEED FOR THESE SERVICES FURNISHED UNDER        THIS PLAN OF TREATMENT AND WHILE UNDER MY CARE     (Physician attestation of this document indicates review and certification of the therapy plan).              Referring Provider:  Enrike Love    Initial Assessment  See Epic Evaluation- Start of Care Date: 07/08/25

## 2025-07-08 NOTE — LETTER
7/8/2025      Enrike Sotomayor  [address] [address]      Dear Colleague,    Thank you for referring your patient, Enrike Sotomayor, to the [facility] [place]. Please see a copy of my visit note below.    Enrike Sotomayor is a 84 year old individual with a diagnosis of CMT.    See pre-visit call for interval history.     0 1 2 3 4   Sensory symptoms None Below or at ankle bones Symptoms up to the distal half of the calf Symptoms up to the proximal half of the calf, including knee Symptoms above knee (above the top of the patella)   Motor symptoms - legs None  Trips, catches toes, slaps feet, shoe inserts Ankle support or stabilization needed most of the time for ambulation Walking aids (cane, walker) needed most of the time Wheelchair most of the time   Motor symptoms - arms None Mild difficulty with buttons Severe difficulty or unable to do buttons Unable to cut most foods Proximal weakness (affect movements involving the elbow and above)   Pin sensibility Normal Decreased below or at ankle bones Decreased up to the distal half of the calf Decreased up to the proximal half of the calf, including knee Decreased above knee (above the  top of the patella)   Vibration  Normal Reduced at great toe Reduced at ankle Reduced at knee (tibial tuberosity) Absent at knee and ankle   Strength - legs Normal 4+, 4, or 4- on foot dorsi- or plantarflexion </= 3 on foot dorsi- or plantarflexion </= 3 on foot dorsi- and plantarflexion Proximal weakness   Strength - arms Normal 4+, 4, or 4- on intrinsic hand muscles </= 3 on intrinsic hand muscles < 5 on wrist extensors Weak above elbow   Ulnar CMAP  >/= 6 mV 4-5.9 mV 2-3.9 mV 0.1-1.9 mV Absent   Median CMAP (if ulnar not done) >/= 4 mV 2.8-3.9 mV 1.2-2.7 mV 0.1-1.1 mV Absent   Radial SNAP >/= 15 microV 10-14.9 microV 5-9.9 microV 1-4.9 microV < 1 microV     CMTES: 7    CMTNSv2: 12    Examination      Mental state: Alert, appropriate, speech,  language, and thought content normal.     Cranial nerves II-XII normal. Neck flexion and extension are 5/5    Sensory examination:     Right Left   Vibration (Rydell-Seiffer) TT4 TT4   Pin DC DC   Legend:   MM = medial malleolus, TT = tibial tuberosity, K = patella, MCP = MCP joint  MF = mid-foot, DC = distal calf, MC = mid calf, PC = proximal calf      Motor examination:     Right Left   Shoulder abduction  5 5   Elbow extension 5 5   Elbow flexion 5 5   Wrist extension  5 5   Finger extension 5 5   FDI 4 5   APB 4 4-   Hip flexion 5 5   Knee flexion 5 5   Knee extension 5 5   Dorsiflexion 4 4   Plantar flexion 5 5   A=atrophy      Gait: Independent but camptocormic      Impression and recommendations:  Interval change: none on serial examination; however he does report gradual progression of weakness and function disability, of uncertain etiology. Will obtain some laboratory testing for treatable metabolic causes of weakness, without evidence of substantial CMT progression.   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    The longitudinal plan of care for CMT was addressed during this visit. Due to the added complexity in care, I will continue to support Enrike Sotomayor in the subsequent management of this condition and with the ongoing continuity of care of this condition.            Nerve Conduction Studies  Motor Sites      Latency Amplitude Neg. Amp Diff Segment Distance Velocity Neg. Dur Neg Area Diff Temperature Comment   Site (ms) Norm (mV) Norm (%)  cm m/s Norm (ms) (%) ( C)    Left Ulnar (ADM) Motor   Wrist 8.0  < 3.5 1.58  > 5.0  Wrist-ADM 8   7.5  31.7    Bel Elbow 15.7 - 0.87 - -45 Bel Elbow-Wrist 19.5 25  > 48 - - 31.9      Sensory Sites      Onset Lat Peak Lat Neg Peak - Start Amp Amp (P-P) Segment Distance Velocity Temperature Comment   Site ms (ms) ( V) Norm ( V)  cm m/s Norm ( C)    Left  Radial Sensory   Forearm-Wrist 4.1 5.2 7  > 15 12 Forearm-Wrist 10 24 - 31.9            NCS Waveforms:    Motor       Sensory       Again, thank you for allowing me to participate in the care of your patient.        Sincerely,        Enrike Love MD    Electronically signed

## 2025-07-09 LAB — VIT B12 SERPL-MCNC: 440 PG/ML (ref 232–1245)

## 2025-07-10 LAB — METHYLMALONATE SERPL-SCNC: 0.28 UMOL/L (ref 0–0.4)

## 2025-07-31 ENCOUNTER — TELEPHONE (OUTPATIENT)
Dept: CARDIOLOGY | Facility: CLINIC | Age: 85
End: 2025-07-31
Payer: COMMERCIAL

## 2025-07-31 NOTE — TELEPHONE ENCOUNTER
PC to patient who had questions amanda: his eliquis with regard to his upcoming knee surgery 9/11/ reviewed OV with Dr Corona 9/5 as opportunity to discuss eliquis and receive recommendation if med hold is necessary. He agrees to plan and has no further questions.  -sea

## 2025-07-31 NOTE — TELEPHONE ENCOUNTER
[facility] Call Center    Phone Message    May a detailed message be left on voicemail: yes     Reason for Call: Other: pt would like to talk to the team about his AFIB and his follow up needs for treatment.      Action Taken: Other: cardiology     Travel Screening: Not Applicable      Thank you!  [facility]        Date of Service:

## 2025-08-09 ENCOUNTER — HEALTH MAINTENANCE LETTER (OUTPATIENT)
Age: 85
End: 2025-08-09

## 2025-08-24 DIAGNOSIS — I25.10 CORONARY ARTERY DISEASE INVOLVING NATIVE CORONARY ARTERY OF NATIVE HEART, UNSPECIFIED WHETHER ANGINA PRESENT: ICD-10-CM

## 2025-08-24 DIAGNOSIS — I25.10 CORONARY ARTERIOSCLEROSIS: ICD-10-CM

## 2025-08-25 RX ORDER — ROSUVASTATIN CALCIUM 10 MG/1
10 TABLET, COATED ORAL DAILY
Qty: 90 TABLET | Refills: 0 | Status: SHIPPED | OUTPATIENT
Start: 2025-08-25

## 2025-09-03 ENCOUNTER — LAB REQUISITION (OUTPATIENT)
Dept: LAB | Facility: CLINIC | Age: 85
End: 2025-09-03
Payer: COMMERCIAL

## 2025-09-03 DIAGNOSIS — Z01.818 ENCOUNTER FOR OTHER PREPROCEDURAL EXAMINATION: ICD-10-CM

## 2025-09-03 LAB
ANION GAP SERPL CALCULATED.3IONS-SCNC: 11 MMOL/L (ref 7–15)
BUN SERPL-MCNC: 22.8 MG/DL (ref 8–23)
CALCIUM SERPL-MCNC: 9.3 MG/DL (ref 8.8–10.4)
CHLORIDE SERPL-SCNC: 103 MMOL/L (ref 98–107)
CREAT SERPL-MCNC: 1.04 MG/DL (ref 0.67–1.17)
EGFRCR SERPLBLD CKD-EPI 2021: 72 ML/MIN/1.73M2
GLUCOSE SERPL-MCNC: 83 MG/DL (ref 70–99)
HCO3 SERPL-SCNC: 22 MMOL/L (ref 22–29)
POTASSIUM SERPL-SCNC: 4.8 MMOL/L (ref 3.4–5.3)
SODIUM SERPL-SCNC: 136 MMOL/L (ref 135–145)

## (undated) DEVICE — RX SURGIFLO HEMOSTATIC MATRIX W/THROMBIN 8ML 2994

## (undated) DEVICE — SYR ANGIOGRAPHY MULTIUSE KIT ACIST 014612

## (undated) DEVICE — SU ETHIBOND 0 CT-1 CR 8X18" CX21D

## (undated) DEVICE — SLEEVE TR BAND RADIAL COMPRESSION DEVICE 24CM TRB24-REG

## (undated) DEVICE — KIT ENDO VASOVIEW HEMOPRO 2 VH-4000

## (undated) DEVICE — CABLE MYO/LEAD PACING BLUE DISP 019-535

## (undated) DEVICE — Device

## (undated) DEVICE — DRSG DRAIN 4X4" 7086

## (undated) DEVICE — SU STRATAFIX PDS PLUS 2-0 SPIRAL CT-1 30CM SXPP1B410

## (undated) DEVICE — PACK MAJOR BASIN 673

## (undated) DEVICE — GLOVE BIOGEL PI SZ 8.0 40880

## (undated) DEVICE — CATH SUCTION 14FR W/O CTRL DYND41962

## (undated) DEVICE — SUCTION CANISTER MEDIVAC LINER 3000ML W/LID 65651-530

## (undated) DEVICE — SUCTION DRY CHEST DRAIN OASIS 3600-100

## (undated) DEVICE — KIT HAND CONTROL ACIST 014644 AR-P54

## (undated) DEVICE — CONNECTOR BLAKE DRAIN SGL BCC1

## (undated) DEVICE — SU PLEDGET SOFT TFE 3/8"X3/26"X1/16" PCP40

## (undated) DEVICE — CATH THORACIC STRAIGHT CLOTSTOP 36FR 100036

## (undated) DEVICE — DEVICE TISSUE STABILIZATION OCTOBASE 28707

## (undated) DEVICE — SU DERMABOND ADVANCED .7ML DNX12

## (undated) DEVICE — GUIDEWIRE STARTER 260CM X 0.035

## (undated) DEVICE — PITCHER STERILE 1000ML  SSK9004A

## (undated) DEVICE — SURGICEL POWDER ABSORBABLE HEMOSTAT 3GM 3013SP

## (undated) DEVICE — CUSTOM PACK CV ST JOES SCV5BCVHEA

## (undated) DEVICE — CABLE MYO/LEAD PACING WHITE DISP 019-530

## (undated) DEVICE — CONNECTOR Y 1/2 X 3/8 X 3/8 STRL C440

## (undated) DEVICE — ELECTRODE DEFIB CADENCE 22550R

## (undated) DEVICE — PREP CHLORAPREP 26ML TINTED HI-LITE ORANGE 930815

## (undated) DEVICE — LIGACLIP LARGE AESCULAP O4120-1

## (undated) DEVICE — CATH ANGIO INFINITI JL3.5 4FRX100CM 538418

## (undated) DEVICE — BLANKET BAIR ADLT PLYMR 60X36IN 63000

## (undated) DEVICE — CUSTOM PACK CAB SCV5BCBHEA

## (undated) DEVICE — GLOVE BIOGEL PI MICRO INDICATOR UNDERGLOVE SZ 8.0 48980

## (undated) DEVICE — CLIP SPRING FOGARTY SOFTJAW CSOFT6

## (undated) DEVICE — SU PROLENE 7-0 BV175-6 24" 2 ARM MONOFILAMENT M8735

## (undated) DEVICE — SOL WATER IRRIG 1000ML BOTTLE 2F7114

## (undated) DEVICE — CATH DIAGNOSTIC RADIAL 5FR TIG 4.0

## (undated) DEVICE — SU STRATAFIX MONOCRYL 4-0 SPIRAL PS-2 SXMP1B118

## (undated) DEVICE — SOL NACL 0.9% IRRIG 1000ML BOTTLE 2F7124

## (undated) DEVICE — SU PROLENE 7-0 BV175-6 24" 8735H

## (undated) DEVICE — CONNECTOR CARDIO BLAKE DRAIN BCC2

## (undated) DEVICE — CELL SAVER

## (undated) DEVICE — CUSTOM PACK CORONARY SAN5BCRHEA

## (undated) DEVICE — CLIP HORIZON MULTI SM YELLOW 001204

## (undated) DEVICE — TUBING INSUFFLATION W/FILTER 28-0207

## (undated) DEVICE — SHTH INTRO 0.021IN ID 6FR DIA

## (undated) DEVICE — ESU ELEC BLADE 2.75" COATED/INSULATED E1455

## (undated) DEVICE — MANIFOLD KIT ANGIO AUTOMATED 014613

## (undated) DEVICE — SU STRATAFIX PDS PLUS 0 CT 45CM SXPP1A406

## (undated) DEVICE — GLOVE GAMMEX NEOPRENE ULTRA SZ 7 LF 8514

## (undated) DEVICE — SU MYOSTERNAL WIRE  046-267

## (undated) DEVICE — SU ETHIBOND 3-0 BBDA 36" X588H

## (undated) DEVICE — SU STERNAL WIRE SINGLE #6 046-032

## (undated) DEVICE — RX VISTASEAL FIBRIN SEALANT W/THROMBIN 10ML VST10

## (undated) DEVICE — GOWN IMPERVIOUS BREATHABLE SMART LG 89015

## (undated) DEVICE — ESU ELEC BLADE E-SEP INSULATED NEPTUNE 70MM 0703-070-002

## (undated) DEVICE — HEMOSTASIS BONE OSTENE 2.5G SYNTHETIC 1503832

## (undated) DEVICE — SU PROLENE 6-0 C-1DA 30" M8706

## (undated) DEVICE — LEAD PACER MYOCARDIAL BIPOLAR TEMPORARY 53CM 6495F

## (undated) DEVICE — GLOVE LINER COTTON MED 32-2076

## (undated) DEVICE — ESU GROUND PAD ADULT REM W/15' CORD E7507DB

## (undated) DEVICE — ESU PENCIL SMOKE EVAC W/ROCKER SWITCH 0703-047-000

## (undated) DEVICE — CATH THORACIC STRAIGHT CLOTSTOP 32FR

## (undated) RX ORDER — PROPOFOL 10 MG/ML
INJECTION, EMULSION INTRAVENOUS
Status: DISPENSED
Start: 2024-08-07

## (undated) RX ORDER — NICARDIPINE HCL-0.9% SOD CHLOR 1 MG/10 ML
SYRINGE (ML) INTRAVENOUS
Status: DISPENSED
Start: 2024-06-24

## (undated) RX ORDER — FENTANYL CITRATE-0.9 % NACL/PF 10 MCG/ML
PLASTIC BAG, INJECTION (ML) INTRAVENOUS
Status: DISPENSED
Start: 2024-08-07

## (undated) RX ORDER — CEFAZOLIN SODIUM 1 G/3ML
INJECTION, POWDER, FOR SOLUTION INTRAMUSCULAR; INTRAVENOUS
Status: DISPENSED
Start: 2024-08-07

## (undated) RX ORDER — VASOPRESSIN 20 U/ML
INJECTION PARENTERAL
Status: DISPENSED
Start: 2024-08-07

## (undated) RX ORDER — FENTANYL CITRATE 50 UG/ML
INJECTION, SOLUTION INTRAMUSCULAR; INTRAVENOUS
Status: DISPENSED
Start: 2024-08-07

## (undated) RX ORDER — LIDOCAINE HYDROCHLORIDE 10 MG/ML
INJECTION, SOLUTION EPIDURAL; INFILTRATION; INTRACAUDAL; PERINEURAL
Status: DISPENSED
Start: 2024-08-07

## (undated) RX ORDER — FENTANYL CITRATE 50 UG/ML
INJECTION, SOLUTION INTRAMUSCULAR; INTRAVENOUS
Status: DISPENSED
Start: 2024-06-24

## (undated) RX ORDER — ONDANSETRON 2 MG/ML
INJECTION INTRAMUSCULAR; INTRAVENOUS
Status: DISPENSED
Start: 2024-08-07